# Patient Record
Sex: FEMALE | Race: WHITE | NOT HISPANIC OR LATINO
[De-identification: names, ages, dates, MRNs, and addresses within clinical notes are randomized per-mention and may not be internally consistent; named-entity substitution may affect disease eponyms.]

---

## 2017-01-03 ENCOUNTER — APPOINTMENT (OUTPATIENT)
Dept: OBGYN | Facility: CLINIC | Age: 27
End: 2017-01-03

## 2017-01-03 VITALS
SYSTOLIC BLOOD PRESSURE: 126 MMHG | WEIGHT: 124 LBS | HEIGHT: 61 IN | DIASTOLIC BLOOD PRESSURE: 81 MMHG | BODY MASS INDEX: 23.41 KG/M2

## 2017-01-04 LAB
HSV+VZV DNA SPEC QL NAA+PROBE: NOT DETECTED
SPECIMEN SOURCE: NORMAL

## 2017-01-11 ENCOUNTER — LABORATORY RESULT (OUTPATIENT)
Age: 27
End: 2017-01-11

## 2017-02-10 ENCOUNTER — EMERGENCY (EMERGENCY)
Facility: HOSPITAL | Age: 27
LOS: 1 days | Discharge: HOME | End: 2017-02-10
Attending: EMERGENCY MEDICINE
Payer: COMMERCIAL

## 2017-02-10 VITALS
HEART RATE: 92 BPM | RESPIRATION RATE: 19 BRPM | HEIGHT: 61 IN | WEIGHT: 126.1 LBS | TEMPERATURE: 98 F | DIASTOLIC BLOOD PRESSURE: 77 MMHG | SYSTOLIC BLOOD PRESSURE: 114 MMHG | OXYGEN SATURATION: 99 %

## 2017-02-10 DIAGNOSIS — N93.9 ABNORMAL UTERINE AND VAGINAL BLEEDING, UNSPECIFIED: ICD-10-CM

## 2017-02-10 DIAGNOSIS — O20.0 THREATENED ABORTION: ICD-10-CM

## 2017-02-10 DIAGNOSIS — Z98.89 OTHER SPECIFIED POSTPROCEDURAL STATES: Chronic | ICD-10-CM

## 2017-02-10 DIAGNOSIS — Z90.49 ACQUIRED ABSENCE OF OTHER SPECIFIED PARTS OF DIGESTIVE TRACT: ICD-10-CM

## 2017-02-10 DIAGNOSIS — Z90.89 ACQUIRED ABSENCE OF OTHER ORGANS: ICD-10-CM

## 2017-02-10 DIAGNOSIS — J45.909 UNSPECIFIED ASTHMA, UNCOMPLICATED: ICD-10-CM

## 2017-02-10 DIAGNOSIS — Z3A.01 LESS THAN 8 WEEKS GESTATION OF PREGNANCY: ICD-10-CM

## 2017-02-10 LAB
ALBUMIN SERPL ELPH-MCNC: 4.4 G/DL — SIGNIFICANT CHANGE UP (ref 3.3–5.2)
ALP SERPL-CCNC: 44 U/L — SIGNIFICANT CHANGE UP (ref 40–120)
ALT FLD-CCNC: 28 U/L — SIGNIFICANT CHANGE UP
ANION GAP SERPL CALC-SCNC: 13 MMOL/L — SIGNIFICANT CHANGE UP (ref 5–17)
APPEARANCE UR: CLEAR — SIGNIFICANT CHANGE UP
AST SERPL-CCNC: 48 U/L — HIGH
BACTERIA # UR AUTO: ABNORMAL
BASOPHILS # BLD AUTO: 0 K/UL — SIGNIFICANT CHANGE UP (ref 0–0.2)
BASOPHILS NFR BLD AUTO: 0.4 % — SIGNIFICANT CHANGE UP (ref 0–2)
BILIRUB SERPL-MCNC: 0.4 MG/DL — SIGNIFICANT CHANGE UP (ref 0.4–2)
BILIRUB UR-MCNC: NEGATIVE — SIGNIFICANT CHANGE UP
BUN SERPL-MCNC: 10 MG/DL — SIGNIFICANT CHANGE UP (ref 8–20)
CALCIUM SERPL-MCNC: 9.2 MG/DL — SIGNIFICANT CHANGE UP (ref 8.6–10.2)
CHLORIDE SERPL-SCNC: 101 MMOL/L — SIGNIFICANT CHANGE UP (ref 98–107)
CO2 SERPL-SCNC: 24 MMOL/L — SIGNIFICANT CHANGE UP (ref 22–29)
COLOR SPEC: YELLOW — SIGNIFICANT CHANGE UP
CREAT SERPL-MCNC: 0.56 MG/DL — SIGNIFICANT CHANGE UP (ref 0.5–1.3)
DIFF PNL FLD: ABNORMAL
EOSINOPHIL # BLD AUTO: 0.1 K/UL — SIGNIFICANT CHANGE UP (ref 0–0.5)
EOSINOPHIL NFR BLD AUTO: 1.5 % — SIGNIFICANT CHANGE UP (ref 0–6)
EPI CELLS # UR: SIGNIFICANT CHANGE UP
GLUCOSE SERPL-MCNC: 89 MG/DL — SIGNIFICANT CHANGE UP (ref 70–115)
GLUCOSE UR QL: NEGATIVE MG/DL — SIGNIFICANT CHANGE UP
HCG SERPL-ACNC: 46.54 MIU/ML — SIGNIFICANT CHANGE UP
HCG UR QL: POSITIVE
HCT VFR BLD CALC: 38.5 % — SIGNIFICANT CHANGE UP (ref 37–47)
HGB BLD-MCNC: 13.5 G/DL — SIGNIFICANT CHANGE UP (ref 12–16)
KETONES UR-MCNC: ABNORMAL
LEUKOCYTE ESTERASE UR-ACNC: ABNORMAL
LYMPHOCYTES # BLD AUTO: 1.7 K/UL — SIGNIFICANT CHANGE UP (ref 1–4.8)
LYMPHOCYTES # BLD AUTO: 21.9 % — SIGNIFICANT CHANGE UP (ref 20–55)
MCHC RBC-ENTMCNC: 31.9 PG — HIGH (ref 27–31)
MCHC RBC-ENTMCNC: 35.1 G/DL — SIGNIFICANT CHANGE UP (ref 32–36)
MCV RBC AUTO: 91 FL — SIGNIFICANT CHANGE UP (ref 81–99)
MONOCYTES # BLD AUTO: 0.7 K/UL — SIGNIFICANT CHANGE UP (ref 0–0.8)
MONOCYTES NFR BLD AUTO: 9.6 % — SIGNIFICANT CHANGE UP (ref 3–10)
NEUTROPHILS # BLD AUTO: 5 K/UL — SIGNIFICANT CHANGE UP (ref 1.8–8)
NEUTROPHILS NFR BLD AUTO: 66.3 % — SIGNIFICANT CHANGE UP (ref 37–73)
NITRITE UR-MCNC: NEGATIVE — SIGNIFICANT CHANGE UP
PH UR: 6 — SIGNIFICANT CHANGE UP (ref 4.8–8)
PLATELET # BLD AUTO: 186 K/UL — SIGNIFICANT CHANGE UP (ref 150–400)
POTASSIUM SERPL-MCNC: 5.6 MMOL/L — HIGH (ref 3.5–5.3)
POTASSIUM SERPL-SCNC: 5.6 MMOL/L — HIGH (ref 3.5–5.3)
PROT SERPL-MCNC: 7.7 G/DL — SIGNIFICANT CHANGE UP (ref 6.6–8.7)
PROT UR-MCNC: NEGATIVE MG/DL — SIGNIFICANT CHANGE UP
RBC # BLD: 4.23 M/UL — LOW (ref 4.4–5.2)
RBC # FLD: 12.7 % — SIGNIFICANT CHANGE UP (ref 11–15.6)
RBC CASTS # UR COMP ASSIST: ABNORMAL /HPF (ref 0–4)
SODIUM SERPL-SCNC: 138 MMOL/L — SIGNIFICANT CHANGE UP (ref 135–145)
SP GR SPEC: 1.01 — SIGNIFICANT CHANGE UP (ref 1.01–1.02)
UROBILINOGEN FLD QL: NEGATIVE MG/DL — SIGNIFICANT CHANGE UP
WBC # BLD: 7.58 K/UL — SIGNIFICANT CHANGE UP (ref 4.8–10.8)
WBC # FLD AUTO: 7.58 K/UL — SIGNIFICANT CHANGE UP (ref 4.8–10.8)
WBC UR QL: SIGNIFICANT CHANGE UP

## 2017-02-10 PROCEDURE — 81001 URINALYSIS AUTO W/SCOPE: CPT

## 2017-02-10 PROCEDURE — 76817 TRANSVAGINAL US OBSTETRIC: CPT

## 2017-02-10 PROCEDURE — 85027 COMPLETE CBC AUTOMATED: CPT

## 2017-02-10 PROCEDURE — 99284 EMERGENCY DEPT VISIT MOD MDM: CPT | Mod: 25

## 2017-02-10 PROCEDURE — 84702 CHORIONIC GONADOTROPIN TEST: CPT

## 2017-02-10 PROCEDURE — 80053 COMPREHEN METABOLIC PANEL: CPT

## 2017-02-10 PROCEDURE — 76801 OB US < 14 WKS SINGLE FETUS: CPT

## 2017-02-10 PROCEDURE — 76801 OB US < 14 WKS SINGLE FETUS: CPT | Mod: 26

## 2017-02-10 PROCEDURE — 99284 EMERGENCY DEPT VISIT MOD MDM: CPT

## 2017-02-10 PROCEDURE — 81025 URINE PREGNANCY TEST: CPT

## 2017-02-10 PROCEDURE — 76817 TRANSVAGINAL US OBSTETRIC: CPT | Mod: 26

## 2017-02-10 RX ORDER — ACETAMINOPHEN 500 MG
975 TABLET ORAL ONCE
Qty: 0 | Refills: 0 | Status: COMPLETED | OUTPATIENT
Start: 2017-02-10 | End: 2017-02-10

## 2017-02-10 RX ADMIN — Medication 975 MILLIGRAM(S): at 18:53

## 2017-02-10 NOTE — ED STATDOCS - MEDICAL DECISION MAKING DETAILS
Known pregnancy, about 6 weeks by date. no prenatal care to date. Describes suprapubic crampy pain and vaginal spotting. Line, labs with urine for UTI, HCG/US to evaluate for early/threatened vs. ectopic pregnancy. Will treat symptomatically, check results, and reassess.

## 2017-02-10 NOTE — ED ADULT TRIAGE NOTE - CHIEF COMPLAINT QUOTE
c/o abd. pain and vaginal bleeding , started 2 hrs ago,  bleeding with clots, . took hpt and was +, lmp 12/31/2016

## 2017-02-10 NOTE — ED STATDOCS - MUSCULOSKELETAL FINDINGS, MLM
neck supple/Spine is midline. no leg edema. No calf tenderness./normal range of motion/no muscle tenderness

## 2017-02-10 NOTE — ED STATDOCS - NS ED MD SCRIBE ATTENDING SCRIBE SECTIONS
INTAKE ASSESSMENT/SCREENINGS/DISPOSITION/REVIEW OF SYSTEMS/HIV/VITAL SIGNS( Pullset)/HISTORY OF PRESENT ILLNESS/PHYSICAL EXAM/PAST MEDICAL/SURGICAL/SOCIAL HISTORY

## 2017-02-10 NOTE — ED STATDOCS - PROGRESS NOTE DETAILS
PA NOTE: Pt seen by intake physician and orders/plan reviewed. PT is pregnant presenting to the ED for lower abdominal cramping and vaginal bleeding.  Pt states she found out on 17 that she was pregnant with at home test, states scheduled for OB visit on 17; LMP 16, .  Pt states lower abdominal cramping started this morning, developing vaginal bleeding with small clot around 2pm.  Pt denies  fever, chills, dysuria, vaginal discharge, diarrhea, constipation, nausea, vomiting, hx of STDs.  Pt   PE: GEN: Awake, alert,  NAD,  EYES: PERRL CARDIAC: Reg rate and rhythm, S1,S2, RRR  RESP: No distress noted. Lungs CTA bilaterally no wheeze, ronchi, rales. ABD: soft, supple, mid-suprapubic tenderness, no guarding. . NEURO: AOx3, no focal deficits   PLAN: labs- cbc to check for infection/anemia, bmp to check kidney function/electrolytes, beta hcg, type and screen, ua; pelvic US to evaluate for IUP vs ectopic vs miscarriage; tylenol for pain; monitor; re-evaluate Spoke with patients OB/GYN doctor, Dr. Flannery [phone], made aware of the pts results, no IUP with beta of 45.  Instructed pt to follow up monday.  Pt states blood type is b+.  Pt states she is ready to go home and will follow up on Monday as scheduled. Pt educated on signs and symptoms to return to ED for.

## 2017-02-10 NOTE — ED STATDOCS - LAB INTERPRETATION
hemolyze potassium  no white count  not anemic  urine = rbcs  very low hcg suggesting early pregnancy; IUP cannot likely be seen due to early--follow quant/US

## 2017-02-10 NOTE — ED STATDOCS - OBJECTIVE STATEMENT
27 year old female, with hx of asthma, presenting to the ED with vaginal bleeding and cramping suprapubic pain. Pt states that she is approximately 6 weeks pregnant and found out through a home-pregnancy test. She states that she has not had her first visit with her GYN yet. Pt states that her PSHx includes appendectomy and tonsillectomy. She states that she has NKDA. She states that she began to bleed vaginally 2 hours ago. Pt denies having any hx of transfusion or transplant. She denies having any nausea, vomiting, dysuria, fever, cough, phlegm, or urinary frequency. Pt states that she is able to tolerate PO. No further complaints at this time.  GYN: Dr. Lawson

## 2017-02-10 NOTE — ED STATDOCS - ATTENDING CONTRIBUTION TO CARE
I, Jairo Richardson, performed the initial face to face bedside interview with this patient regarding history of present illness, review of symptoms and relevant past medical, social and family history.  I completed an independent physical examination.  I was the initial provider who evaluated this patient. I have signed out the follow up of any pending tests (i.e. labs, radiological studies) to the ACP.  I have communicated the patient’s plan of care and disposition with the ACP.  The history, relevant review of systems, past medical and surgical history, medical decision making, and physical examination was documented by the scribe in my presence and I attest to the accuracy of the documentation.

## 2017-02-13 ENCOUNTER — APPOINTMENT (OUTPATIENT)
Dept: OBGYN | Facility: CLINIC | Age: 27
End: 2017-02-13

## 2017-02-13 VITALS
DIASTOLIC BLOOD PRESSURE: 70 MMHG | WEIGHT: 124 LBS | HEIGHT: 61 IN | SYSTOLIC BLOOD PRESSURE: 110 MMHG | BODY MASS INDEX: 23.41 KG/M2

## 2017-02-14 ENCOUNTER — LABORATORY RESULT (OUTPATIENT)
Age: 27
End: 2017-02-14

## 2017-02-20 LAB
HCG UR QL: NEGATIVE
QUALITY CONTROL: YES

## 2017-07-03 ENCOUNTER — APPOINTMENT (OUTPATIENT)
Dept: OBGYN | Facility: CLINIC | Age: 27
End: 2017-07-03

## 2017-07-03 VITALS
HEART RATE: 106 BPM | HEIGHT: 61 IN | SYSTOLIC BLOOD PRESSURE: 106 MMHG | BODY MASS INDEX: 24.17 KG/M2 | WEIGHT: 128 LBS | DIASTOLIC BLOOD PRESSURE: 72 MMHG

## 2017-07-06 ENCOUNTER — APPOINTMENT (OUTPATIENT)
Dept: HUMAN REPRODUCTION | Facility: CLINIC | Age: 27
End: 2017-07-06

## 2017-08-04 ENCOUNTER — APPOINTMENT (OUTPATIENT)
Dept: OBGYN | Facility: CLINIC | Age: 27
End: 2017-08-04

## 2017-08-28 ENCOUNTER — APPOINTMENT (OUTPATIENT)
Dept: OBGYN | Facility: CLINIC | Age: 27
End: 2017-08-28
Payer: COMMERCIAL

## 2017-08-28 VITALS
SYSTOLIC BLOOD PRESSURE: 105 MMHG | BODY MASS INDEX: 24.35 KG/M2 | WEIGHT: 129 LBS | DIASTOLIC BLOOD PRESSURE: 65 MMHG | HEIGHT: 61 IN

## 2017-08-28 LAB
HCG UR QL: POSITIVE
QUALITY CONTROL: YES

## 2017-08-28 PROCEDURE — 76817 TRANSVAGINAL US OBSTETRIC: CPT

## 2017-08-28 PROCEDURE — 99213 OFFICE O/P EST LOW 20 MIN: CPT | Mod: 25

## 2017-08-28 PROCEDURE — 81025 URINE PREGNANCY TEST: CPT

## 2017-09-01 ENCOUNTER — RX RENEWAL (OUTPATIENT)
Age: 27
End: 2017-09-01

## 2017-09-01 LAB — CYTOLOGY CVX/VAG DOC THIN PREP: NORMAL

## 2017-09-11 ENCOUNTER — APPOINTMENT (OUTPATIENT)
Dept: OBGYN | Facility: CLINIC | Age: 27
End: 2017-09-11
Payer: COMMERCIAL

## 2017-09-11 VITALS
HEIGHT: 61 IN | SYSTOLIC BLOOD PRESSURE: 110 MMHG | WEIGHT: 128 LBS | DIASTOLIC BLOOD PRESSURE: 74 MMHG | BODY MASS INDEX: 24.17 KG/M2

## 2017-09-11 DIAGNOSIS — R19.09 OTHER INTRA-ABDOMINAL AND PELVIC SWELLING, MASS AND LUMP: ICD-10-CM

## 2017-09-11 DIAGNOSIS — Z87.42 PERSONAL HISTORY OF OTHER DISEASES OF THE FEMALE GENITAL TRACT: ICD-10-CM

## 2017-09-11 DIAGNOSIS — N88.8 OTHER SPECIFIED NONINFLAMMATORY DISORDERS OF CERVIX UTERI: ICD-10-CM

## 2017-09-11 DIAGNOSIS — M25.559 PAIN IN UNSPECIFIED HIP: ICD-10-CM

## 2017-09-11 DIAGNOSIS — O03.9 COMPLETE OR UNSPECIFIED SPONTANEOUS ABORTION W/OUT COMPLICATION: ICD-10-CM

## 2017-09-11 DIAGNOSIS — Z87.09 PERSONAL HISTORY OF OTHER DISEASES OF THE RESPIRATORY SYSTEM: ICD-10-CM

## 2017-09-11 DIAGNOSIS — Z86.19 PERSONAL HISTORY OF OTHER INFECTIOUS AND PARASITIC DISEASES: ICD-10-CM

## 2017-09-11 PROCEDURE — 0501F PRENATAL FLOW SHEET: CPT

## 2017-09-11 PROCEDURE — 76817 TRANSVAGINAL US OBSTETRIC: CPT

## 2017-09-25 ENCOUNTER — APPOINTMENT (OUTPATIENT)
Dept: ANTEPARTUM | Facility: CLINIC | Age: 27
End: 2017-09-25
Payer: COMMERCIAL

## 2017-09-25 ENCOUNTER — ASOB RESULT (OUTPATIENT)
Age: 27
End: 2017-09-25

## 2017-09-25 PROCEDURE — 76801 OB US < 14 WKS SINGLE FETUS: CPT

## 2017-10-02 ENCOUNTER — APPOINTMENT (OUTPATIENT)
Dept: ANTEPARTUM | Facility: CLINIC | Age: 27
End: 2017-10-02
Payer: COMMERCIAL

## 2017-10-02 ENCOUNTER — ASOB RESULT (OUTPATIENT)
Age: 27
End: 2017-10-02

## 2017-10-02 PROCEDURE — 76801 OB US < 14 WKS SINGLE FETUS: CPT

## 2017-10-02 PROCEDURE — 76813 OB US NUCHAL MEAS 1 GEST: CPT

## 2017-10-02 PROCEDURE — 36416 COLLJ CAPILLARY BLOOD SPEC: CPT

## 2017-10-04 ENCOUNTER — ASOB RESULT (OUTPATIENT)
Age: 27
End: 2017-10-04

## 2017-10-04 ENCOUNTER — APPOINTMENT (OUTPATIENT)
Dept: MATERNAL FETAL MEDICINE | Facility: CLINIC | Age: 27
End: 2017-10-04

## 2017-10-05 LAB
1ST TRIMESTER DATA: NORMAL
ADDENDUM DOC: NORMAL
AFP PNL SERPL: NORMAL
AFP SERPL-ACNC: NORMAL
CLINICAL BIOCHEMIST REVIEW: NORMAL
FREE BETA HCG 1ST TRIMESTER: NORMAL
Lab: NORMAL
NOTES NTD: NORMAL
NT: NORMAL
PAPP-A SERPL-ACNC: NORMAL
TRISOMY 18/3: NORMAL

## 2017-10-09 ENCOUNTER — NON-APPOINTMENT (OUTPATIENT)
Age: 27
End: 2017-10-09

## 2017-10-09 ENCOUNTER — APPOINTMENT (OUTPATIENT)
Dept: OBGYN | Facility: CLINIC | Age: 27
End: 2017-10-09
Payer: COMMERCIAL

## 2017-10-09 VITALS
HEIGHT: 61 IN | WEIGHT: 125 LBS | SYSTOLIC BLOOD PRESSURE: 114 MMHG | DIASTOLIC BLOOD PRESSURE: 70 MMHG | BODY MASS INDEX: 23.6 KG/M2

## 2017-10-09 PROCEDURE — 90471 IMMUNIZATION ADMIN: CPT

## 2017-10-09 PROCEDURE — 90656 IIV3 VACC NO PRSV 0.5 ML IM: CPT

## 2017-10-09 PROCEDURE — 0502F SUBSEQUENT PRENATAL CARE: CPT

## 2017-10-14 ENCOUNTER — TRANSCRIPTION ENCOUNTER (OUTPATIENT)
Age: 27
End: 2017-10-14

## 2017-10-16 LAB — CYTOLOGY CVX/VAG DOC THIN PREP: NORMAL

## 2017-10-19 ENCOUNTER — APPOINTMENT (OUTPATIENT)
Dept: NEUROLOGY | Facility: CLINIC | Age: 27
End: 2017-10-19
Payer: COMMERCIAL

## 2017-10-19 VITALS
SYSTOLIC BLOOD PRESSURE: 112 MMHG | WEIGHT: 125 LBS | DIASTOLIC BLOOD PRESSURE: 80 MMHG | HEIGHT: 61 IN | BODY MASS INDEX: 23.6 KG/M2

## 2017-10-19 PROCEDURE — 99204 OFFICE O/P NEW MOD 45 MIN: CPT

## 2017-10-30 ENCOUNTER — APPOINTMENT (OUTPATIENT)
Dept: ANTEPARTUM | Facility: CLINIC | Age: 27
End: 2017-10-30
Payer: COMMERCIAL

## 2017-10-30 ENCOUNTER — ASOB RESULT (OUTPATIENT)
Age: 27
End: 2017-10-30

## 2017-10-30 PROCEDURE — 76805 OB US >/= 14 WKS SNGL FETUS: CPT

## 2017-11-02 LAB
1ST TRIMESTER DATA: NORMAL
2ND TRIMESTER DATA: NORMAL
AFP PNL SERPL: NORMAL
AFP SERPL-ACNC: NORMAL
AFP SERPL-ACNC: NORMAL
B-HCG FREE SERPL-MCNC: NORMAL
CLINICAL BIOCHEMIST REVIEW: NORMAL
FREE BETA HCG 1ST TRIMESTER: NORMAL
INHIBIN A SERPL-MCNC: NORMAL
NOTES NTD: NORMAL
NT: NORMAL
PAPP-A SERPL-ACNC: NORMAL
U ESTRIOL SERPL-SCNC: NORMAL

## 2017-11-06 ENCOUNTER — APPOINTMENT (OUTPATIENT)
Dept: OBGYN | Facility: CLINIC | Age: 27
End: 2017-11-06
Payer: COMMERCIAL

## 2017-11-06 VITALS
HEIGHT: 61 IN | SYSTOLIC BLOOD PRESSURE: 110 MMHG | BODY MASS INDEX: 24.17 KG/M2 | WEIGHT: 128 LBS | DIASTOLIC BLOOD PRESSURE: 68 MMHG

## 2017-11-06 PROCEDURE — 0502F SUBSEQUENT PRENATAL CARE: CPT

## 2017-11-17 ENCOUNTER — APPOINTMENT (OUTPATIENT)
Dept: OBGYN | Facility: CLINIC | Age: 27
End: 2017-11-17
Payer: COMMERCIAL

## 2017-11-17 VITALS — BODY MASS INDEX: 24.56 KG/M2 | WEIGHT: 130 LBS | SYSTOLIC BLOOD PRESSURE: 118 MMHG | DIASTOLIC BLOOD PRESSURE: 78 MMHG

## 2017-11-17 PROCEDURE — 0502F SUBSEQUENT PRENATAL CARE: CPT

## 2017-11-27 ENCOUNTER — APPOINTMENT (OUTPATIENT)
Dept: ANTEPARTUM | Facility: CLINIC | Age: 27
End: 2017-11-27
Payer: COMMERCIAL

## 2017-11-27 ENCOUNTER — ASOB RESULT (OUTPATIENT)
Age: 27
End: 2017-11-27

## 2017-11-27 PROCEDURE — 76817 TRANSVAGINAL US OBSTETRIC: CPT | Mod: 59

## 2017-11-27 PROCEDURE — 76811 OB US DETAILED SNGL FETUS: CPT

## 2017-12-04 ENCOUNTER — APPOINTMENT (OUTPATIENT)
Dept: OBGYN | Facility: CLINIC | Age: 27
End: 2017-12-04
Payer: COMMERCIAL

## 2017-12-04 VITALS
BODY MASS INDEX: 25.3 KG/M2 | WEIGHT: 134 LBS | SYSTOLIC BLOOD PRESSURE: 110 MMHG | HEIGHT: 61 IN | DIASTOLIC BLOOD PRESSURE: 74 MMHG

## 2017-12-04 PROCEDURE — 76818 FETAL BIOPHYS PROFILE W/NST: CPT

## 2017-12-04 PROCEDURE — 0502F SUBSEQUENT PRENATAL CARE: CPT

## 2017-12-18 ENCOUNTER — APPOINTMENT (OUTPATIENT)
Dept: OBGYN | Facility: CLINIC | Age: 27
End: 2017-12-18
Payer: COMMERCIAL

## 2017-12-18 VITALS
BODY MASS INDEX: 25.86 KG/M2 | WEIGHT: 137 LBS | DIASTOLIC BLOOD PRESSURE: 74 MMHG | SYSTOLIC BLOOD PRESSURE: 111 MMHG | HEIGHT: 61 IN

## 2017-12-18 PROCEDURE — 59025 FETAL NON-STRESS TEST: CPT

## 2017-12-18 PROCEDURE — 0502F SUBSEQUENT PRENATAL CARE: CPT

## 2017-12-18 PROCEDURE — 76817 TRANSVAGINAL US OBSTETRIC: CPT

## 2018-01-01 ENCOUNTER — LABORATORY RESULT (OUTPATIENT)
Age: 28
End: 2018-01-01

## 2018-01-02 ENCOUNTER — APPOINTMENT (OUTPATIENT)
Dept: OBGYN | Facility: CLINIC | Age: 28
End: 2018-01-02
Payer: COMMERCIAL

## 2018-01-02 VITALS
SYSTOLIC BLOOD PRESSURE: 125 MMHG | WEIGHT: 138 LBS | BODY MASS INDEX: 26.06 KG/M2 | DIASTOLIC BLOOD PRESSURE: 86 MMHG | HEIGHT: 61 IN

## 2018-01-02 PROCEDURE — 99213 OFFICE O/P EST LOW 20 MIN: CPT | Mod: 25

## 2018-01-02 PROCEDURE — 0502F SUBSEQUENT PRENATAL CARE: CPT

## 2018-01-17 ENCOUNTER — APPOINTMENT (OUTPATIENT)
Dept: OBGYN | Facility: CLINIC | Age: 28
End: 2018-01-17
Payer: COMMERCIAL

## 2018-01-17 VITALS
SYSTOLIC BLOOD PRESSURE: 121 MMHG | BODY MASS INDEX: 26.62 KG/M2 | WEIGHT: 141 LBS | DIASTOLIC BLOOD PRESSURE: 74 MMHG | HEIGHT: 61 IN

## 2018-01-17 PROCEDURE — 0502F SUBSEQUENT PRENATAL CARE: CPT

## 2018-01-22 ENCOUNTER — ASOB RESULT (OUTPATIENT)
Age: 28
End: 2018-01-22

## 2018-01-22 ENCOUNTER — APPOINTMENT (OUTPATIENT)
Dept: ANTEPARTUM | Facility: CLINIC | Age: 28
End: 2018-01-22
Payer: COMMERCIAL

## 2018-01-22 PROCEDURE — 76817 TRANSVAGINAL US OBSTETRIC: CPT

## 2018-01-22 PROCEDURE — 76816 OB US FOLLOW-UP PER FETUS: CPT

## 2018-01-23 ENCOUNTER — APPOINTMENT (OUTPATIENT)
Dept: ANTEPARTUM | Facility: CLINIC | Age: 28
End: 2018-01-23

## 2018-01-23 ENCOUNTER — APPOINTMENT (OUTPATIENT)
Dept: MATERNAL FETAL MEDICINE | Facility: CLINIC | Age: 28
End: 2018-01-23
Payer: COMMERCIAL

## 2018-01-23 VITALS
BODY MASS INDEX: 27.3 KG/M2 | RESPIRATION RATE: 16 BRPM | SYSTOLIC BLOOD PRESSURE: 102 MMHG | HEART RATE: 99 BPM | OXYGEN SATURATION: 98 % | DIASTOLIC BLOOD PRESSURE: 68 MMHG | WEIGHT: 144.5 LBS

## 2018-01-23 DIAGNOSIS — Z3A.28 28 WEEKS GESTATION OF PREGNANCY: ICD-10-CM

## 2018-01-23 PROCEDURE — 99242 OFF/OP CONSLTJ NEW/EST SF 20: CPT

## 2018-01-23 RX ORDER — OXICONAZOLE NITRATE 10 MG/G
1 CREAM TOPICAL
Qty: 90 | Refills: 0 | Status: DISCONTINUED | COMMUNITY
Start: 2017-06-12 | End: 2018-01-23

## 2018-01-23 RX ORDER — HYDROCORTISONE 25 MG/G
2.5 CREAM TOPICAL
Qty: 454 | Refills: 0 | Status: DISCONTINUED | COMMUNITY
Start: 2017-11-02

## 2018-01-23 RX ORDER — AZELAIC ACID 0.15 G/G
15 AEROSOL, FOAM TOPICAL
Qty: 50 | Refills: 0 | Status: DISCONTINUED | COMMUNITY
Start: 2017-06-12 | End: 2018-01-23

## 2018-01-23 RX ORDER — DOXYCYCLINE HYCLATE 100 MG/1
100 TABLET ORAL
Qty: 10 | Refills: 0 | Status: DISCONTINUED | COMMUNITY
Start: 2017-06-12 | End: 2018-01-23

## 2018-01-23 RX ORDER — VITAMIN A ACETATE, .BETA.-CAROTENE, ASCORBIC ACID, CHOLECALCIFEROL, .ALPHA.-TOCOPHEROL ACETATE, DL-, THIAMINE MONONITRATE, RIBOFLAVIN, NIACINAMIDE, PYRIDOXINE HYDROCHLORIDE, FOLIC ACID, CYANOCOBALAMIN, CALCIUM CARBONATE, FERROUS FUMARATE, ZINC OXIDE, AND CUPRIC OXIDE 2000; 2000; 120; 400; 22; 1.84; 3; 20; 10; 1; 12; 200; 27; 25; 2 [IU]/1; [IU]/1; MG/1; [IU]/1; MG/1; MG/1; MG/1; MG/1; MG/1; MG/1; UG/1; MG/1; MG/1; MG/1; MG/1
27-1 TABLET ORAL DAILY
Qty: 90 | Refills: 2 | Status: DISCONTINUED | COMMUNITY
Start: 2017-03-16 | End: 2018-01-23

## 2018-01-23 RX ORDER — VITAMIN C, CALCIUM, IRON, VITAMIN D3, VITAMIN E, VITAMIN B1, VITAMIN B2, VITAMIN B3, VITAMIN B6, FOLIC ACID, IODINE, ZINC, COPPER, DOCUSATE SODIUM, DOCOSAHEXAENOIC ACID (DHA) 27-1-50 MG
27-1 & 250 KIT ORAL
Qty: 90 | Refills: 2 | Status: DISCONTINUED | COMMUNITY
Start: 2017-02-20 | End: 2018-01-23

## 2018-01-30 ENCOUNTER — ASOB RESULT (OUTPATIENT)
Age: 28
End: 2018-01-30

## 2018-01-30 ENCOUNTER — APPOINTMENT (OUTPATIENT)
Dept: ANTEPARTUM | Facility: CLINIC | Age: 28
End: 2018-01-30
Payer: COMMERCIAL

## 2018-01-30 PROCEDURE — 76815 OB US LIMITED FETUS(S): CPT

## 2018-01-30 PROCEDURE — 76819 FETAL BIOPHYS PROFIL W/O NST: CPT

## 2018-01-31 LAB — FIBRONECTIN PLAS-MCNC: NEGATIVE

## 2018-02-05 ENCOUNTER — APPOINTMENT (OUTPATIENT)
Dept: OBGYN | Facility: CLINIC | Age: 28
End: 2018-02-05
Payer: COMMERCIAL

## 2018-02-05 ENCOUNTER — APPOINTMENT (OUTPATIENT)
Dept: ANTEPARTUM | Facility: CLINIC | Age: 28
End: 2018-02-05

## 2018-02-05 VITALS
WEIGHT: 146 LBS | HEIGHT: 61 IN | DIASTOLIC BLOOD PRESSURE: 72 MMHG | SYSTOLIC BLOOD PRESSURE: 113 MMHG | BODY MASS INDEX: 27.56 KG/M2

## 2018-02-05 PROCEDURE — 0502F SUBSEQUENT PRENATAL CARE: CPT

## 2018-02-13 ENCOUNTER — APPOINTMENT (OUTPATIENT)
Dept: ANTEPARTUM | Facility: CLINIC | Age: 28
End: 2018-02-13
Payer: COMMERCIAL

## 2018-02-13 ENCOUNTER — ASOB RESULT (OUTPATIENT)
Age: 28
End: 2018-02-13

## 2018-02-13 PROCEDURE — 76817 TRANSVAGINAL US OBSTETRIC: CPT

## 2018-02-13 PROCEDURE — 76816 OB US FOLLOW-UP PER FETUS: CPT

## 2018-02-13 PROCEDURE — 76819 FETAL BIOPHYS PROFIL W/O NST: CPT

## 2018-02-14 ENCOUNTER — LABORATORY RESULT (OUTPATIENT)
Age: 28
End: 2018-02-14

## 2018-02-16 ENCOUNTER — APPOINTMENT (OUTPATIENT)
Dept: OBGYN | Facility: CLINIC | Age: 28
End: 2018-02-16
Payer: COMMERCIAL

## 2018-02-16 VITALS
WEIGHT: 147 LBS | HEIGHT: 61 IN | BODY MASS INDEX: 27.75 KG/M2 | DIASTOLIC BLOOD PRESSURE: 70 MMHG | SYSTOLIC BLOOD PRESSURE: 114 MMHG

## 2018-02-16 PROCEDURE — 0502F SUBSEQUENT PRENATAL CARE: CPT

## 2018-02-16 PROCEDURE — 90715 TDAP VACCINE 7 YRS/> IM: CPT

## 2018-02-16 PROCEDURE — 90471 IMMUNIZATION ADMIN: CPT

## 2018-02-28 ENCOUNTER — ASOB RESULT (OUTPATIENT)
Age: 28
End: 2018-02-28

## 2018-02-28 ENCOUNTER — APPOINTMENT (OUTPATIENT)
Dept: ANTEPARTUM | Facility: CLINIC | Age: 28
End: 2018-02-28
Payer: COMMERCIAL

## 2018-02-28 ENCOUNTER — APPOINTMENT (OUTPATIENT)
Dept: OBGYN | Facility: CLINIC | Age: 28
End: 2018-02-28
Payer: COMMERCIAL

## 2018-02-28 VITALS
DIASTOLIC BLOOD PRESSURE: 74 MMHG | WEIGHT: 150 LBS | HEIGHT: 61 IN | BODY MASS INDEX: 28.32 KG/M2 | SYSTOLIC BLOOD PRESSURE: 126 MMHG

## 2018-02-28 PROCEDURE — 76816 OB US FOLLOW-UP PER FETUS: CPT

## 2018-02-28 PROCEDURE — 0502F SUBSEQUENT PRENATAL CARE: CPT

## 2018-02-28 PROCEDURE — 76817 TRANSVAGINAL US OBSTETRIC: CPT

## 2018-02-28 PROCEDURE — 76819 FETAL BIOPHYS PROFIL W/O NST: CPT

## 2018-03-13 ENCOUNTER — APPOINTMENT (OUTPATIENT)
Dept: ANTEPARTUM | Facility: CLINIC | Age: 28
End: 2018-03-13
Payer: COMMERCIAL

## 2018-03-13 ENCOUNTER — ASOB RESULT (OUTPATIENT)
Age: 28
End: 2018-03-13

## 2018-03-13 ENCOUNTER — LABORATORY RESULT (OUTPATIENT)
Age: 28
End: 2018-03-13

## 2018-03-13 PROCEDURE — 76816 OB US FOLLOW-UP PER FETUS: CPT

## 2018-03-13 PROCEDURE — 76819 FETAL BIOPHYS PROFIL W/O NST: CPT

## 2018-03-14 ENCOUNTER — LABORATORY RESULT (OUTPATIENT)
Age: 28
End: 2018-03-14

## 2018-03-14 ENCOUNTER — APPOINTMENT (OUTPATIENT)
Dept: OBGYN | Facility: CLINIC | Age: 28
End: 2018-03-14
Payer: COMMERCIAL

## 2018-03-14 VITALS
DIASTOLIC BLOOD PRESSURE: 78 MMHG | BODY MASS INDEX: 28.7 KG/M2 | HEIGHT: 61 IN | WEIGHT: 152 LBS | SYSTOLIC BLOOD PRESSURE: 110 MMHG

## 2018-03-14 PROCEDURE — 0502F SUBSEQUENT PRENATAL CARE: CPT

## 2018-03-21 ENCOUNTER — APPOINTMENT (OUTPATIENT)
Dept: OBGYN | Facility: CLINIC | Age: 28
End: 2018-03-21
Payer: COMMERCIAL

## 2018-03-21 ENCOUNTER — APPOINTMENT (OUTPATIENT)
Dept: ANTEPARTUM | Facility: CLINIC | Age: 28
End: 2018-03-21
Payer: COMMERCIAL

## 2018-03-21 ENCOUNTER — ASOB RESULT (OUTPATIENT)
Age: 28
End: 2018-03-21

## 2018-03-21 PROCEDURE — 76819 FETAL BIOPHYS PROFIL W/O NST: CPT

## 2018-03-21 PROCEDURE — 76816 OB US FOLLOW-UP PER FETUS: CPT

## 2018-03-21 PROCEDURE — 0502F SUBSEQUENT PRENATAL CARE: CPT

## 2018-03-29 ENCOUNTER — APPOINTMENT (OUTPATIENT)
Dept: OBGYN | Facility: CLINIC | Age: 28
End: 2018-03-29
Payer: COMMERCIAL

## 2018-03-29 VITALS
SYSTOLIC BLOOD PRESSURE: 110 MMHG | HEIGHT: 61 IN | BODY MASS INDEX: 29.83 KG/M2 | DIASTOLIC BLOOD PRESSURE: 76 MMHG | WEIGHT: 158 LBS

## 2018-03-29 PROCEDURE — 0502F SUBSEQUENT PRENATAL CARE: CPT

## 2018-03-29 PROCEDURE — 76818 FETAL BIOPHYS PROFILE W/NST: CPT

## 2018-04-04 ENCOUNTER — APPOINTMENT (OUTPATIENT)
Dept: OBGYN | Facility: CLINIC | Age: 28
End: 2018-04-04
Payer: COMMERCIAL

## 2018-04-04 VITALS
WEIGHT: 160 LBS | SYSTOLIC BLOOD PRESSURE: 130 MMHG | DIASTOLIC BLOOD PRESSURE: 80 MMHG | BODY MASS INDEX: 30.21 KG/M2 | HEIGHT: 61 IN

## 2018-04-04 PROCEDURE — 0502F SUBSEQUENT PRENATAL CARE: CPT

## 2018-04-10 ENCOUNTER — MEDICATION RENEWAL (OUTPATIENT)
Age: 28
End: 2018-04-10

## 2018-04-12 ENCOUNTER — APPOINTMENT (OUTPATIENT)
Dept: ANTEPARTUM | Facility: CLINIC | Age: 28
End: 2018-04-12
Payer: COMMERCIAL

## 2018-04-12 ENCOUNTER — APPOINTMENT (OUTPATIENT)
Dept: OBGYN | Facility: CLINIC | Age: 28
End: 2018-04-12
Payer: COMMERCIAL

## 2018-04-12 ENCOUNTER — ASOB RESULT (OUTPATIENT)
Age: 28
End: 2018-04-12

## 2018-04-12 VITALS
DIASTOLIC BLOOD PRESSURE: 80 MMHG | BODY MASS INDEX: 30.21 KG/M2 | SYSTOLIC BLOOD PRESSURE: 129 MMHG | WEIGHT: 160 LBS | HEIGHT: 61 IN

## 2018-04-12 PROCEDURE — 76820 UMBILICAL ARTERY ECHO: CPT

## 2018-04-12 PROCEDURE — 93975 VASCULAR STUDY: CPT

## 2018-04-12 PROCEDURE — 0502F SUBSEQUENT PRENATAL CARE: CPT

## 2018-04-12 PROCEDURE — 76819 FETAL BIOPHYS PROFIL W/O NST: CPT

## 2018-04-12 PROCEDURE — 76816 OB US FOLLOW-UP PER FETUS: CPT

## 2018-04-18 ENCOUNTER — APPOINTMENT (OUTPATIENT)
Dept: OBGYN | Facility: CLINIC | Age: 28
End: 2018-04-18
Payer: COMMERCIAL

## 2018-04-18 VITALS
WEIGHT: 161 LBS | BODY MASS INDEX: 30.4 KG/M2 | SYSTOLIC BLOOD PRESSURE: 120 MMHG | HEIGHT: 61 IN | DIASTOLIC BLOOD PRESSURE: 84 MMHG

## 2018-04-18 PROCEDURE — 76817 TRANSVAGINAL US OBSTETRIC: CPT

## 2018-04-18 PROCEDURE — 0502F SUBSEQUENT PRENATAL CARE: CPT

## 2018-04-23 ENCOUNTER — APPOINTMENT (OUTPATIENT)
Dept: OBGYN | Facility: CLINIC | Age: 28
End: 2018-04-23
Payer: COMMERCIAL

## 2018-04-23 PROCEDURE — 76818 FETAL BIOPHYS PROFILE W/NST: CPT

## 2018-04-23 PROCEDURE — 0502F SUBSEQUENT PRENATAL CARE: CPT

## 2018-04-25 ENCOUNTER — TRANSCRIPTION ENCOUNTER (OUTPATIENT)
Age: 28
End: 2018-04-25

## 2018-04-25 ENCOUNTER — INPATIENT (INPATIENT)
Facility: HOSPITAL | Age: 28
LOS: 1 days | Discharge: HOME | End: 2018-04-27
Payer: COMMERCIAL

## 2018-04-25 VITALS — WEIGHT: 158.73 LBS | HEIGHT: 61 IN

## 2018-04-25 DIAGNOSIS — Z98.89 OTHER SPECIFIED POSTPROCEDURAL STATES: Chronic | ICD-10-CM

## 2018-04-25 DIAGNOSIS — O26.893 OTHER SPECIFIED PREGNANCY RELATED CONDITIONS, THIRD TRIMESTER: ICD-10-CM

## 2018-04-25 DIAGNOSIS — O47.1 FALSE LABOR AT OR AFTER 37 COMPLETED WEEKS OF GESTATION: ICD-10-CM

## 2018-04-25 LAB
APPEARANCE UR: CLEAR — SIGNIFICANT CHANGE UP
BASE EXCESS BLDCOA CALC-SCNC: -9.1 MMOL/L — LOW (ref -2–2)
BASE EXCESS BLDCOV CALC-SCNC: -7.6 MMOL/L — LOW (ref -2–2)
BASOPHILS # BLD AUTO: 0 K/UL — SIGNIFICANT CHANGE UP (ref 0–0.2)
BILIRUB UR-MCNC: NEGATIVE — SIGNIFICANT CHANGE UP
BLD GP AB SCN SERPL QL: SIGNIFICANT CHANGE UP
COLOR SPEC: YELLOW — SIGNIFICANT CHANGE UP
DIFF PNL FLD: ABNORMAL
EOSINOPHIL # BLD AUTO: 0 K/UL — SIGNIFICANT CHANGE UP (ref 0–0.5)
EPI CELLS # UR: SIGNIFICANT CHANGE UP
GAS PNL BLDCOV: 7.19 — LOW (ref 7.25–7.45)
GLUCOSE UR QL: NEGATIVE — SIGNIFICANT CHANGE UP
HCO3 BLDCOA-SCNC: 16 MMOL/L — LOW (ref 21–29)
HCO3 BLDCOV-SCNC: 17 MMOL/L — LOW (ref 21–29)
HCT VFR BLD CALC: 32.9 % — LOW (ref 37–47)
HGB BLD-MCNC: 11.4 G/DL — LOW (ref 12–16)
KETONES UR-MCNC: NEGATIVE — SIGNIFICANT CHANGE UP
LEUKOCYTE ESTERASE UR-ACNC: ABNORMAL
LYMPHOCYTES # BLD AUTO: 1.3 K/UL — SIGNIFICANT CHANGE UP (ref 1–4.8)
LYMPHOCYTES # BLD AUTO: 10 % — LOW (ref 20–55)
MCHC RBC-ENTMCNC: 32.3 PG — HIGH (ref 27–31)
MCHC RBC-ENTMCNC: 34.7 G/DL — SIGNIFICANT CHANGE UP (ref 32–36)
MCV RBC AUTO: 93.2 FL — SIGNIFICANT CHANGE UP (ref 81–99)
MONOCYTES # BLD AUTO: 0.6 K/UL — SIGNIFICANT CHANGE UP (ref 0–0.8)
MONOCYTES NFR BLD AUTO: 5 % — SIGNIFICANT CHANGE UP (ref 3–10)
NEUTROPHILS # BLD AUTO: 11.1 K/UL — HIGH (ref 1.8–8)
NEUTROPHILS NFR BLD AUTO: 84 % — HIGH (ref 37–73)
NEUTS BAND # BLD: 1 % — SIGNIFICANT CHANGE UP (ref 0–8)
NITRITE UR-MCNC: NEGATIVE — SIGNIFICANT CHANGE UP
PCO2 BLDCOA: 64.8 MMHG — SIGNIFICANT CHANGE UP (ref 32–68)
PCO2 BLDCOV: 55.3 MMHG — HIGH (ref 29–53)
PH BLDCOA: 7.12 — LOW (ref 7.18–7.38)
PH UR: 6.5 — SIGNIFICANT CHANGE UP (ref 5–8)
PLAT MORPH BLD: NORMAL — SIGNIFICANT CHANGE UP
PLATELET # BLD AUTO: 167 K/UL — SIGNIFICANT CHANGE UP (ref 150–400)
PO2 BLDCOA: 17.3 MMHG — SIGNIFICANT CHANGE UP (ref 5.7–30.5)
PO2 BLDCOA: 21.6 MMHG — SIGNIFICANT CHANGE UP (ref 17–41)
PROT UR-MCNC: NEGATIVE — SIGNIFICANT CHANGE UP
RBC # BLD: 3.53 M/UL — LOW (ref 4.4–5.2)
RBC # FLD: 13.3 % — SIGNIFICANT CHANGE UP (ref 11–15.6)
RBC BLD AUTO: NORMAL — SIGNIFICANT CHANGE UP
SAO2 % BLDCOA: SIGNIFICANT CHANGE UP
SAO2 % BLDCOV: SIGNIFICANT CHANGE UP
SP GR SPEC: 1.01 — SIGNIFICANT CHANGE UP (ref 1.01–1.02)
T PALLIDUM AB TITR SER: NEGATIVE — SIGNIFICANT CHANGE UP
TYPE + AB SCN PNL BLD: SIGNIFICANT CHANGE UP
UROBILINOGEN FLD QL: NEGATIVE — SIGNIFICANT CHANGE UP
WBC # BLD: 13 K/UL — HIGH (ref 4.8–10.8)
WBC # FLD AUTO: 13 K/UL — HIGH (ref 4.8–10.8)
WBC UR QL: SIGNIFICANT CHANGE UP

## 2018-04-25 PROCEDURE — 59400 OBSTETRICAL CARE: CPT

## 2018-04-25 RX ORDER — GLYCERIN ADULT
1 SUPPOSITORY, RECTAL RECTAL AT BEDTIME
Qty: 0 | Refills: 0 | Status: DISCONTINUED | OUTPATIENT
Start: 2018-04-26 | End: 2018-04-27

## 2018-04-25 RX ORDER — SODIUM CHLORIDE 9 MG/ML
3 INJECTION INTRAMUSCULAR; INTRAVENOUS; SUBCUTANEOUS EVERY 8 HOURS
Qty: 0 | Refills: 0 | Status: DISCONTINUED | OUTPATIENT
Start: 2018-04-26 | End: 2018-04-27

## 2018-04-25 RX ORDER — DIBUCAINE 1 %
1 OINTMENT (GRAM) RECTAL EVERY 4 HOURS
Qty: 0 | Refills: 0 | Status: DISCONTINUED | OUTPATIENT
Start: 2018-04-26 | End: 2018-04-27

## 2018-04-25 RX ORDER — SODIUM CHLORIDE 9 MG/ML
500 INJECTION, SOLUTION INTRAVENOUS ONCE
Qty: 0 | Refills: 0 | Status: DISCONTINUED | OUTPATIENT
Start: 2018-04-25 | End: 2018-04-26

## 2018-04-25 RX ORDER — AER TRAVELER 0.5 G/1
1 SOLUTION RECTAL; TOPICAL EVERY 4 HOURS
Qty: 0 | Refills: 0 | Status: DISCONTINUED | OUTPATIENT
Start: 2018-04-26 | End: 2018-04-27

## 2018-04-25 RX ORDER — LANOLIN
1 OINTMENT (GRAM) TOPICAL EVERY 6 HOURS
Qty: 0 | Refills: 0 | Status: DISCONTINUED | OUTPATIENT
Start: 2018-04-26 | End: 2018-04-27

## 2018-04-25 RX ORDER — PRAMOXINE HYDROCHLORIDE 150 MG/15G
1 AEROSOL, FOAM RECTAL EVERY 4 HOURS
Qty: 0 | Refills: 0 | Status: DISCONTINUED | OUTPATIENT
Start: 2018-04-26 | End: 2018-04-27

## 2018-04-25 RX ORDER — SODIUM CHLORIDE 9 MG/ML
1000 INJECTION, SOLUTION INTRAVENOUS ONCE
Qty: 0 | Refills: 0 | Status: DISCONTINUED | OUTPATIENT
Start: 2018-04-25 | End: 2018-04-25

## 2018-04-25 RX ORDER — ACETAMINOPHEN 500 MG
650 TABLET ORAL EVERY 6 HOURS
Qty: 0 | Refills: 0 | Status: DISCONTINUED | OUTPATIENT
Start: 2018-04-26 | End: 2018-04-27

## 2018-04-25 RX ORDER — SODIUM CHLORIDE 9 MG/ML
1000 INJECTION, SOLUTION INTRAVENOUS
Qty: 0 | Refills: 0 | Status: DISCONTINUED | OUTPATIENT
Start: 2018-04-25 | End: 2018-04-25

## 2018-04-25 RX ORDER — HYDROCORTISONE 1 %
1 OINTMENT (GRAM) TOPICAL EVERY 4 HOURS
Qty: 0 | Refills: 0 | Status: DISCONTINUED | OUTPATIENT
Start: 2018-04-26 | End: 2018-04-27

## 2018-04-25 RX ORDER — IBUPROFEN 200 MG
600 TABLET ORAL EVERY 6 HOURS
Qty: 0 | Refills: 0 | Status: DISCONTINUED | OUTPATIENT
Start: 2018-04-26 | End: 2018-04-27

## 2018-04-25 RX ORDER — DOCUSATE SODIUM 100 MG
100 CAPSULE ORAL
Qty: 0 | Refills: 0 | Status: DISCONTINUED | OUTPATIENT
Start: 2018-04-26 | End: 2018-04-27

## 2018-04-25 RX ORDER — OXYTOCIN 10 UNIT/ML
333.33 VIAL (ML) INJECTION
Qty: 20 | Refills: 0 | Status: DISCONTINUED | OUTPATIENT
Start: 2018-04-25 | End: 2018-04-25

## 2018-04-25 RX ORDER — TETANUS TOXOID, REDUCED DIPHTHERIA TOXOID AND ACELLULAR PERTUSSIS VACCINE, ADSORBED 5; 2.5; 8; 8; 2.5 [IU]/.5ML; [IU]/.5ML; UG/.5ML; UG/.5ML; UG/.5ML
0.5 SUSPENSION INTRAMUSCULAR ONCE
Qty: 0 | Refills: 0 | Status: DISCONTINUED | OUTPATIENT
Start: 2018-04-26 | End: 2018-04-27

## 2018-04-25 RX ORDER — OXYTOCIN 10 UNIT/ML
333.33 VIAL (ML) INJECTION
Qty: 20 | Refills: 0 | Status: DISCONTINUED | OUTPATIENT
Start: 2018-04-25 | End: 2018-04-27

## 2018-04-25 RX ORDER — OXYTOCIN 10 UNIT/ML
41.67 VIAL (ML) INJECTION
Qty: 20 | Refills: 0 | Status: DISCONTINUED | OUTPATIENT
Start: 2018-04-26 | End: 2018-04-27

## 2018-04-25 RX ORDER — OXYTOCIN 10 UNIT/ML
2 VIAL (ML) INJECTION
Qty: 30 | Refills: 0 | Status: DISCONTINUED | OUTPATIENT
Start: 2018-04-25 | End: 2018-04-27

## 2018-04-25 RX ORDER — CITRIC ACID/SODIUM CITRATE 300-500 MG
30 SOLUTION, ORAL ORAL ONCE
Qty: 0 | Refills: 0 | Status: DISCONTINUED | OUTPATIENT
Start: 2018-04-25 | End: 2018-04-26

## 2018-04-25 RX ORDER — OXYTOCIN 10 UNIT/ML
333.33 VIAL (ML) INJECTION
Qty: 20 | Refills: 0 | Status: COMPLETED | OUTPATIENT
Start: 2018-04-25

## 2018-04-25 RX ORDER — OXYCODONE AND ACETAMINOPHEN 5; 325 MG/1; MG/1
2 TABLET ORAL EVERY 6 HOURS
Qty: 0 | Refills: 0 | Status: DISCONTINUED | OUTPATIENT
Start: 2018-04-26 | End: 2018-04-27

## 2018-04-25 RX ORDER — DIPHENHYDRAMINE HCL 50 MG
25 CAPSULE ORAL EVERY 6 HOURS
Qty: 0 | Refills: 0 | Status: DISCONTINUED | OUTPATIENT
Start: 2018-04-26 | End: 2018-04-27

## 2018-04-25 RX ORDER — SODIUM CHLORIDE 9 MG/ML
1000 INJECTION, SOLUTION INTRAVENOUS
Qty: 0 | Refills: 0 | Status: DISCONTINUED | OUTPATIENT
Start: 2018-04-25 | End: 2018-04-26

## 2018-04-25 RX ORDER — CITRIC ACID/SODIUM CITRATE 300-500 MG
15 SOLUTION, ORAL ORAL EVERY 4 HOURS
Qty: 0 | Refills: 0 | Status: DISCONTINUED | OUTPATIENT
Start: 2018-04-25 | End: 2018-04-26

## 2018-04-25 RX ORDER — SIMETHICONE 80 MG/1
80 TABLET, CHEWABLE ORAL EVERY 6 HOURS
Qty: 0 | Refills: 0 | Status: DISCONTINUED | OUTPATIENT
Start: 2018-04-26 | End: 2018-04-27

## 2018-04-25 RX ORDER — MAGNESIUM HYDROXIDE 400 MG/1
30 TABLET, CHEWABLE ORAL
Qty: 0 | Refills: 0 | Status: DISCONTINUED | OUTPATIENT
Start: 2018-04-26 | End: 2018-04-27

## 2018-04-25 RX ADMIN — Medication 2 MILLIUNIT(S)/MIN: at 11:43

## 2018-04-26 RX ORDER — IBUPROFEN 200 MG
1 TABLET ORAL
Qty: 30 | Refills: 0 | OUTPATIENT
Start: 2018-04-26

## 2018-04-26 RX ORDER — DOCUSATE SODIUM 100 MG
1 CAPSULE ORAL
Qty: 30 | Refills: 0 | OUTPATIENT
Start: 2018-04-26

## 2018-04-26 RX ADMIN — OXYCODONE AND ACETAMINOPHEN 2 TABLET(S): 5; 325 TABLET ORAL at 13:25

## 2018-04-26 RX ADMIN — OXYCODONE AND ACETAMINOPHEN 2 TABLET(S): 5; 325 TABLET ORAL at 19:51

## 2018-04-26 RX ADMIN — OXYCODONE AND ACETAMINOPHEN 2 TABLET(S): 5; 325 TABLET ORAL at 14:15

## 2018-04-26 RX ADMIN — OXYCODONE AND ACETAMINOPHEN 2 TABLET(S): 5; 325 TABLET ORAL at 06:38

## 2018-04-26 RX ADMIN — OXYCODONE AND ACETAMINOPHEN 2 TABLET(S): 5; 325 TABLET ORAL at 00:49

## 2018-04-26 RX ADMIN — OXYCODONE AND ACETAMINOPHEN 2 TABLET(S): 5; 325 TABLET ORAL at 20:50

## 2018-04-26 RX ADMIN — Medication 600 MILLIGRAM(S): at 11:30

## 2018-04-26 RX ADMIN — Medication 600 MILLIGRAM(S): at 10:39

## 2018-04-26 NOTE — DISCHARGE NOTE OB - CARE PROVIDER_API CALL
Salvatore Flannery), Obstetrics and Gynecology  [address] [place] [address]  Phone: [phone]  Fax: [phone]

## 2018-04-26 NOTE — DISCHARGE NOTE OB - PATIENT PORTAL LINK FT
You can access the [facility] Patient Portal, offered by [facility], by registering with the following website: [email or web address]

## 2018-04-26 NOTE — PROGRESS NOTE ADULT - SUBJECTIVE AND OBJECTIVE BOX
Patient is PPD#1 s/p spontaneous vaginal delivery     S:    The patient had some perineal pain overnight, otherwise no complaints.  She is ambulating and tolerating diet   + flatus/-BM     O:    T(C): 37.4 (04-26-18 @ 00:18), Max: 37.4 (04-26-18 @ 00:18)  HR: 67 (04-26-18 @ 00:18) (65 - 89)  BP: 129/79 (04-26-18 @ 00:18) (114/58 - 135/62)  RR: 18 (04-26-18 @ 00:18) (16 - 18)  SpO2: 98% (04-26-18 @ 00:18) (98% - 98%)  Abdomen:  soft, non-tender, non-distended, +bowel sounds.  Uterus:  Fundus firm below umbilicus  VE:  +lochia  Ext:  Non-tender.                          11.4   13.0  )-----------( 167      ( 25 Apr 2018 11:41 )             32.9

## 2018-04-26 NOTE — PROGRESS NOTE ADULT - SUBJECTIVE AND OBJECTIVE BOX
INTERVAL HPI/OVERNIGHT EVENTS:  28y Female s/p labor epidural on 4/25/18    Vital Signs Last 24 Hrs  T(C): 36.7 (26 Apr 2018 07:40), Max: 37.4 (26 Apr 2018 00:18)  T(F): 98 (26 Apr 2018 07:40), Max: 99.3 (26 Apr 2018 00:18)  HR: 83 (26 Apr 2018 07:40) (65 - 89)  BP: 119/80 (26 Apr 2018 07:40) (114/58 - 135/62)  BP(mean): --  RR: 18 (26 Apr 2018 07:40) (16 - 18)  SpO2: 98% (26 Apr 2018 07:40) (98% - 98%)    Patient seen, doing well, no headache, no residual numbness or weakness, no anesthetic complications or complaints noted or reported.

## 2018-04-26 NOTE — DISCHARGE NOTE OB - MEDICATION SUMMARY - MEDICATIONS TO TAKE
I will START or STAY ON the medications listed below when I get home from the hospital:     mg oral tablet  -- 1 tab(s) by mouth every 8 hours   -- Do not take this drug if you are pregnant.  It is very important that you take or use this exactly as directed.  Do not skip doses or discontinue unless directed by your doctor.  May cause drowsiness or dizziness.  Obtain medical advice before taking any non-prescription drugs as some may affect the action of this medication.  Take with food or milk.    -- Indication: For moderate pain    oxyCODONE-acetaminophen 5 mg-325 mg oral tablet  -- 1 tab(s) by mouth every 6 hours MDD:4  -- Caution federal law prohibits the transfer of this drug to any person other  than the person for whom it was prescribed.  May cause drowsiness.  Alcohol may intensify this effect.  Use care when operating dangerous machinery.  This prescription cannot be refilled.  This product contains acetaminophen.  Do not use  with any other product containing acetaminophen to prevent possible liver damage.  Using more of this medication than prescribed may cause serious breathing problems.    -- Indication: For severe pain    Prenatal Multivitamins with Folic Acid 1 mg oral tablet  -- 1 tab(s) by mouth once a day  -- Indication: For home med    Colace 100 mg oral capsule  -- 1 cap(s) by mouth once a day   -- Medication should be taken with plenty of water.    -- Indication: For constipation

## 2018-04-26 NOTE — DISCHARGE NOTE OB - NS DC ANGIO PCI YN
Continue taking the antibiotics prescribed for the ear infection. Return to the emergency room if symptoms worsen, or if you have any concerns. Make an appointment with your child's pediatrician.  
no

## 2018-04-26 NOTE — PROGRESS NOTE ADULT - ASSESSMENT
A/P: Patient is PPD#1 s/p spontaneous vaginal delivery   -Stable  -Voiding, tolerating PO, bowel function nml   -Advance care as tolerated   -Continue routine postpartum/postoperative care and education.  -Desires circumcision.

## 2018-04-27 VITALS
SYSTOLIC BLOOD PRESSURE: 126 MMHG | TEMPERATURE: 98 F | HEART RATE: 86 BPM | DIASTOLIC BLOOD PRESSURE: 85 MMHG | RESPIRATION RATE: 20 BRPM

## 2018-04-27 LAB
BASOPHILS # BLD AUTO: 0 K/UL — SIGNIFICANT CHANGE UP (ref 0–0.2)
BASOPHILS NFR BLD AUTO: 0.2 % — SIGNIFICANT CHANGE UP (ref 0–2)
EOSINOPHIL # BLD AUTO: 0.2 K/UL — SIGNIFICANT CHANGE UP (ref 0–0.5)
EOSINOPHIL NFR BLD AUTO: 2.1 % — SIGNIFICANT CHANGE UP (ref 0–6)
HCT VFR BLD CALC: 30.7 % — LOW (ref 37–47)
HGB BLD-MCNC: 10.2 G/DL — LOW (ref 12–16)
LYMPHOCYTES # BLD AUTO: 2.5 K/UL — SIGNIFICANT CHANGE UP (ref 1–4.8)
LYMPHOCYTES # BLD AUTO: 23.4 % — SIGNIFICANT CHANGE UP (ref 20–55)
MCHC RBC-ENTMCNC: 31.5 PG — HIGH (ref 27–31)
MCHC RBC-ENTMCNC: 33.2 G/DL — SIGNIFICANT CHANGE UP (ref 32–36)
MCV RBC AUTO: 94.8 FL — SIGNIFICANT CHANGE UP (ref 81–99)
MONOCYTES # BLD AUTO: 0.7 K/UL — SIGNIFICANT CHANGE UP (ref 0–0.8)
MONOCYTES NFR BLD AUTO: 6.2 % — SIGNIFICANT CHANGE UP (ref 3–10)
NEUTROPHILS # BLD AUTO: 7.2 K/UL — SIGNIFICANT CHANGE UP (ref 1.8–8)
NEUTROPHILS NFR BLD AUTO: 67.8 % — SIGNIFICANT CHANGE UP (ref 37–73)
PLATELET # BLD AUTO: 139 K/UL — LOW (ref 150–400)
RBC # BLD: 3.24 M/UL — LOW (ref 4.4–5.2)
RBC # FLD: 14 % — SIGNIFICANT CHANGE UP (ref 11–15.6)
WBC # BLD: 10.6 K/UL — SIGNIFICANT CHANGE UP (ref 4.8–10.8)
WBC # FLD AUTO: 10.6 K/UL — SIGNIFICANT CHANGE UP (ref 4.8–10.8)

## 2018-04-27 PROCEDURE — 36415 COLL VENOUS BLD VENIPUNCTURE: CPT

## 2018-04-27 PROCEDURE — 86900 BLOOD TYPING SEROLOGIC ABO: CPT

## 2018-04-27 PROCEDURE — 86780 TREPONEMA PALLIDUM: CPT

## 2018-04-27 PROCEDURE — 81001 URINALYSIS AUTO W/SCOPE: CPT

## 2018-04-27 PROCEDURE — 82803 BLOOD GASES ANY COMBINATION: CPT

## 2018-04-27 PROCEDURE — 85027 COMPLETE CBC AUTOMATED: CPT

## 2018-04-27 PROCEDURE — 86901 BLOOD TYPING SEROLOGIC RH(D): CPT

## 2018-04-27 PROCEDURE — 86850 RBC ANTIBODY SCREEN: CPT

## 2018-04-27 PROCEDURE — G0463: CPT

## 2018-04-27 PROCEDURE — 59025 FETAL NON-STRESS TEST: CPT

## 2018-04-27 RX ADMIN — OXYCODONE AND ACETAMINOPHEN 2 TABLET(S): 5; 325 TABLET ORAL at 10:31

## 2018-04-27 RX ADMIN — OXYCODONE AND ACETAMINOPHEN 2 TABLET(S): 5; 325 TABLET ORAL at 01:55

## 2018-04-27 RX ADMIN — Medication 100 MILLIGRAM(S): at 10:31

## 2018-04-27 RX ADMIN — OXYCODONE AND ACETAMINOPHEN 2 TABLET(S): 5; 325 TABLET ORAL at 11:30

## 2018-04-27 RX ADMIN — OXYCODONE AND ACETAMINOPHEN 2 TABLET(S): 5; 325 TABLET ORAL at 02:50

## 2018-04-27 NOTE — PROGRESS NOTE ADULT - ASSESSMENT
A/P: PPD#2 s/p spontaneous vaginal delivery viable male infant, completed circumcision   -Stable  -Voiding, tolerating PO, bowel function nml   -Advance care as tolerated   -Continue routine postpartum/postoperative care and education.

## 2018-04-27 NOTE — PROGRESS NOTE ADULT - SUBJECTIVE AND OBJECTIVE BOX
Post partum Day#2        Vital Signs Last 24 Hrs  T(C): 37 (2018 20:08), Max: 37 (2018 20:08)  T(F): 98.6 (2018 20:08), Max: 98.6 (2018 20:08)  HR: 88 (2018 20:08) (88 - 88)  BP: 113/76 (2018 20:08) (113/76 - 113/76)  BP(mean): --  RR: 20 (2018 20:08) (20 - 20)  SpO2: --    Out of bed  Breasts soft  nontender no engorgement  Lungs Clear  Abdomen soft, fundus firm, lochia wnl  No cva or calf tenderness                          10.2   10.6  )-----------( 139      ( 2018 07:30 )             30.7       Urinalysis Basic - ( 2018 11:41 )    Color: Yellow / Appearance: Clear / S.010 / pH: x  Gluc: x / Ketone: Negative  / Bili: Negative / Urobili: Negative   Blood: x / Protein: Negative / Nitrite: Negative   Leuk Esterase: Trace / RBC: x / WBC 3-5   Sq Epi: x / Non Sq Epi: Occasional / Bacteria: x            Patient doing well, discharge today to office in 6 weeks

## 2018-04-27 NOTE — PROGRESS NOTE ADULT - SUBJECTIVE AND OBJECTIVE BOX
PPD#2 s/p spontaneous vaginal delivery viable male infant, completed circumcision     S:    The patient has no complaints.  She is ambulating and tolerating diet   + flatus/-BM     O:    T(C): 37 (04-26-18 @ 20:08), Max: 37 (04-26-18 @ 20:08)  HR: 88 (04-26-18 @ 20:08) (83 - 88)  BP: 113/76 (04-26-18 @ 20:08) (113/76 - 119/80)  RR: 20 (04-26-18 @ 20:08) (18 - 20)  SpO2: 98% (04-26-18 @ 07:40) (98% - 98%)  Abdomen:  soft, non-tender, non-distended, +bowel sounds.  Uterus:  Fundus firm below umbilicus  VE:  +lochia  Ext:  Non-tender.                          11.4   13.0  )-----------( 167      ( 25 Apr 2018 11:41 )             32.9

## 2018-04-30 ENCOUNTER — APPOINTMENT (OUTPATIENT)
Dept: OBGYN | Facility: CLINIC | Age: 28
End: 2018-04-30
Payer: COMMERCIAL

## 2018-04-30 VITALS
SYSTOLIC BLOOD PRESSURE: 124 MMHG | HEIGHT: 61 IN | WEIGHT: 149 LBS | DIASTOLIC BLOOD PRESSURE: 76 MMHG | HEART RATE: 108 BPM | BODY MASS INDEX: 28.13 KG/M2

## 2018-04-30 PROCEDURE — 0503F POSTPARTUM CARE VISIT: CPT

## 2018-04-30 PROCEDURE — 99212 OFFICE O/P EST SF 10 MIN: CPT

## 2018-05-07 ENCOUNTER — APPOINTMENT (OUTPATIENT)
Dept: OBGYN | Facility: CLINIC | Age: 28
End: 2018-05-07
Payer: COMMERCIAL

## 2018-05-07 VITALS
BODY MASS INDEX: 28.13 KG/M2 | WEIGHT: 149 LBS | HEIGHT: 61 IN | DIASTOLIC BLOOD PRESSURE: 70 MMHG | SYSTOLIC BLOOD PRESSURE: 114 MMHG

## 2018-05-07 PROCEDURE — 99214 OFFICE O/P EST MOD 30 MIN: CPT

## 2018-05-14 ENCOUNTER — APPOINTMENT (OUTPATIENT)
Dept: OBGYN | Facility: CLINIC | Age: 28
End: 2018-05-14
Payer: COMMERCIAL

## 2018-05-14 VITALS
WEIGHT: 143 LBS | DIASTOLIC BLOOD PRESSURE: 74 MMHG | BODY MASS INDEX: 27 KG/M2 | SYSTOLIC BLOOD PRESSURE: 116 MMHG | HEIGHT: 61 IN

## 2018-05-14 PROCEDURE — 81003 URINALYSIS AUTO W/O SCOPE: CPT | Mod: QW

## 2018-05-14 PROCEDURE — 99214 OFFICE O/P EST MOD 30 MIN: CPT

## 2018-05-15 LAB — BACTERIA UR CULT: NORMAL

## 2018-05-25 ENCOUNTER — APPOINTMENT (OUTPATIENT)
Dept: OBGYN | Facility: CLINIC | Age: 28
End: 2018-05-25
Payer: COMMERCIAL

## 2018-05-25 VITALS
DIASTOLIC BLOOD PRESSURE: 76 MMHG | HEIGHT: 61 IN | WEIGHT: 140 LBS | SYSTOLIC BLOOD PRESSURE: 120 MMHG | HEART RATE: 91 BPM | BODY MASS INDEX: 26.43 KG/M2

## 2018-05-25 PROCEDURE — 99213 OFFICE O/P EST LOW 20 MIN: CPT

## 2018-05-30 ENCOUNTER — OUTPATIENT (OUTPATIENT)
Dept: OUTPATIENT SERVICES | Facility: HOSPITAL | Age: 28
LOS: 1 days | End: 2018-05-30
Payer: COMMERCIAL

## 2018-05-30 ENCOUNTER — APPOINTMENT (OUTPATIENT)
Dept: ULTRASOUND IMAGING | Facility: CLINIC | Age: 28
End: 2018-05-30

## 2018-05-30 DIAGNOSIS — Z98.89 OTHER SPECIFIED POSTPROCEDURAL STATES: Chronic | ICD-10-CM

## 2018-05-30 DIAGNOSIS — Z00.8 ENCOUNTER FOR OTHER GENERAL EXAMINATION: ICD-10-CM

## 2018-05-30 PROCEDURE — 76642 ULTRASOUND BREAST LIMITED: CPT

## 2018-05-30 PROCEDURE — 76642 ULTRASOUND BREAST LIMITED: CPT | Mod: 26,LT

## 2018-05-31 ENCOUNTER — APPOINTMENT (OUTPATIENT)
Dept: ULTRASOUND IMAGING | Facility: CLINIC | Age: 28
End: 2018-05-31
Payer: COMMERCIAL

## 2018-05-31 ENCOUNTER — RESULT REVIEW (OUTPATIENT)
Age: 28
End: 2018-05-31

## 2018-05-31 ENCOUNTER — OUTPATIENT (OUTPATIENT)
Dept: OUTPATIENT SERVICES | Facility: HOSPITAL | Age: 28
LOS: 1 days | End: 2018-05-31
Payer: COMMERCIAL

## 2018-05-31 DIAGNOSIS — Z98.89 OTHER SPECIFIED POSTPROCEDURAL STATES: Chronic | ICD-10-CM

## 2018-05-31 DIAGNOSIS — Z00.8 ENCOUNTER FOR OTHER GENERAL EXAMINATION: ICD-10-CM

## 2018-05-31 PROCEDURE — 19083 BX BREAST 1ST LESION US IMAG: CPT | Mod: LT

## 2018-05-31 PROCEDURE — 19083 BX BREAST 1ST LESION US IMAG: CPT

## 2018-06-01 ENCOUNTER — APPOINTMENT (OUTPATIENT)
Dept: SURGERY | Facility: CLINIC | Age: 28
End: 2018-06-01
Payer: COMMERCIAL

## 2018-06-01 VITALS
HEART RATE: 104 BPM | DIASTOLIC BLOOD PRESSURE: 85 MMHG | BODY MASS INDEX: 26.43 KG/M2 | OXYGEN SATURATION: 99 % | HEIGHT: 61 IN | TEMPERATURE: 98.2 F | WEIGHT: 140 LBS | SYSTOLIC BLOOD PRESSURE: 131 MMHG

## 2018-06-01 PROCEDURE — 99205 OFFICE O/P NEW HI 60 MIN: CPT

## 2018-06-01 PROCEDURE — 99204 OFFICE O/P NEW MOD 45 MIN: CPT

## 2018-06-01 RX ORDER — NYSTATIN 100000 [USP'U]/G
100000 CREAM TOPICAL TWICE DAILY
Qty: 45 | Refills: 0 | Status: DISCONTINUED | COMMUNITY
Start: 2018-05-14 | End: 2018-06-01

## 2018-06-01 RX ORDER — VITAMIN C, CALCIUM, IRON, VITAMIN D3, VITAMIN E, VITAMIN B1, VITAMIN B2, VITAMIN B3, VITAMIN B6, FOLIC ACID, IODINE, ZINC, COPPER, DOCUSATE SODIUM, DOCOSAHEXAENOIC ACID (DHA) 27-1-50 MG
27-1 & 250 KIT ORAL
Qty: 90 | Refills: 2 | Status: DISCONTINUED | COMMUNITY
Start: 2017-02-13 | End: 2018-06-01

## 2018-06-01 RX ORDER — VALACYCLOVIR 500 MG/1
500 TABLET, FILM COATED ORAL TWICE DAILY
Qty: 60 | Refills: 0 | Status: DISCONTINUED | COMMUNITY
Start: 2017-09-01 | End: 2018-06-01

## 2018-06-01 RX ORDER — CEPHALEXIN 500 MG/1
500 CAPSULE ORAL 3 TIMES DAILY
Qty: 21 | Refills: 0 | Status: DISCONTINUED | COMMUNITY
Start: 2018-04-30 | End: 2018-06-01

## 2018-06-01 RX ORDER — MUPIROCIN 20 MG/G
2 OINTMENT TOPICAL
Qty: 22 | Refills: 0 | Status: DISCONTINUED | COMMUNITY
Start: 2017-10-24

## 2018-06-01 RX ORDER — SODIUM PICOSULFATE, MAGNESIUM OXIDE, AND ANHYDROUS CITRIC ACID 10; 3.5; 12 MG/16.2G; G/16.2G; G/16.2G
10-3.5-12 POWDER, METERED ORAL
Qty: 2 | Refills: 0 | Status: DISCONTINUED | COMMUNITY
Start: 2017-06-27

## 2018-06-01 RX ORDER — CEPHALEXIN 500 MG/1
500 CAPSULE ORAL 3 TIMES DAILY
Qty: 21 | Refills: 0 | Status: DISCONTINUED | COMMUNITY
Start: 2018-05-17 | End: 2018-06-01

## 2018-06-01 RX ORDER — AMPICILLIN 500 MG/1
500 CAPSULE ORAL 3 TIMES DAILY
Qty: 21 | Refills: 0 | Status: DISCONTINUED | COMMUNITY
Start: 2018-05-14 | End: 2018-06-01

## 2018-06-01 RX ORDER — VITAMIN C, CALCIUM, IRON, VITAMIN D3, VITAMIN E, VITAMIN B1, VITAMIN B2, VITAMIN B3, VITAMIN B6, FOLIC ACID, IODINE, ZINC, COPPER, DOCUSATE SODIUM, DOCOSAHEXAENOIC ACID (DHA) 27-1-50 MG
27-1 & 250 KIT ORAL
Qty: 90 | Refills: 2 | Status: DISCONTINUED | COMMUNITY
Start: 2018-04-10 | End: 2018-06-01

## 2018-06-01 RX ORDER — VALACYCLOVIR 1 G/1
1 TABLET, FILM COATED ORAL
Qty: 10 | Refills: 0 | Status: DISCONTINUED | COMMUNITY
Start: 2017-01-03 | End: 2018-06-01

## 2018-06-06 ENCOUNTER — APPOINTMENT (OUTPATIENT)
Dept: SURGERY | Facility: CLINIC | Age: 28
End: 2018-06-06
Payer: COMMERCIAL

## 2018-06-06 DIAGNOSIS — Z87.09 PERSONAL HISTORY OF OTHER DISEASES OF THE RESPIRATORY SYSTEM: ICD-10-CM

## 2018-06-06 PROCEDURE — 99354: CPT

## 2018-06-06 PROCEDURE — 99215 OFFICE O/P EST HI 40 MIN: CPT

## 2018-06-07 ENCOUNTER — OUTPATIENT (OUTPATIENT)
Dept: OUTPATIENT SERVICES | Facility: HOSPITAL | Age: 28
LOS: 1 days | End: 2018-06-07
Payer: COMMERCIAL

## 2018-06-07 ENCOUNTER — APPOINTMENT (OUTPATIENT)
Dept: MRI IMAGING | Facility: CLINIC | Age: 28
End: 2018-06-07
Payer: COMMERCIAL

## 2018-06-07 ENCOUNTER — APPOINTMENT (OUTPATIENT)
Dept: CT IMAGING | Facility: CLINIC | Age: 28
End: 2018-06-07

## 2018-06-07 ENCOUNTER — APPOINTMENT (OUTPATIENT)
Dept: ULTRASOUND IMAGING | Facility: CLINIC | Age: 28
End: 2018-06-07
Payer: COMMERCIAL

## 2018-06-07 ENCOUNTER — APPOINTMENT (OUTPATIENT)
Dept: NUCLEAR MEDICINE | Facility: CLINIC | Age: 28
End: 2018-06-07

## 2018-06-07 ENCOUNTER — APPOINTMENT (OUTPATIENT)
Dept: MAMMOGRAPHY | Facility: CLINIC | Age: 28
End: 2018-06-07
Payer: COMMERCIAL

## 2018-06-07 DIAGNOSIS — Z98.89 OTHER SPECIFIED POSTPROCEDURAL STATES: Chronic | ICD-10-CM

## 2018-06-07 DIAGNOSIS — Z00.8 ENCOUNTER FOR OTHER GENERAL EXAMINATION: ICD-10-CM

## 2018-06-07 PROBLEM — Z87.09 HISTORY OF ASTHMA: Status: RESOLVED | Noted: 2018-06-01 | Resolved: 2018-06-07

## 2018-06-07 PROCEDURE — 77066 DX MAMMO INCL CAD BI: CPT

## 2018-06-07 PROCEDURE — 76641 ULTRASOUND BREAST COMPLETE: CPT | Mod: 26,50

## 2018-06-07 PROCEDURE — 74178 CT ABD&PLV WO CNTR FLWD CNTR: CPT

## 2018-06-07 PROCEDURE — 77066 DX MAMMO INCL CAD BI: CPT | Mod: 26

## 2018-06-07 PROCEDURE — 74178 CT ABD&PLV WO CNTR FLWD CNTR: CPT | Mod: 26

## 2018-06-07 PROCEDURE — 0159T: CPT | Mod: 26

## 2018-06-07 PROCEDURE — 76641 ULTRASOUND BREAST COMPLETE: CPT

## 2018-06-07 PROCEDURE — C8908: CPT

## 2018-06-07 PROCEDURE — C8937: CPT

## 2018-06-07 PROCEDURE — 71260 CT THORAX DX C+: CPT

## 2018-06-07 PROCEDURE — G0279: CPT

## 2018-06-07 PROCEDURE — 77059 MRI BREAST BILATERAL: CPT | Mod: 26

## 2018-06-07 PROCEDURE — G0279: CPT | Mod: 26

## 2018-06-07 PROCEDURE — 71260 CT THORAX DX C+: CPT | Mod: 26

## 2018-06-08 ENCOUNTER — APPOINTMENT (OUTPATIENT)
Dept: NUCLEAR MEDICINE | Facility: CLINIC | Age: 28
End: 2018-06-08
Payer: COMMERCIAL

## 2018-06-08 ENCOUNTER — OUTPATIENT (OUTPATIENT)
Dept: OUTPATIENT SERVICES | Facility: HOSPITAL | Age: 28
LOS: 1 days | End: 2018-06-08
Payer: COMMERCIAL

## 2018-06-08 ENCOUNTER — RESULT REVIEW (OUTPATIENT)
Age: 28
End: 2018-06-08

## 2018-06-08 ENCOUNTER — OUTPATIENT (OUTPATIENT)
Dept: OUTPATIENT SERVICES | Facility: HOSPITAL | Age: 28
LOS: 1 days | End: 2018-06-08

## 2018-06-08 ENCOUNTER — APPOINTMENT (OUTPATIENT)
Dept: ULTRASOUND IMAGING | Facility: CLINIC | Age: 28
End: 2018-06-08
Payer: COMMERCIAL

## 2018-06-08 DIAGNOSIS — Z98.89 OTHER SPECIFIED POSTPROCEDURAL STATES: Chronic | ICD-10-CM

## 2018-06-08 DIAGNOSIS — Z00.8 ENCOUNTER FOR OTHER GENERAL EXAMINATION: ICD-10-CM

## 2018-06-08 DIAGNOSIS — C50.919 MALIGNANT NEOPLASM OF UNSPECIFIED SITE OF UNSPECIFIED FEMALE BREAST: ICD-10-CM

## 2018-06-08 DIAGNOSIS — C50.412 MALIGNANT NEOPLASM OF UPPER-OUTER QUADRANT OF LEFT FEMALE BREAST: ICD-10-CM

## 2018-06-08 PROCEDURE — 19083 BX BREAST 1ST LESION US IMAG: CPT | Mod: LT

## 2018-06-08 PROCEDURE — 78306 BONE IMAGING WHOLE BODY: CPT | Mod: 26

## 2018-06-08 PROCEDURE — 19083 BX BREAST 1ST LESION US IMAG: CPT

## 2018-06-08 PROCEDURE — C1739: CPT

## 2018-06-08 PROCEDURE — 19285 PERQ DEV BREAST 1ST US IMAG: CPT | Mod: 59,LT

## 2018-06-08 PROCEDURE — 77065 DX MAMMO INCL CAD UNI: CPT

## 2018-06-08 PROCEDURE — 19285 PERQ DEV BREAST 1ST US IMAG: CPT

## 2018-06-08 PROCEDURE — 77065 DX MAMMO INCL CAD UNI: CPT | Mod: 26,LT

## 2018-06-11 ENCOUNTER — APPOINTMENT (OUTPATIENT)
Dept: OBGYN | Facility: CLINIC | Age: 28
End: 2018-06-11

## 2018-06-11 ENCOUNTER — RESULT REVIEW (OUTPATIENT)
Age: 28
End: 2018-06-11

## 2018-06-12 ENCOUNTER — OUTPATIENT (OUTPATIENT)
Dept: OUTPATIENT SERVICES | Facility: HOSPITAL | Age: 28
LOS: 1 days | Discharge: HOME | End: 2018-06-12
Payer: COMMERCIAL

## 2018-06-12 ENCOUNTER — APPOINTMENT (OUTPATIENT)
Dept: HUMAN REPRODUCTION | Facility: CLINIC | Age: 28
End: 2018-06-12
Payer: COMMERCIAL

## 2018-06-12 DIAGNOSIS — Z98.89 OTHER SPECIFIED POSTPROCEDURAL STATES: Chronic | ICD-10-CM

## 2018-06-12 DIAGNOSIS — D05.12 INTRADUCTAL CARCINOMA IN SITU OF LEFT BREAST: ICD-10-CM

## 2018-06-12 PROCEDURE — 99244 OFF/OP CNSLTJ NEW/EST MOD 40: CPT

## 2018-06-13 ENCOUNTER — APPOINTMENT (OUTPATIENT)
Dept: MAMMOGRAPHY | Facility: CLINIC | Age: 28
End: 2018-06-13

## 2018-06-13 ENCOUNTER — RESULT REVIEW (OUTPATIENT)
Age: 28
End: 2018-06-13

## 2018-06-13 ENCOUNTER — APPOINTMENT (OUTPATIENT)
Dept: ULTRASOUND IMAGING | Facility: CLINIC | Age: 28
End: 2018-06-13

## 2018-06-13 ENCOUNTER — APPOINTMENT (OUTPATIENT)
Dept: HEMATOLOGY ONCOLOGY | Facility: CLINIC | Age: 28
End: 2018-06-13
Payer: COMMERCIAL

## 2018-06-13 ENCOUNTER — APPOINTMENT (OUTPATIENT)
Dept: HUMAN REPRODUCTION | Facility: CLINIC | Age: 28
End: 2018-06-13
Payer: COMMERCIAL

## 2018-06-13 VITALS
WEIGHT: 140.54 LBS | HEIGHT: 61 IN | OXYGEN SATURATION: 100 % | SYSTOLIC BLOOD PRESSURE: 136 MMHG | TEMPERATURE: 97.7 F | HEART RATE: 78 BPM | BODY MASS INDEX: 26.53 KG/M2 | DIASTOLIC BLOOD PRESSURE: 87 MMHG

## 2018-06-13 LAB
BASOPHILS # BLD AUTO: 0.1 K/UL — SIGNIFICANT CHANGE UP (ref 0–0.2)
BASOPHILS NFR BLD AUTO: 1 % — SIGNIFICANT CHANGE UP (ref 0–2)
EOSINOPHIL # BLD AUTO: 0.1 K/UL — SIGNIFICANT CHANGE UP (ref 0–0.5)
EOSINOPHIL NFR BLD AUTO: 1.7 % — SIGNIFICANT CHANGE UP (ref 0–6)
HCT VFR BLD CALC: 42.7 % — SIGNIFICANT CHANGE UP (ref 34.5–45)
HGB BLD-MCNC: 14.6 G/DL — SIGNIFICANT CHANGE UP (ref 11.5–15.5)
LYMPHOCYTES # BLD AUTO: 1.8 K/UL — SIGNIFICANT CHANGE UP (ref 1–3.3)
LYMPHOCYTES # BLD AUTO: 35.6 % — SIGNIFICANT CHANGE UP (ref 13–44)
MCHC RBC-ENTMCNC: 32.6 PG — SIGNIFICANT CHANGE UP (ref 27–34)
MCHC RBC-ENTMCNC: 34.1 GM/DL — SIGNIFICANT CHANGE UP (ref 32–36)
MCV RBC AUTO: 95.4 FL — SIGNIFICANT CHANGE UP (ref 80–100)
MONOCYTES # BLD AUTO: 0.4 K/UL — SIGNIFICANT CHANGE UP (ref 0–0.9)
MONOCYTES NFR BLD AUTO: 7.7 % — SIGNIFICANT CHANGE UP (ref 2–14)
NEUTROPHILS # BLD AUTO: 2.7 K/UL — SIGNIFICANT CHANGE UP (ref 1.8–7.4)
NEUTROPHILS NFR BLD AUTO: 54 % — SIGNIFICANT CHANGE UP (ref 43–77)
PLATELET # BLD AUTO: 215 K/UL — SIGNIFICANT CHANGE UP (ref 150–400)
RBC # BLD: 4.47 M/UL — SIGNIFICANT CHANGE UP (ref 3.8–5.2)
RBC # FLD: 11.3 % — SIGNIFICANT CHANGE UP (ref 10.3–14.5)
WBC # BLD: 5 K/UL — SIGNIFICANT CHANGE UP (ref 3.8–10.5)
WBC # FLD AUTO: 5 K/UL — SIGNIFICANT CHANGE UP (ref 3.8–10.5)

## 2018-06-13 PROCEDURE — 93010 ELECTROCARDIOGRAM REPORT: CPT

## 2018-06-13 PROCEDURE — 99214 OFFICE O/P EST MOD 30 MIN: CPT | Mod: 25

## 2018-06-13 PROCEDURE — 99205 OFFICE O/P NEW HI 60 MIN: CPT

## 2018-06-13 PROCEDURE — 76830 TRANSVAGINAL US NON-OB: CPT

## 2018-06-13 PROCEDURE — 36415 COLL VENOUS BLD VENIPUNCTURE: CPT

## 2018-06-13 RX ORDER — FLUCONAZOLE 100 MG/1
100 TABLET ORAL
Qty: 18 | Refills: 1 | Status: DISCONTINUED | COMMUNITY
Start: 2018-05-07 | End: 2018-06-13

## 2018-06-14 ENCOUNTER — APPOINTMENT (OUTPATIENT)
Dept: PLASTIC SURGERY | Facility: CLINIC | Age: 28
End: 2018-06-14
Payer: COMMERCIAL

## 2018-06-14 VITALS
RESPIRATION RATE: 16 BRPM | SYSTOLIC BLOOD PRESSURE: 132 MMHG | DIASTOLIC BLOOD PRESSURE: 84 MMHG | HEART RATE: 82 BPM | TEMPERATURE: 98.1 F

## 2018-06-14 DIAGNOSIS — Z82.3 FAMILY HISTORY OF STROKE: ICD-10-CM

## 2018-06-14 DIAGNOSIS — Z80.0 FAMILY HISTORY OF MALIGNANT NEOPLASM OF DIGESTIVE ORGANS: ICD-10-CM

## 2018-06-14 DIAGNOSIS — Z80.8 FAMILY HISTORY OF MALIGNANT NEOPLASM OF OTHER ORGANS OR SYSTEMS: ICD-10-CM

## 2018-06-14 DIAGNOSIS — Z82.49 FAMILY HISTORY OF ISCHEMIC HEART DISEASE AND OTHER DISEASES OF THE CIRCULATORY SYSTEM: ICD-10-CM

## 2018-06-14 PROCEDURE — 99245 OFF/OP CONSLTJ NEW/EST HI 55: CPT

## 2018-06-16 ENCOUNTER — APPOINTMENT (OUTPATIENT)
Dept: HUMAN REPRODUCTION | Facility: CLINIC | Age: 28
End: 2018-06-16
Payer: COMMERCIAL

## 2018-06-16 PROCEDURE — 36415 COLL VENOUS BLD VENIPUNCTURE: CPT

## 2018-06-16 PROCEDURE — 99213 OFFICE O/P EST LOW 20 MIN: CPT

## 2018-06-18 ENCOUNTER — APPOINTMENT (OUTPATIENT)
Dept: HUMAN REPRODUCTION | Facility: CLINIC | Age: 28
End: 2018-06-18
Payer: COMMERCIAL

## 2018-06-18 PROCEDURE — 99213 OFFICE O/P EST LOW 20 MIN: CPT | Mod: 25

## 2018-06-18 PROCEDURE — 36415 COLL VENOUS BLD VENIPUNCTURE: CPT

## 2018-06-18 PROCEDURE — 76830 TRANSVAGINAL US NON-OB: CPT

## 2018-06-19 ENCOUNTER — APPOINTMENT (OUTPATIENT)
Dept: CARDIOLOGY | Facility: CLINIC | Age: 28
End: 2018-06-19
Payer: COMMERCIAL

## 2018-06-19 PROCEDURE — 93306 TTE W/DOPPLER COMPLETE: CPT

## 2018-06-19 PROCEDURE — 0399T: CPT

## 2018-06-19 PROCEDURE — 76376 3D RENDER W/INTRP POSTPROCES: CPT

## 2018-06-21 ENCOUNTER — APPOINTMENT (OUTPATIENT)
Dept: SURGERY | Facility: CLINIC | Age: 28
End: 2018-06-21
Payer: COMMERCIAL

## 2018-06-21 ENCOUNTER — APPOINTMENT (OUTPATIENT)
Dept: HUMAN REPRODUCTION | Facility: CLINIC | Age: 28
End: 2018-06-21
Payer: COMMERCIAL

## 2018-06-21 VITALS
WEIGHT: 139 LBS | DIASTOLIC BLOOD PRESSURE: 88 MMHG | OXYGEN SATURATION: 99 % | SYSTOLIC BLOOD PRESSURE: 127 MMHG | HEIGHT: 61 IN | TEMPERATURE: 97.8 F | HEART RATE: 87 BPM | BODY MASS INDEX: 26.24 KG/M2

## 2018-06-21 PROCEDURE — 36415 COLL VENOUS BLD VENIPUNCTURE: CPT

## 2018-06-21 PROCEDURE — 76830 TRANSVAGINAL US NON-OB: CPT

## 2018-06-21 PROCEDURE — 99212 OFFICE O/P EST SF 10 MIN: CPT | Mod: 25

## 2018-06-21 PROCEDURE — 99214 OFFICE O/P EST MOD 30 MIN: CPT

## 2018-06-21 PROCEDURE — 99213 OFFICE O/P EST LOW 20 MIN: CPT | Mod: 25

## 2018-06-21 RX ORDER — DOXYCYCLINE HYCLATE 100 MG/1
100 CAPSULE ORAL TWICE DAILY
Qty: 12 | Refills: 0 | Status: DISCONTINUED | COMMUNITY
Start: 2018-06-14 | End: 2018-06-21

## 2018-06-22 ENCOUNTER — OUTPATIENT (OUTPATIENT)
Dept: OUTPATIENT SERVICES | Facility: HOSPITAL | Age: 28
LOS: 1 days | Discharge: HOME | End: 2018-06-22

## 2018-06-22 VITALS
DIASTOLIC BLOOD PRESSURE: 75 MMHG | OXYGEN SATURATION: 100 % | RESPIRATION RATE: 14 BRPM | HEART RATE: 73 BPM | HEIGHT: 63 IN | WEIGHT: 136.91 LBS | TEMPERATURE: 98 F | SYSTOLIC BLOOD PRESSURE: 119 MMHG

## 2018-06-22 DIAGNOSIS — Z98.89 OTHER SPECIFIED POSTPROCEDURAL STATES: Chronic | ICD-10-CM

## 2018-06-22 DIAGNOSIS — Z98.890 OTHER SPECIFIED POSTPROCEDURAL STATES: Chronic | ICD-10-CM

## 2018-06-22 LAB
ANION GAP SERPL CALC-SCNC: 6 MMOL/L — SIGNIFICANT CHANGE UP (ref 5–17)
APPEARANCE UR: CLEAR — SIGNIFICANT CHANGE UP
APTT BLD: 39.4 SEC — HIGH (ref 27.5–37.4)
BASOPHILS # BLD AUTO: 0.03 K/UL — SIGNIFICANT CHANGE UP (ref 0–0.2)
BASOPHILS NFR BLD AUTO: 0.7 % — SIGNIFICANT CHANGE UP (ref 0–2)
BILIRUB UR-MCNC: NEGATIVE — SIGNIFICANT CHANGE UP
BUN SERPL-MCNC: 13 MG/DL — SIGNIFICANT CHANGE UP (ref 7–23)
CALCIUM SERPL-MCNC: 8.9 MG/DL — SIGNIFICANT CHANGE UP (ref 8.5–10.1)
CHLORIDE SERPL-SCNC: 109 MMOL/L — HIGH (ref 96–108)
CO2 SERPL-SCNC: 26 MMOL/L — SIGNIFICANT CHANGE UP (ref 22–31)
COLOR SPEC: YELLOW — SIGNIFICANT CHANGE UP
CREAT SERPL-MCNC: 0.66 MG/DL — SIGNIFICANT CHANGE UP (ref 0.5–1.3)
DIFF PNL FLD: NEGATIVE — SIGNIFICANT CHANGE UP
EOSINOPHIL # BLD AUTO: 0.11 K/UL — SIGNIFICANT CHANGE UP (ref 0–0.5)
EOSINOPHIL NFR BLD AUTO: 2.4 % — SIGNIFICANT CHANGE UP (ref 0–6)
GLUCOSE SERPL-MCNC: 89 MG/DL — SIGNIFICANT CHANGE UP (ref 70–99)
GLUCOSE UR QL: NEGATIVE MG/DL — SIGNIFICANT CHANGE UP
HCT VFR BLD CALC: 38 % — SIGNIFICANT CHANGE UP (ref 34.5–45)
HGB BLD-MCNC: 12.9 G/DL — SIGNIFICANT CHANGE UP (ref 11.5–15.5)
IMM GRANULOCYTES NFR BLD AUTO: 0.2 % — SIGNIFICANT CHANGE UP (ref 0–1.5)
INR BLD: 1.02 RATIO — SIGNIFICANT CHANGE UP (ref 0.88–1.16)
KETONES UR-MCNC: NEGATIVE — SIGNIFICANT CHANGE UP
LEUKOCYTE ESTERASE UR-ACNC: NEGATIVE — SIGNIFICANT CHANGE UP
LYMPHOCYTES # BLD AUTO: 1.36 K/UL — SIGNIFICANT CHANGE UP (ref 1–3.3)
LYMPHOCYTES # BLD AUTO: 30.1 % — SIGNIFICANT CHANGE UP (ref 13–44)
MCHC RBC-ENTMCNC: 31.5 PG — SIGNIFICANT CHANGE UP (ref 27–34)
MCHC RBC-ENTMCNC: 33.9 GM/DL — SIGNIFICANT CHANGE UP (ref 32–36)
MCV RBC AUTO: 92.9 FL — SIGNIFICANT CHANGE UP (ref 80–100)
MONOCYTES # BLD AUTO: 0.38 K/UL — SIGNIFICANT CHANGE UP (ref 0–0.9)
MONOCYTES NFR BLD AUTO: 8.4 % — SIGNIFICANT CHANGE UP (ref 2–14)
NEUTROPHILS # BLD AUTO: 2.63 K/UL — SIGNIFICANT CHANGE UP (ref 1.8–7.4)
NEUTROPHILS NFR BLD AUTO: 58.2 % — SIGNIFICANT CHANGE UP (ref 43–77)
NITRITE UR-MCNC: NEGATIVE — SIGNIFICANT CHANGE UP
PH UR: 5 — SIGNIFICANT CHANGE UP (ref 5–8)
PLATELET # BLD AUTO: 211 K/UL — SIGNIFICANT CHANGE UP (ref 150–400)
POTASSIUM SERPL-MCNC: 3.8 MMOL/L — SIGNIFICANT CHANGE UP (ref 3.5–5.3)
POTASSIUM SERPL-SCNC: 3.8 MMOL/L — SIGNIFICANT CHANGE UP (ref 3.5–5.3)
PROT UR-MCNC: NEGATIVE MG/DL — SIGNIFICANT CHANGE UP
PROTHROM AB SERPL-ACNC: 11 SEC — SIGNIFICANT CHANGE UP (ref 9.8–12.7)
RBC # BLD: 4.09 M/UL — SIGNIFICANT CHANGE UP (ref 3.8–5.2)
RBC # FLD: 11.6 % — SIGNIFICANT CHANGE UP (ref 10.3–14.5)
SODIUM SERPL-SCNC: 141 MMOL/L — SIGNIFICANT CHANGE UP (ref 135–145)
SP GR SPEC: 1.01 — SIGNIFICANT CHANGE UP (ref 1.01–1.02)
UROBILINOGEN FLD QL: NEGATIVE MG/DL — SIGNIFICANT CHANGE UP
WBC # BLD: 4.52 K/UL — SIGNIFICANT CHANGE UP (ref 3.8–10.5)
WBC # FLD AUTO: 4.52 K/UL — SIGNIFICANT CHANGE UP (ref 3.8–10.5)

## 2018-06-22 NOTE — H&P PST ADULT - ANESTHESIA, PREVIOUS REACTION, PROFILE
(H35.371) Puckering of macula, right eye - Assesment : Examination revealed ERM. OD: ERM WITH TRACE MACULAR CYST OS: TRACE ERM - Plan : Monitor. Advised patient to call our office with decreased vision or increased symptoms. none

## 2018-06-22 NOTE — H&P PST ADULT - ASSESSMENT
yo scheduled for   with Dr. carroll     1. Labs as per surgeon  2. EKG  3. Medical clearance with  4. discussed EZ sponges & day of procedure instructions  CAPRINI SCORE    AGE RELATED RISK FACTORS                                                       MOBILITY RELATED FACTORS  [ ] Age 41-60 years                                            (1 Point)                  [ ] Bed rest                                                        (1 Point)  [ ] Age: 61-74 years                                           (2 Points)                [ ] Plaster cast                                                   (2 Points)  [ ] Age= 75 years                                              (3 Points)                 [ ] Bed bound for more than 72 hours                   (2 Points)    DISEASE RELATED RISK FACTORS                                               GENDER SPECIFIC FACTORS  [ ] Edema in the lower extremities                       (1 Point)                  [ ] Pregnancy                                                     (1 Point)  [ ] Varicose veins                                               (1 Point)                  [ ] Post-partum < 6 weeks                                   (1 Point)             [ ] BMI > 25 Kg/m2                                            (1 Point)                  [ ] Hormonal therapy  or oral contraception            (1 Point)                 [ ] Sepsis (in the previous month)                        (1 Point)                  [ ] History of pregnancy complications  [ ] Pneumonia or serious lung disease                                               [ ] Unexplained or recurrent                       (1 Point)           (in the previous month)                               (1 Point)  [ ] Abnormal pulmonary function test                     (1 Point)                 SURGERY RELATED RISK FACTORS  [ ] Acute myocardial infarction                              (1 Point)                 [ ]  Section                                            (1 Point)  [ ] Congestive heart failure (in the previous month)  (1 Point)                 [ ] Minor surgery                                                 (1 Point)   [ ] Inflammatory bowel disease                             (1 Point)                 [ ] Arthroscopic surgery                                        (2 Points)  [ ] Central venous access                                    (2 Points)                [ ] General surgery lasting more than 45 minutes   (2 Points)       [ ] Stroke (in the previous month)                          (5 Points)               [ ] Elective arthroplasty                                        (5 Points)                                                                                                                                               HEMATOLOGY RELATED FACTORS                                                 TRAUMA RELATED RISK FACTORS  [ ] Prior episodes of VTE                                     (3 Points)                 [ ] Fracture of the hip, pelvis, or leg                       (5 Points)  [ ] Positive family history for VTE                         (3 Points)                 [ ] Acute spinal cord injury (in the previous month)  (5 Points)  [ ] Prothrombin 65376 A                                      (3 Points)                 [ ] Paralysis  (less than 1 month)                          (5 Points)  [ ] Factor V Leiden                                             (3 Points)                 [ ] Multiple Trauma within 1 month                         (5 Points)  [ ] Lupus anticoagulants                                     (3 Points)                                                           [ ] Anticardiolipin antibodies                                (3 Points)                                                       [ ] High homocysteine in the blood                      (3 Points)                                             [ ] Other congenital or acquired thrombophilia       (3 Points)                                                [ ] Heparin induced thrombocytopenia                  (3 Points)                                          Total Score [          ] 29 yo female scheduled for b/l mastectomy with breast reconstruction & tissue expanders on 18 with Dr. Farr & Dr Falcon    1. Labs as per surgeon  2. EKG on chart  3. Medical clearance with PCP Dr Storey  4. discussed EZ sponges, mupirocin & day of procedure instructions  5. copy of chest CT on chart  6. Stat urine pregnancy on admit    CAPRINI SCORE    AGE RELATED RISK FACTORS                                                       MOBILITY RELATED FACTORS  [ ] Age 41-60 years                                            (1 Point)                  [ ] Bed rest                                                        (1 Point)  [ ] Age: 61-74 years                                           (2 Points)                [ ] Plaster cast                                                   (2 Points)  [ ] Age= 75 years                                              (3 Points)                 [ ] Bed bound for more than 72 hours                   (2 Points)    DISEASE RELATED RISK FACTORS                                               GENDER SPECIFIC FACTORS  [ ] Edema in the lower extremities                       (1 Point)                  [ ] Pregnancy                                                     (1 Point)  [ ] Varicose veins                                               (1 Point)                  [ ] Post-partum < 6 weeks                                   (1 Point)             [ ] BMI > 25 Kg/m2                                            (1 Point)                  [x ] Hormonal therapy  or oral contraception            (1 Point)                 [ ] Sepsis (in the previous month)                        (1 Point)                  [ ] History of pregnancy complications  [ ] Pneumonia or serious lung disease                                               [ ] Unexplained or recurrent                       (1 Point)           (in the previous month)                               (1 Point)  [ ] Abnormal pulmonary function test                     (1 Point)                 SURGERY RELATED RISK FACTORS  [ ] Acute myocardial infarction                              (1 Point)                 [ ]  Section                                            (1 Point)  [ ] Congestive heart failure (in the previous month)  (1 Point)                 [ ] Minor surgery                                                 (1 Point)   [ ] Inflammatory bowel disease                             (1 Point)                 [ ] Arthroscopic surgery                                        (2 Points)  [ ] Central venous access                                    (2 Points)                [x ] General surgery lasting more than 45 minutes   (2 Points)       [ ] Stroke (in the previous month)                          (5 Points)               [ ] Elective arthroplasty                                        (5 Points)                                                                                                                                               HEMATOLOGY RELATED FACTORS                                                 TRAUMA RELATED RISK FACTORS  [ ] Prior episodes of VTE                                     (3 Points)                 [ ] Fracture of the hip, pelvis, or leg                       (5 Points)  [ ] Positive family history for VTE                         (3 Points)                 [ ] Acute spinal cord injury (in the previous month)  (5 Points)  [ ] Prothrombin 86077 A                                      (3 Points)                 [ ] Paralysis  (less than 1 month)                          (5 Points)  [ ] Factor V Leiden                                             (3 Points)                 [ ] Multiple Trauma within 1 month                         (5 Points)  [ ] Lupus anticoagulants                                     (3 Points)                                                           [ ] Anticardiolipin antibodies                                (3 Points)                                                       [ ] High homocysteine in the blood                      (3 Points)                                             [ ] Other congenital or acquired thrombophilia       (3 Points)                                                [ ] Heparin induced thrombocytopenia                  (3 Points)                                          Total Score [      3    ]

## 2018-06-22 NOTE — H&P PST ADULT - SKIN/BREAST COMMENTS
Reports new diagnosis of stage 2 left breast cancer Reports new diagnosis of stage 2 left breast cancer.

## 2018-06-22 NOTE — H&P PST ADULT - PMH
Anxiety    Asthma    Back pain    Breast cancer in female  left breast  Breast pain, left    Hemorrhoids    IBS (irritable bowel syndrome)    Lumbar disc herniation    Migraine    Sciatic nerve disease, right

## 2018-06-22 NOTE — H&P PST ADULT - PSH
H/O left breast biopsy  & lymph node biopsy 2018  History of tonsillectomy  2008  S/P appendectomy  2004

## 2018-06-23 ENCOUNTER — APPOINTMENT (OUTPATIENT)
Dept: HUMAN REPRODUCTION | Facility: CLINIC | Age: 28
End: 2018-06-23
Payer: COMMERCIAL

## 2018-06-23 PROCEDURE — 76830 TRANSVAGINAL US NON-OB: CPT

## 2018-06-23 PROCEDURE — 36415 COLL VENOUS BLD VENIPUNCTURE: CPT

## 2018-06-23 PROCEDURE — 99213 OFFICE O/P EST LOW 20 MIN: CPT | Mod: 25

## 2018-06-25 ENCOUNTER — OTHER (OUTPATIENT)
Age: 28
End: 2018-06-25

## 2018-06-25 ENCOUNTER — APPOINTMENT (OUTPATIENT)
Dept: FAMILY MEDICINE | Facility: CLINIC | Age: 28
End: 2018-06-25
Payer: COMMERCIAL

## 2018-06-25 ENCOUNTER — APPOINTMENT (OUTPATIENT)
Dept: MRI IMAGING | Facility: CLINIC | Age: 28
End: 2018-06-25
Payer: COMMERCIAL

## 2018-06-25 ENCOUNTER — APPOINTMENT (OUTPATIENT)
Dept: HUMAN REPRODUCTION | Facility: CLINIC | Age: 28
End: 2018-06-25
Payer: COMMERCIAL

## 2018-06-25 ENCOUNTER — OUTPATIENT (OUTPATIENT)
Dept: OUTPATIENT SERVICES | Facility: HOSPITAL | Age: 28
LOS: 1 days | End: 2018-06-25
Payer: COMMERCIAL

## 2018-06-25 VITALS
OXYGEN SATURATION: 99 % | SYSTOLIC BLOOD PRESSURE: 112 MMHG | HEART RATE: 90 BPM | HEIGHT: 61 IN | DIASTOLIC BLOOD PRESSURE: 80 MMHG | WEIGHT: 141 LBS | BODY MASS INDEX: 26.62 KG/M2

## 2018-06-25 DIAGNOSIS — Z98.89 OTHER SPECIFIED POSTPROCEDURAL STATES: Chronic | ICD-10-CM

## 2018-06-25 DIAGNOSIS — Z82.49 FAMILY HISTORY OF ISCHEMIC HEART DISEASE AND OTHER DISEASES OF THE CIRCULATORY SYSTEM: ICD-10-CM

## 2018-06-25 DIAGNOSIS — Z98.890 OTHER SPECIFIED POSTPROCEDURAL STATES: Chronic | ICD-10-CM

## 2018-06-25 DIAGNOSIS — Z00.8 ENCOUNTER FOR OTHER GENERAL EXAMINATION: ICD-10-CM

## 2018-06-25 DIAGNOSIS — R51 HEADACHE: ICD-10-CM

## 2018-06-25 PROCEDURE — 36415 COLL VENOUS BLD VENIPUNCTURE: CPT

## 2018-06-25 PROCEDURE — A9585: CPT

## 2018-06-25 PROCEDURE — 76830 TRANSVAGINAL US NON-OB: CPT

## 2018-06-25 PROCEDURE — 70553 MRI BRAIN STEM W/O & W/DYE: CPT

## 2018-06-25 PROCEDURE — 99244 OFF/OP CNSLTJ NEW/EST MOD 40: CPT | Mod: 25

## 2018-06-25 PROCEDURE — 99213 OFFICE O/P EST LOW 20 MIN: CPT | Mod: 25

## 2018-06-25 PROCEDURE — 70553 MRI BRAIN STEM W/O & W/DYE: CPT | Mod: 26

## 2018-06-25 RX ORDER — NEEDLES, DISPOSABLE 25GX5/8"
27G X 1/2" NEEDLE, DISPOSABLE MISCELLANEOUS
Qty: 25 | Refills: 0 | Status: DISCONTINUED | COMMUNITY
Start: 2018-06-13 | End: 2018-06-25

## 2018-06-25 RX ORDER — LETROZOLE TABLETS 2.5 MG/1
2.5 TABLET, FILM COATED ORAL
Qty: 30 | Refills: 1 | Status: DISCONTINUED | COMMUNITY
Start: 2018-06-12 | End: 2018-06-25

## 2018-06-25 RX ORDER — CHORIOGONADOTROPIN ALFA 10000 UNIT
10000 KIT INTRAMUSCULAR
Qty: 1 | Refills: 3 | Status: DISCONTINUED | COMMUNITY
Start: 2018-06-13 | End: 2018-06-25

## 2018-06-25 RX ORDER — AZITHROMYCIN 250 MG/1
250 TABLET, FILM COATED ORAL
Qty: 6 | Refills: 0 | Status: DISCONTINUED | COMMUNITY
Start: 2018-01-22

## 2018-06-25 RX ORDER — MUPIROCIN 20 MG/G
1 OINTMENT TOPICAL
Qty: 0 | Refills: 0 | DISCHARGE
Start: 2018-06-25 | End: 2018-06-29

## 2018-06-25 RX ORDER — SYRINGE WITH NEEDLE, 1 ML 25GX5/8"
22G X 1-1/2" SYRINGE, EMPTY DISPOSABLE MISCELLANEOUS
Qty: 25 | Refills: 0 | Status: DISCONTINUED | COMMUNITY
Start: 2018-06-13 | End: 2018-06-25

## 2018-06-25 RX ORDER — ISOPROPYL ALCOHOL 0.7 ML/ML
SWAB TOPICAL
Qty: 25 | Refills: 0 | Status: DISCONTINUED | COMMUNITY
Start: 2018-06-13 | End: 2018-06-25

## 2018-06-25 RX ORDER — CONTAINER,EMPTY
EACH MISCELLANEOUS
Qty: 1 | Refills: 0 | Status: DISCONTINUED | COMMUNITY
Start: 2018-06-13 | End: 2018-06-25

## 2018-06-25 RX ORDER — CETRORELIX ACETATE 0.25 MG
0.25 KIT SUBCUTANEOUS
Qty: 8 | Refills: 1 | Status: DISCONTINUED | COMMUNITY
Start: 2018-06-13 | End: 2018-06-25

## 2018-06-25 RX ORDER — MENOTROPINS 75 UNIT
75 KIT SUBCUTANEOUS
Qty: 25 | Refills: 1 | Status: DISCONTINUED | COMMUNITY
Start: 2018-06-13 | End: 2018-06-25

## 2018-06-25 RX ORDER — FOLLITROPIN ALFA 1050 UNIT
1050 KIT SUBCUTANEOUS
Qty: 4 | Refills: 1 | Status: DISCONTINUED | COMMUNITY
Start: 2018-06-13 | End: 2018-06-25

## 2018-06-26 ENCOUNTER — APPOINTMENT (OUTPATIENT)
Dept: HUMAN REPRODUCTION | Facility: CLINIC | Age: 28
End: 2018-06-26
Payer: COMMERCIAL

## 2018-06-26 ENCOUNTER — LABORATORY RESULT (OUTPATIENT)
Age: 28
End: 2018-06-26

## 2018-06-26 ENCOUNTER — OTHER (OUTPATIENT)
Age: 28
End: 2018-06-26

## 2018-06-26 ENCOUNTER — APPOINTMENT (OUTPATIENT)
Dept: CARDIOLOGY | Facility: CLINIC | Age: 28
End: 2018-06-26
Payer: COMMERCIAL

## 2018-06-26 ENCOUNTER — RESULT REVIEW (OUTPATIENT)
Age: 28
End: 2018-06-26

## 2018-06-26 ENCOUNTER — APPOINTMENT (OUTPATIENT)
Dept: HEMATOLOGY ONCOLOGY | Facility: CLINIC | Age: 28
End: 2018-06-26

## 2018-06-26 VITALS
SYSTOLIC BLOOD PRESSURE: 115 MMHG | DIASTOLIC BLOOD PRESSURE: 81 MMHG | HEART RATE: 93 BPM | WEIGHT: 141 LBS | OXYGEN SATURATION: 100 % | HEIGHT: 61 IN | BODY MASS INDEX: 26.62 KG/M2

## 2018-06-26 LAB
APTT BLD: 33 SEC
BASOPHILS # BLD AUTO: 0.1 K/UL — SIGNIFICANT CHANGE UP (ref 0–0.2)
BASOPHILS NFR BLD AUTO: 0.9 % — SIGNIFICANT CHANGE UP (ref 0–2)
EOSINOPHIL # BLD AUTO: 0.1 K/UL — SIGNIFICANT CHANGE UP (ref 0–0.5)
EOSINOPHIL NFR BLD AUTO: 1.5 % — SIGNIFICANT CHANGE UP (ref 0–6)
HCT VFR BLD CALC: 41.6 % — SIGNIFICANT CHANGE UP (ref 34.5–45)
HGB BLD-MCNC: 14 G/DL — SIGNIFICANT CHANGE UP (ref 11.5–15.5)
INR PPP: 0.96 RATIO
LYMPHOCYTES # BLD AUTO: 1.6 K/UL — SIGNIFICANT CHANGE UP (ref 1–3.3)
LYMPHOCYTES # BLD AUTO: 27.2 % — SIGNIFICANT CHANGE UP (ref 13–44)
MCHC RBC-ENTMCNC: 32.4 PG — SIGNIFICANT CHANGE UP (ref 27–34)
MCHC RBC-ENTMCNC: 33.7 GM/DL — SIGNIFICANT CHANGE UP (ref 32–36)
MCV RBC AUTO: 96.2 FL — SIGNIFICANT CHANGE UP (ref 80–100)
MONOCYTES # BLD AUTO: 0.5 K/UL — SIGNIFICANT CHANGE UP (ref 0–0.9)
MONOCYTES NFR BLD AUTO: 7.7 % — SIGNIFICANT CHANGE UP (ref 2–14)
NEUTROPHILS # BLD AUTO: 3.8 K/UL — SIGNIFICANT CHANGE UP (ref 1.8–7.4)
NEUTROPHILS NFR BLD AUTO: 62.7 % — SIGNIFICANT CHANGE UP (ref 43–77)
PLATELET # BLD AUTO: 231 K/UL — SIGNIFICANT CHANGE UP (ref 150–400)
PT BLD: 10.8 SEC
RBC # BLD: 4.33 M/UL — SIGNIFICANT CHANGE UP (ref 3.8–5.2)
RBC # FLD: 11 % — SIGNIFICANT CHANGE UP (ref 10.3–14.5)
WBC # BLD: 6 K/UL — SIGNIFICANT CHANGE UP (ref 3.8–10.5)
WBC # FLD AUTO: 6 K/UL — SIGNIFICANT CHANGE UP (ref 3.8–10.5)

## 2018-06-26 PROCEDURE — 36415 COLL VENOUS BLD VENIPUNCTURE: CPT

## 2018-06-26 PROCEDURE — 99213 OFFICE O/P EST LOW 20 MIN: CPT | Mod: 25

## 2018-06-26 PROCEDURE — 99244 OFF/OP CNSLTJ NEW/EST MOD 40: CPT

## 2018-06-26 PROCEDURE — 76830 TRANSVAGINAL US NON-OB: CPT

## 2018-06-27 ENCOUNTER — APPOINTMENT (OUTPATIENT)
Dept: HUMAN REPRODUCTION | Facility: CLINIC | Age: 28
End: 2018-06-27
Payer: COMMERCIAL

## 2018-06-27 ENCOUNTER — LABORATORY RESULT (OUTPATIENT)
Age: 28
End: 2018-06-27

## 2018-06-27 ENCOUNTER — RESULT REVIEW (OUTPATIENT)
Age: 28
End: 2018-06-27

## 2018-06-27 ENCOUNTER — APPOINTMENT (OUTPATIENT)
Dept: HEMATOLOGY ONCOLOGY | Facility: CLINIC | Age: 28
End: 2018-06-27

## 2018-06-27 LAB
BASOPHILS # BLD AUTO: 0 K/UL — SIGNIFICANT CHANGE UP (ref 0–0.2)
BASOPHILS NFR BLD AUTO: 0.4 % — SIGNIFICANT CHANGE UP (ref 0–2)
CONFIRM: 24.6 SEC
DRVVT IMM 1:2 NP PPP: NORMAL
DRVVT SCREEN TO CONFIRM RATIO: 0.91 RATIO
EOSINOPHIL # BLD AUTO: 0 K/UL — SIGNIFICANT CHANGE UP (ref 0–0.5)
EOSINOPHIL NFR BLD AUTO: 0.2 % — SIGNIFICANT CHANGE UP (ref 0–6)
HCT VFR BLD CALC: 41 % — SIGNIFICANT CHANGE UP (ref 34.5–45)
HGB BLD-MCNC: 13.5 G/DL — SIGNIFICANT CHANGE UP (ref 11.5–15.5)
LYMPHOCYTES # BLD AUTO: 0.5 K/UL — LOW (ref 1–3.3)
LYMPHOCYTES # BLD AUTO: 6.1 % — LOW (ref 13–44)
MCHC RBC-ENTMCNC: 31.9 PG — SIGNIFICANT CHANGE UP (ref 27–34)
MCHC RBC-ENTMCNC: 33 GM/DL — SIGNIFICANT CHANGE UP (ref 32–36)
MCV RBC AUTO: 96.7 FL — SIGNIFICANT CHANGE UP (ref 80–100)
MONOCYTES # BLD AUTO: 0.1 K/UL — SIGNIFICANT CHANGE UP (ref 0–0.9)
MONOCYTES NFR BLD AUTO: 0.7 % — LOW (ref 2–14)
NEUTROPHILS # BLD AUTO: 7.3 K/UL — SIGNIFICANT CHANGE UP (ref 1.8–7.4)
NEUTROPHILS NFR BLD AUTO: 92.7 % — HIGH (ref 43–77)
PLATELET # BLD AUTO: 196 K/UL — SIGNIFICANT CHANGE UP (ref 150–400)
RBC # BLD: 4.24 M/UL — SIGNIFICANT CHANGE UP (ref 3.8–5.2)
RBC # FLD: 11.1 % — SIGNIFICANT CHANGE UP (ref 10.3–14.5)
SCREEN DRVVT: 27.7 SEC
WBC # BLD: 7.8 K/UL — SIGNIFICANT CHANGE UP (ref 3.8–10.5)
WBC # FLD AUTO: 7.8 K/UL — SIGNIFICANT CHANGE UP (ref 3.8–10.5)

## 2018-06-27 PROCEDURE — 58970 RETRIEVAL OF OOCYTE: CPT

## 2018-06-27 PROCEDURE — 89250 CULTR OOCYTE/EMBRYO <4 DAYS: CPT

## 2018-06-27 PROCEDURE — 89280 ASSIST OOCYTE FERTILIZATION: CPT

## 2018-06-27 PROCEDURE — 89254 OOCYTE IDENTIFICATION: CPT

## 2018-06-27 PROCEDURE — 76948 ECHO GUIDE OVA ASPIRATION: CPT

## 2018-06-28 ENCOUNTER — RESULT REVIEW (OUTPATIENT)
Age: 28
End: 2018-06-28

## 2018-06-28 LAB
ALBUMIN SERPL ELPH-MCNC: 4.9 G/DL
ALP BLD-CCNC: 72 U/L
ALT SERPL-CCNC: 15 U/L
ANION GAP SERPL CALC-SCNC: 14 MMOL/L
APTT 2H P 1:4 NP PPP: 34.6 SEC
APTT 2H P INC PPP: 35.1 SEC
APTT BLD: 41.2 SEC
APTT HEX PL PPP: NEGATIVE
APTT IMM NP/PRE NP PPP: 33.7 SEC
APTT INV RATIO PPP: 39.4 SEC
AST SERPL-CCNC: 17 U/L
B2 GLYCOPROT1 AB SER QL: NEGATIVE
BILIRUB SERPL-MCNC: 0.3 MG/DL
BUN SERPL-MCNC: 11 MG/DL
CALCIUM SERPL-MCNC: 9.7 MG/DL
CARDIOLIPIN AB SER IA-ACNC: NEGATIVE
CHLORIDE SERPL-SCNC: 105 MMOL/L
CO2 SERPL-SCNC: 24 MMOL/L
CREAT SERPL-MCNC: 0.81 MG/DL
FACT IX ACT/NOR PPP: 101 %
FACT VIII ACT/NOR PPP: 102 %
FACT XI ACT/NOR PPP: 91 %
GLUCOSE SERPL-MCNC: 87 MG/DL
HBV CORE IGG+IGM SER QL: NONREACTIVE
HBV CORE IGM SER QL: NONREACTIVE
HBV SURFACE AB SER QL: REACTIVE
HBV SURFACE AG SER QL: NONREACTIVE
HCV AB SER QL: NONREACTIVE
HCV S/CO RATIO: 0.1 S/CO
HEX-1: 49.4 SECS
HEX-2: 47.8 SECS
INR PPP: 0.9 RATIO
MAGNESIUM SERPL-MCNC: 2 MG/DL
NPP NORMAL POOLED PLASMA: 32.8 SEC
POTASSIUM SERPL-SCNC: 4.4 MMOL/L
PROT SERPL-MCNC: 7.8 G/DL
PT BLD: 10.1 SEC
SILICA CLOTTING TIME INTERPRETATION: NORMAL
SILICA CLOTTING TIME S/C: 0.98 RATIO
SODIUM SERPL-SCNC: 143 MMOL/L
VWF AG PPP IA-ACNC: 104 %
VWF:RCO ACT/NOR PPP PL AGG: 97 %

## 2018-06-28 RX ORDER — HYDROMORPHONE HYDROCHLORIDE 2 MG/ML
1 INJECTION INTRAMUSCULAR; INTRAVENOUS; SUBCUTANEOUS
Qty: 0 | Refills: 0 | Status: DISCONTINUED | OUTPATIENT
Start: 2018-06-29 | End: 2018-06-29

## 2018-06-28 RX ORDER — OXYCODONE HYDROCHLORIDE 5 MG/1
10 TABLET ORAL ONCE
Qty: 0 | Refills: 0 | Status: DISCONTINUED | OUTPATIENT
Start: 2018-06-29 | End: 2018-06-29

## 2018-06-28 RX ORDER — ACETAMINOPHEN 500 MG
975 TABLET ORAL ONCE
Qty: 0 | Refills: 0 | Status: COMPLETED | OUTPATIENT
Start: 2018-06-29 | End: 2018-06-29

## 2018-06-28 RX ORDER — ONDANSETRON 8 MG/1
4 TABLET, FILM COATED ORAL ONCE
Qty: 0 | Refills: 0 | Status: COMPLETED | OUTPATIENT
Start: 2018-06-29 | End: 2018-06-29

## 2018-06-29 ENCOUNTER — INPATIENT (INPATIENT)
Facility: HOSPITAL | Age: 28
LOS: 0 days | Discharge: HOME HEALTH | End: 2018-06-30
Attending: PLASTIC SURGERY | Admitting: PLASTIC SURGERY
Payer: COMMERCIAL

## 2018-06-29 ENCOUNTER — APPOINTMENT (OUTPATIENT)
Dept: SURGERY | Facility: HOSPITAL | Age: 28
End: 2018-06-29

## 2018-06-29 ENCOUNTER — RESULT REVIEW (OUTPATIENT)
Age: 28
End: 2018-06-29

## 2018-06-29 VITALS
WEIGHT: 138.89 LBS | TEMPERATURE: 98 F | RESPIRATION RATE: 14 BRPM | SYSTOLIC BLOOD PRESSURE: 123 MMHG | OXYGEN SATURATION: 100 % | DIASTOLIC BLOOD PRESSURE: 72 MMHG | HEIGHT: 61 IN | HEART RATE: 68 BPM

## 2018-06-29 DIAGNOSIS — Z98.89 OTHER SPECIFIED POSTPROCEDURAL STATES: Chronic | ICD-10-CM

## 2018-06-29 DIAGNOSIS — Z98.890 OTHER SPECIFIED POSTPROCEDURAL STATES: Chronic | ICD-10-CM

## 2018-06-29 LAB — HCG UR QL: NEGATIVE — SIGNIFICANT CHANGE UP

## 2018-06-29 PROCEDURE — 88305 TISSUE EXAM BY PATHOLOGIST: CPT | Mod: 26

## 2018-06-29 PROCEDURE — 88307 TISSUE EXAM BY PATHOLOGIST: CPT | Mod: 26

## 2018-06-29 PROCEDURE — 88333 PATH CONSLTJ SURG CYTO XM 1: CPT | Mod: 26

## 2018-06-29 PROCEDURE — 19303 MAST SIMPLE COMPLETE: CPT | Mod: 50

## 2018-06-29 PROCEDURE — 38792 RA TRACER ID OF SENTINL NODE: CPT | Mod: 50

## 2018-06-29 PROCEDURE — 38500 BIOPSY/REMOVAL LYMPH NODES: CPT | Mod: 50

## 2018-06-29 PROCEDURE — 78806: CPT | Mod: 26

## 2018-06-29 PROCEDURE — 88360 TUMOR IMMUNOHISTOCHEM/MANUAL: CPT | Mod: 26

## 2018-06-29 PROCEDURE — 88304 TISSUE EXAM BY PATHOLOGIST: CPT | Mod: 26

## 2018-06-29 RX ORDER — SODIUM CHLORIDE 9 MG/ML
1000 INJECTION, SOLUTION INTRAVENOUS
Qty: 0 | Refills: 0 | Status: DISCONTINUED | OUTPATIENT
Start: 2018-06-29 | End: 2018-06-29

## 2018-06-29 RX ORDER — OXYCODONE AND ACETAMINOPHEN 5; 325 MG/1; MG/1
2 TABLET ORAL EVERY 4 HOURS
Qty: 0 | Refills: 0 | Status: DISCONTINUED | OUTPATIENT
Start: 2018-06-29 | End: 2018-06-30

## 2018-06-29 RX ORDER — MEPERIDINE HYDROCHLORIDE 50 MG/ML
12.5 INJECTION INTRAMUSCULAR; INTRAVENOUS; SUBCUTANEOUS
Qty: 0 | Refills: 0 | Status: DISCONTINUED | OUTPATIENT
Start: 2018-06-29 | End: 2018-06-29

## 2018-06-29 RX ORDER — OXYCODONE AND ACETAMINOPHEN 5; 325 MG/1; MG/1
1 TABLET ORAL EVERY 4 HOURS
Qty: 0 | Refills: 0 | Status: DISCONTINUED | OUTPATIENT
Start: 2018-06-29 | End: 2018-06-30

## 2018-06-29 RX ORDER — CYCLOBENZAPRINE HYDROCHLORIDE 10 MG/1
1 TABLET, FILM COATED ORAL
Qty: 0 | Refills: 0 | DISCHARGE
Start: 2018-06-29

## 2018-06-29 RX ORDER — HEPARIN SODIUM 5000 [USP'U]/ML
5000 INJECTION INTRAVENOUS; SUBCUTANEOUS EVERY 8 HOURS
Qty: 0 | Refills: 0 | Status: DISCONTINUED | OUTPATIENT
Start: 2018-06-29 | End: 2018-06-30

## 2018-06-29 RX ORDER — HYDROMORPHONE HYDROCHLORIDE 2 MG/ML
0.5 INJECTION INTRAMUSCULAR; INTRAVENOUS; SUBCUTANEOUS
Qty: 0 | Refills: 0 | Status: DISCONTINUED | OUTPATIENT
Start: 2018-06-29 | End: 2018-06-30

## 2018-06-29 RX ORDER — KETOROLAC TROMETHAMINE 30 MG/ML
15 SYRINGE (ML) INJECTION EVERY 6 HOURS
Qty: 0 | Refills: 0 | Status: DISCONTINUED | OUTPATIENT
Start: 2018-06-30 | End: 2018-06-30

## 2018-06-29 RX ORDER — ACETAMINOPHEN 500 MG
1000 TABLET ORAL ONCE
Qty: 0 | Refills: 0 | Status: COMPLETED | OUTPATIENT
Start: 2018-06-30 | End: 2018-06-30

## 2018-06-29 RX ORDER — FENTANYL CITRATE 50 UG/ML
50 INJECTION INTRAVENOUS
Qty: 0 | Refills: 0 | Status: DISCONTINUED | OUTPATIENT
Start: 2018-06-29 | End: 2018-06-29

## 2018-06-29 RX ORDER — CYCLOBENZAPRINE HYDROCHLORIDE 10 MG/1
10 TABLET, FILM COATED ORAL THREE TIMES A DAY
Qty: 0 | Refills: 0 | Status: DISCONTINUED | OUTPATIENT
Start: 2018-06-29 | End: 2018-06-30

## 2018-06-29 RX ORDER — ACETAMINOPHEN 500 MG
650 TABLET ORAL EVERY 6 HOURS
Qty: 0 | Refills: 0 | Status: DISCONTINUED | OUTPATIENT
Start: 2018-06-29 | End: 2018-06-30

## 2018-06-29 RX ORDER — FAMOTIDINE 10 MG/ML
20 INJECTION INTRAVENOUS ONCE
Qty: 0 | Refills: 0 | Status: COMPLETED | OUTPATIENT
Start: 2018-06-29 | End: 2018-06-29

## 2018-06-29 RX ORDER — SODIUM CHLORIDE 9 MG/ML
3 INJECTION INTRAMUSCULAR; INTRAVENOUS; SUBCUTANEOUS EVERY 8 HOURS
Qty: 0 | Refills: 0 | Status: DISCONTINUED | OUTPATIENT
Start: 2018-06-29 | End: 2018-06-29

## 2018-06-29 RX ORDER — CEFAZOLIN SODIUM 1 G
1000 VIAL (EA) INJECTION EVERY 8 HOURS
Qty: 0 | Refills: 0 | Status: DISCONTINUED | OUTPATIENT
Start: 2018-06-29 | End: 2018-06-30

## 2018-06-29 RX ADMIN — Medication 975 MILLIGRAM(S): at 09:14

## 2018-06-29 RX ADMIN — OXYCODONE AND ACETAMINOPHEN 2 TABLET(S): 5; 325 TABLET ORAL at 22:46

## 2018-06-29 RX ADMIN — OXYCODONE HYDROCHLORIDE 10 MILLIGRAM(S): 5 TABLET ORAL at 09:13

## 2018-06-29 RX ADMIN — HYDROMORPHONE HYDROCHLORIDE 1 MILLIGRAM(S): 2 INJECTION INTRAMUSCULAR; INTRAVENOUS; SUBCUTANEOUS at 19:27

## 2018-06-29 RX ADMIN — HYDROMORPHONE HYDROCHLORIDE 1 MILLIGRAM(S): 2 INJECTION INTRAMUSCULAR; INTRAVENOUS; SUBCUTANEOUS at 18:57

## 2018-06-29 RX ADMIN — HEPARIN SODIUM 5000 UNIT(S): 5000 INJECTION INTRAVENOUS; SUBCUTANEOUS at 22:46

## 2018-06-29 RX ADMIN — HYDROMORPHONE HYDROCHLORIDE 1 MILLIGRAM(S): 2 INJECTION INTRAMUSCULAR; INTRAVENOUS; SUBCUTANEOUS at 19:19

## 2018-06-29 RX ADMIN — Medication 975 MILLIGRAM(S): at 09:13

## 2018-06-29 RX ADMIN — ONDANSETRON 4 MILLIGRAM(S): 8 TABLET, FILM COATED ORAL at 22:46

## 2018-06-29 RX ADMIN — CYCLOBENZAPRINE HYDROCHLORIDE 10 MILLIGRAM(S): 10 TABLET, FILM COATED ORAL at 22:45

## 2018-06-29 RX ADMIN — OXYCODONE AND ACETAMINOPHEN 2 TABLET(S): 5; 325 TABLET ORAL at 23:30

## 2018-06-29 RX ADMIN — HYDROMORPHONE HYDROCHLORIDE 1 MILLIGRAM(S): 2 INJECTION INTRAMUSCULAR; INTRAVENOUS; SUBCUTANEOUS at 20:19

## 2018-06-29 RX ADMIN — OXYCODONE HYDROCHLORIDE 10 MILLIGRAM(S): 5 TABLET ORAL at 09:14

## 2018-06-29 NOTE — BRIEF OPERATIVE NOTE - PROCEDURE
<<-----Click on this checkbox to enter Procedure Breast reconstruction with tissue expander  06/29/2018  breast reconstruction with tissue expanders and alloderm following double mastectomy  Active  [email or web address]

## 2018-06-29 NOTE — ASU DISCHARGE PLAN (ADULT/PEDIATRIC). - SPECIAL INSTRUCTIONS
You should follow-up in the office in 3-4 days, please call for follow-up appointment if you have not already made one. [phone].   Prescription pain medications have been prescribed for you, take as needed for pain only. Do not drive a vehicle or operate machinery while taking narcotic pain medication.   You have drains, record output of drains daily bring recording with you to post operative visit.   Take all medications as they have been prescribed for you.  Ambulating is very important post operatively, make sure you ambulate several times daily.   If you experience bleeding that does not stop, swelling, hardness of surgical site, chest pain, shortness of breath, leg pain, dizziness or any signs or symptoms of infection such as fever, redness, pus, foul smell of surgical site please contact the office immediately, [phone].

## 2018-06-29 NOTE — ASU DISCHARGE PLAN (ADULT/PEDIATRIC). - MEDICATION SUMMARY - MEDICATIONS TO TAKE
I will START or STAY ON the medications listed below when I get home from the hospital:    oxyCODONE-acetaminophen 5 mg-325 mg oral tablet  -- 1 tab(s) by mouth every 4 hours, As needed, Moderate Pain  -- Indication: For Moderate to severe pain    Valium 5 mg oral tablet  -- 1 tab(s) by mouth 3 times a day, As Needed  -- Indication: For As per PMD    doxycycline monohydrate 100 mg oral tablet  -- 1 tab(s) by mouth 2 times a day  -- Indication: For Infection prophylaxis     Ambien 5 mg oral tablet  -- 1 tab(s) by mouth once a day (at bedtime), As Needed  -- Indication: For As per PMD    cyclobenzaprine 10 mg oral tablet  -- 1 tab(s) by mouth 3 times a day  -- Indication: For Muscle spasms

## 2018-06-30 VITALS
OXYGEN SATURATION: 98 % | DIASTOLIC BLOOD PRESSURE: 43 MMHG | TEMPERATURE: 98 F | RESPIRATION RATE: 16 BRPM | HEART RATE: 78 BPM | SYSTOLIC BLOOD PRESSURE: 97 MMHG

## 2018-06-30 LAB
ANION GAP SERPL CALC-SCNC: 6 MMOL/L — SIGNIFICANT CHANGE UP (ref 5–17)
BASOPHILS # BLD AUTO: 0.02 K/UL — SIGNIFICANT CHANGE UP (ref 0–0.2)
BASOPHILS NFR BLD AUTO: 0.2 % — SIGNIFICANT CHANGE UP (ref 0–2)
BUN SERPL-MCNC: 11 MG/DL — SIGNIFICANT CHANGE UP (ref 7–23)
CALCIUM SERPL-MCNC: 8 MG/DL — LOW (ref 8.5–10.1)
CHLORIDE SERPL-SCNC: 106 MMOL/L — SIGNIFICANT CHANGE UP (ref 96–108)
CO2 SERPL-SCNC: 28 MMOL/L — SIGNIFICANT CHANGE UP (ref 22–31)
CREAT SERPL-MCNC: 0.66 MG/DL — SIGNIFICANT CHANGE UP (ref 0.5–1.3)
EOSINOPHIL # BLD AUTO: 0 K/UL — SIGNIFICANT CHANGE UP (ref 0–0.5)
EOSINOPHIL NFR BLD AUTO: 0 % — SIGNIFICANT CHANGE UP (ref 0–6)
GLUCOSE SERPL-MCNC: 96 MG/DL — SIGNIFICANT CHANGE UP (ref 70–99)
HCT VFR BLD CALC: 30.2 % — LOW (ref 34.5–45)
HGB BLD-MCNC: 10.2 G/DL — LOW (ref 11.5–15.5)
IMM GRANULOCYTES NFR BLD AUTO: 0.2 % — SIGNIFICANT CHANGE UP (ref 0–1.5)
LYMPHOCYTES # BLD AUTO: 1.46 K/UL — SIGNIFICANT CHANGE UP (ref 1–3.3)
LYMPHOCYTES # BLD AUTO: 16.3 % — SIGNIFICANT CHANGE UP (ref 13–44)
MCHC RBC-ENTMCNC: 32.4 PG — SIGNIFICANT CHANGE UP (ref 27–34)
MCHC RBC-ENTMCNC: 33.8 GM/DL — SIGNIFICANT CHANGE UP (ref 32–36)
MCV RBC AUTO: 95.9 FL — SIGNIFICANT CHANGE UP (ref 80–100)
MONOCYTES # BLD AUTO: 0.89 K/UL — SIGNIFICANT CHANGE UP (ref 0–0.9)
MONOCYTES NFR BLD AUTO: 9.9 % — SIGNIFICANT CHANGE UP (ref 2–14)
NEUTROPHILS # BLD AUTO: 6.59 K/UL — SIGNIFICANT CHANGE UP (ref 1.8–7.4)
NEUTROPHILS NFR BLD AUTO: 73.4 % — SIGNIFICANT CHANGE UP (ref 43–77)
NRBC # BLD: 0 /100 WBCS — SIGNIFICANT CHANGE UP (ref 0–0)
PLATELET # BLD AUTO: 178 K/UL — SIGNIFICANT CHANGE UP (ref 150–400)
POTASSIUM SERPL-MCNC: 4 MMOL/L — SIGNIFICANT CHANGE UP (ref 3.5–5.3)
POTASSIUM SERPL-SCNC: 4 MMOL/L — SIGNIFICANT CHANGE UP (ref 3.5–5.3)
RBC # BLD: 3.15 M/UL — LOW (ref 3.8–5.2)
RBC # FLD: 11.9 % — SIGNIFICANT CHANGE UP (ref 10.3–14.5)
SODIUM SERPL-SCNC: 140 MMOL/L — SIGNIFICANT CHANGE UP (ref 135–145)
WBC # BLD: 8.98 K/UL — SIGNIFICANT CHANGE UP (ref 3.8–10.5)
WBC # FLD AUTO: 8.98 K/UL — SIGNIFICANT CHANGE UP (ref 3.8–10.5)

## 2018-06-30 RX ORDER — GABAPENTIN 400 MG/1
100 CAPSULE ORAL THREE TIMES A DAY
Qty: 0 | Refills: 0 | Status: DISCONTINUED | OUTPATIENT
Start: 2018-06-30 | End: 2018-06-30

## 2018-06-30 RX ORDER — GABAPENTIN 400 MG/1
300 CAPSULE ORAL THREE TIMES A DAY
Qty: 0 | Refills: 0 | Status: DISCONTINUED | OUTPATIENT
Start: 2018-06-30 | End: 2018-06-30

## 2018-06-30 RX ADMIN — GABAPENTIN 100 MILLIGRAM(S): 400 CAPSULE ORAL at 11:07

## 2018-06-30 RX ADMIN — Medication 100 MILLIGRAM(S): at 11:30

## 2018-06-30 RX ADMIN — Medication 100 MILLIGRAM(S): at 16:18

## 2018-06-30 RX ADMIN — HEPARIN SODIUM 5000 UNIT(S): 5000 INJECTION INTRAVENOUS; SUBCUTANEOUS at 13:08

## 2018-06-30 RX ADMIN — OXYCODONE AND ACETAMINOPHEN 2 TABLET(S): 5; 325 TABLET ORAL at 15:29

## 2018-06-30 RX ADMIN — OXYCODONE AND ACETAMINOPHEN 2 TABLET(S): 5; 325 TABLET ORAL at 09:00

## 2018-06-30 RX ADMIN — OXYCODONE AND ACETAMINOPHEN 2 TABLET(S): 5; 325 TABLET ORAL at 04:55

## 2018-06-30 RX ADMIN — Medication 100 MILLIGRAM(S): at 00:30

## 2018-06-30 RX ADMIN — OXYCODONE AND ACETAMINOPHEN 2 TABLET(S): 5; 325 TABLET ORAL at 06:13

## 2018-06-30 RX ADMIN — CYCLOBENZAPRINE HYDROCHLORIDE 10 MILLIGRAM(S): 10 TABLET, FILM COATED ORAL at 13:08

## 2018-06-30 RX ADMIN — Medication 1000 MILLIGRAM(S): at 06:30

## 2018-06-30 RX ADMIN — Medication 400 MILLIGRAM(S): at 05:57

## 2018-06-30 RX ADMIN — Medication 15 MILLIGRAM(S): at 18:18

## 2018-06-30 RX ADMIN — OXYCODONE AND ACETAMINOPHEN 2 TABLET(S): 5; 325 TABLET ORAL at 16:19

## 2018-06-30 RX ADMIN — HEPARIN SODIUM 5000 UNIT(S): 5000 INJECTION INTRAVENOUS; SUBCUTANEOUS at 06:12

## 2018-06-30 RX ADMIN — Medication 15 MILLIGRAM(S): at 11:45

## 2018-06-30 RX ADMIN — Medication 15 MILLIGRAM(S): at 00:30

## 2018-06-30 RX ADMIN — Medication 15 MILLIGRAM(S): at 11:30

## 2018-06-30 RX ADMIN — Medication 15 MILLIGRAM(S): at 17:18

## 2018-06-30 RX ADMIN — Medication 15 MILLIGRAM(S): at 06:12

## 2018-06-30 RX ADMIN — CYCLOBENZAPRINE HYDROCHLORIDE 10 MILLIGRAM(S): 10 TABLET, FILM COATED ORAL at 09:00

## 2018-06-30 RX ADMIN — OXYCODONE AND ACETAMINOPHEN 2 TABLET(S): 5; 325 TABLET ORAL at 10:00

## 2018-06-30 NOTE — PROGRESS NOTE ADULT - SUBJECTIVE AND OBJECTIVE BOX
C/o bilateral chest soreness, suboptimal pain control.  Ambulated in room, voiding, tolerated liquids and breakfast this morning.        98.5, 99/52, HR 71, 100% RA  Void 50cc  Drains: 30, 37, 40, 65 serosanguinous  Bilateral breasts soft, no fluid collections; incisions c/d/i with dermabond  Right breast lateral skin flap inferior portion slightly ecchymotic, but with 2 second capillary refill; otherwise all skin pink and appears well perfused.

## 2018-06-30 NOTE — PROGRESS NOTE ADULT - ASSESSMENT
POD#1 s/p bilateral breast reconstruction with tissue expanders and alloderm following bilateral mastectomy, doing well, but poor pain control.   -optimize pain control  -ambulate   -SHIREEN drain teaching  -okay for discharge home later this evening after improved pain control.

## 2018-07-01 PROCEDURE — 89272 EXTENDED CULTURE OF OOCYTES: CPT

## 2018-07-02 ENCOUNTER — APPOINTMENT (OUTPATIENT)
Dept: SURGERY | Facility: CLINIC | Age: 28
End: 2018-07-02

## 2018-07-02 PROCEDURE — 89258 CRYOPRESERVATION EMBRYO(S): CPT

## 2018-07-02 PROCEDURE — 89342 STORAGE/YEAR EMBRYO(S): CPT

## 2018-07-03 PROCEDURE — 89258 CRYOPRESERVATION EMBRYO(S): CPT

## 2018-07-09 LAB — SURGICAL PATHOLOGY FINAL REPORT - CH: SIGNIFICANT CHANGE UP

## 2018-07-11 ENCOUNTER — MESSAGE (OUTPATIENT)
Age: 28
End: 2018-07-11

## 2018-07-11 ENCOUNTER — APPOINTMENT (OUTPATIENT)
Dept: SURGERY | Facility: CLINIC | Age: 28
End: 2018-07-11
Payer: COMMERCIAL

## 2018-07-11 VITALS
OXYGEN SATURATION: 100 % | SYSTOLIC BLOOD PRESSURE: 133 MMHG | TEMPERATURE: 98.3 F | RESPIRATION RATE: 19 BRPM | DIASTOLIC BLOOD PRESSURE: 85 MMHG | HEART RATE: 112 BPM

## 2018-07-11 DIAGNOSIS — J45.909 UNSPECIFIED ASTHMA, UNCOMPLICATED: ICD-10-CM

## 2018-07-11 DIAGNOSIS — C50.912 MALIGNANT NEOPLASM OF UNSPECIFIED SITE OF LEFT FEMALE BREAST: ICD-10-CM

## 2018-07-11 DIAGNOSIS — N64.81 PTOSIS OF BREAST: ICD-10-CM

## 2018-07-11 PROCEDURE — 99024 POSTOP FOLLOW-UP VISIT: CPT

## 2018-07-11 RX ORDER — GABAPENTIN 100 MG/1
100 CAPSULE ORAL
Qty: 21 | Refills: 0 | Status: DISCONTINUED | COMMUNITY
Start: 2018-06-30

## 2018-07-11 RX ORDER — OXYCODONE AND ACETAMINOPHEN 5; 325 MG/1; MG/1
5-325 TABLET ORAL
Qty: 30 | Refills: 0 | Status: DISCONTINUED | COMMUNITY
Start: 2018-06-22

## 2018-07-11 RX ORDER — AMOXICILLIN 500 MG/1
500 TABLET, FILM COATED ORAL
Qty: 90 | Refills: 0 | Status: DISCONTINUED | COMMUNITY
Start: 2018-03-29

## 2018-07-11 RX ORDER — STANDARDIZED SENNA CONCENTRATE 8.6 MG/1
8.6 TABLET ORAL
Qty: 28 | Refills: 0 | Status: DISCONTINUED | COMMUNITY
Start: 2018-07-05 | End: 2018-07-11

## 2018-07-11 RX ORDER — ASCORBIC ACID, CHOLECALCIFEROL, .ALPHA.-TOCOPHEROL ACETATE, DL-, PYRIDOXINE, FOLIC ACID, CYANOCOBALAMIN, CALCIUM, FERROUS FUMARATE, MAGNESIUM, DOCONEXENT 85; 200; 10; 25; 1; 12; 140; 27; 45; 300 [IU]/1; [IU]/1; [IU]/1; [IU]/1; MG/1; UG/1; MG/1; MG/1; MG/1; MG/1
27-0.6-0.4-3 CAPSULE, GELATIN COATED ORAL
Qty: 90 | Refills: 0 | Status: DISCONTINUED | COMMUNITY
Start: 2018-04-10

## 2018-07-11 RX ORDER — ALPRAZOLAM 0.5 MG/1
0.5 TABLET ORAL
Qty: 60 | Refills: 0 | Status: DISCONTINUED | COMMUNITY
Start: 2018-06-12

## 2018-07-11 RX ORDER — KETOROLAC TROMETHAMINE 10 MG/1
10 TABLET, FILM COATED ORAL
Qty: 6 | Refills: 0 | Status: DISCONTINUED | COMMUNITY
Start: 2018-06-30

## 2018-07-11 RX ORDER — IBUPROFEN 600 MG/1
600 TABLET, FILM COATED ORAL
Qty: 30 | Refills: 0 | Status: DISCONTINUED | COMMUNITY
Start: 2018-04-26

## 2018-07-12 ENCOUNTER — APPOINTMENT (OUTPATIENT)
Dept: CT IMAGING | Facility: CLINIC | Age: 28
End: 2018-07-12

## 2018-07-12 ENCOUNTER — OUTPATIENT (OUTPATIENT)
Dept: OUTPATIENT SERVICES | Facility: HOSPITAL | Age: 28
LOS: 1 days | Discharge: HOME | End: 2018-07-12

## 2018-07-12 DIAGNOSIS — D05.12 INTRADUCTAL CARCINOMA IN SITU OF LEFT BREAST: ICD-10-CM

## 2018-07-12 DIAGNOSIS — Z98.89 OTHER SPECIFIED POSTPROCEDURAL STATES: Chronic | ICD-10-CM

## 2018-07-12 DIAGNOSIS — Z98.890 OTHER SPECIFIED POSTPROCEDURAL STATES: Chronic | ICD-10-CM

## 2018-07-12 LAB — VWF MULTIMERS PPP IA-ACNC: NORMAL

## 2018-07-13 ENCOUNTER — APPOINTMENT (OUTPATIENT)
Dept: HEMATOLOGY ONCOLOGY | Facility: CLINIC | Age: 28
End: 2018-07-13
Payer: COMMERCIAL

## 2018-07-13 ENCOUNTER — OUTPATIENT (OUTPATIENT)
Dept: OUTPATIENT SERVICES | Facility: HOSPITAL | Age: 28
LOS: 1 days | Discharge: HOME | End: 2018-07-13

## 2018-07-13 ENCOUNTER — RESULT REVIEW (OUTPATIENT)
Age: 28
End: 2018-07-13

## 2018-07-13 ENCOUNTER — OTHER (OUTPATIENT)
Age: 28
End: 2018-07-13

## 2018-07-13 ENCOUNTER — APPOINTMENT (OUTPATIENT)
Dept: CT IMAGING | Facility: CLINIC | Age: 28
End: 2018-07-13

## 2018-07-13 VITALS
HEART RATE: 113 BPM | WEIGHT: 135.78 LBS | DIASTOLIC BLOOD PRESSURE: 77 MMHG | SYSTOLIC BLOOD PRESSURE: 116 MMHG | BODY MASS INDEX: 25.66 KG/M2

## 2018-07-13 DIAGNOSIS — Z98.89 OTHER SPECIFIED POSTPROCEDURAL STATES: Chronic | ICD-10-CM

## 2018-07-13 DIAGNOSIS — Z98.890 OTHER SPECIFIED POSTPROCEDURAL STATES: Chronic | ICD-10-CM

## 2018-07-13 LAB
BASOPHILS # BLD AUTO: 0.1 K/UL — SIGNIFICANT CHANGE UP (ref 0–0.2)
BASOPHILS NFR BLD AUTO: 1.4 % — SIGNIFICANT CHANGE UP (ref 0–2)
EOSINOPHIL # BLD AUTO: 0.3 K/UL — SIGNIFICANT CHANGE UP (ref 0–0.5)
EOSINOPHIL NFR BLD AUTO: 5.8 % — SIGNIFICANT CHANGE UP (ref 0–6)
HCT VFR BLD CALC: 36.8 % — SIGNIFICANT CHANGE UP (ref 34.5–45)
HGB BLD-MCNC: 12.2 G/DL — SIGNIFICANT CHANGE UP (ref 11.5–15.5)
LYMPHOCYTES # BLD AUTO: 1.5 K/UL — SIGNIFICANT CHANGE UP (ref 1–3.3)
LYMPHOCYTES # BLD AUTO: 32.8 % — SIGNIFICANT CHANGE UP (ref 13–44)
MCHC RBC-ENTMCNC: 31.5 PG — SIGNIFICANT CHANGE UP (ref 27–34)
MCHC RBC-ENTMCNC: 33.2 GM/DL — SIGNIFICANT CHANGE UP (ref 32–36)
MCV RBC AUTO: 94.6 FL — SIGNIFICANT CHANGE UP (ref 80–100)
MONOCYTES # BLD AUTO: 0.4 K/UL — SIGNIFICANT CHANGE UP (ref 0–0.9)
MONOCYTES NFR BLD AUTO: 8.3 % — SIGNIFICANT CHANGE UP (ref 2–14)
NEUTROPHILS # BLD AUTO: 2.4 K/UL — SIGNIFICANT CHANGE UP (ref 1.8–7.4)
NEUTROPHILS NFR BLD AUTO: 51.8 % — SIGNIFICANT CHANGE UP (ref 43–77)
PLATELET # BLD AUTO: 216 K/UL — SIGNIFICANT CHANGE UP (ref 150–400)
RBC # BLD: 3.89 M/UL — SIGNIFICANT CHANGE UP (ref 3.8–5.2)
RBC # FLD: 10.3 % — SIGNIFICANT CHANGE UP (ref 10.3–14.5)
WBC # BLD: 4.6 K/UL — SIGNIFICANT CHANGE UP (ref 3.8–10.5)
WBC # FLD AUTO: 4.6 K/UL — SIGNIFICANT CHANGE UP (ref 3.8–10.5)

## 2018-07-13 PROCEDURE — 99214 OFFICE O/P EST MOD 30 MIN: CPT

## 2018-07-16 ENCOUNTER — OUTPATIENT (OUTPATIENT)
Dept: OUTPATIENT SERVICES | Facility: HOSPITAL | Age: 28
LOS: 1 days | Discharge: HOME | End: 2018-07-16

## 2018-07-16 ENCOUNTER — APPOINTMENT (OUTPATIENT)
Dept: SURGERY | Facility: CLINIC | Age: 28
End: 2018-07-16

## 2018-07-16 VITALS
OXYGEN SATURATION: 98 % | HEIGHT: 63 IN | TEMPERATURE: 98 F | WEIGHT: 136.91 LBS | DIASTOLIC BLOOD PRESSURE: 80 MMHG | RESPIRATION RATE: 16 BRPM | HEART RATE: 92 BPM | SYSTOLIC BLOOD PRESSURE: 131 MMHG

## 2018-07-16 VITALS
HEART RATE: 68 BPM | DIASTOLIC BLOOD PRESSURE: 82 MMHG | SYSTOLIC BLOOD PRESSURE: 125 MMHG | OXYGEN SATURATION: 97 % | RESPIRATION RATE: 12 BRPM

## 2018-07-16 DIAGNOSIS — Z98.89 OTHER SPECIFIED POSTPROCEDURAL STATES: Chronic | ICD-10-CM

## 2018-07-16 DIAGNOSIS — Z98.890 OTHER SPECIFIED POSTPROCEDURAL STATES: Chronic | ICD-10-CM

## 2018-07-16 LAB — HCG UR QL: NEGATIVE — SIGNIFICANT CHANGE UP

## 2018-07-16 RX ORDER — FENTANYL CITRATE 50 UG/ML
50 INJECTION INTRAVENOUS
Qty: 0 | Refills: 0 | Status: DISCONTINUED | OUTPATIENT
Start: 2018-07-16 | End: 2018-07-16

## 2018-07-16 RX ORDER — FAMOTIDINE 10 MG/ML
20 INJECTION INTRAVENOUS ONCE
Qty: 0 | Refills: 0 | Status: COMPLETED | OUTPATIENT
Start: 2018-07-16 | End: 2018-07-16

## 2018-07-16 RX ORDER — OXYCODONE HYDROCHLORIDE 5 MG/1
10 TABLET ORAL EVERY 6 HOURS
Qty: 0 | Refills: 0 | Status: DISCONTINUED | OUTPATIENT
Start: 2018-07-16 | End: 2018-07-16

## 2018-07-16 RX ORDER — ONDANSETRON 8 MG/1
4 TABLET, FILM COATED ORAL ONCE
Qty: 0 | Refills: 0 | Status: DISCONTINUED | OUTPATIENT
Start: 2018-07-16 | End: 2018-07-16

## 2018-07-16 RX ORDER — ACETAMINOPHEN 500 MG
1000 TABLET ORAL ONCE
Qty: 0 | Refills: 0 | Status: COMPLETED | OUTPATIENT
Start: 2018-07-16 | End: 2018-07-16

## 2018-07-16 RX ORDER — NALOXONE HYDROCHLORIDE 4 MG/.1ML
0.6 SPRAY NASAL ONCE
Qty: 0 | Refills: 0 | Status: COMPLETED | OUTPATIENT
Start: 2018-07-16 | End: 2018-07-16

## 2018-07-16 RX ORDER — FENTANYL CITRATE 50 UG/ML
25 INJECTION INTRAVENOUS ONCE
Qty: 0 | Refills: 0 | Status: DISCONTINUED | OUTPATIENT
Start: 2018-07-16 | End: 2018-07-16

## 2018-07-16 RX ORDER — NALOXONE HYDROCHLORIDE 4 MG/.1ML
0.06 SPRAY NASAL ONCE
Qty: 0 | Refills: 0 | Status: COMPLETED | OUTPATIENT
Start: 2018-07-16 | End: 2018-07-16

## 2018-07-16 RX ORDER — SODIUM CHLORIDE 9 MG/ML
1000 INJECTION, SOLUTION INTRAVENOUS
Qty: 0 | Refills: 0 | Status: DISCONTINUED | OUTPATIENT
Start: 2018-07-16 | End: 2018-07-16

## 2018-07-16 RX ADMIN — FENTANYL CITRATE 50 MICROGRAM(S): 50 INJECTION INTRAVENOUS at 19:04

## 2018-07-16 RX ADMIN — Medication 1000 MILLIGRAM(S): at 19:04

## 2018-07-16 RX ADMIN — OXYCODONE HYDROCHLORIDE 10 MILLIGRAM(S): 5 TABLET ORAL at 19:37

## 2018-07-16 RX ADMIN — FAMOTIDINE 20 MILLIGRAM(S): 10 INJECTION INTRAVENOUS at 13:19

## 2018-07-16 RX ADMIN — OXYCODONE HYDROCHLORIDE 5 MILLIGRAM(S): 5 TABLET ORAL at 19:37

## 2018-07-16 RX ADMIN — FENTANYL CITRATE 50 MICROGRAM(S): 50 INJECTION INTRAVENOUS at 17:00

## 2018-07-16 RX ADMIN — FENTANYL CITRATE 25 MICROGRAM(S): 50 INJECTION INTRAVENOUS at 19:05

## 2018-07-16 RX ADMIN — Medication 400 MILLIGRAM(S): at 17:58

## 2018-07-16 RX ADMIN — NALOXONE HYDROCHLORIDE 0.06 MILLIGRAM(S): 4 SPRAY NASAL at 17:10

## 2018-07-16 RX ADMIN — SODIUM CHLORIDE 75 MILLILITER(S): 9 INJECTION, SOLUTION INTRAVENOUS at 17:21

## 2018-07-16 RX ADMIN — OXYCODONE HYDROCHLORIDE 10 MILLIGRAM(S): 5 TABLET ORAL at 20:30

## 2018-07-16 RX ADMIN — FENTANYL CITRATE 25 MICROGRAM(S): 50 INJECTION INTRAVENOUS at 18:47

## 2018-07-16 RX ADMIN — OXYCODONE HYDROCHLORIDE 5 MILLIGRAM(S): 5 TABLET ORAL at 19:04

## 2018-07-16 NOTE — ASU DISCHARGE PLAN (ADULT/PEDIATRIC). - NOTIFY
Fever greater than 101/Persistent Nausea and Vomiting/Swelling that continues/Unable to Urinate/Numbness, color, or temperature change to extremity

## 2018-07-16 NOTE — H&P ADULT - ASSESSMENT
Exploration , possible Incision and drainage of right breast fluid collection as scheduled.  Rsisk/benefits explained, consent signed.

## 2018-07-16 NOTE — H&P ADULT - HISTORY OF PRESENT ILLNESS
17 days s/p bilateral mastetcomy with reconstruction with Lidia and alloderm.   Right breast enlarged a few days after removing drain, associated with pain.   Potential seroma/hematoma, not expanding. Scheduled for incision and drainage fo right breast.

## 2018-07-16 NOTE — BRIEF OPERATIVE NOTE - OPERATION/FINDINGS
Incision and drainage of right breast fluid collection ~40cc of serosanguinous fluid in upper outer quadrant

## 2018-07-16 NOTE — H&P ADULT - NSHPPHYSICALEXAM_GEN_ALL_CORE
Bilateral breasts soft, all skin viable. Incisions c/d/i.  Right breast larger than left, possible subpec. fluid collection.

## 2018-07-16 NOTE — BRIEF OPERATIVE NOTE - PROCEDURE
<<-----Click on this checkbox to enter Procedure Incision and drainage  07/16/2018    Active  [email or web address]

## 2018-07-17 ENCOUNTER — APPOINTMENT (OUTPATIENT)
Dept: HUMAN REPRODUCTION | Facility: CLINIC | Age: 28
End: 2018-07-17

## 2018-07-17 ENCOUNTER — APPOINTMENT (OUTPATIENT)
Dept: CARDIOLOGY | Facility: CLINIC | Age: 28
End: 2018-07-17

## 2018-07-18 PROBLEM — C50.919 MALIGNANT NEOPLASM OF UNSPECIFIED SITE OF UNSPECIFIED FEMALE BREAST: Chronic | Status: ACTIVE | Noted: 2018-06-22

## 2018-07-18 PROBLEM — K58.9 IRRITABLE BOWEL SYNDROME, UNSPECIFIED: Chronic | Status: ACTIVE | Noted: 2018-06-22

## 2018-07-18 PROBLEM — M54.9 DORSALGIA, UNSPECIFIED: Chronic | Status: ACTIVE | Noted: 2018-06-22

## 2018-07-18 PROBLEM — N64.4 MASTODYNIA: Chronic | Status: ACTIVE | Noted: 2018-06-22

## 2018-07-18 PROBLEM — K58.9 IRRITABLE BOWEL SYNDROME WITHOUT DIARRHEA: Chronic | Status: ACTIVE | Noted: 2018-06-22

## 2018-07-18 PROBLEM — K64.9 UNSPECIFIED HEMORRHOIDS: Chronic | Status: ACTIVE | Noted: 2018-06-22

## 2018-07-18 PROBLEM — M51.26 OTHER INTERVERTEBRAL DISC DISPLACEMENT, LUMBAR REGION: Chronic | Status: ACTIVE | Noted: 2018-06-22

## 2018-07-18 PROBLEM — G43.909 MIGRAINE, UNSPECIFIED, NOT INTRACTABLE, WITHOUT STATUS MIGRAINOSUS: Chronic | Status: ACTIVE | Noted: 2018-06-22

## 2018-07-18 PROBLEM — F41.9 ANXIETY DISORDER, UNSPECIFIED: Chronic | Status: ACTIVE | Noted: 2018-06-22

## 2018-07-18 PROBLEM — G57.01 LESION OF SCIATIC NERVE, RIGHT LOWER LIMB: Chronic | Status: ACTIVE | Noted: 2018-06-22

## 2018-07-19 ENCOUNTER — APPOINTMENT (OUTPATIENT)
Dept: RADIATION ONCOLOGY | Facility: CLINIC | Age: 28
End: 2018-07-19
Payer: COMMERCIAL

## 2018-07-19 VITALS
BODY MASS INDEX: 24.92 KG/M2 | DIASTOLIC BLOOD PRESSURE: 77 MMHG | SYSTOLIC BLOOD PRESSURE: 116 MMHG | HEART RATE: 118 BPM | HEIGHT: 61 IN | OXYGEN SATURATION: 99 % | RESPIRATION RATE: 16 BRPM | WEIGHT: 132 LBS

## 2018-07-19 DIAGNOSIS — L76.33 POSTPROCEDURAL SEROMA OF SKIN AND SUBCUTANEOUS TISSUE FOLLOWING A DERMATOLOGIC PROCEDURE: ICD-10-CM
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
## 2018-07-19 DIAGNOSIS — F41.9 ANXIETY DISORDER, UNSPECIFIED: ICD-10-CM

## 2018-07-19 DIAGNOSIS — Z91.010 ALLERGY TO PEANUTS: ICD-10-CM

## 2018-07-19 DIAGNOSIS — J45.909 UNSPECIFIED ASTHMA, UNCOMPLICATED: ICD-10-CM

## 2018-07-19 DIAGNOSIS — Z91.013 ALLERGY TO SEAFOOD: ICD-10-CM

## 2018-07-19 PROCEDURE — 99244 OFF/OP CNSLTJ NEW/EST MOD 40: CPT | Mod: 25

## 2018-07-23 ENCOUNTER — OTHER (OUTPATIENT)
Age: 28
End: 2018-07-23

## 2018-07-23 ENCOUNTER — APPOINTMENT (OUTPATIENT)
Dept: HEMATOLOGY ONCOLOGY | Facility: CLINIC | Age: 28
End: 2018-07-23

## 2018-07-26 ENCOUNTER — RX RENEWAL (OUTPATIENT)
Age: 28
End: 2018-07-26

## 2018-07-27 ENCOUNTER — APPOINTMENT (OUTPATIENT)
Dept: FAMILY MEDICINE | Facility: CLINIC | Age: 28
End: 2018-07-27

## 2018-07-30 ENCOUNTER — APPOINTMENT (OUTPATIENT)
Dept: HUMAN REPRODUCTION | Facility: CLINIC | Age: 28
End: 2018-07-30
Payer: COMMERCIAL

## 2018-07-30 PROCEDURE — 99213 OFFICE O/P EST LOW 20 MIN: CPT | Mod: 25

## 2018-07-30 PROCEDURE — 76830 TRANSVAGINAL US NON-OB: CPT

## 2018-07-31 ENCOUNTER — FORM ENCOUNTER (OUTPATIENT)
Age: 28
End: 2018-07-31

## 2018-08-01 ENCOUNTER — APPOINTMENT (OUTPATIENT)
Dept: INTERVENTIONAL RADIOLOGY/VASCULAR | Facility: CLINIC | Age: 28
End: 2018-08-01
Payer: COMMERCIAL

## 2018-08-01 ENCOUNTER — OUTPATIENT (OUTPATIENT)
Dept: OUTPATIENT SERVICES | Facility: HOSPITAL | Age: 28
LOS: 1 days | End: 2018-08-01

## 2018-08-01 DIAGNOSIS — C50.412 MALIGNANT NEOPLASM OF UPPER-OUTER QUADRANT OF LEFT FEMALE BREAST: ICD-10-CM

## 2018-08-01 DIAGNOSIS — Z98.89 OTHER SPECIFIED POSTPROCEDURAL STATES: Chronic | ICD-10-CM

## 2018-08-01 DIAGNOSIS — Z98.890 OTHER SPECIFIED POSTPROCEDURAL STATES: Chronic | ICD-10-CM

## 2018-08-01 PROCEDURE — 36561 INSERT TUNNELED CV CATH: CPT

## 2018-08-01 PROCEDURE — 77001 FLUOROGUIDE FOR VEIN DEVICE: CPT | Mod: 26

## 2018-08-01 PROCEDURE — 76937 US GUIDE VASCULAR ACCESS: CPT | Mod: 26

## 2018-08-02 ENCOUNTER — APPOINTMENT (OUTPATIENT)
Dept: HEMATOLOGY ONCOLOGY | Facility: CLINIC | Age: 28
End: 2018-08-02
Payer: COMMERCIAL

## 2018-08-02 VITALS
WEIGHT: 133.49 LBS | BODY MASS INDEX: 25.2 KG/M2 | SYSTOLIC BLOOD PRESSURE: 125 MMHG | DIASTOLIC BLOOD PRESSURE: 87 MMHG | TEMPERATURE: 97.7 F | HEIGHT: 61 IN | HEART RATE: 92 BPM | OXYGEN SATURATION: 98 %

## 2018-08-02 PROCEDURE — 99214 OFFICE O/P EST MOD 30 MIN: CPT

## 2018-08-03 ENCOUNTER — APPOINTMENT (OUTPATIENT)
Dept: INFUSION THERAPY | Facility: CLINIC | Age: 28
End: 2018-08-03

## 2018-08-03 ENCOUNTER — LABORATORY RESULT (OUTPATIENT)
Age: 28
End: 2018-08-03

## 2018-08-03 ENCOUNTER — RESULT REVIEW (OUTPATIENT)
Age: 28
End: 2018-08-03

## 2018-08-03 LAB
BASOPHILS # BLD AUTO: 0 K/UL — SIGNIFICANT CHANGE UP (ref 0–0.2)
BASOPHILS NFR BLD AUTO: 1 % — SIGNIFICANT CHANGE UP (ref 0–2)
EOSINOPHIL # BLD AUTO: 0.3 K/UL — SIGNIFICANT CHANGE UP (ref 0–0.5)
EOSINOPHIL NFR BLD AUTO: 6.5 % — HIGH (ref 0–6)
HCT VFR BLD CALC: 39.5 % — SIGNIFICANT CHANGE UP (ref 34.5–45)
HGB BLD-MCNC: 13.1 G/DL — SIGNIFICANT CHANGE UP (ref 11.5–15.5)
LYMPHOCYTES # BLD AUTO: 1.3 K/UL — SIGNIFICANT CHANGE UP (ref 1–3.3)
LYMPHOCYTES # BLD AUTO: 31.3 % — SIGNIFICANT CHANGE UP (ref 13–44)
MCHC RBC-ENTMCNC: 30.6 PG — SIGNIFICANT CHANGE UP (ref 27–34)
MCHC RBC-ENTMCNC: 33.3 GM/DL — SIGNIFICANT CHANGE UP (ref 32–36)
MCV RBC AUTO: 92.1 FL — SIGNIFICANT CHANGE UP (ref 80–100)
MONOCYTES # BLD AUTO: 0.4 K/UL — SIGNIFICANT CHANGE UP (ref 0–0.9)
MONOCYTES NFR BLD AUTO: 8.9 % — SIGNIFICANT CHANGE UP (ref 2–14)
NEUTROPHILS # BLD AUTO: 2.2 K/UL — SIGNIFICANT CHANGE UP (ref 1.8–7.4)
NEUTROPHILS NFR BLD AUTO: 52.3 % — SIGNIFICANT CHANGE UP (ref 43–77)
PLATELET # BLD AUTO: 168 K/UL — SIGNIFICANT CHANGE UP (ref 150–400)
RBC # BLD: 4.29 M/UL — SIGNIFICANT CHANGE UP (ref 3.8–5.2)
RBC # FLD: 10.6 % — SIGNIFICANT CHANGE UP (ref 10.3–14.5)
WBC # BLD: 4.2 K/UL — SIGNIFICANT CHANGE UP (ref 3.8–10.5)
WBC # FLD AUTO: 4.2 K/UL — SIGNIFICANT CHANGE UP (ref 3.8–10.5)

## 2018-08-06 DIAGNOSIS — R11.2 NAUSEA WITH VOMITING, UNSPECIFIED: ICD-10-CM

## 2018-08-06 DIAGNOSIS — Z51.11 ENCOUNTER FOR ANTINEOPLASTIC CHEMOTHERAPY: ICD-10-CM

## 2018-08-06 DIAGNOSIS — Z51.89 ENCOUNTER FOR OTHER SPECIFIED AFTERCARE: ICD-10-CM

## 2018-08-06 DIAGNOSIS — C50.412 MALIGNANT NEOPLASM OF UPPER-OUTER QUADRANT OF LEFT FEMALE BREAST: ICD-10-CM

## 2018-08-08 ENCOUNTER — OUTPATIENT (OUTPATIENT)
Dept: OUTPATIENT SERVICES | Facility: HOSPITAL | Age: 28
LOS: 1 days | Discharge: HOME | End: 2018-08-08

## 2018-08-08 DIAGNOSIS — Z98.89 OTHER SPECIFIED POSTPROCEDURAL STATES: Chronic | ICD-10-CM

## 2018-08-08 DIAGNOSIS — C50.412 MALIGNANT NEOPLASM OF UPPER-OUTER QUADRANT OF LEFT FEMALE BREAST: ICD-10-CM

## 2018-08-08 DIAGNOSIS — Z98.890 OTHER SPECIFIED POSTPROCEDURAL STATES: Chronic | ICD-10-CM

## 2018-08-17 ENCOUNTER — LABORATORY RESULT (OUTPATIENT)
Age: 28
End: 2018-08-17

## 2018-08-17 ENCOUNTER — APPOINTMENT (OUTPATIENT)
Dept: HEMATOLOGY ONCOLOGY | Facility: CLINIC | Age: 28
End: 2018-08-17

## 2018-08-17 ENCOUNTER — RESULT REVIEW (OUTPATIENT)
Age: 28
End: 2018-08-17

## 2018-08-17 ENCOUNTER — APPOINTMENT (OUTPATIENT)
Dept: HEMATOLOGY ONCOLOGY | Facility: CLINIC | Age: 28
End: 2018-08-17
Payer: COMMERCIAL

## 2018-08-17 ENCOUNTER — APPOINTMENT (OUTPATIENT)
Dept: INFUSION THERAPY | Facility: CLINIC | Age: 28
End: 2018-08-17

## 2018-08-17 DIAGNOSIS — Z87.898 PERSONAL HISTORY OF OTHER SPECIFIED CONDITIONS: ICD-10-CM

## 2018-08-17 DIAGNOSIS — N61.0 MASTITIS WITHOUT ABSCESS: ICD-10-CM

## 2018-08-17 DIAGNOSIS — R39.9 UNSPECIFIED SYMPTOMS AND SIGNS INVOLVING THE GENITOURINARY SYSTEM: ICD-10-CM

## 2018-08-17 DIAGNOSIS — N64.59 OTHER SIGNS AND SYMPTOMS IN BREAST: ICD-10-CM

## 2018-08-17 DIAGNOSIS — R92.8 OTHER ABNORMAL AND INCONCLUSIVE FINDINGS ON DIAGNOSTIC IMAGING OF BREAST: ICD-10-CM

## 2018-08-17 LAB
BASOPHILS # BLD AUTO: 0.1 K/UL — SIGNIFICANT CHANGE UP (ref 0–0.2)
BASOPHILS NFR BLD AUTO: 2.3 % — HIGH (ref 0–2)
EOSINOPHIL # BLD AUTO: 0.1 K/UL — SIGNIFICANT CHANGE UP (ref 0–0.5)
EOSINOPHIL NFR BLD AUTO: 1.7 % — SIGNIFICANT CHANGE UP (ref 0–6)
HCT VFR BLD CALC: 40.6 % — SIGNIFICANT CHANGE UP (ref 34.5–45)
HGB BLD-MCNC: 13.8 G/DL — SIGNIFICANT CHANGE UP (ref 11.5–15.5)
LYMPHOCYTES # BLD AUTO: 1.7 K/UL — SIGNIFICANT CHANGE UP (ref 1–3.3)
LYMPHOCYTES # BLD AUTO: 34.6 % — SIGNIFICANT CHANGE UP (ref 13–44)
MCHC RBC-ENTMCNC: 30.6 PG — SIGNIFICANT CHANGE UP (ref 27–34)
MCHC RBC-ENTMCNC: 34 GM/DL — SIGNIFICANT CHANGE UP (ref 32–36)
MCV RBC AUTO: 89.9 FL — SIGNIFICANT CHANGE UP (ref 80–100)
MONOCYTES # BLD AUTO: 0.6 K/UL — SIGNIFICANT CHANGE UP (ref 0–0.9)
MONOCYTES NFR BLD AUTO: 13 % — SIGNIFICANT CHANGE UP (ref 2–14)
NEUTROPHILS # BLD AUTO: 2.4 K/UL — SIGNIFICANT CHANGE UP (ref 1.8–7.4)
NEUTROPHILS NFR BLD AUTO: 48.4 % — SIGNIFICANT CHANGE UP (ref 43–77)
PLATELET # BLD AUTO: 217 K/UL — SIGNIFICANT CHANGE UP (ref 150–400)
RBC # BLD: 4.51 M/UL — SIGNIFICANT CHANGE UP (ref 3.8–5.2)
RBC # FLD: 10.5 % — SIGNIFICANT CHANGE UP (ref 10.3–14.5)
WBC # BLD: 4.9 K/UL — SIGNIFICANT CHANGE UP (ref 3.8–10.5)
WBC # FLD AUTO: 4.9 K/UL — SIGNIFICANT CHANGE UP (ref 3.8–10.5)

## 2018-08-17 PROCEDURE — 99214 OFFICE O/P EST MOD 30 MIN: CPT

## 2018-08-20 DIAGNOSIS — Z51.11 ENCOUNTER FOR ANTINEOPLASTIC CHEMOTHERAPY: ICD-10-CM

## 2018-08-20 DIAGNOSIS — R11.2 NAUSEA WITH VOMITING, UNSPECIFIED: ICD-10-CM

## 2018-08-20 DIAGNOSIS — Z51.89 ENCOUNTER FOR OTHER SPECIFIED AFTERCARE: ICD-10-CM

## 2018-08-22 ENCOUNTER — OTHER (OUTPATIENT)
Age: 28
End: 2018-08-22

## 2018-08-31 ENCOUNTER — RESULT REVIEW (OUTPATIENT)
Age: 28
End: 2018-08-31

## 2018-08-31 ENCOUNTER — LABORATORY RESULT (OUTPATIENT)
Age: 28
End: 2018-08-31

## 2018-08-31 ENCOUNTER — APPOINTMENT (OUTPATIENT)
Dept: INFUSION THERAPY | Facility: CLINIC | Age: 28
End: 2018-08-31

## 2018-08-31 PROBLEM — R39.9 UTI SYMPTOMS: Status: RESOLVED | Noted: 2018-05-14 | Resolved: 2018-08-31

## 2018-08-31 PROBLEM — N61.0 ACUTE MASTITIS OF LEFT BREAST: Noted: 2018-04-30

## 2018-08-31 PROBLEM — R92.8 ABNORMAL FINDING ON BREAST IMAGING: Noted: 2018-06-12

## 2018-08-31 PROBLEM — N64.59 ABNORMAL BREAST EXAM: Noted: 2018-06-12

## 2018-08-31 PROBLEM — Z87.898 HISTORY OF PAINFUL URINATION: Status: RESOLVED | Noted: 2017-11-17 | Resolved: 2018-08-31

## 2018-08-31 LAB
BASOPHILS # BLD AUTO: 0.1 K/UL — SIGNIFICANT CHANGE UP (ref 0–0.2)
BASOPHILS NFR BLD AUTO: 1.3 % — SIGNIFICANT CHANGE UP (ref 0–2)
EOSINOPHIL # BLD AUTO: 0 K/UL — SIGNIFICANT CHANGE UP (ref 0–0.5)
EOSINOPHIL NFR BLD AUTO: 0.7 % — SIGNIFICANT CHANGE UP (ref 0–6)
HCT VFR BLD CALC: 37.2 % — SIGNIFICANT CHANGE UP (ref 34.5–45)
HGB BLD-MCNC: 12.4 G/DL — SIGNIFICANT CHANGE UP (ref 11.5–15.5)
LYMPHOCYTES # BLD AUTO: 1.3 K/UL — SIGNIFICANT CHANGE UP (ref 1–3.3)
LYMPHOCYTES # BLD AUTO: 23.6 % — SIGNIFICANT CHANGE UP (ref 13–44)
MCHC RBC-ENTMCNC: 29.8 PG — SIGNIFICANT CHANGE UP (ref 27–34)
MCHC RBC-ENTMCNC: 33.2 GM/DL — SIGNIFICANT CHANGE UP (ref 32–36)
MCV RBC AUTO: 89.6 FL — SIGNIFICANT CHANGE UP (ref 80–100)
MONOCYTES # BLD AUTO: 0.7 K/UL — SIGNIFICANT CHANGE UP (ref 0–0.9)
MONOCYTES NFR BLD AUTO: 13.6 % — SIGNIFICANT CHANGE UP (ref 2–14)
NEUTROPHILS # BLD AUTO: 3.3 K/UL — SIGNIFICANT CHANGE UP (ref 1.8–7.4)
NEUTROPHILS NFR BLD AUTO: 60.8 % — SIGNIFICANT CHANGE UP (ref 43–77)
PLATELET # BLD AUTO: 186 K/UL — SIGNIFICANT CHANGE UP (ref 150–400)
RBC # BLD: 4.15 M/UL — SIGNIFICANT CHANGE UP (ref 3.8–5.2)
RBC # FLD: 11.2 % — SIGNIFICANT CHANGE UP (ref 10.3–14.5)
WBC # BLD: 5.5 K/UL — SIGNIFICANT CHANGE UP (ref 3.8–10.5)
WBC # FLD AUTO: 5.5 K/UL — SIGNIFICANT CHANGE UP (ref 3.8–10.5)

## 2018-09-06 ENCOUNTER — FORM ENCOUNTER (OUTPATIENT)
Age: 28
End: 2018-09-06

## 2018-09-07 ENCOUNTER — OUTPATIENT (OUTPATIENT)
Dept: OUTPATIENT SERVICES | Facility: HOSPITAL | Age: 28
LOS: 1 days | End: 2018-09-07
Payer: COMMERCIAL

## 2018-09-07 ENCOUNTER — APPOINTMENT (OUTPATIENT)
Dept: ULTRASOUND IMAGING | Facility: CLINIC | Age: 28
End: 2018-09-07
Payer: COMMERCIAL

## 2018-09-07 DIAGNOSIS — Z98.89 OTHER SPECIFIED POSTPROCEDURAL STATES: Chronic | ICD-10-CM

## 2018-09-07 DIAGNOSIS — Z00.8 ENCOUNTER FOR OTHER GENERAL EXAMINATION: ICD-10-CM

## 2018-09-07 DIAGNOSIS — Z98.890 OTHER SPECIFIED POSTPROCEDURAL STATES: Chronic | ICD-10-CM

## 2018-09-07 PROCEDURE — 76882 US LMTD JT/FCL EVL NVASC XTR: CPT | Mod: 26,RT

## 2018-09-07 PROCEDURE — 76882 US LMTD JT/FCL EVL NVASC XTR: CPT

## 2018-09-10 ENCOUNTER — MOBILE ON CALL (OUTPATIENT)
Age: 28
End: 2018-09-10

## 2018-09-11 ENCOUNTER — OUTPATIENT (OUTPATIENT)
Dept: OUTPATIENT SERVICES | Facility: HOSPITAL | Age: 28
LOS: 1 days | Discharge: HOME | End: 2018-09-11

## 2018-09-11 DIAGNOSIS — Z98.89 OTHER SPECIFIED POSTPROCEDURAL STATES: Chronic | ICD-10-CM

## 2018-09-11 DIAGNOSIS — Z98.890 OTHER SPECIFIED POSTPROCEDURAL STATES: Chronic | ICD-10-CM

## 2018-09-11 DIAGNOSIS — C50.412 MALIGNANT NEOPLASM OF UPPER-OUTER QUADRANT OF LEFT FEMALE BREAST: ICD-10-CM

## 2018-09-14 ENCOUNTER — LABORATORY RESULT (OUTPATIENT)
Age: 28
End: 2018-09-14

## 2018-09-14 ENCOUNTER — RESULT REVIEW (OUTPATIENT)
Age: 28
End: 2018-09-14

## 2018-09-14 ENCOUNTER — APPOINTMENT (OUTPATIENT)
Dept: INFUSION THERAPY | Facility: CLINIC | Age: 28
End: 2018-09-14

## 2018-09-14 LAB
BASOPHILS # BLD AUTO: 0.1 K/UL — SIGNIFICANT CHANGE UP (ref 0–0.2)
BASOPHILS NFR BLD AUTO: 2.2 % — HIGH (ref 0–2)
EOSINOPHIL # BLD AUTO: 0.1 K/UL — SIGNIFICANT CHANGE UP (ref 0–0.5)
EOSINOPHIL NFR BLD AUTO: 1.8 % — SIGNIFICANT CHANGE UP (ref 0–6)
HCT VFR BLD CALC: 36 % — SIGNIFICANT CHANGE UP (ref 34.5–45)
HGB BLD-MCNC: 11.9 G/DL — SIGNIFICANT CHANGE UP (ref 11.5–15.5)
LYMPHOCYTES # BLD AUTO: 1.1 K/UL — SIGNIFICANT CHANGE UP (ref 1–3.3)
LYMPHOCYTES # BLD AUTO: 26.8 % — SIGNIFICANT CHANGE UP (ref 13–44)
MCHC RBC-ENTMCNC: 29.9 PG — SIGNIFICANT CHANGE UP (ref 27–34)
MCHC RBC-ENTMCNC: 33 GM/DL — SIGNIFICANT CHANGE UP (ref 32–36)
MCV RBC AUTO: 90.6 FL — SIGNIFICANT CHANGE UP (ref 80–100)
MONOCYTES # BLD AUTO: 0.6 K/UL — SIGNIFICANT CHANGE UP (ref 0–0.9)
MONOCYTES NFR BLD AUTO: 14.6 % — HIGH (ref 2–14)
NEUTROPHILS # BLD AUTO: 2.3 K/UL — SIGNIFICANT CHANGE UP (ref 1.8–7.4)
NEUTROPHILS NFR BLD AUTO: 54.5 % — SIGNIFICANT CHANGE UP (ref 43–77)
PLATELET # BLD AUTO: 197 K/UL — SIGNIFICANT CHANGE UP (ref 150–400)
RBC # BLD: 3.97 M/UL — SIGNIFICANT CHANGE UP (ref 3.8–5.2)
RBC # FLD: 13.8 % — SIGNIFICANT CHANGE UP (ref 10.3–14.5)
WBC # BLD: 4.1 K/UL — SIGNIFICANT CHANGE UP (ref 3.8–10.5)
WBC # FLD AUTO: 4.1 K/UL — SIGNIFICANT CHANGE UP (ref 3.8–10.5)

## 2018-09-17 ENCOUNTER — APPOINTMENT (OUTPATIENT)
Dept: SURGERY | Facility: CLINIC | Age: 28
End: 2018-09-17
Payer: COMMERCIAL

## 2018-09-17 VITALS
TEMPERATURE: 98 F | BODY MASS INDEX: 25.68 KG/M2 | DIASTOLIC BLOOD PRESSURE: 74 MMHG | SYSTOLIC BLOOD PRESSURE: 115 MMHG | WEIGHT: 136 LBS | HEART RATE: 98 BPM | HEIGHT: 61 IN | OXYGEN SATURATION: 98 %

## 2018-09-17 DIAGNOSIS — Z51.11 ENCOUNTER FOR ANTINEOPLASTIC CHEMOTHERAPY: ICD-10-CM

## 2018-09-17 DIAGNOSIS — Z51.89 ENCOUNTER FOR OTHER SPECIFIED AFTERCARE: ICD-10-CM

## 2018-09-17 DIAGNOSIS — R11.2 NAUSEA WITH VOMITING, UNSPECIFIED: ICD-10-CM

## 2018-09-17 PROCEDURE — 99214 OFFICE O/P EST MOD 30 MIN: CPT | Mod: 24

## 2018-09-21 ENCOUNTER — RESULT REVIEW (OUTPATIENT)
Age: 28
End: 2018-09-21

## 2018-09-21 ENCOUNTER — APPOINTMENT (OUTPATIENT)
Dept: HEMATOLOGY ONCOLOGY | Facility: CLINIC | Age: 28
End: 2018-09-21
Payer: COMMERCIAL

## 2018-09-21 VITALS
DIASTOLIC BLOOD PRESSURE: 75 MMHG | OXYGEN SATURATION: 100 % | WEIGHT: 135.25 LBS | TEMPERATURE: 98.2 F | HEIGHT: 61 IN | BODY MASS INDEX: 25.54 KG/M2 | HEART RATE: 81 BPM | SYSTOLIC BLOOD PRESSURE: 111 MMHG

## 2018-09-21 LAB
BASOPHILS # BLD AUTO: 0 K/UL — SIGNIFICANT CHANGE UP (ref 0–0.2)
BASOPHILS NFR BLD AUTO: 2.1 % — HIGH (ref 0–2)
EOSINOPHIL # BLD AUTO: 0.1 K/UL — SIGNIFICANT CHANGE UP (ref 0–0.5)
EOSINOPHIL NFR BLD AUTO: 4.8 % — SIGNIFICANT CHANGE UP (ref 0–6)
HCT VFR BLD CALC: 32.2 % — LOW (ref 34.5–45)
HGB BLD-MCNC: 10.6 G/DL — LOW (ref 11.5–15.5)
LYMPHOCYTES # BLD AUTO: 0.5 K/UL — LOW (ref 1–3.3)
LYMPHOCYTES # BLD AUTO: 30.9 % — SIGNIFICANT CHANGE UP (ref 13–44)
MCHC RBC-ENTMCNC: 29.5 PG — SIGNIFICANT CHANGE UP (ref 27–34)
MCHC RBC-ENTMCNC: 32.9 GM/DL — SIGNIFICANT CHANGE UP (ref 32–36)
MCV RBC AUTO: 89.6 FL — SIGNIFICANT CHANGE UP (ref 80–100)
MONOCYTES # BLD AUTO: 0.1 K/UL — SIGNIFICANT CHANGE UP (ref 0–0.9)
MONOCYTES NFR BLD AUTO: 6.6 % — SIGNIFICANT CHANGE UP (ref 2–14)
NEUTROPHILS # BLD AUTO: 1 K/UL — LOW (ref 1.8–7.4)
NEUTROPHILS NFR BLD AUTO: 55.6 % — SIGNIFICANT CHANGE UP (ref 43–77)
PLATELET # BLD AUTO: 168 K/UL — SIGNIFICANT CHANGE UP (ref 150–400)
RBC # BLD: 3.6 M/UL — LOW (ref 3.8–5.2)
RBC # FLD: 13.6 % — SIGNIFICANT CHANGE UP (ref 10.3–14.5)
WBC # BLD: 1.8 K/UL — LOW (ref 3.8–10.5)
WBC # FLD AUTO: 1.8 K/UL — LOW (ref 3.8–10.5)

## 2018-09-21 PROCEDURE — 99214 OFFICE O/P EST MOD 30 MIN: CPT

## 2018-09-21 RX ORDER — NAPROXEN 500 MG/1
500 TABLET ORAL
Qty: 60 | Refills: 0 | Status: DISCONTINUED | COMMUNITY
Start: 2018-07-11 | End: 2018-09-21

## 2018-09-21 RX ORDER — GABAPENTIN 300 MG/1
300 CAPSULE ORAL
Qty: 21 | Refills: 0 | Status: DISCONTINUED | COMMUNITY
Start: 2018-07-06 | End: 2018-09-21

## 2018-09-21 RX ORDER — CYCLOBENZAPRINE HYDROCHLORIDE 10 MG/1
10 TABLET, FILM COATED ORAL
Qty: 42 | Refills: 0 | Status: DISCONTINUED | COMMUNITY
Start: 2018-06-22 | End: 2018-09-21

## 2018-09-21 RX ORDER — CELECOXIB 200 MG/1
200 CAPSULE ORAL
Qty: 20 | Refills: 0 | Status: DISCONTINUED | COMMUNITY
Start: 2018-07-03 | End: 2018-09-21

## 2018-09-21 RX ORDER — DOXYCYCLINE HYCLATE 100 MG/1
100 CAPSULE ORAL TWICE DAILY
Qty: 12 | Refills: 0 | Status: DISCONTINUED | COMMUNITY
Start: 2018-06-13 | End: 2018-09-21

## 2018-09-23 ENCOUNTER — FORM ENCOUNTER (OUTPATIENT)
Age: 28
End: 2018-09-23

## 2018-09-24 ENCOUNTER — OUTPATIENT (OUTPATIENT)
Dept: OUTPATIENT SERVICES | Facility: HOSPITAL | Age: 28
LOS: 1 days | End: 2018-09-24
Payer: COMMERCIAL

## 2018-09-24 ENCOUNTER — APPOINTMENT (OUTPATIENT)
Dept: MAMMOGRAPHY | Facility: CLINIC | Age: 28
End: 2018-09-24
Payer: COMMERCIAL

## 2018-09-24 ENCOUNTER — APPOINTMENT (OUTPATIENT)
Dept: RADIOLOGY | Facility: CLINIC | Age: 28
End: 2018-09-24
Payer: COMMERCIAL

## 2018-09-24 ENCOUNTER — APPOINTMENT (OUTPATIENT)
Dept: ULTRASOUND IMAGING | Facility: CLINIC | Age: 28
End: 2018-09-24
Payer: COMMERCIAL

## 2018-09-24 DIAGNOSIS — Z98.890 OTHER SPECIFIED POSTPROCEDURAL STATES: Chronic | ICD-10-CM

## 2018-09-24 DIAGNOSIS — Z00.8 ENCOUNTER FOR OTHER GENERAL EXAMINATION: ICD-10-CM

## 2018-09-24 DIAGNOSIS — Z98.89 OTHER SPECIFIED POSTPROCEDURAL STATES: Chronic | ICD-10-CM

## 2018-09-24 PROCEDURE — 71046 X-RAY EXAM CHEST 2 VIEWS: CPT

## 2018-09-24 PROCEDURE — 76642 ULTRASOUND BREAST LIMITED: CPT | Mod: 26,LT

## 2018-09-24 PROCEDURE — 76642 ULTRASOUND BREAST LIMITED: CPT

## 2018-09-24 PROCEDURE — 71046 X-RAY EXAM CHEST 2 VIEWS: CPT | Mod: 26

## 2018-09-26 ENCOUNTER — RX RENEWAL (OUTPATIENT)
Age: 28
End: 2018-09-26

## 2018-09-28 ENCOUNTER — APPOINTMENT (OUTPATIENT)
Dept: INFUSION THERAPY | Facility: CLINIC | Age: 28
End: 2018-09-28

## 2018-10-01 ENCOUNTER — RESULT REVIEW (OUTPATIENT)
Age: 28
End: 2018-10-01

## 2018-10-01 ENCOUNTER — APPOINTMENT (OUTPATIENT)
Dept: HEMATOLOGY ONCOLOGY | Facility: CLINIC | Age: 28
End: 2018-10-01
Payer: COMMERCIAL

## 2018-10-01 VITALS
HEIGHT: 61 IN | SYSTOLIC BLOOD PRESSURE: 118 MMHG | WEIGHT: 136.35 LBS | TEMPERATURE: 98.2 F | OXYGEN SATURATION: 98 % | BODY MASS INDEX: 25.74 KG/M2 | DIASTOLIC BLOOD PRESSURE: 85 MMHG | HEART RATE: 79 BPM

## 2018-10-01 LAB
BASOPHILS # BLD AUTO: 0.1 K/UL — SIGNIFICANT CHANGE UP (ref 0–0.2)
BASOPHILS NFR BLD AUTO: 1 % — SIGNIFICANT CHANGE UP (ref 0–2)
EOSINOPHIL # BLD AUTO: 0.1 K/UL — SIGNIFICANT CHANGE UP (ref 0–0.5)
EOSINOPHIL NFR BLD AUTO: 3 % — SIGNIFICANT CHANGE UP (ref 0–6)
HCT VFR BLD CALC: 33.6 % — LOW (ref 34.5–45)
HGB BLD-MCNC: 11.4 G/DL — LOW (ref 11.5–15.5)
LYMPHOCYTES # BLD AUTO: 1 K/UL — SIGNIFICANT CHANGE UP (ref 1–3.3)
LYMPHOCYTES # BLD AUTO: 36 % — SIGNIFICANT CHANGE UP (ref 13–44)
MCHC RBC-ENTMCNC: 30.5 PG — SIGNIFICANT CHANGE UP (ref 27–34)
MCHC RBC-ENTMCNC: 33.8 GM/DL — SIGNIFICANT CHANGE UP (ref 32–36)
MCV RBC AUTO: 90 FL — SIGNIFICANT CHANGE UP (ref 80–100)
MONOCYTES # BLD AUTO: 0.7 K/UL — SIGNIFICANT CHANGE UP (ref 0–0.9)
MONOCYTES NFR BLD AUTO: 20 % — HIGH (ref 2–14)
NEUTROPHILS # BLD AUTO: 1.5 K/UL — LOW (ref 1.8–7.4)
NEUTROPHILS NFR BLD AUTO: 38 % — LOW (ref 43–77)
NEUTS BAND # BLD: 2 % — SIGNIFICANT CHANGE UP (ref 0–8)
PLAT MORPH BLD: NORMAL — SIGNIFICANT CHANGE UP
PLATELET # BLD AUTO: 268 K/UL — SIGNIFICANT CHANGE UP (ref 150–400)
RBC # BLD: 3.74 M/UL — LOW (ref 3.8–5.2)
RBC # FLD: 16.1 % — HIGH (ref 10.3–14.5)
RBC BLD AUTO: NORMAL — SIGNIFICANT CHANGE UP
WBC # BLD: 3.3 K/UL — LOW (ref 3.8–10.5)
WBC # FLD AUTO: 3.3 K/UL — LOW (ref 3.8–10.5)

## 2018-10-01 PROCEDURE — 99214 OFFICE O/P EST MOD 30 MIN: CPT

## 2018-10-05 ENCOUNTER — LABORATORY RESULT (OUTPATIENT)
Age: 28
End: 2018-10-05

## 2018-10-05 ENCOUNTER — APPOINTMENT (OUTPATIENT)
Dept: INFUSION THERAPY | Facility: CLINIC | Age: 28
End: 2018-10-05

## 2018-10-05 ENCOUNTER — RESULT REVIEW (OUTPATIENT)
Age: 28
End: 2018-10-05

## 2018-10-05 LAB
BASOPHILS # BLD AUTO: 0.1 K/UL — SIGNIFICANT CHANGE UP (ref 0–0.2)
BASOPHILS NFR BLD AUTO: 2.5 % — HIGH (ref 0–2)
EOSINOPHIL # BLD AUTO: 0.1 K/UL — SIGNIFICANT CHANGE UP (ref 0–0.5)
EOSINOPHIL NFR BLD AUTO: 1.6 % — SIGNIFICANT CHANGE UP (ref 0–6)
HCT VFR BLD CALC: 35.4 % — SIGNIFICANT CHANGE UP (ref 34.5–45)
HGB BLD-MCNC: 11.7 G/DL — SIGNIFICANT CHANGE UP (ref 11.5–15.5)
LYMPHOCYTES # BLD AUTO: 1 K/UL — SIGNIFICANT CHANGE UP (ref 1–3.3)
LYMPHOCYTES # BLD AUTO: 29.5 % — SIGNIFICANT CHANGE UP (ref 13–44)
MCHC RBC-ENTMCNC: 30.4 PG — SIGNIFICANT CHANGE UP (ref 27–34)
MCHC RBC-ENTMCNC: 33.1 GM/DL — SIGNIFICANT CHANGE UP (ref 32–36)
MCV RBC AUTO: 92 FL — SIGNIFICANT CHANGE UP (ref 80–100)
MONOCYTES # BLD AUTO: 0.6 K/UL — SIGNIFICANT CHANGE UP (ref 0–0.9)
MONOCYTES NFR BLD AUTO: 16.5 % — HIGH (ref 2–14)
NEUTROPHILS # BLD AUTO: 1.8 K/UL — SIGNIFICANT CHANGE UP (ref 1.8–7.4)
NEUTROPHILS NFR BLD AUTO: 49.9 % — SIGNIFICANT CHANGE UP (ref 43–77)
PLATELET # BLD AUTO: 333 K/UL — SIGNIFICANT CHANGE UP (ref 150–400)
RBC # BLD: 3.84 M/UL — SIGNIFICANT CHANGE UP (ref 3.8–5.2)
RBC # FLD: 16.1 % — HIGH (ref 10.3–14.5)
WBC # BLD: 3.5 K/UL — LOW (ref 3.8–10.5)
WBC # FLD AUTO: 3.5 K/UL — LOW (ref 3.8–10.5)

## 2018-10-09 ENCOUNTER — OUTPATIENT (OUTPATIENT)
Dept: OUTPATIENT SERVICES | Facility: HOSPITAL | Age: 28
LOS: 1 days | Discharge: HOME | End: 2018-10-09

## 2018-10-09 DIAGNOSIS — Z98.890 OTHER SPECIFIED POSTPROCEDURAL STATES: Chronic | ICD-10-CM

## 2018-10-09 DIAGNOSIS — C50.412 MALIGNANT NEOPLASM OF UPPER-OUTER QUADRANT OF LEFT FEMALE BREAST: ICD-10-CM

## 2018-10-09 DIAGNOSIS — Z98.89 OTHER SPECIFIED POSTPROCEDURAL STATES: Chronic | ICD-10-CM

## 2018-10-10 ENCOUNTER — RX RENEWAL (OUTPATIENT)
Age: 28
End: 2018-10-10

## 2018-10-12 ENCOUNTER — RESULT REVIEW (OUTPATIENT)
Age: 28
End: 2018-10-12

## 2018-10-12 ENCOUNTER — LABORATORY RESULT (OUTPATIENT)
Age: 28
End: 2018-10-12

## 2018-10-12 ENCOUNTER — APPOINTMENT (OUTPATIENT)
Dept: INFUSION THERAPY | Facility: CLINIC | Age: 28
End: 2018-10-12

## 2018-10-12 LAB
BASOPHILS # BLD AUTO: 0.1 K/UL — SIGNIFICANT CHANGE UP (ref 0–0.2)
BASOPHILS NFR BLD AUTO: 2.6 % — HIGH (ref 0–2)
EOSINOPHIL # BLD AUTO: 0.1 K/UL — SIGNIFICANT CHANGE UP (ref 0–0.5)
EOSINOPHIL NFR BLD AUTO: 5.4 % — SIGNIFICANT CHANGE UP (ref 0–6)
HCT VFR BLD CALC: 35.8 % — SIGNIFICANT CHANGE UP (ref 34.5–45)
HGB BLD-MCNC: 11.8 G/DL — SIGNIFICANT CHANGE UP (ref 11.5–15.5)
LYMPHOCYTES # BLD AUTO: 0.7 K/UL — LOW (ref 1–3.3)
LYMPHOCYTES # BLD AUTO: 27.1 % — SIGNIFICANT CHANGE UP (ref 13–44)
MCHC RBC-ENTMCNC: 29.9 PG — SIGNIFICANT CHANGE UP (ref 27–34)
MCHC RBC-ENTMCNC: 32.9 GM/DL — SIGNIFICANT CHANGE UP (ref 32–36)
MCV RBC AUTO: 91 FL — SIGNIFICANT CHANGE UP (ref 80–100)
MONOCYTES # BLD AUTO: 0.3 K/UL — SIGNIFICANT CHANGE UP (ref 0–0.9)
MONOCYTES NFR BLD AUTO: 10.9 % — SIGNIFICANT CHANGE UP (ref 2–14)
NEUTROPHILS # BLD AUTO: 1.4 K/UL — LOW (ref 1.8–7.4)
NEUTROPHILS NFR BLD AUTO: 54.1 % — SIGNIFICANT CHANGE UP (ref 43–77)
PLATELET # BLD AUTO: 288 K/UL — SIGNIFICANT CHANGE UP (ref 150–400)
RBC # BLD: 3.93 M/UL — SIGNIFICANT CHANGE UP (ref 3.8–5.2)
RBC # FLD: 15.3 % — HIGH (ref 10.3–14.5)
WBC # BLD: 2.7 K/UL — LOW (ref 3.8–10.5)
WBC # FLD AUTO: 2.7 K/UL — LOW (ref 3.8–10.5)

## 2018-10-15 ENCOUNTER — RX RENEWAL (OUTPATIENT)
Age: 28
End: 2018-10-15

## 2018-10-15 DIAGNOSIS — Z51.11 ENCOUNTER FOR ANTINEOPLASTIC CHEMOTHERAPY: ICD-10-CM

## 2018-10-15 DIAGNOSIS — R11.2 NAUSEA WITH VOMITING, UNSPECIFIED: ICD-10-CM

## 2018-10-19 ENCOUNTER — RESULT REVIEW (OUTPATIENT)
Age: 28
End: 2018-10-19

## 2018-10-19 ENCOUNTER — LABORATORY RESULT (OUTPATIENT)
Age: 28
End: 2018-10-19

## 2018-10-19 ENCOUNTER — APPOINTMENT (OUTPATIENT)
Dept: HEMATOLOGY ONCOLOGY | Facility: CLINIC | Age: 28
End: 2018-10-19
Payer: COMMERCIAL

## 2018-10-19 ENCOUNTER — APPOINTMENT (OUTPATIENT)
Dept: INFUSION THERAPY | Facility: CLINIC | Age: 28
End: 2018-10-19
Payer: COMMERCIAL

## 2018-10-19 VITALS
DIASTOLIC BLOOD PRESSURE: 76 MMHG | OXYGEN SATURATION: 100 % | BODY MASS INDEX: 25.85 KG/M2 | TEMPERATURE: 98 F | HEIGHT: 61 IN | SYSTOLIC BLOOD PRESSURE: 125 MMHG | WEIGHT: 136.9 LBS | HEART RATE: 94 BPM

## 2018-10-19 LAB
BASOPHILS # BLD AUTO: 0 K/UL — SIGNIFICANT CHANGE UP (ref 0–0.2)
BASOPHILS NFR BLD AUTO: 1.6 % — SIGNIFICANT CHANGE UP (ref 0–2)
EOSINOPHIL # BLD AUTO: 0.3 K/UL — SIGNIFICANT CHANGE UP (ref 0–0.5)
EOSINOPHIL NFR BLD AUTO: 10.2 % — HIGH (ref 0–6)
HCT VFR BLD CALC: 35.3 % — SIGNIFICANT CHANGE UP (ref 34.5–45)
HGB BLD-MCNC: 11.6 G/DL — SIGNIFICANT CHANGE UP (ref 11.5–15.5)
LYMPHOCYTES # BLD AUTO: 0.9 K/UL — LOW (ref 1–3.3)
LYMPHOCYTES # BLD AUTO: 31.2 % — SIGNIFICANT CHANGE UP (ref 13–44)
MCHC RBC-ENTMCNC: 30.2 PG — SIGNIFICANT CHANGE UP (ref 27–34)
MCHC RBC-ENTMCNC: 32.8 GM/DL — SIGNIFICANT CHANGE UP (ref 32–36)
MCV RBC AUTO: 91.9 FL — SIGNIFICANT CHANGE UP (ref 80–100)
MONOCYTES # BLD AUTO: 0.4 K/UL — SIGNIFICANT CHANGE UP (ref 0–0.9)
MONOCYTES NFR BLD AUTO: 12.8 % — SIGNIFICANT CHANGE UP (ref 2–14)
NEUTROPHILS # BLD AUTO: 1.3 K/UL — LOW (ref 1.8–7.4)
NEUTROPHILS NFR BLD AUTO: 44.2 % — SIGNIFICANT CHANGE UP (ref 43–77)
PLATELET # BLD AUTO: 220 K/UL — SIGNIFICANT CHANGE UP (ref 150–400)
RBC # BLD: 3.84 M/UL — SIGNIFICANT CHANGE UP (ref 3.8–5.2)
RBC # FLD: 15.6 % — HIGH (ref 10.3–14.5)
WBC # BLD: 3 K/UL — LOW (ref 3.8–10.5)
WBC # FLD AUTO: 3 K/UL — LOW (ref 3.8–10.5)

## 2018-10-19 PROCEDURE — 99214 OFFICE O/P EST MOD 30 MIN: CPT

## 2018-10-19 PROCEDURE — G0008: CPT

## 2018-10-26 ENCOUNTER — RESULT REVIEW (OUTPATIENT)
Age: 28
End: 2018-10-26

## 2018-10-26 ENCOUNTER — OTHER (OUTPATIENT)
Age: 28
End: 2018-10-26

## 2018-10-26 ENCOUNTER — LABORATORY RESULT (OUTPATIENT)
Age: 28
End: 2018-10-26

## 2018-10-26 ENCOUNTER — APPOINTMENT (OUTPATIENT)
Age: 28
End: 2018-10-26

## 2018-10-26 ENCOUNTER — APPOINTMENT (OUTPATIENT)
Dept: BREAST CENTER | Facility: CLINIC | Age: 28
End: 2018-10-26

## 2018-10-26 LAB
BASOPHILS # BLD AUTO: 0 K/UL — SIGNIFICANT CHANGE UP (ref 0–0.2)
BASOPHILS NFR BLD AUTO: 1.7 % — SIGNIFICANT CHANGE UP (ref 0–2)
EOSINOPHIL # BLD AUTO: 0.1 K/UL — SIGNIFICANT CHANGE UP (ref 0–0.5)
EOSINOPHIL NFR BLD AUTO: 5 % — SIGNIFICANT CHANGE UP (ref 0–6)
HCT VFR BLD CALC: 35.1 % — SIGNIFICANT CHANGE UP (ref 34.5–45)
HGB BLD-MCNC: 11.6 G/DL — SIGNIFICANT CHANGE UP (ref 11.5–15.5)
LYMPHOCYTES # BLD AUTO: 0.8 K/UL — LOW (ref 1–3.3)
LYMPHOCYTES # BLD AUTO: 29.2 % — SIGNIFICANT CHANGE UP (ref 13–44)
MCHC RBC-ENTMCNC: 30.3 PG — SIGNIFICANT CHANGE UP (ref 27–34)
MCHC RBC-ENTMCNC: 33 GM/DL — SIGNIFICANT CHANGE UP (ref 32–36)
MCV RBC AUTO: 92 FL — SIGNIFICANT CHANGE UP (ref 80–100)
MONOCYTES # BLD AUTO: 0.2 K/UL — SIGNIFICANT CHANGE UP (ref 0–0.9)
MONOCYTES NFR BLD AUTO: 7.6 % — SIGNIFICANT CHANGE UP (ref 2–14)
NEUTROPHILS # BLD AUTO: 1.6 K/UL — LOW (ref 1.8–7.4)
NEUTROPHILS NFR BLD AUTO: 56.4 % — SIGNIFICANT CHANGE UP (ref 43–77)
PLATELET # BLD AUTO: 226 K/UL — SIGNIFICANT CHANGE UP (ref 150–400)
RBC # BLD: 3.82 M/UL — SIGNIFICANT CHANGE UP (ref 3.8–5.2)
RBC # FLD: 14.8 % — HIGH (ref 10.3–14.5)
WBC # BLD: 2.8 K/UL — LOW (ref 3.8–10.5)
WBC # FLD AUTO: 2.8 K/UL — LOW (ref 3.8–10.5)

## 2018-11-02 ENCOUNTER — RESULT REVIEW (OUTPATIENT)
Age: 28
End: 2018-11-02

## 2018-11-02 ENCOUNTER — LABORATORY RESULT (OUTPATIENT)
Age: 28
End: 2018-11-02

## 2018-11-02 ENCOUNTER — APPOINTMENT (OUTPATIENT)
Dept: HEMATOLOGY ONCOLOGY | Facility: CLINIC | Age: 28
End: 2018-11-02
Payer: COMMERCIAL

## 2018-11-02 ENCOUNTER — APPOINTMENT (OUTPATIENT)
Age: 28
End: 2018-11-02

## 2018-11-02 LAB
BASOPHILS # BLD AUTO: 0 K/UL — SIGNIFICANT CHANGE UP (ref 0–0.2)
BASOPHILS NFR BLD AUTO: 1 % — SIGNIFICANT CHANGE UP (ref 0–2)
EOSINOPHIL # BLD AUTO: 0.1 K/UL — SIGNIFICANT CHANGE UP (ref 0–0.5)
EOSINOPHIL NFR BLD AUTO: 4 % — SIGNIFICANT CHANGE UP (ref 0–6)
HCT VFR BLD CALC: 34.7 % — SIGNIFICANT CHANGE UP (ref 34.5–45)
HGB BLD-MCNC: 11.5 G/DL — SIGNIFICANT CHANGE UP (ref 11.5–15.5)
LYMPHOCYTES # BLD AUTO: 0.7 K/UL — LOW (ref 1–3.3)
LYMPHOCYTES # BLD AUTO: 21.6 % — SIGNIFICANT CHANGE UP (ref 13–44)
MCHC RBC-ENTMCNC: 31.2 PG — SIGNIFICANT CHANGE UP (ref 27–34)
MCHC RBC-ENTMCNC: 33.2 GM/DL — SIGNIFICANT CHANGE UP (ref 32–36)
MCV RBC AUTO: 94 FL — SIGNIFICANT CHANGE UP (ref 80–100)
MONOCYTES # BLD AUTO: 0.3 K/UL — SIGNIFICANT CHANGE UP (ref 0–0.9)
MONOCYTES NFR BLD AUTO: 9.1 % — SIGNIFICANT CHANGE UP (ref 2–14)
NEUTROPHILS # BLD AUTO: 2.2 K/UL — SIGNIFICANT CHANGE UP (ref 1.8–7.4)
NEUTROPHILS NFR BLD AUTO: 64.2 % — SIGNIFICANT CHANGE UP (ref 43–77)
PLATELET # BLD AUTO: 235 K/UL — SIGNIFICANT CHANGE UP (ref 150–400)
RBC # BLD: 3.69 M/UL — LOW (ref 3.8–5.2)
RBC # FLD: 14.6 % — HIGH (ref 10.3–14.5)
WBC # BLD: 3.4 K/UL — LOW (ref 3.8–10.5)
WBC # FLD AUTO: 3.4 K/UL — LOW (ref 3.8–10.5)

## 2018-11-02 PROCEDURE — 99214 OFFICE O/P EST MOD 30 MIN: CPT

## 2018-11-02 RX ORDER — ZOLPIDEM TARTRATE 10 MG/1
10 TABLET ORAL
Qty: 30 | Refills: 0 | Status: DISCONTINUED | COMMUNITY
Start: 2018-06-06 | End: 2018-11-02

## 2018-11-05 ENCOUNTER — APPOINTMENT (OUTPATIENT)
Dept: SURGERY | Facility: CLINIC | Age: 28
End: 2018-11-05
Payer: COMMERCIAL

## 2018-11-05 ENCOUNTER — OUTPATIENT (OUTPATIENT)
Dept: OUTPATIENT SERVICES | Facility: HOSPITAL | Age: 28
LOS: 1 days | Discharge: HOME | End: 2018-11-05

## 2018-11-05 DIAGNOSIS — Z98.89 OTHER SPECIFIED POSTPROCEDURAL STATES: Chronic | ICD-10-CM

## 2018-11-05 DIAGNOSIS — Z98.890 OTHER SPECIFIED POSTPROCEDURAL STATES: Chronic | ICD-10-CM

## 2018-11-05 DIAGNOSIS — C50.412 MALIGNANT NEOPLASM OF UPPER-OUTER QUADRANT OF LEFT FEMALE BREAST: ICD-10-CM

## 2018-11-05 PROCEDURE — 99214 OFFICE O/P EST MOD 30 MIN: CPT

## 2018-11-05 NOTE — HISTORY OF PRESENT ILLNESS
[Disease: _____________________] : Disease: [unfilled] [T: ___] : T[unfilled] [N: ___] : N[unfilled] [AJCC Stage: ____] : AJCC Stage: [unfilled] [de-identified] : Ms. Kennedy was diagnosed with left triple negative breast cancer at age 28. \par \par She delivered her first child on 4/25/18 following which she self palpated a left breast mass.  It was  thought to be a blocked milk duct / mastitis and was treated with antibiotics and then antifungals.  The mass did not resolve and she sought a 2nd opinion.  \par \par She then had a breast ultrasound on 5/30/18 which showed a 2.3 cm left breast mass at 1-2:00, 9 cm from the nipple, and a single axillary lymph node which does not contain a prominent benign appearing fatty hilum.  \par \par 5/31/18 left breast core biopsy - invasive poorly differentiated ductal carcinoma, [place] score 9/9, measures at least 17 cm w/ extensive necrosis. ER 0%, RI 0%, HER 2 (0/1+) -negative. \par \par On 6/7/18 she had a diagnostic mammogram + ultrasound - 2.1 cm rounded mass of left breast with overall coarsening of parenchymal pattern and increased parenchymal density in upper outer left breast. US - 2.4 cm left breast mass at 1-2:00 and left axilla 0.8 cm rounded hypoechoic mass consistent with abnormal left axillary node.  \par \par On 6/7/18 she also had a breast MRI - 2.9 x 3.2 x 3.1 cm mass in left upper inner breast at 1:00.  Suspicious left axillary nodes with ultrasound correlate for which US guided biopsy is recommended.   \par \par On 6/8/17 she had biopsy of the left axillary node - pathology : reactive lymph node. \par \par 6/7/18 - CT chest/abd/pelvis -  Known left upper outer breast malignancy. 2 small, round left axillary lymph nodes for which patient will undergo percutaneous \par sampling, as per report of breast MRI from 6/7/2018. A 3 mm subpleural nodular density in the right middle lobe probably represents a focus of atelectasis. No evidence of metastatic disease in the chest, abdomen, and pelvis.\par \par 6/8/18 bone scan - Normal bone scan. No radionuclide evidence of osseous metastasis.\par \par  6/29/18 -s/p bilateral mastectomy \par Pathology: left breast IDC 3.5 cm, joo score 9/9, margins negative, 2 SNL nodes negative.  pT2 pN0\par Right mastectomy - negative for malignancy. \par \par Family History: 2 maternal great aunts with breast cancer.\par paternal cousin with breast cancer\par paternal GM - pancreatic cancer\par paternal GF - pancreatic cancer\par \par [facility]  [employer] panel - no clinically significant mutations\par \par PMH: asthma, DPD deficiency (dihydropyrimidine dehydrogenase deficiency).\par \par Sx hx: tonsillectomy\par \par Social: non-smoker, social ETOH  [de-identified] : poorly differentiated invasive ductal carcinoma [de-identified] : ER 0% IA 0% HER2 negative [de-identified] : Having pain in L breast and pain has increased, and she has f/u w/ Dr. Farr on 11/27/18. Has c/o H/As almost daily bilaterally-- was diagnosed w/ sinus infection, rx'd b Dr. Edge - Augmentin 875 mg BID and has completed 4 days. She states her  and child were sick and her PCP wanted to get a . She has clear nasal drainage, and feels improved since starting on antibx. She has palpated a small LN in l neck. Denies fevers, no SOB, no CP (except L reconstructed breast) some nausea, relieved w/ antiemetic. No constipation, some diarrhea from antibiotic, which has subsided " a lot", no pain/burning w/ urination, no vaginal d/c post last tx. Feels fatigued, but energy level is very good. Mentions that she is getting up every AM at 3 AM, and gets up at 6:30 w/ baby, wants to know if we can prescribed Zolpidem, the longer acting. Remainder of ROS is negative.

## 2018-11-05 NOTE — ASSESSMENT
[FreeTextEntry1] : 28 year old female with stage IIA left breast TNBC (T2N0) s/p bilateral mastectomy on 6/29/18. She has DPD deficiency.  6/19/18 ECHO - EF 72%. \par \par S/p 4 cycles of AC. Taxol delayed on 9/28 for low ANC and antibx. On antibx - sounds like it ss empiric choice for sinus pressure (clear drainage), afebrile. WBC 3.4, anc 2.2\par \par Plan: \par - proceed w/ week 5 Taxol today. \par - no c/o numbness\par - finger tips tender- no numbness, no paronychia-> recommended 20 min soak to fingertips daily in 1/2 apple cider vinegar and water.\par - indiscreet small L neck LN- may be reactive. \par - insomnia: wakes in middle of night. Rx zolpidem er 12.5 mg at HS prn to break the poor sleep cycle.\par - follow up as scheduled. \par - RTO in 2 weeks or sooner for questions/concerns.

## 2018-11-05 NOTE — PHYSICAL EXAM
[Ambulatory and capable of all self care but unable to carry out any work activities] : Status 2- Ambulatory and capable of all self care but unable to carry out any work activities. Up and about more than 50% of waking hours [Normal] : pharynx is unremarkable, moist mucus membrane, no oral lesions [de-identified] : Interactive, well groomed, in NAD. [de-identified] : Brianna ceron asked me to feel L neck small indiscreet nodule palpated. [de-identified] : bilateral reconstructed breast, non nodularity [de-identified] : BS+, soft, nontender on palpation. [de-identified] : without spinal or cva tenderness. [de-identified] : dryness of fingertips, no erythema or peronychia, nail beds firm.

## 2018-11-09 ENCOUNTER — LABORATORY RESULT (OUTPATIENT)
Age: 28
End: 2018-11-09

## 2018-11-09 ENCOUNTER — RESULT REVIEW (OUTPATIENT)
Age: 28
End: 2018-11-09

## 2018-11-09 ENCOUNTER — APPOINTMENT (OUTPATIENT)
Age: 28
End: 2018-11-09

## 2018-11-09 LAB
BASOPHILS # BLD AUTO: 0 K/UL — SIGNIFICANT CHANGE UP (ref 0–0.2)
BASOPHILS NFR BLD AUTO: 1.2 % — SIGNIFICANT CHANGE UP (ref 0–2)
EOSINOPHIL # BLD AUTO: 0.2 K/UL — SIGNIFICANT CHANGE UP (ref 0–0.5)
EOSINOPHIL NFR BLD AUTO: 4.3 % — SIGNIFICANT CHANGE UP (ref 0–6)
HCT VFR BLD CALC: 36 % — SIGNIFICANT CHANGE UP (ref 34.5–45)
HGB BLD-MCNC: 12.1 G/DL — SIGNIFICANT CHANGE UP (ref 11.5–15.5)
LYMPHOCYTES # BLD AUTO: 1 K/UL — SIGNIFICANT CHANGE UP (ref 1–3.3)
LYMPHOCYTES # BLD AUTO: 29.6 % — SIGNIFICANT CHANGE UP (ref 13–44)
MCHC RBC-ENTMCNC: 31 PG — SIGNIFICANT CHANGE UP (ref 27–34)
MCHC RBC-ENTMCNC: 33.5 GM/DL — SIGNIFICANT CHANGE UP (ref 32–36)
MCV RBC AUTO: 92.5 FL — SIGNIFICANT CHANGE UP (ref 80–100)
MONOCYTES # BLD AUTO: 0.3 K/UL — SIGNIFICANT CHANGE UP (ref 0–0.9)
MONOCYTES NFR BLD AUTO: 8.5 % — SIGNIFICANT CHANGE UP (ref 2–14)
NEUTROPHILS # BLD AUTO: 1.9 K/UL — SIGNIFICANT CHANGE UP (ref 1.8–7.4)
NEUTROPHILS NFR BLD AUTO: 56.3 % — SIGNIFICANT CHANGE UP (ref 43–77)
PLATELET # BLD AUTO: 264 K/UL — SIGNIFICANT CHANGE UP (ref 150–400)
RBC # BLD: 3.89 M/UL — SIGNIFICANT CHANGE UP (ref 3.8–5.2)
RBC # FLD: 13.7 % — SIGNIFICANT CHANGE UP (ref 10.3–14.5)
WBC # BLD: 3.5 K/UL — LOW (ref 3.8–10.5)
WBC # FLD AUTO: 3.5 K/UL — LOW (ref 3.8–10.5)

## 2018-11-09 RX ORDER — ZOLPIDEM TARTRATE 12.5 MG/1
12.5 TABLET, EXTENDED RELEASE ORAL
Qty: 30 | Refills: 2 | Status: DISCONTINUED | COMMUNITY
Start: 2018-11-02 | End: 2018-11-09

## 2018-11-09 RX ORDER — AMOXICILLIN 875 MG/1
875 TABLET, FILM COATED ORAL
Qty: 20 | Refills: 0 | Status: DISCONTINUED | COMMUNITY
Start: 2018-09-26 | End: 2018-11-09

## 2018-11-09 RX ORDER — DIPHENHYDRAMINE HYDROCHLORIDE AND LIDOCAINE HYDROCHLORIDE AND ALUMINUM HYDROXIDE AND MAGNESIUM HYDRO
KIT
Qty: 1 | Refills: 1 | Status: DISCONTINUED | COMMUNITY
Start: 2018-09-26 | End: 2018-11-09

## 2018-11-09 RX ORDER — DIAZEPAM 5 MG/1
5 TABLET ORAL
Qty: 60 | Refills: 0 | Status: DISCONTINUED | COMMUNITY
Start: 2018-06-06 | End: 2018-11-09

## 2018-11-09 RX ORDER — LIDOCAINE HYDROCHLORIDE 20 MG/ML
2 SOLUTION OROPHARYNGEAL
Qty: 1 | Refills: 2 | Status: DISCONTINUED | COMMUNITY
Start: 2018-11-09 | End: 2018-11-09

## 2018-11-12 ENCOUNTER — OUTPATIENT (OUTPATIENT)
Dept: OUTPATIENT SERVICES | Facility: HOSPITAL | Age: 28
LOS: 1 days | End: 2018-11-12
Payer: COMMERCIAL

## 2018-11-12 DIAGNOSIS — Z51.11 ENCOUNTER FOR ANTINEOPLASTIC CHEMOTHERAPY: ICD-10-CM

## 2018-11-12 DIAGNOSIS — Z98.890 OTHER SPECIFIED POSTPROCEDURAL STATES: Chronic | ICD-10-CM

## 2018-11-12 DIAGNOSIS — Z98.89 OTHER SPECIFIED POSTPROCEDURAL STATES: Chronic | ICD-10-CM

## 2018-11-12 DIAGNOSIS — R11.2 NAUSEA WITH VOMITING, UNSPECIFIED: ICD-10-CM

## 2018-11-12 DIAGNOSIS — I89.0 LYMPHEDEMA, NOT ELSEWHERE CLASSIFIED: ICD-10-CM

## 2018-11-15 ENCOUNTER — OUTPATIENT (OUTPATIENT)
Dept: OUTPATIENT SERVICES | Facility: HOSPITAL | Age: 28
LOS: 1 days | End: 2018-11-15
Payer: COMMERCIAL

## 2018-11-15 ENCOUNTER — APPOINTMENT (OUTPATIENT)
Dept: CT IMAGING | Facility: CLINIC | Age: 28
End: 2018-11-15
Payer: COMMERCIAL

## 2018-11-15 DIAGNOSIS — Z98.89 OTHER SPECIFIED POSTPROCEDURAL STATES: Chronic | ICD-10-CM

## 2018-11-15 DIAGNOSIS — C50.412 MALIGNANT NEOPLASM OF UPPER-OUTER QUADRANT OF LEFT FEMALE BREAST: ICD-10-CM

## 2018-11-15 DIAGNOSIS — Z98.890 OTHER SPECIFIED POSTPROCEDURAL STATES: Chronic | ICD-10-CM

## 2018-11-15 PROCEDURE — 71260 CT THORAX DX C+: CPT | Mod: 26

## 2018-11-15 PROCEDURE — 71260 CT THORAX DX C+: CPT

## 2018-11-16 ENCOUNTER — LABORATORY RESULT (OUTPATIENT)
Age: 28
End: 2018-11-16

## 2018-11-16 ENCOUNTER — APPOINTMENT (OUTPATIENT)
Age: 28
End: 2018-11-16

## 2018-11-16 ENCOUNTER — RESULT REVIEW (OUTPATIENT)
Age: 28
End: 2018-11-16

## 2018-11-16 ENCOUNTER — APPOINTMENT (OUTPATIENT)
Dept: HEMATOLOGY ONCOLOGY | Facility: CLINIC | Age: 28
End: 2018-11-16
Payer: COMMERCIAL

## 2018-11-16 DIAGNOSIS — N63.20 UNSPECIFIED LUMP IN THE LEFT BREAST, UNSPECIFIED QUADRANT: ICD-10-CM

## 2018-11-16 LAB
BASOPHILS # BLD AUTO: 0 K/UL — SIGNIFICANT CHANGE UP (ref 0–0.2)
BASOPHILS NFR BLD AUTO: 0.9 % — SIGNIFICANT CHANGE UP (ref 0–2)
EOSINOPHIL # BLD AUTO: 0.1 K/UL — SIGNIFICANT CHANGE UP (ref 0–0.5)
EOSINOPHIL NFR BLD AUTO: 3 % — SIGNIFICANT CHANGE UP (ref 0–6)
HCT VFR BLD CALC: 36.8 % — SIGNIFICANT CHANGE UP (ref 34.5–45)
HGB BLD-MCNC: 12.1 G/DL — SIGNIFICANT CHANGE UP (ref 11.5–15.5)
LYMPHOCYTES # BLD AUTO: 1 K/UL — SIGNIFICANT CHANGE UP (ref 1–3.3)
LYMPHOCYTES # BLD AUTO: 26.8 % — SIGNIFICANT CHANGE UP (ref 13–44)
MCHC RBC-ENTMCNC: 30.9 PG — SIGNIFICANT CHANGE UP (ref 27–34)
MCHC RBC-ENTMCNC: 33 GM/DL — SIGNIFICANT CHANGE UP (ref 32–36)
MCV RBC AUTO: 93.7 FL — SIGNIFICANT CHANGE UP (ref 80–100)
MONOCYTES # BLD AUTO: 0.2 K/UL — SIGNIFICANT CHANGE UP (ref 0–0.9)
MONOCYTES NFR BLD AUTO: 6.5 % — SIGNIFICANT CHANGE UP (ref 2–14)
NEUTROPHILS # BLD AUTO: 2.3 K/UL — SIGNIFICANT CHANGE UP (ref 1.8–7.4)
NEUTROPHILS NFR BLD AUTO: 62.8 % — SIGNIFICANT CHANGE UP (ref 43–77)
PLATELET # BLD AUTO: 243 K/UL — SIGNIFICANT CHANGE UP (ref 150–400)
RBC # BLD: 3.92 M/UL — SIGNIFICANT CHANGE UP (ref 3.8–5.2)
RBC # FLD: 13 % — SIGNIFICANT CHANGE UP (ref 10.3–14.5)
WBC # BLD: 3.6 K/UL — LOW (ref 3.8–10.5)
WBC # FLD AUTO: 3.6 K/UL — LOW (ref 3.8–10.5)

## 2018-11-16 PROCEDURE — 99214 OFFICE O/P EST MOD 30 MIN: CPT

## 2018-11-17 RX ORDER — SULFAMETHOXAZOLE AND TRIMETHOPRIM 800; 160 MG/1; MG/1
800-160 TABLET ORAL
Qty: 14 | Refills: 0 | Status: DISCONTINUED | COMMUNITY
Start: 2018-10-20

## 2018-11-17 RX ORDER — AMOXICILLIN AND CLAVULANATE POTASSIUM 875; 125 MG/1; MG/1
875-125 TABLET, COATED ORAL
Qty: 20 | Refills: 0 | Status: DISCONTINUED | COMMUNITY
Start: 2018-10-30

## 2018-11-21 NOTE — ASSESSMENT
[FreeTextEntry1] : 28 year old female with stage IIA left breast TNBC (T2N0) s/p bilateral mastectomy on 6/29/18. She has DPD deficiency.  6/19/18 ECHO - EF 72%. \par \par S/p 4 cycles of AC. Taxol delayed on 9/28 for low ANC and antibx. On antibx - sounds like it ss empiric choice for sinus pressure (clear drainage), afebrile. WBC 3.4, anc 2.2\par \par Plan: \par - Peripheral neuropathy- reports dropping things- no outright abnormality determined on exam. Discussed 25% dose reduction in Taxol--> Brianna does not want a dose reduction at this time.\par - Proceed w/ week 7 Taxol today. \par - L chest pain at reconstructed site--> reviewed results of chest CT--> wnl's. Advised to contact plastic surgeon to evaluate increased pain. No LUE edema--> did not palpate hand swelling as patient subjectively notes. Discussed PT evaluation for strategic maneuvers to decrease pain no lymphedema palpated maybe subacute.\par - insomnia: zolpidem was ineffective recent change to Lorazepam 0.5 mg at HS-- still awakening at 3 AM- Advised trial of 1 mg at bedtime.\par - Vit D level is low --- despite Vit D 50,000 IU weekly for 8 weeks. Called her on 11/19 and re-ordered- may need endocrinologist to evaluate.\par - RTO in 2 weeks or sooner for questions/concerns.

## 2018-11-21 NOTE — PHYSICAL EXAM
[Ambulatory and capable of all self care but unable to carry out any work activities] : Status 2- Ambulatory and capable of all self care but unable to carry out any work activities. Up and about more than 50% of waking hours [Normal] : normal appearance, no rash, nodules, vesicles, ulcers, erythema [de-identified] : IAppears fatigued, well groomed, in NAD. [de-identified] : supple [de-identified] : bilateral reconstructed breast, no nodularity. Pain on palpation 3 CM above implant and at implant ridge. No axillary adenopathy. [de-identified] : BS+, soft, nontender on palpation. [de-identified] : without spinal or cva tenderness. [de-identified] : Handwriting clear and legible, no difficulty holding pencil, w/ eyes closed determined coin in R hand- correctly determined a miles, L hand correctly determined small paper clip w/in a moment of placing in hand. [de-identified] : Quiet

## 2018-11-21 NOTE — RESULTS/DATA
[FreeTextEntry1] : EXAM: CT CHEST IC \par \par PROCEDURE DATE: 11/15/2018 \par \par INTERPRETATION: EXAMINATION: CT CHEST IC \par \par CLINICAL INDICATION: Left breast cancer. \par \par TECHNIQUE: CT of the chest was obtained after the administration of 50 ml of \par [id number]. \par \par COMPARISON: None. \par \par FINDINGS: \par \par AIRWAYS AND LUNGS: The central tracheobronchial tree is patent. The lungs \par are clear. \par \par MEDIASTINUM AND PLEURA: There are no enlarged mediastinal, hilar or axillary \par lymph nodes. The visualized portion of the thyroid gland is unremarkable. \par There is no pleural effusion. There is no pneumothorax. \par \par HEART AND VESSELS: The heart is normal in size. There are no \par atherosclerotic calcifications of the aorta. There is no pericardial \par effusion. \par \par UPPER ABDOMEN: Images of the upper abdomen demonstrate no abnormality. \par \par BONES AND SOFT TISSUES: The bones are unremarkable. Bilateral mastectomy. \par \par TUBES/LINES: None. \par \par IMPRESSION: \par Clear lungs. \par \par \par \par

## 2018-11-21 NOTE — HISTORY OF PRESENT ILLNESS
[Disease: _____________________] : Disease: [unfilled] [T: ___] : T[unfilled] [N: ___] : N[unfilled] [AJCC Stage: ____] : AJCC Stage: [unfilled] [de-identified] : Ms. Kennedy was diagnosed with left triple negative breast cancer at age 28. \par \par She delivered her first child on 4/25/18 following which she self palpated a left breast mass.  It was  thought to be a blocked milk duct / mastitis and was treated with antibiotics and then antifungals.  The mass did not resolve and she sought a 2nd opinion.  \par \par She then had a breast ultrasound on 5/30/18 which showed a 2.3 cm left breast mass at 1-2:00, 9 cm from the nipple, and a single axillary lymph node which does not contain a prominent benign appearing fatty hilum.  \par \par 5/31/18 left breast core biopsy - invasive poorly differentiated ductal carcinoma, [place] score 9/9, measures at least 17 cm w/ extensive necrosis. ER 0%, ID 0%, HER 2 (0/1+) -negative. \par \par On 6/7/18 she had a diagnostic mammogram + ultrasound - 2.1 cm rounded mass of left breast with overall coarsening of parenchymal pattern and increased parenchymal density in upper outer left breast. US - 2.4 cm left breast mass at 1-2:00 and left axilla 0.8 cm rounded hypoechoic mass consistent with abnormal left axillary node.  \par \par On 6/7/18 she also had a breast MRI - 2.9 x 3.2 x 3.1 cm mass in left upper inner breast at 1:00.  Suspicious left axillary nodes with ultrasound correlate for which US guided biopsy is recommended.   \par \par On 6/8/17 she had biopsy of the left axillary node - pathology : reactive lymph node. \par \par 6/7/18 - CT chest/abd/pelvis -  Known left upper outer breast malignancy. 2 small, round left axillary lymph nodes for which patient will undergo percutaneous \par sampling, as per report of breast MRI from 6/7/2018. A 3 mm subpleural nodular density in the right middle lobe probably represents a focus of atelectasis. No evidence of metastatic disease in the chest, abdomen, and pelvis.\par \par 6/8/18 bone scan - Normal bone scan. No radionuclide evidence of osseous metastasis.\par \par  6/29/18 -s/p bilateral mastectomy \par Pathology: left breast IDC 3.5 cm, joo score 9/9, margins negative, 2 SNL nodes negative.  pT2 pN0\par Right mastectomy - negative for malignancy. \par \par Family History: 2 maternal great aunts with breast cancer.\par paternal cousin with breast cancer\par paternal GM - pancreatic cancer\par paternal GF - pancreatic cancer\par \par [facility]  [employer] panel - no clinically significant mutations\par \par PMH: asthma, DPD deficiency (dihydropyrimidine dehydrogenase deficiency).\par \par Sx hx: tonsillectomy\par \par Social: non-smoker, social ETOH  [de-identified] : poorly differentiated invasive ductal carcinoma [de-identified] : ER 0% HI 0% HER2 negative [de-identified] : Denies fevers, reports vision changes, seen by optometrist per Brianna no identified concerns, no SOB, ++ increased pain to L reconstructed breast and L axilla- CT chest ordered by Dr. Farr to evaluate- requests results. Denies injury but w/ holding baby is uncomfortable. Denies injury. Appetite is fair, intermittent nausea, mouth sore improved, no constipation or diarrhea, no pain/burning w/ urinations. Feels neuropathy is increased as she has dropped things. Feels hands are swollen, no LE edema. Sleep remains problematic- tried lorazepam at HS 0.5 mg awakens at 3 AM. Did not increase to 2 tabs. Psychologically does not feel that pain to L reconstructed breast is r/t to fear of cancer recurrence, states "I'm doing everything I can possibly do to prevent this cancer from returning+. The remainder of her ROS is negative.

## 2018-11-23 ENCOUNTER — APPOINTMENT (OUTPATIENT)
Age: 28
End: 2018-11-23

## 2018-11-23 ENCOUNTER — RESULT REVIEW (OUTPATIENT)
Age: 28
End: 2018-11-23

## 2018-11-23 ENCOUNTER — LABORATORY RESULT (OUTPATIENT)
Age: 28
End: 2018-11-23

## 2018-11-23 LAB
BASOPHILS # BLD AUTO: 0.1 K/UL — SIGNIFICANT CHANGE UP (ref 0–0.2)
BASOPHILS NFR BLD AUTO: 1.6 % — SIGNIFICANT CHANGE UP (ref 0–2)
EOSINOPHIL # BLD AUTO: 0.2 K/UL — SIGNIFICANT CHANGE UP (ref 0–0.5)
EOSINOPHIL NFR BLD AUTO: 4.4 % — SIGNIFICANT CHANGE UP (ref 0–6)
HCT VFR BLD CALC: 37.7 % — SIGNIFICANT CHANGE UP (ref 34.5–45)
HGB BLD-MCNC: 12.2 G/DL — SIGNIFICANT CHANGE UP (ref 11.5–15.5)
LYMPHOCYTES # BLD AUTO: 1 K/UL — SIGNIFICANT CHANGE UP (ref 1–3.3)
LYMPHOCYTES # BLD AUTO: 29.6 % — SIGNIFICANT CHANGE UP (ref 13–44)
MCHC RBC-ENTMCNC: 30.9 PG — SIGNIFICANT CHANGE UP (ref 27–34)
MCHC RBC-ENTMCNC: 32.2 GM/DL — SIGNIFICANT CHANGE UP (ref 32–36)
MCV RBC AUTO: 95.9 FL — SIGNIFICANT CHANGE UP (ref 80–100)
MONOCYTES # BLD AUTO: 0.2 K/UL — SIGNIFICANT CHANGE UP (ref 0–0.9)
MONOCYTES NFR BLD AUTO: 6.4 % — SIGNIFICANT CHANGE UP (ref 2–14)
NEUTROPHILS # BLD AUTO: 2.1 K/UL — SIGNIFICANT CHANGE UP (ref 1.8–7.4)
NEUTROPHILS NFR BLD AUTO: 58 % — SIGNIFICANT CHANGE UP (ref 43–77)
PLATELET # BLD AUTO: 251 K/UL — SIGNIFICANT CHANGE UP (ref 150–400)
RBC # BLD: 3.93 M/UL — SIGNIFICANT CHANGE UP (ref 3.8–5.2)
RBC # FLD: 12.2 % — SIGNIFICANT CHANGE UP (ref 10.3–14.5)
WBC # BLD: 3.5 K/UL — LOW (ref 3.8–10.5)
WBC # FLD AUTO: 3.5 K/UL — LOW (ref 3.8–10.5)

## 2018-11-26 NOTE — ASU DISCHARGE PLAN (ADULT/PEDIATRIC). - MEDICATION SUMMARY - MEDICATIONS TO STOP TAKING
Continue statin   I will STOP taking the medications listed below when I get home from the hospital:    Bactroban 2% nasal ointment  -- 1 application into nose 2 times a day

## 2018-11-30 ENCOUNTER — APPOINTMENT (OUTPATIENT)
Age: 28
End: 2018-11-30

## 2018-11-30 ENCOUNTER — LABORATORY RESULT (OUTPATIENT)
Age: 28
End: 2018-11-30

## 2018-11-30 ENCOUNTER — APPOINTMENT (OUTPATIENT)
Dept: HEMATOLOGY ONCOLOGY | Facility: CLINIC | Age: 28
End: 2018-11-30
Payer: COMMERCIAL

## 2018-11-30 ENCOUNTER — RESULT REVIEW (OUTPATIENT)
Age: 28
End: 2018-11-30

## 2018-11-30 VITALS
WEIGHT: 144.29 LBS | SYSTOLIC BLOOD PRESSURE: 123 MMHG | HEIGHT: 61 IN | DIASTOLIC BLOOD PRESSURE: 89 MMHG | HEART RATE: 75 BPM | BODY MASS INDEX: 27.24 KG/M2 | TEMPERATURE: 97.9 F | OXYGEN SATURATION: 99 %

## 2018-11-30 LAB
BASOPHILS # BLD AUTO: 0 K/UL — SIGNIFICANT CHANGE UP (ref 0–0.2)
BASOPHILS NFR BLD AUTO: 1.3 % — SIGNIFICANT CHANGE UP (ref 0–2)
EOSINOPHIL # BLD AUTO: 0.1 K/UL — SIGNIFICANT CHANGE UP (ref 0–0.5)
EOSINOPHIL NFR BLD AUTO: 2.2 % — SIGNIFICANT CHANGE UP (ref 0–6)
HCT VFR BLD CALC: 37.3 % — SIGNIFICANT CHANGE UP (ref 34.5–45)
HGB BLD-MCNC: 12 G/DL — SIGNIFICANT CHANGE UP (ref 11.5–15.5)
LYMPHOCYTES # BLD AUTO: 0.9 K/UL — LOW (ref 1–3.3)
LYMPHOCYTES # BLD AUTO: 25.3 % — SIGNIFICANT CHANGE UP (ref 13–44)
MCHC RBC-ENTMCNC: 30.7 PG — SIGNIFICANT CHANGE UP (ref 27–34)
MCHC RBC-ENTMCNC: 32 GM/DL — SIGNIFICANT CHANGE UP (ref 32–36)
MCV RBC AUTO: 95.9 FL — SIGNIFICANT CHANGE UP (ref 80–100)
MONOCYTES # BLD AUTO: 0.3 K/UL — SIGNIFICANT CHANGE UP (ref 0–0.9)
MONOCYTES NFR BLD AUTO: 9.6 % — SIGNIFICANT CHANGE UP (ref 2–14)
NEUTROPHILS # BLD AUTO: 2.1 K/UL — SIGNIFICANT CHANGE UP (ref 1.8–7.4)
NEUTROPHILS NFR BLD AUTO: 61.5 % — SIGNIFICANT CHANGE UP (ref 43–77)
PLATELET # BLD AUTO: 234 K/UL — SIGNIFICANT CHANGE UP (ref 150–400)
RBC # BLD: 3.89 M/UL — SIGNIFICANT CHANGE UP (ref 3.8–5.2)
RBC # FLD: 11.8 % — SIGNIFICANT CHANGE UP (ref 10.3–14.5)
WBC # BLD: 3.5 K/UL — LOW (ref 3.8–10.5)
WBC # FLD AUTO: 3.5 K/UL — LOW (ref 3.8–10.5)

## 2018-11-30 PROCEDURE — 99214 OFFICE O/P EST MOD 30 MIN: CPT

## 2018-11-30 RX ORDER — BENZONATATE 100 MG/1
100 CAPSULE ORAL
Qty: 21 | Refills: 1 | Status: DISCONTINUED | COMMUNITY
Start: 2018-09-20 | End: 2018-11-30

## 2018-11-30 RX ORDER — LORAZEPAM 0.5 MG/1
0.5 TABLET ORAL
Qty: 45 | Refills: 0 | Status: DISCONTINUED | COMMUNITY
Start: 2018-11-09 | End: 2018-11-30

## 2018-11-30 NOTE — RESULTS/DATA
[FreeTextEntry1] : EXAM: CT CHEST IC \par \par PROCEDURE DATE: 11/15/2018 \par \par INTERPRETATION: EXAMINATION: CT CHEST IC \par \par CLINICAL INDICATION: Left breast cancer. \par \par TECHNIQUE: CT of the chest was obtained after the administration of 50 ml of \par [id number]. \par \par COMPARISON: None. \par \par FINDINGS: \par \par AIRWAYS AND LUNGS: The central tracheobronchial tree is patent. The lungs \par are clear. \par \par MEDIASTINUM AND PLEURA: There are no enlarged mediastinal, hilar or axillary \par lymph nodes. The visualized portion of the thyroid gland is unremarkable. \par There is no pleural effusion. There is no pneumothorax. \par \par HEART AND VESSELS: The heart is normal in size. There are no \par atherosclerotic calcifications of the aorta. There is no pericardial \par effusion. \par \par UPPER ABDOMEN: Images of the upper abdomen demonstrate no abnormality. \par \par BONES AND SOFT TISSUES: The bones are unremarkable. Bilateral mastectomy. \par \par TUBES/LINES: None. \par \par IMPRESSION: \par Clear lungs. \par \par \par \par

## 2018-11-30 NOTE — PHYSICAL EXAM
[Ambulatory and capable of all self care but unable to carry out any work activities] : Status 2- Ambulatory and capable of all self care but unable to carry out any work activities. Up and about more than 50% of waking hours [Normal] : well developed, well nourished, in no acute distress [de-identified] : supple [de-identified] : bilateral reconstructed breast, no nodularity.  [de-identified] : BS+, soft, nontender on palpation. [de-identified] : without spinal or cva tenderness. [de-identified] : alopecia [de-identified] : Quiet

## 2018-11-30 NOTE — ASSESSMENT
[FreeTextEntry1] : 28 year old female with stage IIA left breast TNBC (T2N0) S/P bilateral mastectomy on 6/29/18. She has DPD deficiency.  6/19/18 ECHO - EF 72%. S/p 4 cycles of dose dense AC, now on weekly Taxol.\par \par Grade 1 intermittent neuropathy, and has weight gain likely related to steroids for chemotherapy.\par \par Plan: \par -proceed week 9 Taxol\par -left lateral chest wall pain - no radiographic correlate, continue PT\par -Insomnia: Ativan and Ambien was ineffective, switch to Trazodone 50 mg q HS\par -Vitamin D level low --> re-treat with Vitamin D 50,000 weekly x 8 \par -Follow up 2 weeks\par

## 2018-11-30 NOTE — HISTORY OF PRESENT ILLNESS
[Disease: _____________________] : Disease: [unfilled] [T: ___] : T[unfilled] [N: ___] : N[unfilled] [AJCC Stage: ____] : AJCC Stage: [unfilled] [de-identified] : Ms. Kennedy was diagnosed with left triple negative breast cancer at age 28. \par \par She delivered her first child on 4/25/18 following which she self palpated a left breast mass.  It was  thought to be a blocked milk duct / mastitis and was treated with antibiotics and then antifungals.  The mass did not resolve and she sought a 2nd opinion.  \par \par She then had a breast ultrasound on 5/30/18 which showed a 2.3 cm left breast mass at 1-2:00, 9 cm from the nipple, and a single axillary lymph node which does not contain a prominent benign appearing fatty hilum.  \par \par 5/31/18 left breast core biopsy - invasive poorly differentiated ductal carcinoma, [place] score 9/9, measures at least 17 cm w/ extensive necrosis. ER 0%, VT 0%, HER 2 (0/1+) -negative. \par \par On 6/7/18 she had a diagnostic mammogram + ultrasound - 2.1 cm rounded mass of left breast with overall coarsening of parenchymal pattern and increased parenchymal density in upper outer left breast. US - 2.4 cm left breast mass at 1-2:00 and left axilla 0.8 cm rounded hypoechoic mass consistent with abnormal left axillary node.  \par \par On 6/7/18 she also had a breast MRI - 2.9 x 3.2 x 3.1 cm mass in left upper inner breast at 1:00.  Suspicious left axillary nodes with ultrasound correlate for which US guided biopsy is recommended.   \par \par On 6/8/17 she had biopsy of the left axillary node - pathology : reactive lymph node. \par \par 6/7/18 - CT chest/abd/pelvis -  Known left upper outer breast malignancy. 2 small, round left axillary lymph nodes for which patient will undergo percutaneous \par sampling, as per report of breast MRI from 6/7/2018. A 3 mm subpleural nodular density in the right middle lobe probably represents a focus of atelectasis. No evidence of metastatic disease in the chest, abdomen, and pelvis.\par \par 6/8/18 bone scan - Normal bone scan. No radionuclide evidence of osseous metastasis.\par \par  6/29/18 -s/p bilateral mastectomy \par Pathology: left breast IDC 3.5 cm, joo score 9/9, margins negative, 2 SNL nodes negative.  pT2 pN0\par Right mastectomy - negative for malignancy. \par \par Family History: 2 maternal great aunts with breast cancer.\par paternal cousin with breast cancer\par paternal GM - pancreatic cancer\par paternal GF - pancreatic cancer\par \par [facility]  [employer] panel - no clinically significant mutations\par \par PMH: asthma, DPD deficiency (dihydropyrimidine dehydrogenase deficiency).\par \par Sx hx: tonsillectomy\par \par Social: non-smoker, social ETOH  [de-identified] : poorly differentiated invasive ductal carcinoma [de-identified] : ER 0% MO 0% HER2 negative [de-identified] : Returns for follow up, c/o ++ increased pain to L reconstructed breast and L axilla, s/p US and CT scan with no findings. Now getting PT.  \par C/o weight gain and has increased appetite. No fever.  Intermittent numbness / tingling of hands/feet.  Reports hot flashes since chemotherapy. Continues to struggle sleeping with no relief from Ativan or Ambien. On vitamin D for chronically low Vit D levels. \par \par Chest CT 11/15/2018: No acute pathology

## 2018-12-06 ENCOUNTER — APPOINTMENT (OUTPATIENT)
Dept: FAMILY MEDICINE | Facility: CLINIC | Age: 28
End: 2018-12-06
Payer: COMMERCIAL

## 2018-12-06 ENCOUNTER — APPOINTMENT (OUTPATIENT)
Dept: HEMATOLOGY ONCOLOGY | Facility: CLINIC | Age: 28
End: 2018-12-06

## 2018-12-06 ENCOUNTER — OUTPATIENT (OUTPATIENT)
Dept: OUTPATIENT SERVICES | Facility: HOSPITAL | Age: 28
LOS: 1 days | Discharge: HOME | End: 2018-12-06

## 2018-12-06 VITALS
OXYGEN SATURATION: 97 % | WEIGHT: 142 LBS | BODY MASS INDEX: 26.81 KG/M2 | HEIGHT: 61 IN | TEMPERATURE: 98.1 F | HEART RATE: 96 BPM | SYSTOLIC BLOOD PRESSURE: 122 MMHG | DIASTOLIC BLOOD PRESSURE: 74 MMHG

## 2018-12-06 DIAGNOSIS — Z98.89 OTHER SPECIFIED POSTPROCEDURAL STATES: Chronic | ICD-10-CM

## 2018-12-06 DIAGNOSIS — Z98.890 OTHER SPECIFIED POSTPROCEDURAL STATES: Chronic | ICD-10-CM

## 2018-12-06 DIAGNOSIS — C50.412 MALIGNANT NEOPLASM OF UPPER-OUTER QUADRANT OF LEFT FEMALE BREAST: ICD-10-CM

## 2018-12-06 PROCEDURE — 99214 OFFICE O/P EST MOD 30 MIN: CPT

## 2018-12-06 RX ORDER — FLUTICASONE PROPIONATE 50 UG/1
50 SPRAY, METERED NASAL DAILY
Qty: 1 | Refills: 0 | Status: COMPLETED | COMMUNITY
Start: 2018-09-21 | End: 2018-12-06

## 2018-12-06 NOTE — ASSESSMENT
[FreeTextEntry1] : REST!!!!!!!!!\par \par aggressive hydration!!!!!!!!\par \par see med orders\par \par Oncology f/u c Dr. Mcclain  strongly advised

## 2018-12-06 NOTE — PHYSICAL EXAM
[No Acute Distress] : no acute distress [Well Nourished] : well nourished [Well Developed] : well developed [Well-Appearing] : well-appearing [EOMI] : extraocular movements intact [No Respiratory Distress] : no respiratory distress  [Clear to Auscultation] : lungs were clear to auscultation bilaterally [No Accessory Muscle Use] : no accessory muscle use [Normal Rate] : normal rate  [Regular Rhythm] : with a regular rhythm [Normal S1, S2] : normal S1 and S2 [No Edema] : there was no peripheral edema [Non-distended] : non-distended [No CVA Tenderness] : no CVA  tenderness [Grossly Normal Strength/Tone] : grossly normal strength/tone [No Rash] : no rash [Normal Gait] : normal gait [Coordination Grossly Intact] : coordination grossly intact [No Focal Deficits] : no focal deficits [Normal Affect] : the affect was normal [Normal Mood] : the mood was normal [Normal Insight/Judgement] : insight and judgment were intact [de-identified] : +PND post pharynx, dull TMs B [de-identified] : +nodes  +L maxillary tenderness to palpation

## 2018-12-06 NOTE — HISTORY OF PRESENT ILLNESS
[FreeTextEntry8] : 27 yo female c hx of L breast Ca, currently undergoing weekly Chemo Tx infusions c Taxol under  the care of Dr. Mcclain and Dr. Farr \par presents to office c  acute onset sneezing, +loose cough, no fever,  +chronic stable chills/sweats, +chest tightness \par +facial pressure  +B/L ear popping  +nasal congestion c clear blood speckled rhinorrhea (STABLE) \par \par 7M  old son  Dx'ed c OM and placed on Amoxil. \par \par \par Dx'ed c Breast Ca 6/6/18

## 2018-12-07 ENCOUNTER — RESULT REVIEW (OUTPATIENT)
Age: 28
End: 2018-12-07

## 2018-12-07 ENCOUNTER — APPOINTMENT (OUTPATIENT)
Age: 28
End: 2018-12-07

## 2018-12-07 ENCOUNTER — LABORATORY RESULT (OUTPATIENT)
Age: 28
End: 2018-12-07

## 2018-12-07 LAB
BASOPHILS # BLD AUTO: 0 K/UL — SIGNIFICANT CHANGE UP (ref 0–0.2)
BASOPHILS NFR BLD AUTO: 1 % — SIGNIFICANT CHANGE UP (ref 0–2)
EOSINOPHIL # BLD AUTO: 0.1 K/UL — SIGNIFICANT CHANGE UP (ref 0–0.5)
EOSINOPHIL NFR BLD AUTO: 4.1 % — SIGNIFICANT CHANGE UP (ref 0–6)
HCT VFR BLD CALC: 36.8 % — SIGNIFICANT CHANGE UP (ref 34.5–45)
HGB BLD-MCNC: 12.1 G/DL — SIGNIFICANT CHANGE UP (ref 11.5–15.5)
LYMPHOCYTES # BLD AUTO: 1 K/UL — SIGNIFICANT CHANGE UP (ref 1–3.3)
LYMPHOCYTES # BLD AUTO: 32.4 % — SIGNIFICANT CHANGE UP (ref 13–44)
MCHC RBC-ENTMCNC: 31.4 PG — SIGNIFICANT CHANGE UP (ref 27–34)
MCHC RBC-ENTMCNC: 32.8 GM/DL — SIGNIFICANT CHANGE UP (ref 32–36)
MCV RBC AUTO: 95.5 FL — SIGNIFICANT CHANGE UP (ref 80–100)
MONOCYTES # BLD AUTO: 0.2 K/UL — SIGNIFICANT CHANGE UP (ref 0–0.9)
MONOCYTES NFR BLD AUTO: 7.9 % — SIGNIFICANT CHANGE UP (ref 2–14)
NEUTROPHILS # BLD AUTO: 1.7 K/UL — LOW (ref 1.8–7.4)
NEUTROPHILS NFR BLD AUTO: 54.5 % — SIGNIFICANT CHANGE UP (ref 43–77)
PLATELET # BLD AUTO: 248 K/UL — SIGNIFICANT CHANGE UP (ref 150–400)
RBC # BLD: 3.85 M/UL — SIGNIFICANT CHANGE UP (ref 3.8–5.2)
RBC # FLD: 11.1 % — SIGNIFICANT CHANGE UP (ref 10.3–14.5)
WBC # BLD: 3.1 K/UL — LOW (ref 3.8–10.5)
WBC # FLD AUTO: 3.1 K/UL — LOW (ref 3.8–10.5)

## 2018-12-10 ENCOUNTER — APPOINTMENT (OUTPATIENT)
Dept: FAMILY MEDICINE | Facility: CLINIC | Age: 28
End: 2018-12-10
Payer: COMMERCIAL

## 2018-12-10 VITALS
SYSTOLIC BLOOD PRESSURE: 126 MMHG | BODY MASS INDEX: 27.56 KG/M2 | DIASTOLIC BLOOD PRESSURE: 78 MMHG | TEMPERATURE: 98.3 F | HEIGHT: 61 IN | WEIGHT: 146 LBS | OXYGEN SATURATION: 97 % | HEART RATE: 109 BPM

## 2018-12-10 DIAGNOSIS — Z51.11 ENCOUNTER FOR ANTINEOPLASTIC CHEMOTHERAPY: ICD-10-CM

## 2018-12-10 DIAGNOSIS — R11.2 NAUSEA WITH VOMITING, UNSPECIFIED: ICD-10-CM

## 2018-12-10 PROCEDURE — 99214 OFFICE O/P EST MOD 30 MIN: CPT

## 2018-12-10 NOTE — PHYSICAL EXAM
[No Acute Distress] : no acute distress [PERRL] : pupils equal round and reactive to light [EOMI] : extraocular movements intact [No JVD] : no jugular venous distention [Supple] : supple [No Lymphadenopathy] : no lymphadenopathy [No Respiratory Distress] : no respiratory distress  [Clear to Auscultation] : lungs were clear to auscultation bilaterally [No Accessory Muscle Use] : no accessory muscle use [Normal Rate] : normal rate  [Regular Rhythm] : with a regular rhythm [Normal S1, S2] : normal S1 and S2 [No Edema] : there was no peripheral edema [No Extremity Clubbing/Cyanosis] : no extremity clubbing/cyanosis [Non-distended] : non-distended [No CVA Tenderness] : no CVA  tenderness [No Rash] : no rash [Normal Gait] : normal gait [Coordination Grossly Intact] : coordination grossly intact [No Focal Deficits] : no focal deficits [Normal Affect] : the affect was normal [Normal Mood] : the mood was normal [Normal Insight/Judgement] : insight and judgment were intact [de-identified] : +PND c minimal erythema post pharynx, minimally dull TMs B/L  [de-identified] : no w/r/r B/L at this time

## 2018-12-10 NOTE — REVIEW OF SYSTEMS
[Negative] : Psychiatric [FreeTextEntry2] : see HPI  [FreeTextEntry4] : see HPI  [FreeTextEntry6] : see HPI  [de-identified] : see HPI

## 2018-12-10 NOTE — HISTORY OF PRESENT ILLNESS
[FreeTextEntry8] : Patient here for worsening sinusitis . Patient c/o ear pain \par \par 27 yo female presents to office for f/u on sinusitis.  pt reports NO relief.  States loose cough now productive\par c yellow phlegm,  worsened B/L ear discomfort, cont'd facial pressure, +chest tightness\par \par +nausea and diarrhea \par \par ** pt states still underwent chemo treatment on 12/7/18 despite being sick on 12/6/18**

## 2018-12-10 NOTE — ASSESSMENT
[FreeTextEntry1] : cont abx course for 7 days total (2 more days) \par \par see med orders \par \par close monitoring \par \par pt instructed to f/u c Oncology Dr. Mcclain \par

## 2018-12-12 ENCOUNTER — RESULT REVIEW (OUTPATIENT)
Age: 28
End: 2018-12-12

## 2018-12-12 ENCOUNTER — APPOINTMENT (OUTPATIENT)
Age: 28
End: 2018-12-12

## 2018-12-12 LAB
BASOPHILS # BLD AUTO: 0 K/UL — SIGNIFICANT CHANGE UP (ref 0–0.2)
BASOPHILS NFR BLD AUTO: 1.1 % — SIGNIFICANT CHANGE UP (ref 0–2)
BUN SERPL-MCNC: 9 MG/DL — SIGNIFICANT CHANGE UP (ref 7–23)
CA-I BLDA-SCNC: 1.21 MMOL/L — SIGNIFICANT CHANGE UP (ref 1.12–1.3)
CHLORIDE SERPL-SCNC: 101 MMOL/L — SIGNIFICANT CHANGE UP (ref 96–108)
CO2 SERPL-SCNC: 28 MMOL/L — SIGNIFICANT CHANGE UP (ref 22–31)
CREAT SERPL-MCNC: 0.7 MG/DL — SIGNIFICANT CHANGE UP (ref 0.5–1.3)
EOSINOPHIL # BLD AUTO: 0.1 K/UL — SIGNIFICANT CHANGE UP (ref 0–0.5)
EOSINOPHIL NFR BLD AUTO: 3.4 % — SIGNIFICANT CHANGE UP (ref 0–6)
GLUCOSE SERPL-MCNC: 93 MG/DL — SIGNIFICANT CHANGE UP (ref 70–99)
HCT VFR BLD CALC: 38.5 % — SIGNIFICANT CHANGE UP (ref 34.5–45)
HGB BLD-MCNC: 12.3 G/DL — SIGNIFICANT CHANGE UP (ref 11.5–15.5)
LYMPHOCYTES # BLD AUTO: 1 K/UL — SIGNIFICANT CHANGE UP (ref 1–3.3)
LYMPHOCYTES # BLD AUTO: 32.7 % — SIGNIFICANT CHANGE UP (ref 13–44)
MCHC RBC-ENTMCNC: 30.7 PG — SIGNIFICANT CHANGE UP (ref 27–34)
MCHC RBC-ENTMCNC: 32 GM/DL — SIGNIFICANT CHANGE UP (ref 32–36)
MCV RBC AUTO: 96 FL — SIGNIFICANT CHANGE UP (ref 80–100)
MONOCYTES # BLD AUTO: 0.3 K/UL — SIGNIFICANT CHANGE UP (ref 0–0.9)
MONOCYTES NFR BLD AUTO: 10.7 % — SIGNIFICANT CHANGE UP (ref 2–14)
NEUTROPHILS # BLD AUTO: 1.5 K/UL — LOW (ref 1.8–7.4)
NEUTROPHILS NFR BLD AUTO: 52.2 % — SIGNIFICANT CHANGE UP (ref 43–77)
PLATELET # BLD AUTO: 239 K/UL — SIGNIFICANT CHANGE UP (ref 150–400)
POTASSIUM SERPL-MCNC: 3.7 MMOL/L — SIGNIFICANT CHANGE UP (ref 3.5–5.3)
POTASSIUM SERPL-SCNC: 3.7 MMOL/L — SIGNIFICANT CHANGE UP (ref 3.5–5.3)
RBC # BLD: 4.01 M/UL — SIGNIFICANT CHANGE UP (ref 3.8–5.2)
RBC # FLD: 11 % — SIGNIFICANT CHANGE UP (ref 10.3–14.5)
SODIUM SERPL-SCNC: 142 MMOL/L — SIGNIFICANT CHANGE UP (ref 135–145)
WBC # BLD: 3 K/UL — LOW (ref 3.8–10.5)
WBC # FLD AUTO: 3 K/UL — LOW (ref 3.8–10.5)

## 2018-12-14 ENCOUNTER — RESULT REVIEW (OUTPATIENT)
Age: 28
End: 2018-12-14

## 2018-12-14 ENCOUNTER — LABORATORY RESULT (OUTPATIENT)
Age: 28
End: 2018-12-14

## 2018-12-14 ENCOUNTER — APPOINTMENT (OUTPATIENT)
Age: 28
End: 2018-12-14

## 2018-12-14 ENCOUNTER — APPOINTMENT (OUTPATIENT)
Dept: HEMATOLOGY ONCOLOGY | Facility: CLINIC | Age: 28
End: 2018-12-14

## 2018-12-14 LAB
BASOPHILS # BLD AUTO: 0 K/UL — SIGNIFICANT CHANGE UP (ref 0–0.2)
EOSINOPHIL # BLD AUTO: 0.1 K/UL — SIGNIFICANT CHANGE UP (ref 0–0.5)
EOSINOPHIL NFR BLD AUTO: 1 % — SIGNIFICANT CHANGE UP (ref 0–6)
HCT VFR BLD CALC: 36.8 % — SIGNIFICANT CHANGE UP (ref 34.5–45)
HGB BLD-MCNC: 12.5 G/DL — SIGNIFICANT CHANGE UP (ref 11.5–15.5)
LYMPHOCYTES # BLD AUTO: 1.5 K/UL — SIGNIFICANT CHANGE UP (ref 1–3.3)
LYMPHOCYTES # BLD AUTO: 54 % — HIGH (ref 13–44)
MCHC RBC-ENTMCNC: 31.9 PG — SIGNIFICANT CHANGE UP (ref 27–34)
MCHC RBC-ENTMCNC: 34 GM/DL — SIGNIFICANT CHANGE UP (ref 32–36)
MCV RBC AUTO: 93.8 FL — SIGNIFICANT CHANGE UP (ref 80–100)
MONOCYTES # BLD AUTO: 0.2 K/UL — SIGNIFICANT CHANGE UP (ref 0–0.9)
MONOCYTES NFR BLD AUTO: 4 % — SIGNIFICANT CHANGE UP (ref 2–14)
NEUTROPHILS # BLD AUTO: 1.5 K/UL — LOW (ref 1.8–7.4)
NEUTROPHILS NFR BLD AUTO: 41 % — LOW (ref 43–77)
PLAT MORPH BLD: NORMAL — SIGNIFICANT CHANGE UP
PLATELET # BLD AUTO: 228 K/UL — SIGNIFICANT CHANGE UP (ref 150–400)
RBC # BLD: 3.92 M/UL — SIGNIFICANT CHANGE UP (ref 3.8–5.2)
RBC # FLD: 10.8 % — SIGNIFICANT CHANGE UP (ref 10.3–14.5)
RBC BLD AUTO: NORMAL — SIGNIFICANT CHANGE UP
WBC # BLD: 3.4 K/UL — LOW (ref 3.8–10.5)
WBC # FLD AUTO: 3.4 K/UL — LOW (ref 3.8–10.5)

## 2018-12-17 ENCOUNTER — RESULT REVIEW (OUTPATIENT)
Age: 28
End: 2018-12-17

## 2018-12-17 ENCOUNTER — APPOINTMENT (OUTPATIENT)
Age: 28
End: 2018-12-17

## 2018-12-17 ENCOUNTER — EMERGENCY (EMERGENCY)
Facility: HOSPITAL | Age: 28
LOS: 1 days | Discharge: HOME | End: 2018-12-17
Attending: EMERGENCY MEDICINE
Payer: COMMERCIAL

## 2018-12-17 VITALS — HEIGHT: 61 IN | WEIGHT: 145.06 LBS

## 2018-12-17 DIAGNOSIS — Z98.89 OTHER SPECIFIED POSTPROCEDURAL STATES: Chronic | ICD-10-CM

## 2018-12-17 DIAGNOSIS — Z98.890 OTHER SPECIFIED POSTPROCEDURAL STATES: Chronic | ICD-10-CM

## 2018-12-17 LAB
ALBUMIN SERPL ELPH-MCNC: 4.3 G/DL — SIGNIFICANT CHANGE UP (ref 3.3–5.2)
ALP SERPL-CCNC: 54 U/L — SIGNIFICANT CHANGE UP (ref 40–120)
ALT FLD-CCNC: 12 U/L — SIGNIFICANT CHANGE UP
ANION GAP SERPL CALC-SCNC: 11 MMOL/L — SIGNIFICANT CHANGE UP (ref 5–17)
APPEARANCE UR: CLEAR — SIGNIFICANT CHANGE UP
APTT BLD: 43.4 SEC — HIGH (ref 27.5–36.3)
AST SERPL-CCNC: 16 U/L — SIGNIFICANT CHANGE UP
BILIRUB SERPL-MCNC: 0.3 MG/DL — LOW (ref 0.4–2)
BILIRUB UR-MCNC: NEGATIVE — SIGNIFICANT CHANGE UP
BUN SERPL-MCNC: 10 MG/DL — SIGNIFICANT CHANGE UP (ref 7–23)
BUN SERPL-MCNC: 8 MG/DL — SIGNIFICANT CHANGE UP (ref 8–20)
C DIFF BY PCR RESULT: SIGNIFICANT CHANGE UP
C DIFF TOX GENS STL QL NAA+PROBE: SIGNIFICANT CHANGE UP
CA-I BLDA-SCNC: 1.21 MMOL/L — SIGNIFICANT CHANGE UP (ref 1.12–1.3)
CALCIUM SERPL-MCNC: 8.5 MG/DL — LOW (ref 8.6–10.2)
CHLORIDE SERPL-SCNC: 103 MMOL/L — SIGNIFICANT CHANGE UP (ref 96–108)
CHLORIDE SERPL-SCNC: 107 MMOL/L — SIGNIFICANT CHANGE UP (ref 98–107)
CO2 SERPL-SCNC: 22 MMOL/L — SIGNIFICANT CHANGE UP (ref 22–29)
CO2 SERPL-SCNC: 26 MMOL/L — SIGNIFICANT CHANGE UP (ref 22–31)
COLOR SPEC: YELLOW — SIGNIFICANT CHANGE UP
CREAT SERPL-MCNC: 0.38 MG/DL — LOW (ref 0.5–1.3)
CREAT SERPL-MCNC: 0.5 MG/DL — SIGNIFICANT CHANGE UP (ref 0.5–1.3)
DIFF PNL FLD: NEGATIVE — SIGNIFICANT CHANGE UP
GLUCOSE SERPL-MCNC: 95 MG/DL — SIGNIFICANT CHANGE UP (ref 70–115)
GLUCOSE SERPL-MCNC: 98 MG/DL — SIGNIFICANT CHANGE UP (ref 70–99)
GLUCOSE UR QL: NEGATIVE MG/DL — SIGNIFICANT CHANGE UP
HCG UR QL: NEGATIVE — SIGNIFICANT CHANGE UP
HCT VFR BLD CALC: 34.9 % — LOW (ref 37–47)
HGB BLD-MCNC: 11.7 G/DL — LOW (ref 12–16)
INR BLD: 1 RATIO — SIGNIFICANT CHANGE UP (ref 0.88–1.16)
KETONES UR-MCNC: NEGATIVE — SIGNIFICANT CHANGE UP
LEUKOCYTE ESTERASE UR-ACNC: NEGATIVE — SIGNIFICANT CHANGE UP
MCHC RBC-ENTMCNC: 31.3 PG — HIGH (ref 27–31)
MCHC RBC-ENTMCNC: 33.5 G/DL — SIGNIFICANT CHANGE UP (ref 32–36)
MCV RBC AUTO: 93.3 FL — SIGNIFICANT CHANGE UP (ref 81–99)
NITRITE UR-MCNC: NEGATIVE — SIGNIFICANT CHANGE UP
PH UR: 6.5 — SIGNIFICANT CHANGE UP (ref 5–8)
PLATELET # BLD AUTO: 285 K/UL — SIGNIFICANT CHANGE UP (ref 150–400)
POTASSIUM SERPL-MCNC: 3.7 MMOL/L — SIGNIFICANT CHANGE UP (ref 3.5–5.3)
POTASSIUM SERPL-MCNC: 3.9 MMOL/L — SIGNIFICANT CHANGE UP (ref 3.5–5.3)
POTASSIUM SERPL-SCNC: 3.7 MMOL/L — SIGNIFICANT CHANGE UP (ref 3.5–5.3)
POTASSIUM SERPL-SCNC: 3.9 MMOL/L — SIGNIFICANT CHANGE UP (ref 3.5–5.3)
PROT SERPL-MCNC: 6.7 G/DL — SIGNIFICANT CHANGE UP (ref 6.6–8.7)
PROT UR-MCNC: NEGATIVE MG/DL — SIGNIFICANT CHANGE UP
PROTHROM AB SERPL-ACNC: 11.5 SEC — SIGNIFICANT CHANGE UP (ref 10–12.9)
RBC # BLD: 3.74 M/UL — LOW (ref 4.4–5.2)
RBC # FLD: 11.4 % — SIGNIFICANT CHANGE UP (ref 11–15.6)
SODIUM SERPL-SCNC: 140 MMOL/L — SIGNIFICANT CHANGE UP (ref 135–145)
SODIUM SERPL-SCNC: 142 MMOL/L — SIGNIFICANT CHANGE UP (ref 135–145)
SP GR SPEC: 1.01 — SIGNIFICANT CHANGE UP (ref 1.01–1.02)
UROBILINOGEN FLD QL: NEGATIVE MG/DL — SIGNIFICANT CHANGE UP
WBC # BLD: 2.7 K/UL — LOW (ref 4.8–10.8)
WBC # FLD AUTO: 2.7 K/UL — LOW (ref 4.8–10.8)

## 2018-12-17 PROCEDURE — 85730 THROMBOPLASTIN TIME PARTIAL: CPT

## 2018-12-17 PROCEDURE — 84702 CHORIONIC GONADOTROPIN TEST: CPT

## 2018-12-17 PROCEDURE — 80053 COMPREHEN METABOLIC PANEL: CPT

## 2018-12-17 PROCEDURE — 36415 COLL VENOUS BLD VENIPUNCTURE: CPT

## 2018-12-17 PROCEDURE — 74177 CT ABD & PELVIS W/CONTRAST: CPT

## 2018-12-17 PROCEDURE — 87493 C DIFF AMPLIFIED PROBE: CPT

## 2018-12-17 PROCEDURE — 74177 CT ABD & PELVIS W/CONTRAST: CPT | Mod: 26

## 2018-12-17 PROCEDURE — 71045 X-RAY EXAM CHEST 1 VIEW: CPT

## 2018-12-17 PROCEDURE — 87045 FECES CULTURE AEROBIC BACT: CPT

## 2018-12-17 PROCEDURE — 99284 EMERGENCY DEPT VISIT MOD MDM: CPT

## 2018-12-17 PROCEDURE — 85610 PROTHROMBIN TIME: CPT

## 2018-12-17 PROCEDURE — 71045 X-RAY EXAM CHEST 1 VIEW: CPT | Mod: 26

## 2018-12-17 PROCEDURE — 99284 EMERGENCY DEPT VISIT MOD MDM: CPT | Mod: 25

## 2018-12-17 PROCEDURE — 81025 URINE PREGNANCY TEST: CPT

## 2018-12-17 PROCEDURE — 85027 COMPLETE CBC AUTOMATED: CPT

## 2018-12-17 PROCEDURE — 81003 URINALYSIS AUTO W/O SCOPE: CPT

## 2018-12-17 PROCEDURE — 87046 STOOL CULTR AEROBIC BACT EA: CPT

## 2018-12-17 RX ORDER — SODIUM CHLORIDE 9 MG/ML
1000 INJECTION INTRAMUSCULAR; INTRAVENOUS; SUBCUTANEOUS ONCE
Qty: 0 | Refills: 0 | Status: COMPLETED | OUTPATIENT
Start: 2018-12-17 | End: 2018-12-17

## 2018-12-17 RX ADMIN — SODIUM CHLORIDE 1000 MILLILITER(S): 9 INJECTION INTRAMUSCULAR; INTRAVENOUS; SUBCUTANEOUS at 18:40

## 2018-12-17 RX ADMIN — Medication 20 MILLIGRAM(S): at 22:44

## 2018-12-17 NOTE — ED ADULT NURSE NOTE - OBJECTIVE STATEMENT
Pt presents to ED with bloody stools that began this morning at 6 am. Pt reported she just finished chemotherapy for breast cancer. Pt had a double breasted mastectomy.. Pt c/o  bright red mucus- like stools, diarrhea, chills, and bone pain in her knees (bilateral).

## 2018-12-17 NOTE — ED ADULT NURSE REASSESSMENT NOTE - NS ED NURSE REASSESS COMMENT FT1
Pt resting comfortably and calm. Plan of care explained and warm blanket provided. Pt awaiting CT scan.  Patient  A/Ox3, VSS, denies chest pain or sob.  Respirations are even and unlabored, lungs cta, +bowel x4 quads, abdomen soft, nontender/nondistended, skin w/d/i. Will continue to monitor.

## 2018-12-17 NOTE — ED ADULT NURSE NOTE - NSIMPLEMENTINTERV_GEN_ALL_ED
Implemented All Universal Safety Interventions:  [place] to call system. Call bell, personal items and telephone within reach. Instruct patient to call for assistance. Room bathroom lighting operational. Non-slip footwear when patient is off stretcher. Physically safe environment: no spills, clutter or unnecessary equipment. Stretcher in lowest position, wheels locked, appropriate side rails in place.

## 2018-12-17 NOTE — ED ADULT TRIAGE NOTE - CHIEF COMPLAINT QUOTE
has breast cancer and on chemo. Has bloody diarrhea. abd pain also. no vomiting. started last night. rt chest wall port not accessed. Last chem Friday. Due again this Friday.

## 2018-12-17 NOTE — ED ADULT TRIAGE NOTE - BSA (M2)
Chief Complaint   Patient presents with    Gyn Exam     Visit Vitals  /71 (BP 1 Location: Right arm, BP Patient Position: Sitting)   Pulse 77   Temp 97.8 °F (36.6 °C) (Oral)   Ht 5' 1\" (1.549 m)   Wt 163 lb 6.4 oz (74.1 kg)   BMI 30.87 kg/m²     1. Have you been to the ER, urgent care clinic since your last visit? Hospitalized since your last visit? No    2. Have you seen or consulted any other health care providers outside of the [address] [place] [address] since your last visit? Include any pap smears or colon screening.  No    Reviewed record in preparation for visit and have necessary documentation  Pt did not bring medication to office visit for review  opportunity was given for questions  Goals that were addressed and/or need to be completed during or after this appointment include   Health Maintenance Due   Topic Date Due    HPV Age 9Y-34Y (1 - Female 3-dose series) 01/07/2007    DTaP/Tdap/Td series (1 - Tdap) 01/07/2017    PAP AKA CERVICAL CYTOLOGY  01/07/2017 1.65

## 2018-12-18 ENCOUNTER — APPOINTMENT (OUTPATIENT)
Dept: CT IMAGING | Facility: CLINIC | Age: 28
End: 2018-12-18

## 2018-12-18 VITALS
SYSTOLIC BLOOD PRESSURE: 115 MMHG | DIASTOLIC BLOOD PRESSURE: 74 MMHG | HEART RATE: 86 BPM | TEMPERATURE: 98 F | RESPIRATION RATE: 16 BRPM | OXYGEN SATURATION: 98 %

## 2018-12-18 DIAGNOSIS — E86.0 DEHYDRATION: ICD-10-CM

## 2018-12-19 LAB
CULTURE RESULTS: SIGNIFICANT CHANGE UP
SPECIMEN SOURCE: SIGNIFICANT CHANGE UP

## 2018-12-20 ENCOUNTER — APPOINTMENT (OUTPATIENT)
Dept: HEMATOLOGY ONCOLOGY | Facility: CLINIC | Age: 28
End: 2018-12-20
Payer: COMMERCIAL

## 2018-12-20 ENCOUNTER — APPOINTMENT (OUTPATIENT)
Dept: CARDIOLOGY | Facility: CLINIC | Age: 28
End: 2018-12-20
Payer: COMMERCIAL

## 2018-12-20 ENCOUNTER — NON-APPOINTMENT (OUTPATIENT)
Age: 28
End: 2018-12-20

## 2018-12-20 VITALS
HEART RATE: 99 BPM | DIASTOLIC BLOOD PRESSURE: 78 MMHG | WEIGHT: 139 LBS | BODY MASS INDEX: 26.24 KG/M2 | OXYGEN SATURATION: 98 % | HEIGHT: 61 IN | SYSTOLIC BLOOD PRESSURE: 108 MMHG

## 2018-12-20 VITALS
HEART RATE: 102 BPM | SYSTOLIC BLOOD PRESSURE: 125 MMHG | HEIGHT: 61 IN | DIASTOLIC BLOOD PRESSURE: 88 MMHG | WEIGHT: 140.05 LBS | BODY MASS INDEX: 26.44 KG/M2 | OXYGEN SATURATION: 97 %

## 2018-12-20 PROCEDURE — 99214 OFFICE O/P EST MOD 30 MIN: CPT

## 2018-12-20 PROCEDURE — 93000 ELECTROCARDIOGRAM COMPLETE: CPT

## 2018-12-20 PROCEDURE — 99214 OFFICE O/P EST MOD 30 MIN: CPT | Mod: 25

## 2018-12-20 NOTE — PHYSICAL EXAM
[Ambulatory and capable of all self care but unable to carry out any work activities] : Status 2- Ambulatory and capable of all self care but unable to carry out any work activities. Up and about more than 50% of waking hours [Normal] : grossly intact [de-identified] : supple [de-identified] : BS+, soft, nontender on palpation. [de-identified] : without spinal or cva tenderness. [de-identified] : alopecia [de-identified] : Quiet

## 2018-12-20 NOTE — DISCUSSION/SUMMARY
[FreeTextEntry1] : Pt is a 27 y/o F diagnosed with L breast cancer 05/2018 s/p b/l mastectomy 6/29/2018, currently receiving chemo - no plans for RXT.  ECG shows NSR with short NE without signs of pre-excitation, no ectopy - this is unchanged.  She is currently asymptomatic\par TTE 06/2018 shows EF = 70% without significant valvular disease\par She remains active without any cardiac symptoms\par There are no signs or symptoms of HF, active ischemia, arrhythmias\par VSS\par I will have her f/u in 6 mnths or sooner PRN\par The described plan was discussed with the pt.  All questions and concerns were addressed to the best of my knowledge. \par

## 2018-12-20 NOTE — ASSESSMENT
[FreeTextEntry1] : 28 year old female with stage IIA left breast TNBC (T2N0) S/P bilateral mastectomy on 6/29/18. She has DPD deficiency.  6/19/18 ECHO - EF 72%. S/p 4 cycles of dose dense AC, now on weekly Taxol.\par \par She has Grade 1 intermittent neuropathy.\par Currently on antibiotics for focal colitis and is improving clinically.\par She has 1 week of taxol remaining which she emphatically wants to complete against her husbands wishes who feels that she complains of being tired all the time at home.  Brianna was clear that she wants last week of treatment.\par \par \par Plan: \par -delay week 12 taxol by 1 week until she completed abx for colitis\par -colitis - continue cipro/flagyl\par -PORT to remain in place for at least 1 year.\par -follow up in 3 weeks\par

## 2018-12-20 NOTE — HISTORY OF PRESENT ILLNESS
[Disease: _____________________] : Disease: [unfilled] [T: ___] : T[unfilled] [N: ___] : N[unfilled] [AJCC Stage: ____] : AJCC Stage: [unfilled] [de-identified] : Ms. Kennedy was diagnosed with left triple negative breast cancer at age 28. \par \par She delivered her first child on 4/25/18 following which she self palpated a left breast mass.  It was  thought to be a blocked milk duct / mastitis and was treated with antibiotics and then antifungals.  The mass did not resolve and she sought a 2nd opinion.  \par \par She then had a breast ultrasound on 5/30/18 which showed a 2.3 cm left breast mass at 1-2:00, 9 cm from the nipple, and a single axillary lymph node which does not contain a prominent benign appearing fatty hilum.  \par \par 5/31/18 left breast core biopsy - invasive poorly differentiated ductal carcinoma, [place] score 9/9, measures at least 17 cm w/ extensive necrosis. ER 0%, MD 0%, HER 2 (0/1+) -negative. \par \par On 6/7/18 she had a diagnostic mammogram + ultrasound - 2.1 cm rounded mass of left breast with overall coarsening of parenchymal pattern and increased parenchymal density in upper outer left breast. US - 2.4 cm left breast mass at 1-2:00 and left axilla 0.8 cm rounded hypoechoic mass consistent with abnormal left axillary node.  \par \par On 6/7/18 she also had a breast MRI - 2.9 x 3.2 x 3.1 cm mass in left upper inner breast at 1:00.  Suspicious left axillary nodes with ultrasound correlate for which US guided biopsy is recommended.   \par \par On 6/8/17 she had biopsy of the left axillary node - pathology : reactive lymph node. \par \par 6/7/18 - CT chest/abd/pelvis -  Known left upper outer breast malignancy. 2 small, round left axillary lymph nodes for which patient will undergo percutaneous \par sampling, as per report of breast MRI from 6/7/2018. A 3 mm subpleural nodular density in the right middle lobe probably represents a focus of atelectasis. No evidence of metastatic disease in the chest, abdomen, and pelvis.\par \par 6/8/18 bone scan - Normal bone scan. No radionuclide evidence of osseous metastasis.\par \par  6/29/18 -s/p bilateral mastectomy \par Pathology: left breast IDC 3.5 cm, joo score 9/9, margins negative, 2 SNL nodes negative.  pT2 pN0\par Right mastectomy - negative for malignancy. \par \par Family History: 2 maternal great aunts with breast cancer.\par paternal cousin with breast cancer\par paternal GM - pancreatic cancer\par paternal GF - pancreatic cancer\par \par [facility]  [employer] panel - no clinically significant mutations\par \par PMH: asthma, DPD deficiency (dihydropyrimidine dehydrogenase deficiency).\par \par Sx hx: tonsillectomy\par \par Social: non-smoker, social ETOH  [de-identified] : poorly differentiated invasive ductal carcinoma [de-identified] : ER 0% ID 0% HER2 negative [de-identified] : Returns for follow up.\par S/p 11 weeks of taxol.\ky Developed bloody diarrhea earlier this week. Came to clinic for hydration on 12/19 and was sent to ED.  CT abd done in ED showed focal colitis. C.diff was negative.\ky Started on cipro/flagyl on 12/19 and today reports that her diarrhea is significant improved.  Thus far today she has had 1 bowel movement which was non bloody and slightly more formed than prior. \par No fever. No abdominal pain.  She has intermittent neuropathy.  + fatigue. \ky Continues to have poor sleep.

## 2018-12-20 NOTE — PHYSICAL EXAM
[General Appearance - Well Developed] : well developed [Normal Appearance] : normal appearance [Well Groomed] : well groomed [General Appearance - Well Nourished] : well nourished [No Deformities] : no deformities [General Appearance - In No Acute Distress] : no acute distress [Normal Conjunctiva] : the conjunctiva exhibited no abnormalities [Eyelids - No Xanthelasma] : the eyelids demonstrated no xanthelasmas [Normal Oral Mucosa] : normal oral mucosa [No Oral Pallor] : no oral pallor [No Oral Cyanosis] : no oral cyanosis [FreeTextEntry1] : no JVD or bruits  [Respiration, Rhythm And Depth] : normal respiratory rhythm and effort [Exaggerated Use Of Accessory Muscles For Inspiration] : no accessory muscle use [Auscultation Breath Sounds / Voice Sounds] : lungs were clear to auscultation bilaterally [Heart Rate And Rhythm] : heart rate and rhythm were normal [Heart Sounds] : normal S1 and S2 [Murmurs] : no murmurs present [Arterial Pulses Normal] : the arterial pulses were normal [Edema] : no peripheral edema present [Abdomen Soft] : soft [Abdomen Tenderness] : non-tender [Abdomen Mass (___ Cm)] : no abdominal mass palpated [Abnormal Walk] : normal gait [Gait - Sufficient For Exercise Testing] : the gait was sufficient for exercise testing [Nail Clubbing] : no clubbing of the fingernails [Cyanosis, Localized] : no localized cyanosis [Petechial Hemorrhages (___cm)] : no petechial hemorrhages [] : no ischemic changes [Oriented To Time, Place, And Person] : oriented to person, place, and time [Impaired Insight] : insight and judgment were intact [Affect] : the affect was normal [Mood] : the mood was normal [No Anxiety] : not feeling anxious

## 2018-12-20 NOTE — HISTORY OF PRESENT ILLNESS
[FreeTextEntry1] : Pt is a 29 y/o F who presents today for f/u.  She was diagnosed with L breast cancer 05/2018 and is now s/p b/l mastectomy 6/29/2018, started chemo treatments 08/03/2018 - may get last treatment this week.  She states that she is feeling well and denies cardiac complaints.  She denies CP, SOB, diaphoresis, palpitations, dizziness, syncope, LE edema, PND. \par \par PMH: breast cancer, asthma\par Smoking status: never\par Current exercise: walks a couple of miles daily\par Daily water intake: 2-3 cups\par Daily caffeine intake: 1 cup coffee\par OTC medications: none\par Family hx: pt denies any immediate fam hx cardiac disease\par Previous cardiac testing: TTE recently\par Previous hospitalizations: appy 2004 - no problems with anesthesia

## 2018-12-20 NOTE — RESULTS/DATA
[FreeTextEntry1] : EXAM: CT CHEST IC \par \par PROCEDURE DATE: 11/15/2018 \par \par INTERPRETATION: EXAMINATION: CT CHEST IC \par \par CLINICAL INDICATION: Left breast cancer. \par \par TECHNIQUE: CT of the chest was obtained after the administration of 50 ml of \par [id number]. \par \par COMPARISON: None. \par \par FINDINGS: \par \par AIRWAYS AND LUNGS: The central tracheobronchial tree is patent. The lungs \par are clear. \par \par MEDIASTINUM AND PLEURA: There are no enlarged mediastinal, hilar or axillary \par lymph nodes. The visualized portion of the thyroid gland is unremarkable. \par There is no pleural effusion. There is no pneumothorax. \par \par HEART AND VESSELS: The heart is normal in size. There are no \par atherosclerotic calcifications of the aorta. There is no pericardial \par effusion. \par \par UPPER ABDOMEN: Images of the upper abdomen demonstrate no abnormality. \par \par BONES AND SOFT TISSUES: The bones are unremarkable. Bilateral mastectomy. \par \par TUBES/LINES: None. \par \par IMPRESSION: \par Clear lungs. \par \par \par \par

## 2018-12-24 NOTE — ED PROVIDER NOTE - MEDICAL DECISION MAKING DETAILS
diarrhea with history of breast ca on chemo, recent abx use; eval diverticulitis, c diff, obstruction;

## 2018-12-24 NOTE — ED PROVIDER NOTE - OBJECTIVE STATEMENT
29 yo female pmh breast ca on chemotherapy comes to ed with diarrhea and abdominal pain x 1 -2 days; pt seen at Dr Mcclain ,and given iv fluids;pt sent to ed for further evaluation; pt recently txed on augmentin

## 2018-12-26 ENCOUNTER — APPOINTMENT (OUTPATIENT)
Age: 28
End: 2018-12-26

## 2018-12-26 ENCOUNTER — LABORATORY RESULT (OUTPATIENT)
Age: 28
End: 2018-12-26

## 2018-12-26 ENCOUNTER — RESULT REVIEW (OUTPATIENT)
Age: 28
End: 2018-12-26

## 2018-12-26 LAB
BASOPHILS # BLD AUTO: 0.1 K/UL — SIGNIFICANT CHANGE UP (ref 0–0.2)
EOSINOPHIL # BLD AUTO: 0.2 K/UL — SIGNIFICANT CHANGE UP (ref 0–0.5)
EOSINOPHIL NFR BLD AUTO: 1 % — SIGNIFICANT CHANGE UP (ref 0–6)
HCT VFR BLD CALC: 38.3 % — SIGNIFICANT CHANGE UP (ref 34.5–45)
HGB BLD-MCNC: 13.1 G/DL — SIGNIFICANT CHANGE UP (ref 11.5–15.5)
LYMPHOCYTES # BLD AUTO: 1.1 K/UL — SIGNIFICANT CHANGE UP (ref 1–3.3)
LYMPHOCYTES # BLD AUTO: 37 % — SIGNIFICANT CHANGE UP (ref 13–44)
MCHC RBC-ENTMCNC: 31.7 PG — SIGNIFICANT CHANGE UP (ref 27–34)
MCHC RBC-ENTMCNC: 34.1 GM/DL — SIGNIFICANT CHANGE UP (ref 32–36)
MCV RBC AUTO: 92.8 FL — SIGNIFICANT CHANGE UP (ref 80–100)
MONOCYTES # BLD AUTO: 0.6 K/UL — SIGNIFICANT CHANGE UP (ref 0–0.9)
MONOCYTES NFR BLD AUTO: 15 % — HIGH (ref 2–14)
NEUTROPHILS # BLD AUTO: 1.5 K/UL — LOW (ref 1.8–7.4)
NEUTROPHILS NFR BLD AUTO: 47 % — SIGNIFICANT CHANGE UP (ref 43–77)
PLAT MORPH BLD: NORMAL — SIGNIFICANT CHANGE UP
PLATELET # BLD AUTO: 276 K/UL — SIGNIFICANT CHANGE UP (ref 150–400)
RBC # BLD: 4.13 M/UL — SIGNIFICANT CHANGE UP (ref 3.8–5.2)
RBC # FLD: 11.2 % — SIGNIFICANT CHANGE UP (ref 10.3–14.5)
RBC BLD AUTO: NORMAL — SIGNIFICANT CHANGE UP
WBC # BLD: 3.3 K/UL — LOW (ref 3.8–10.5)
WBC # FLD AUTO: 3.3 K/UL — LOW (ref 3.8–10.5)

## 2018-12-28 ENCOUNTER — APPOINTMENT (OUTPATIENT)
Age: 28
End: 2018-12-28

## 2018-12-28 ENCOUNTER — APPOINTMENT (OUTPATIENT)
Dept: HEMATOLOGY ONCOLOGY | Facility: CLINIC | Age: 28
End: 2018-12-28

## 2019-01-04 ENCOUNTER — APPOINTMENT (OUTPATIENT)
Age: 29
End: 2019-01-04

## 2019-01-11 ENCOUNTER — APPOINTMENT (OUTPATIENT)
Age: 29
End: 2019-01-11

## 2019-01-14 ENCOUNTER — RX RENEWAL (OUTPATIENT)
Age: 29
End: 2019-01-14

## 2019-01-14 ENCOUNTER — OUTPATIENT (OUTPATIENT)
Dept: OUTPATIENT SERVICES | Facility: HOSPITAL | Age: 29
LOS: 1 days | Discharge: HOME | End: 2019-01-14

## 2019-01-14 DIAGNOSIS — Z98.890 OTHER SPECIFIED POSTPROCEDURAL STATES: Chronic | ICD-10-CM

## 2019-01-14 DIAGNOSIS — Z98.89 OTHER SPECIFIED POSTPROCEDURAL STATES: Chronic | ICD-10-CM

## 2019-01-14 DIAGNOSIS — C50.412 MALIGNANT NEOPLASM OF UPPER-OUTER QUADRANT OF LEFT FEMALE BREAST: ICD-10-CM

## 2019-01-17 ENCOUNTER — APPOINTMENT (OUTPATIENT)
Dept: HEMATOLOGY ONCOLOGY | Facility: CLINIC | Age: 29
End: 2019-01-17
Payer: COMMERCIAL

## 2019-01-17 VITALS
BODY MASS INDEX: 27 KG/M2 | WEIGHT: 143 LBS | TEMPERATURE: 98.3 F | DIASTOLIC BLOOD PRESSURE: 85 MMHG | SYSTOLIC BLOOD PRESSURE: 136 MMHG | HEART RATE: 85 BPM | HEIGHT: 61 IN

## 2019-01-17 PROCEDURE — 99214 OFFICE O/P EST MOD 30 MIN: CPT

## 2019-01-17 RX ORDER — METRONIDAZOLE 500 MG/1
500 TABLET ORAL
Qty: 30 | Refills: 0 | Status: DISCONTINUED | COMMUNITY
Start: 2018-12-18 | End: 2019-01-17

## 2019-01-17 RX ORDER — NAPROXEN 500 MG/1
500 TABLET ORAL
Qty: 30 | Refills: 0 | Status: DISCONTINUED | COMMUNITY
Start: 2018-11-05 | End: 2019-01-17

## 2019-01-17 RX ORDER — MONTELUKAST 10 MG/1
10 TABLET, FILM COATED ORAL
Qty: 30 | Refills: 2 | Status: DISCONTINUED | COMMUNITY
Start: 2018-12-06 | End: 2019-01-17

## 2019-01-17 RX ORDER — METHYLPREDNISOLONE 4 MG/1
4 TABLET ORAL
Qty: 1 | Refills: 0 | Status: DISCONTINUED | COMMUNITY
Start: 2018-12-10 | End: 2019-01-17

## 2019-01-17 RX ORDER — ONDANSETRON 8 MG/1
8 TABLET ORAL
Qty: 90 | Refills: 1 | Status: DISCONTINUED | COMMUNITY
Start: 2018-08-02 | End: 2019-01-17

## 2019-01-17 RX ORDER — CIPROFLOXACIN HYDROCHLORIDE 500 MG/1
500 TABLET, FILM COATED ORAL
Qty: 20 | Refills: 0 | Status: DISCONTINUED | COMMUNITY
Start: 2018-12-18 | End: 2019-01-17

## 2019-01-17 RX ORDER — LIDOCAINE HYDROCHLORIDE 20 MG/ML
2 SOLUTION OROPHARYNGEAL
Qty: 1 | Refills: 2 | Status: DISCONTINUED | COMMUNITY
Start: 2018-11-09 | End: 2019-01-17

## 2019-01-17 RX ORDER — PROCHLORPERAZINE MALEATE 10 MG/1
10 TABLET ORAL EVERY 6 HOURS
Qty: 120 | Refills: 1 | Status: DISCONTINUED | COMMUNITY
Start: 2018-08-02 | End: 2019-01-17

## 2019-01-17 RX ORDER — PROMETHAZINE HYDROCHLORIDE 6.25 MG/5ML
6.25 SOLUTION ORAL
Qty: 1 | Refills: 0 | Status: DISCONTINUED | COMMUNITY
Start: 2018-12-10 | End: 2019-01-17

## 2019-01-17 RX ORDER — TRAZODONE HYDROCHLORIDE 50 MG/1
50 TABLET ORAL
Qty: 30 | Refills: 1 | Status: DISCONTINUED | COMMUNITY
Start: 2018-11-30 | End: 2019-01-17

## 2019-01-17 RX ORDER — AMOXICILLIN 875 MG/1
875 TABLET, FILM COATED ORAL
Qty: 20 | Refills: 0 | Status: DISCONTINUED | COMMUNITY
Start: 2018-12-06 | End: 2019-01-17

## 2019-01-17 NOTE — RESULTS/DATA
[FreeTextEntry1] : EXAM: CT CHEST IC \par \par PROCEDURE DATE: 11/15/2018 \par \par INTERPRETATION: EXAMINATION: CT CHEST IC \par \par CLINICAL INDICATION: Left breast cancer. \par \par TECHNIQUE: CT of the chest was obtained after the administration of 50 ml of \par [id number]. \par \par COMPARISON: None. \par \par FINDINGS: \par \par AIRWAYS AND LUNGS: The central tracheobronchial tree is patent. The lungs \par are clear. \par \par MEDIASTINUM AND PLEURA: There are no enlarged mediastinal, hilar or axillary \par lymph nodes. The visualized portion of the thyroid gland is unremarkable. \par There is no pleural effusion. There is no pneumothorax. \par \par HEART AND VESSELS: The heart is normal in size. There are no \par atherosclerotic calcifications of the aorta. There is no pericardial \par effusion. \par \par UPPER ABDOMEN: Images of the upper abdomen demonstrate no abnormality. \par \par BONES AND SOFT TISSUES: The bones are unremarkable. Bilateral mastectomy. \par \par TUBES/LINES: None. \par \par IMPRESSION: \par Clear lungs. \par \par \par \par

## 2019-01-17 NOTE — HISTORY OF PRESENT ILLNESS
[Disease: _____________________] : Disease: [unfilled] [T: ___] : T[unfilled] [N: ___] : N[unfilled] [AJCC Stage: ____] : AJCC Stage: [unfilled] [de-identified] : Ms. Kennedy was diagnosed with left triple negative breast cancer at age 28. \par \par She delivered her first child on 4/25/18 following which she self palpated a left breast mass.  It was  thought to be a blocked milk duct / mastitis and was treated with antibiotics and then antifungals.  The mass did not resolve and she sought a 2nd opinion.  \par \par She then had a breast ultrasound on 5/30/18 which showed a 2.3 cm left breast mass at 1-2:00, 9 cm from the nipple, and a single axillary lymph node which does not contain a prominent benign appearing fatty hilum.  \par \par 5/31/18 left breast core biopsy - invasive poorly differentiated ductal carcinoma, [place] score 9/9, measures at least 17 cm w/ extensive necrosis. ER 0%, DC 0%, HER 2 (0/1+) -negative. \par \par On 6/7/18 she had a diagnostic mammogram + ultrasound - 2.1 cm rounded mass of left breast with overall coarsening of parenchymal pattern and increased parenchymal density in upper outer left breast. US - 2.4 cm left breast mass at 1-2:00 and left axilla 0.8 cm rounded hypoechoic mass consistent with abnormal left axillary node.  \par \par On 6/7/18 she also had a breast MRI - 2.9 x 3.2 x 3.1 cm mass in left upper inner breast at 1:00.  Suspicious left axillary nodes with ultrasound correlate for which US guided biopsy is recommended.   \par \par On 6/8/17 she had biopsy of the left axillary node - pathology : reactive lymph node. \par \par 6/7/18 - CT chest/abd/pelvis -  Known left upper outer breast malignancy. 2 small, round left axillary lymph nodes for which patient will undergo percutaneous \par sampling, as per report of breast MRI from 6/7/2018. A 3 mm subpleural nodular density in the right middle lobe probably represents a focus of atelectasis. No evidence of metastatic disease in the chest, abdomen, and pelvis.\par \par 6/8/18 bone scan - Normal bone scan. No radionuclide evidence of osseous metastasis.\par \par  6/29/18 -s/p bilateral mastectomy \par Pathology: left breast IDC 3.5 cm, joo score 9/9, margins negative, 2 SNL nodes negative.  pT2 pN0\par Right mastectomy - negative for malignancy. \par \par Family History: 2 maternal great aunts with breast cancer.\par paternal cousin with breast cancer\par paternal GM - pancreatic cancer\par paternal GF - pancreatic cancer\par \par [facility]  [employer]SK panel - no clinically significant mutations\par \par PMH: asthma, DPD deficiency (dihydropyrimidine dehydrogenase deficiency).\par \par Sx hx: tonsillectomy\par \par Social: non-smoker, social ETOH  [de-identified] : poorly differentiated invasive ductal carcinoma [de-identified] : ER 0% [place] 0% HER2 negative [de-identified] : Returns for follow up.\par S/p 12 weeks of taxol on 12/26/18.\par She reports fatigue. She reports pain and tenderness in left breast/ Dr. Farr prescribed gabapentin. She has been going to PT 3x a week. Continues to have radiating pain down her left arm and chronic lower back pain. \par She notes swelling and numbness in her left hand at night, but it resolves in the morning. \par She has about 5 bowel movements a day, with slightly formed stool and notes mucus in stool. She has mild abdominal pain. Denies any melena or blood in stool. \par No fever. No dysuria. She has intermittent neuropathy.\par

## 2019-01-17 NOTE — ADDENDUM
[FreeTextEntry1] : I, Macrina Blakely, acted solely as a scribe for Dr. Eloy Mcclain on this date 1/17/19.

## 2019-01-17 NOTE — ASSESSMENT
[FreeTextEntry1] : 28 year old female with stage IIA left breast TNBC (T2N0) S/P bilateral mastectomy on 6/29/18. She has DPD deficiency.  6/19/18 ECHO - EF 72%. S/p 4 cycles of dose dense AC, followed by weekly taxol completed 12/26/18. \par \par Multiple complaints post completion of chemotherapy including fatigue, left axillary/breast pain which has been on going with negative imaging studies.  Also developed diarrhea in 12/2018 with CT abd/pelvis showing focal colitis and treated with cipro/fagyl but continues to have 5 bowel movements daily with some formed, others with mucus. \par \par \par Plan: \par -diarrhea - referral to GI for further evaluation\par -encouraged establishing care with PCP\par -encouraged pt to exercise and stay active\par -We discussed survivorship at length and that it is normal to feel anxious after completion of treatment.  We discussed that there is no role for routine breast cancer surveillance imaging such as CT scans as studies have not shown improvement in survival by doing so.  Imaging is symptom driven.  \par -PORT to remain in place for at least 1 year, continue to get port flushed every 4-6 weeks.\par -continue follow up with Dr. Farr\par -RTO 3 months\par

## 2019-01-17 NOTE — PHYSICAL EXAM
[Ambulatory and capable of all self care but unable to carry out any work activities] : Status 2- Ambulatory and capable of all self care but unable to carry out any work activities. Up and about more than 50% of waking hours [Normal] : normal appearance, no rash, nodules, vesicles, ulcers, erythema [de-identified] : supple [de-identified] : no palpable masses around reconstructed left breast, no axillary adenopathy [de-identified] : BS+, soft, nontender on palpation. [de-identified] : without spinal or cva tenderness. [de-identified] : Quiet

## 2019-01-18 ENCOUNTER — APPOINTMENT (OUTPATIENT)
Age: 29
End: 2019-01-18

## 2019-01-25 ENCOUNTER — APPOINTMENT (OUTPATIENT)
Age: 29
End: 2019-01-25

## 2019-01-31 PROCEDURE — 97163 PT EVAL HIGH COMPLEX 45 MIN: CPT

## 2019-01-31 PROCEDURE — 97140 MANUAL THERAPY 1/> REGIONS: CPT

## 2019-01-31 PROCEDURE — 97110 THERAPEUTIC EXERCISES: CPT

## 2019-02-01 ENCOUNTER — OUTPATIENT (OUTPATIENT)
Dept: OUTPATIENT SERVICES | Facility: HOSPITAL | Age: 29
LOS: 1 days | End: 2019-02-01
Payer: COMMERCIAL

## 2019-02-01 ENCOUNTER — APPOINTMENT (OUTPATIENT)
Age: 29
End: 2019-02-01

## 2019-02-01 DIAGNOSIS — Z98.89 OTHER SPECIFIED POSTPROCEDURAL STATES: Chronic | ICD-10-CM

## 2019-02-01 DIAGNOSIS — Z98.890 OTHER SPECIFIED POSTPROCEDURAL STATES: Chronic | ICD-10-CM

## 2019-02-08 ENCOUNTER — APPOINTMENT (OUTPATIENT)
Age: 29
End: 2019-02-08

## 2019-02-11 ENCOUNTER — OUTPATIENT (OUTPATIENT)
Dept: OUTPATIENT SERVICES | Facility: HOSPITAL | Age: 29
LOS: 1 days | Discharge: HOME | End: 2019-02-11

## 2019-02-11 DIAGNOSIS — Z98.89 OTHER SPECIFIED POSTPROCEDURAL STATES: Chronic | ICD-10-CM

## 2019-02-11 DIAGNOSIS — Z98.890 OTHER SPECIFIED POSTPROCEDURAL STATES: Chronic | ICD-10-CM

## 2019-02-11 DIAGNOSIS — C50.412 MALIGNANT NEOPLASM OF UPPER-OUTER QUADRANT OF LEFT FEMALE BREAST: ICD-10-CM

## 2019-02-12 DIAGNOSIS — I89.0 LYMPHEDEMA, NOT ELSEWHERE CLASSIFIED: ICD-10-CM

## 2019-02-15 ENCOUNTER — APPOINTMENT (OUTPATIENT)
Age: 29
End: 2019-02-15

## 2019-02-22 ENCOUNTER — APPOINTMENT (OUTPATIENT)
Age: 29
End: 2019-02-22

## 2019-02-26 ENCOUNTER — APPOINTMENT (OUTPATIENT)
Dept: SURGERY | Facility: CLINIC | Age: 29
End: 2019-02-26
Payer: COMMERCIAL

## 2019-02-26 VITALS
SYSTOLIC BLOOD PRESSURE: 117 MMHG | DIASTOLIC BLOOD PRESSURE: 83 MMHG | HEART RATE: 101 BPM | WEIGHT: 145.56 LBS | HEIGHT: 61 IN | BODY MASS INDEX: 27.48 KG/M2

## 2019-02-26 PROCEDURE — 99214 OFFICE O/P EST MOD 30 MIN: CPT

## 2019-02-27 PROCEDURE — 97140 MANUAL THERAPY 1/> REGIONS: CPT

## 2019-02-27 PROCEDURE — 97110 THERAPEUTIC EXERCISES: CPT

## 2019-03-06 ENCOUNTER — APPOINTMENT (OUTPATIENT)
Age: 29
End: 2019-03-06

## 2019-03-08 NOTE — PHYSICAL EXAM
[Normocephalic] : normocephalic [Atraumatic] : atraumatic [PERRL] : pupils equal, round and reactive to light [Sclera nonicteric] : sclera nonicteric [Supple] : supple [No Supraclavicular Adenopathy] : no supraclavicular adenopathy [Examined in the supine and seated position] : examined in the supine and seated position [No dominant masses] : no dominant masses in right breast  [No dominant masses] : no dominant masses left breast [No Axillary Lymphadenopathy] : no left axillary lymphadenopathy [No Edema] : no edema [No Rashes] : no rashes [No Ulceration] : no ulceration [de-identified] : mastectomy flap well healed.\par  [de-identified] : mastectomy flap well healed.\par \par

## 2019-03-08 NOTE — HISTORY OF PRESENT ILLNESS
[FreeTextEntry1] : I had the pleasure of seeing JEANE GUTIERREZ  in the office for a followup visit secondary to left breast pain.  \par \par Jeane is a sg 27 yo female who originally presented after feeling a left breast mass after delivery of her son on 2018.  She states she had first felt the lump after delivery of her child.  She sought medical attention and was told it was a blocked milk duct which caused the mastitis and put her on antibiotics.  When the symptoms did not resolve she saw another physician for a second opinion.  She was then sent for ultrasound and ultrasound demonstrated a 2.3 cm lobulated hypoechoic nodule at in the left breast 2:00 and an ultrasound guided core biopsy was recommended.  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  Chemotherapy began on 8/3/2018.  Radiotherapy was discussed with Dr. Gage and there is no significant benefit to adjuvant radiotherapy.\par \par She denies skin changes, nipple dimpling or nipple discharge. \par \par  with 1st delivery at 28.  Menses began at age 8.\par She denies personal history of malignancy.\par Family history is significant for two maunts diagnosed with breast cancer, Pcousin diagnosed with breast cancer, PGM and MGM both diagnosed with pancreatic cancer at unknown age, MGM diagnose with skin cancer.\par \par 2018 [employer] LRN:  Genetic results:  Negative- No clinically significant mutation identified\par \par Imaging:  [facility] [place]\par 2018 MRI BREAST:  1.  2.9X 3.2X 3.1 CM MASS  in the LEFT BREAST UPPER INNER BREAST AT 1:00, middle to posterior depth, consistent with biopsy proven invasive ductal carcinoma.  2.  Suspicious left axillary lymph node with ultrasound correlate, for which ultrasound guided core biopsy has been recommended.  As per addendum to a breast ultrasound guided core biopsy report dated 2018, the patient will return for left axillary lymph node biopsy after surgical consultation.  3.  No MRI evidence of malignancy in the right breast.  BIRADS 6 Biopsy-Proven Malignancy\par \par 2018 CT CHEST/ABD/PELVIS:  Known left upper outer breast malignancy.  2 small round left axillary lymph nodes for which patient will undergo percutaneous sampling, as per report of breast MRI from 2018.  A 3 mm subpleural nodular density in the right middle lobe probably represents a focus of atelectasis.  No evidence of metastatic disease in the chest, abdomen, and pelvis.\par \par 2018  US NONVASC EXT LTD RT:  Benign appearing right groin lymph nodes the smaller of which correlates with area of patients tenderness.  Findings likely represent benign reactive lymph nodes.  Recommend continued clinical follow up.\par \par 2018  US breast limited left\par Impression:  No sonographic suspicious findings left axilla and upper outer quadrant left breast at area of concern.  Note is made of two small benign appearing axillary lymph nodes and a small amount of fluid adjacent to the axillary segment of the axillary segment of the left breast expander.  BIRADS 2 benign findings.\par \par SURGICAL PATHOLOGY:\par 1.  Breast, right simple mastectomy:  Negative for malignancy.\par 2.  [place] lymph node, left axilla, excision:  One lymph node, negative for metastatic carcinoma.\par 3.  [place] lymph node, left axilla, excision:  One lymph node, negative for metastatic carcinoma.\par 4.  Breast, left, simple mastectomy: Infiltrating ductal carcinoma, measuring 3.5 cm, with necrosis.  Infiltrating carcinoma extends to the anterior margin of the mastectomy specimen, please see final margin status in the synoptic summary below and part 5.  Negative nipple.  Negative skin.  Focal secretory change.  \par 5.  Left superior flap margin, over tumor:  negative margin.\par 6.  Skin and adipose tissue, right and left breast, excision:  Negative for malignancy.\par \par We reviewed and discussed clinical breast exam.  She understands the left breast pain and lump felt at the 8:00 position of the left breast is scar tissue building on the TE.  This can occur and when she gets her TE exchanged out the lump and pain should go away.  There are no dominant masses on exam today.  Recommendation to increase gabapentin to TID and she plans to TE exchanged in  when her  is off from work.  Will continue to follow  and all questions answered.\par \par All questions answered.

## 2019-03-08 NOTE — PAST MEDICAL HISTORY
[Menstruating] : The patient is menstruating [Total Preg ___] : G[unfilled] [Live Births ___] : P[unfilled]  [Age At Live Birth ___] : Age at live birth: [unfilled] [FreeTextEntry8] : attempted but then stopped

## 2019-03-12 ENCOUNTER — APPOINTMENT (OUTPATIENT)
Dept: SURGERY | Facility: CLINIC | Age: 29
End: 2019-03-12

## 2019-03-13 ENCOUNTER — TRANSCRIPTION ENCOUNTER (OUTPATIENT)
Age: 29
End: 2019-03-13

## 2019-03-17 ENCOUNTER — FORM ENCOUNTER (OUTPATIENT)
Age: 29
End: 2019-03-17

## 2019-03-18 ENCOUNTER — LABORATORY RESULT (OUTPATIENT)
Age: 29
End: 2019-03-18

## 2019-03-18 ENCOUNTER — APPOINTMENT (OUTPATIENT)
Dept: FAMILY MEDICINE | Facility: CLINIC | Age: 29
End: 2019-03-18
Payer: COMMERCIAL

## 2019-03-18 ENCOUNTER — OUTPATIENT (OUTPATIENT)
Dept: OUTPATIENT SERVICES | Facility: HOSPITAL | Age: 29
LOS: 1 days | End: 2019-03-18
Payer: COMMERCIAL

## 2019-03-18 ENCOUNTER — APPOINTMENT (OUTPATIENT)
Dept: ULTRASOUND IMAGING | Facility: CLINIC | Age: 29
End: 2019-03-18
Payer: COMMERCIAL

## 2019-03-18 VITALS
WEIGHT: 146 LBS | OXYGEN SATURATION: 97 % | TEMPERATURE: 97.5 F | HEIGHT: 61 IN | DIASTOLIC BLOOD PRESSURE: 84 MMHG | SYSTOLIC BLOOD PRESSURE: 122 MMHG | BODY MASS INDEX: 27.56 KG/M2 | HEART RATE: 83 BPM

## 2019-03-18 DIAGNOSIS — Z98.89 OTHER SPECIFIED POSTPROCEDURAL STATES: Chronic | ICD-10-CM

## 2019-03-18 DIAGNOSIS — R10.9 UNSPECIFIED ABDOMINAL PAIN: ICD-10-CM

## 2019-03-18 DIAGNOSIS — M54.9 DORSALGIA, UNSPECIFIED: ICD-10-CM

## 2019-03-18 DIAGNOSIS — Z98.890 OTHER SPECIFIED POSTPROCEDURAL STATES: Chronic | ICD-10-CM

## 2019-03-18 DIAGNOSIS — Z00.00 ENCOUNTER FOR GENERAL ADULT MEDICAL EXAMINATION WITHOUT ABNORMAL FINDINGS: ICD-10-CM

## 2019-03-18 PROCEDURE — 72110 X-RAY EXAM L-2 SPINE 4/>VWS: CPT | Mod: 26

## 2019-03-18 PROCEDURE — 36415 COLL VENOUS BLD VENIPUNCTURE: CPT

## 2019-03-18 PROCEDURE — 99214 OFFICE O/P EST MOD 30 MIN: CPT | Mod: 25

## 2019-03-18 PROCEDURE — 72070 X-RAY EXAM THORAC SPINE 2VWS: CPT

## 2019-03-18 PROCEDURE — 72070 X-RAY EXAM THORAC SPINE 2VWS: CPT | Mod: 26

## 2019-03-18 PROCEDURE — 76700 US EXAM ABDOM COMPLETE: CPT | Mod: 26

## 2019-03-18 PROCEDURE — 76700 US EXAM ABDOM COMPLETE: CPT

## 2019-03-18 PROCEDURE — 72110 X-RAY EXAM L-2 SPINE 4/>VWS: CPT

## 2019-03-18 RX ORDER — GABAPENTIN 300 MG/1
300 CAPSULE ORAL 3 TIMES DAILY
Qty: 90 | Refills: 4 | Status: DISCONTINUED | COMMUNITY
Start: 2019-02-07 | End: 2019-03-18

## 2019-03-18 RX ORDER — ERGOCALCIFEROL 1.25 MG/1
1.25 MG CAPSULE, LIQUID FILLED ORAL
Qty: 8 | Refills: 0 | Status: DISCONTINUED | COMMUNITY
Start: 2018-09-20 | End: 2019-03-18

## 2019-03-18 RX ORDER — GABAPENTIN 300 MG/1
300 CAPSULE ORAL
Qty: 5 | Refills: 0 | Status: DISCONTINUED | COMMUNITY
Start: 2019-01-11 | End: 2019-03-18

## 2019-03-18 RX ORDER — ESCITALOPRAM OXALATE 10 MG/1
10 TABLET ORAL
Qty: 30 | Refills: 2 | Status: DISCONTINUED | COMMUNITY
Start: 2018-07-13 | End: 2019-03-18

## 2019-03-19 LAB
25(OH)D3 SERPL-MCNC: 23.1 NG/ML
ALBUMIN SERPL ELPH-MCNC: 4 G/DL
ALP BLD-CCNC: 61 U/L
ALT SERPL-CCNC: 28 U/L
ANION GAP SERPL CALC-SCNC: 14 MMOL/L
APPEARANCE: ABNORMAL
AST SERPL-CCNC: 41 U/L
BACTERIA: NEGATIVE
BASOPHILS # BLD AUTO: 0.02 K/UL
BASOPHILS NFR BLD AUTO: 0.8 %
BILIRUB SERPL-MCNC: 0.2 MG/DL
BILIRUBIN URINE: NEGATIVE
BLOOD URINE: NEGATIVE
BUN SERPL-MCNC: 8 MG/DL
CALCIUM SERPL-MCNC: 9.3 MG/DL
CHLORIDE SERPL-SCNC: 105 MMOL/L
CHOLEST SERPL-MCNC: 121 MG/DL
CHOLEST/HDLC SERPL: 2.3 RATIO
CO2 SERPL-SCNC: 22 MMOL/L
COLOR: YELLOW
CREAT SERPL-MCNC: 0.56 MG/DL
EOSINOPHIL # BLD AUTO: 0.08 K/UL
EOSINOPHIL NFR BLD AUTO: 3.3 %
GLUCOSE QUALITATIVE U: NEGATIVE
GLUCOSE SERPL-MCNC: 94 MG/DL
HBA1C MFR BLD HPLC: 4.8 %
HCT VFR BLD CALC: 39.2 %
HDLC SERPL-MCNC: 53 MG/DL
HGB BLD-MCNC: 12.8 G/DL
HYALINE CASTS: 1 /LPF
IMM GRANULOCYTES NFR BLD AUTO: 0 %
KETONES URINE: NORMAL
LDLC SERPL CALC-MCNC: 52 MG/DL
LEUKOCYTE ESTERASE URINE: NEGATIVE
LPL SERPL-CCNC: 19 U/L
LYMPHOCYTES # BLD AUTO: 0.86 K/UL
LYMPHOCYTES NFR BLD AUTO: 35.2 %
MAN DIFF?: NORMAL
MCHC RBC-ENTMCNC: 30.1 PG
MCHC RBC-ENTMCNC: 32.7 GM/DL
MCV RBC AUTO: 92.2 FL
MICROSCOPIC-UA: NORMAL
MONOCYTES # BLD AUTO: 0.5 K/UL
MONOCYTES NFR BLD AUTO: 20.5 %
NEUTROPHILS # BLD AUTO: 0.98 K/UL
NEUTROPHILS NFR BLD AUTO: 40.2 %
NITRITE URINE: NEGATIVE
PH URINE: 6
PLATELET # BLD AUTO: 172 K/UL
POTASSIUM SERPL-SCNC: 3.8 MMOL/L
PROT SERPL-MCNC: 6.9 G/DL
PROTEIN URINE: ABNORMAL
RBC # BLD: 4.25 M/UL
RBC # FLD: 12.5 %
RED BLOOD CELLS URINE: 4 /HPF
SODIUM SERPL-SCNC: 141 MMOL/L
SPECIFIC GRAVITY URINE: 1.03
SQUAMOUS EPITHELIAL CELLS: 15 /HPF
TRIGL SERPL-MCNC: 82 MG/DL
TSH SERPL-ACNC: 0.03 UIU/ML
UROBILINOGEN URINE: NORMAL
WBC # FLD AUTO: 2.44 K/UL
WHITE BLOOD CELLS URINE: 4 /HPF

## 2019-03-20 ENCOUNTER — OTHER (OUTPATIENT)
Age: 29
End: 2019-03-20

## 2019-03-21 ENCOUNTER — FORM ENCOUNTER (OUTPATIENT)
Age: 29
End: 2019-03-21

## 2019-03-22 ENCOUNTER — APPOINTMENT (OUTPATIENT)
Dept: ULTRASOUND IMAGING | Facility: CLINIC | Age: 29
End: 2019-03-22
Payer: COMMERCIAL

## 2019-03-22 ENCOUNTER — OUTPATIENT (OUTPATIENT)
Dept: OUTPATIENT SERVICES | Facility: HOSPITAL | Age: 29
LOS: 1 days | End: 2019-03-22
Payer: COMMERCIAL

## 2019-03-22 DIAGNOSIS — Z98.890 OTHER SPECIFIED POSTPROCEDURAL STATES: Chronic | ICD-10-CM

## 2019-03-22 DIAGNOSIS — Z98.89 OTHER SPECIFIED POSTPROCEDURAL STATES: Chronic | ICD-10-CM

## 2019-03-22 DIAGNOSIS — R79.89 OTHER SPECIFIED ABNORMAL FINDINGS OF BLOOD CHEMISTRY: ICD-10-CM

## 2019-03-22 PROCEDURE — 76536 US EXAM OF HEAD AND NECK: CPT

## 2019-03-22 PROCEDURE — 76536 US EXAM OF HEAD AND NECK: CPT | Mod: 26

## 2019-03-27 ENCOUNTER — OUTPATIENT (OUTPATIENT)
Dept: OUTPATIENT SERVICES | Facility: HOSPITAL | Age: 29
LOS: 1 days | Discharge: HOME | End: 2019-03-27

## 2019-03-27 DIAGNOSIS — Z98.89 OTHER SPECIFIED POSTPROCEDURAL STATES: Chronic | ICD-10-CM

## 2019-03-27 DIAGNOSIS — Z98.890 OTHER SPECIFIED POSTPROCEDURAL STATES: Chronic | ICD-10-CM

## 2019-03-27 DIAGNOSIS — C50.412 MALIGNANT NEOPLASM OF UPPER-OUTER QUADRANT OF LEFT FEMALE BREAST: ICD-10-CM

## 2019-04-01 ENCOUNTER — LABORATORY RESULT (OUTPATIENT)
Age: 29
End: 2019-04-01

## 2019-04-01 ENCOUNTER — OUTPATIENT (OUTPATIENT)
Dept: OUTPATIENT SERVICES | Facility: HOSPITAL | Age: 29
LOS: 1 days | End: 2019-04-01
Payer: COMMERCIAL

## 2019-04-01 ENCOUNTER — APPOINTMENT (OUTPATIENT)
Dept: ULTRASOUND IMAGING | Facility: CLINIC | Age: 29
End: 2019-04-01
Payer: COMMERCIAL

## 2019-04-01 DIAGNOSIS — Z98.89 OTHER SPECIFIED POSTPROCEDURAL STATES: Chronic | ICD-10-CM

## 2019-04-01 DIAGNOSIS — Z00.00 ENCOUNTER FOR GENERAL ADULT MEDICAL EXAMINATION WITHOUT ABNORMAL FINDINGS: ICD-10-CM

## 2019-04-01 DIAGNOSIS — Z98.890 OTHER SPECIFIED POSTPROCEDURAL STATES: Chronic | ICD-10-CM

## 2019-04-01 PROCEDURE — 76604 US EXAM CHEST: CPT

## 2019-04-01 PROCEDURE — 76604 US EXAM CHEST: CPT | Mod: 26

## 2019-04-02 ENCOUNTER — APPOINTMENT (OUTPATIENT)
Dept: FAMILY MEDICINE | Facility: CLINIC | Age: 29
End: 2019-04-02
Payer: COMMERCIAL

## 2019-04-02 VITALS
WEIGHT: 145 LBS | DIASTOLIC BLOOD PRESSURE: 72 MMHG | OXYGEN SATURATION: 100 % | HEART RATE: 94 BPM | SYSTOLIC BLOOD PRESSURE: 116 MMHG | HEIGHT: 61 IN | BODY MASS INDEX: 27.38 KG/M2

## 2019-04-02 PROCEDURE — 36415 COLL VENOUS BLD VENIPUNCTURE: CPT

## 2019-04-02 PROCEDURE — 99214 OFFICE O/P EST MOD 30 MIN: CPT | Mod: 25

## 2019-04-03 LAB
ALBUMIN SERPL ELPH-MCNC: 4.2 G/DL
ALP BLD-CCNC: 55 U/L
ALT SERPL-CCNC: 15 U/L
ANION GAP SERPL CALC-SCNC: 12 MMOL/L
APPEARANCE: CLEAR
AST SERPL-CCNC: 18 U/L
BACTERIA: NEGATIVE
BASOPHILS # BLD AUTO: 0.03 K/UL
BASOPHILS NFR BLD AUTO: 0.8 %
BILIRUB SERPL-MCNC: 0.4 MG/DL
BILIRUBIN URINE: NEGATIVE
BLOOD URINE: NEGATIVE
BUN SERPL-MCNC: 10 MG/DL
CALCIUM SERPL-MCNC: 9.4 MG/DL
CHLORIDE SERPL-SCNC: 106 MMOL/L
CO2 SERPL-SCNC: 25 MMOL/L
COLOR: NORMAL
CREAT SERPL-MCNC: 0.61 MG/DL
EOSINOPHIL # BLD AUTO: 0.06 K/UL
EOSINOPHIL NFR BLD AUTO: 1.5 %
GLUCOSE QUALITATIVE U: NEGATIVE
GLUCOSE SERPL-MCNC: 119 MG/DL
HAV IGM SER QL: NONREACTIVE
HBV CORE IGM SER QL: NONREACTIVE
HBV SURFACE AG SER QL: NONREACTIVE
HCT VFR BLD CALC: 41.4 %
HCV AB SER QL: NONREACTIVE
HCV S/CO RATIO: 0.09 S/CO
HGB BLD-MCNC: 13.2 G/DL
HYALINE CASTS: 0 /LPF
IMM GRANULOCYTES NFR BLD AUTO: 0 %
KETONES URINE: NEGATIVE
LEUKOCYTE ESTERASE URINE: NEGATIVE
LYMPHOCYTES # BLD AUTO: 1.25 K/UL
LYMPHOCYTES NFR BLD AUTO: 31.6 %
MAN DIFF?: NORMAL
MCHC RBC-ENTMCNC: 30 PG
MCHC RBC-ENTMCNC: 31.9 GM/DL
MCV RBC AUTO: 94.1 FL
MICROSCOPIC-UA: NORMAL
MONOCYTES # BLD AUTO: 0.38 K/UL
MONOCYTES NFR BLD AUTO: 9.6 %
NEUTROPHILS # BLD AUTO: 2.23 K/UL
NEUTROPHILS NFR BLD AUTO: 56.5 %
NITRITE URINE: NEGATIVE
PH URINE: 6
PLATELET # BLD AUTO: 214 K/UL
POTASSIUM SERPL-SCNC: 4 MMOL/L
PROT SERPL-MCNC: 6.9 G/DL
PROTEIN URINE: NEGATIVE
RBC # BLD: 4.4 M/UL
RBC # FLD: 12.8 %
RED BLOOD CELLS URINE: 0 /HPF
SODIUM SERPL-SCNC: 143 MMOL/L
SPECIFIC GRAVITY URINE: 1.01
SQUAMOUS EPITHELIAL CELLS: 1 /HPF
T3 SERPL-MCNC: 110 NG/DL
T3FREE SERPL-MCNC: 3.38 PG/ML
T3RU NFR SERPL: 0.9 TBI
T4 FREE SERPL-MCNC: 1.3 NG/DL
T4 SERPL-MCNC: 7.1 UG/DL
THYROGLOB AB SERPL-ACNC: 383 IU/ML
THYROPEROXIDASE AB SERPL IA-ACNC: 199 IU/ML
TSH SERPL-ACNC: 0.05 UIU/ML
UROBILINOGEN URINE: NORMAL
WBC # FLD AUTO: 3.95 K/UL
WHITE BLOOD CELLS URINE: 0 /HPF

## 2019-04-04 ENCOUNTER — APPOINTMENT (OUTPATIENT)
Dept: HEMATOLOGY ONCOLOGY | Facility: CLINIC | Age: 29
End: 2019-04-04
Payer: COMMERCIAL

## 2019-04-04 ENCOUNTER — APPOINTMENT (OUTPATIENT)
Age: 29
End: 2019-04-04

## 2019-04-04 VITALS
WEIGHT: 145 LBS | HEIGHT: 61 IN | OXYGEN SATURATION: 98 % | BODY MASS INDEX: 27.38 KG/M2 | SYSTOLIC BLOOD PRESSURE: 111 MMHG | DIASTOLIC BLOOD PRESSURE: 73 MMHG | HEART RATE: 80 BPM

## 2019-04-04 PROCEDURE — 99214 OFFICE O/P EST MOD 30 MIN: CPT

## 2019-04-04 NOTE — HISTORY OF PRESENT ILLNESS
[Disease: _____________________] : Disease: [unfilled] [T: ___] : T[unfilled] [N: ___] : N[unfilled] [AJCC Stage: ____] : AJCC Stage: [unfilled] [de-identified] : Ms. Kennedy was diagnosed with left triple negative breast cancer at age 28. \par \par She delivered her first child on 4/25/18 following which she self palpated a left breast mass.  It was  thought to be a blocked milk duct / mastitis and was treated with antibiotics and then antifungals.  The mass did not resolve and she sought a 2nd opinion.  \par \par She then had a breast ultrasound on 5/30/18 which showed a 2.3 cm left breast mass at 1-2:00, 9 cm from the nipple, and a single axillary lymph node which does not contain a prominent benign appearing fatty hilum.  \par \par 5/31/18 left breast core biopsy - invasive poorly differentiated ductal carcinoma, [place] score 9/9, measures at least 17 cm w/ extensive necrosis. ER 0%, TX 0%, HER 2 (0/1+) -negative. \par \par On 6/7/18 she had a diagnostic mammogram + ultrasound - 2.1 cm rounded mass of left breast with overall coarsening of parenchymal pattern and increased parenchymal density in upper outer left breast. US - 2.4 cm left breast mass at 1-2:00 and left axilla 0.8 cm rounded hypoechoic mass consistent with abnormal left axillary node.  \par \par On 6/7/18 she also had a breast MRI - 2.9 x 3.2 x 3.1 cm mass in left upper inner breast at 1:00.  Suspicious left axillary nodes with ultrasound correlate for which US guided biopsy is recommended.   \par \par On 6/8/17 she had biopsy of the left axillary node - pathology : reactive lymph node. \par \par 6/7/18 - CT chest/abd/pelvis -  Known left upper outer breast malignancy. 2 small, round left axillary lymph nodes for which patient will undergo percutaneous \par sampling, as per report of breast MRI from 6/7/2018. A 3 mm subpleural nodular density in the right middle lobe probably represents a focus of atelectasis. No evidence of metastatic disease in the chest, abdomen, and pelvis.\par \par 6/8/18 bone scan - Normal bone scan. No radionuclide evidence of osseous metastasis.\par \par  6/29/18 -s/p bilateral mastectomy \par Pathology: left breast IDC 3.5 cm, joo score 9/9, margins negative, 2 SNL nodes negative.  pT2 pN0\par Right mastectomy - negative for malignancy. \par \par Family History: 2 maternal great aunts with breast cancer.\par paternal cousin with breast cancer\par paternal GM - pancreatic cancer\par paternal GF - pancreatic cancer\par \par [facility]  [employer] panel - no clinically significant mutations\par \par PMH: asthma, DPD deficiency (dihydropyrimidine dehydrogenase deficiency).\par \par Sx hx: tonsillectomy\par \par Social: non-smoker, social ETOH  [de-identified] : poorly differentiated invasive ductal carcinoma [de-identified] : ER 0% [place] 0% HER2 negative [de-identified] : Returns for follow up.\par Continues to have 5-6 bowel movements per day. \par She is s/p colonosocpy by Dr. Perez which was reportedly negative.\par She felt a small lump in right breast --> s/p US which showed it was a lipoma. \par She will have implant exchange surgery in 6/2019.\par She is not back at work yet. \par \par

## 2019-04-04 NOTE — ASSESSMENT
[FreeTextEntry1] : 28 year old female with stage IIA left breast TNBC (T2N0) S/P bilateral mastectomy on 6/29/18. She has DPD deficiency.  6/19/18 ECHO - EF 72%. S/p 4 cycles of dose dense AC, followed by weekly taxol completed 12/26/18. \par \par Doing well clinically.  S/p US chest on 4/1/19 which showed benign appearing lipoma in right chest wall.\par \par \par Plan: \par -diarrhea - follow up GI\par -breast revision surgery in 6/2019\par -continue follow up with Dr. Farr\par -RTO 3 months\par

## 2019-04-04 NOTE — RESULTS/DATA
[FreeTextEntry1] : EXAM: CT CHEST IC \par \par PROCEDURE DATE: 11/15/2018 \par \par INTERPRETATION: EXAMINATION: CT CHEST IC \par \par CLINICAL INDICATION: Left breast cancer. \par \par TECHNIQUE: CT of the chest was obtained after the administration of 50 ml of \par [id number]. \par \par COMPARISON: None. \par \par FINDINGS: \par \par AIRWAYS AND LUNGS: The central tracheobronchial tree is patent. The lungs \par are clear. \par \par MEDIASTINUM AND PLEURA: There are no enlarged mediastinal, hilar or axillary \par lymph nodes. The visualized portion of the thyroid gland is unremarkable. \par There is no pleural effusion. There is no pneumothorax. \par \par HEART AND VESSELS: The heart is normal in size. There are no \par atherosclerotic calcifications of the aorta. There is no pericardial \par effusion. \par \par UPPER ABDOMEN: Images of the upper abdomen demonstrate no abnormality. \par \par BONES AND SOFT TISSUES: The bones are unremarkable. Bilateral mastectomy. \par \par TUBES/LINES: None. \par \par IMPRESSION: \par Clear lungs. \par \par \par \par

## 2019-04-04 NOTE — PHYSICAL EXAM
[Ambulatory and capable of all self care but unable to carry out any work activities] : Status 2- Ambulatory and capable of all self care but unable to carry out any work activities. Up and about more than 50% of waking hours [Normal] : affect appropriate [de-identified] : supple [de-identified] : no palpable masses around reconstructed left breast, no axillary adenopathy [de-identified] : BS+, soft, nontender on palpation. [de-identified] : without spinal or cva tenderness.

## 2019-04-05 LAB — TSI ACT/NOR SER: <0.1 IU/L

## 2019-04-11 ENCOUNTER — NON-APPOINTMENT (OUTPATIENT)
Age: 29
End: 2019-04-11

## 2019-04-11 ENCOUNTER — APPOINTMENT (OUTPATIENT)
Dept: FAMILY MEDICINE | Facility: CLINIC | Age: 29
End: 2019-04-11
Payer: COMMERCIAL

## 2019-04-11 VITALS
HEIGHT: 61 IN | DIASTOLIC BLOOD PRESSURE: 72 MMHG | BODY MASS INDEX: 27.19 KG/M2 | SYSTOLIC BLOOD PRESSURE: 106 MMHG | OXYGEN SATURATION: 99 % | WEIGHT: 144 LBS | HEART RATE: 79 BPM

## 2019-04-11 DIAGNOSIS — Z83.3 FAMILY HISTORY OF DIABETES MELLITUS: ICD-10-CM

## 2019-04-11 PROCEDURE — 99214 OFFICE O/P EST MOD 30 MIN: CPT | Mod: 25

## 2019-04-11 PROCEDURE — 93000 ELECTROCARDIOGRAM COMPLETE: CPT

## 2019-04-11 PROCEDURE — 99385 PREV VISIT NEW AGE 18-39: CPT | Mod: 25

## 2019-04-11 PROCEDURE — 99395 PREV VISIT EST AGE 18-39: CPT

## 2019-04-17 ENCOUNTER — APPOINTMENT (OUTPATIENT)
Dept: ENDOCRINOLOGY | Facility: CLINIC | Age: 29
End: 2019-04-17
Payer: COMMERCIAL

## 2019-04-17 VITALS
HEART RATE: 82 BPM | SYSTOLIC BLOOD PRESSURE: 116 MMHG | BODY MASS INDEX: 27 KG/M2 | WEIGHT: 143 LBS | DIASTOLIC BLOOD PRESSURE: 74 MMHG | HEIGHT: 61 IN

## 2019-04-17 PROCEDURE — 99203 OFFICE O/P NEW LOW 30 MIN: CPT

## 2019-04-23 ENCOUNTER — APPOINTMENT (OUTPATIENT)
Dept: FAMILY MEDICINE | Facility: CLINIC | Age: 29
End: 2019-04-23

## 2019-04-23 ENCOUNTER — APPOINTMENT (OUTPATIENT)
Dept: ORTHOPEDIC SURGERY | Facility: CLINIC | Age: 29
End: 2019-04-23
Payer: COMMERCIAL

## 2019-04-23 PROCEDURE — 99204 OFFICE O/P NEW MOD 45 MIN: CPT

## 2019-04-23 NOTE — CONSULT LETTER
[Dear  ___] : Dear  [unfilled], [Please see my note below.] : Please see my note below. [Consult Letter:] : I had the pleasure of evaluating your patient, [unfilled]. [Consult Closing:] : Thank you very much for allowing me to participate in the care of this patient.  If you have any questions, please do not hesitate to contact me. [Sincerely,] : Sincerely, [FreeTextEntry3] : Dr. Juanita Mcgregor\par Orthopaedic Surgery\par Hand & Upper Extremity.\par

## 2019-04-23 NOTE — PHYSICAL EXAM
[Normal RUE] : Right Upper Extremity: No scars, rashes, lesions, ulcers, skin intact [Normal Finger/nose] : finger to nose coordination [Normal] : No swelling, no edema, normal pedal pulses and normal temperature [Tinel's Sign Median Nerve At The Wrist (Carpal Tunnel)] : negative Tinel's [de-identified] : right wrist:\par Skin intact no swelling no erythema no ecchymosis\par 1 x 2 cm soft swelling or mass at the SL interval, moderate tenderness to palpation\par Wrist range of motion intact moderate pain with full flexion and extension\par Range of motion of the digits intact without pain\par Sensation intact distally, capillary refill less than 2 seconds [Wrist Neurological Dysfunction Phalen's Maneuver Bilateral] : negative Phalen's

## 2019-04-23 NOTE — ASSESSMENT
[FreeTextEntry1] : rigoberto is a 29-year-old female with a month's post a painful dorsal right wrist mass consistent with ganglion cyst. Risks and benefits of observation versus aspiration versus surgical excision were discussed at length the patient she wishes to proceed with surgical excision.She is having surgery with Dr. Falcon on June 7 and would like to schedule the wrist mass excision at the same time. She will be prepped for the procedure, she may take meloxicam as needed until a time.

## 2019-04-23 NOTE — HISTORY OF PRESENT ILLNESS
[FreeTextEntry1] : the patient is a 29-year-old female who presents for evaluation of a right dorsal wrist mass. Her symptoms have been present since approximately August of note she was on chemotherapy at the time for breast cancer. Since she first noticed the mass it has become significantly painful, her pain is worse with activity and improved with rest. She is a dull pain at the site of the mass but did not significantly radiate. She denies paresthesias.

## 2019-04-28 NOTE — PHYSICAL EXAM
[Alert] : alert [No Acute Distress] : no acute distress [Well Nourished] : well nourished [Well Developed] : well developed [Normal Sclera/Conjunctiva] : normal sclera/conjunctiva [EOMI] : extra ocular movement intact [No Proptosis] : no proptosis [Normal Oropharynx] : the oropharynx was normal [Thyroid Not Enlarged] : the thyroid was not enlarged [No Thyroid Nodules] : there were no palpable thyroid nodules [No Respiratory Distress] : no respiratory distress [No Accessory Muscle Use] : no accessory muscle use [Clear to Auscultation] : lungs were clear to auscultation bilaterally [Normal Rate] : heart rate was normal  [Normal S1, S2] : normal S1 and S2 [Regular Rhythm] : with a regular rhythm [Pedal Pulses Normal] : the pedal pulses are present [No Edema] : there was no peripheral edema [Post Cervical Nodes] : posterior cervical nodes [Anterior Cervical Nodes] : anterior cervical nodes [Normal] : normal and non tender [No Stigmata of Cushings Syndrome] : no stigmata of cushings syndrome [Normal Gait] : normal gait [Normal Strength/Tone] : muscle strength and tone were normal [No Rash] : no rash [Acanthosis Nigricans] : no acanthosis nigricans [Normal Reflexes] : deep tendon reflexes were 2+ and symmetric [No Tremors] : no tremors [Oriented x3] : oriented to person, place, and time [de-identified] : fine vlllus hairs on  sideburn area

## 2019-04-28 NOTE — ASSESSMENT
[FreeTextEntry1] : Abnormal TFT - ? due toHT as evidenced by thyroid osnogram - no nodules only thryoidisits\par will have to trend full TFT \par  pt upset bc of weight gain- could be mulitfactorial- explained that tyipcally when TSH is low - usuall means a patietn is hyperthyroid and is losing weight\par  Possible secondary hypothyroidims\par  - will check full pituitary panel  in a few weeks \par \par Hirsutism- fine vellus heairs on face- checkhormonal panel \par Abd pain - for follwoup CT scan with PMD \par

## 2019-04-28 NOTE — HISTORY OF PRESENT ILLNESS
[FreeTextEntry1] : Pt is a 29 year old femal who is here for inability to lose weight\par  pt has been trying ot lose weight with weight watchers and exercsie and has been unsuccessful\par \par Pt with PMH\par  brest Cancer- inmenopause now due to chemo \par dx 2018  Triple negative \par lump in breast found while breast feeding \par Satge 2 breast Ca 0 chemo no xrt \par has residual nerve damage on left side  -takes gabapentin \par \par Pt has stored her eggs \par  Other PMH  IBS \par GERD\par Asthma\par  No preDm \par \par \par PSH  bilateral mastectomy  \par AP 2004 \par tonsillecotmy 2008 \par \par FH GM DM\par  Mom and Dad overweight \par Pat and Mat GF  pancreatic CA \par chidl healthy \par \par Soc HX Bor and rasie dinLI \par \par workedas a  \par no excess expsoure to chem or XRT \par \par non smoker- no etoh \par

## 2019-04-28 NOTE — REVIEW OF SYSTEMS
[Joint Pain] : joint pain [Muscle Weakness] : muscle weakness [Muscle Cramps] : muscle cramps [Myalgia] : myalgia  [Negative] : Heme/Lymph [FreeTextEntry7] : rt sided abdpain -to get CT scan done   HAs IBS - loose BM 5x in AM  [de-identified] : + hair growth since LMP Aug 2 2018  grew a beard of fine hair  No change in hand or shoe size no easy bruising

## 2019-05-06 ENCOUNTER — OTHER (OUTPATIENT)
Age: 29
End: 2019-05-06

## 2019-05-07 ENCOUNTER — APPOINTMENT (OUTPATIENT)
Dept: SURGERY | Facility: CLINIC | Age: 29
End: 2019-05-07
Payer: COMMERCIAL

## 2019-05-09 ENCOUNTER — APPOINTMENT (OUTPATIENT)
Dept: HEMATOLOGY ONCOLOGY | Facility: CLINIC | Age: 29
End: 2019-05-09

## 2019-05-14 ENCOUNTER — OUTPATIENT (OUTPATIENT)
Dept: OUTPATIENT SERVICES | Facility: HOSPITAL | Age: 29
LOS: 1 days | Discharge: HOME | End: 2019-05-14

## 2019-05-14 ENCOUNTER — FORM ENCOUNTER (OUTPATIENT)
Age: 29
End: 2019-05-14

## 2019-05-14 DIAGNOSIS — Z98.89 OTHER SPECIFIED POSTPROCEDURAL STATES: Chronic | ICD-10-CM

## 2019-05-14 DIAGNOSIS — C50.412 MALIGNANT NEOPLASM OF UPPER-OUTER QUADRANT OF LEFT FEMALE BREAST: ICD-10-CM

## 2019-05-14 DIAGNOSIS — Z98.890 OTHER SPECIFIED POSTPROCEDURAL STATES: Chronic | ICD-10-CM

## 2019-05-15 ENCOUNTER — NON-APPOINTMENT (OUTPATIENT)
Age: 29
End: 2019-05-15

## 2019-05-15 ENCOUNTER — LABORATORY RESULT (OUTPATIENT)
Age: 29
End: 2019-05-15

## 2019-05-15 ENCOUNTER — OUTPATIENT (OUTPATIENT)
Dept: OUTPATIENT SERVICES | Facility: HOSPITAL | Age: 29
LOS: 1 days | End: 2019-05-15
Payer: COMMERCIAL

## 2019-05-15 ENCOUNTER — APPOINTMENT (OUTPATIENT)
Dept: RADIOLOGY | Facility: CLINIC | Age: 29
End: 2019-05-15
Payer: COMMERCIAL

## 2019-05-15 ENCOUNTER — APPOINTMENT (OUTPATIENT)
Dept: FAMILY MEDICINE | Facility: CLINIC | Age: 29
End: 2019-05-15
Payer: COMMERCIAL

## 2019-05-15 VITALS
OXYGEN SATURATION: 98 % | DIASTOLIC BLOOD PRESSURE: 70 MMHG | HEIGHT: 61 IN | HEART RATE: 94 BPM | BODY MASS INDEX: 27.38 KG/M2 | SYSTOLIC BLOOD PRESSURE: 110 MMHG | WEIGHT: 145 LBS

## 2019-05-15 DIAGNOSIS — Z98.89 OTHER SPECIFIED POSTPROCEDURAL STATES: Chronic | ICD-10-CM

## 2019-05-15 DIAGNOSIS — Z98.890 OTHER SPECIFIED POSTPROCEDURAL STATES: Chronic | ICD-10-CM

## 2019-05-15 DIAGNOSIS — E06.3 AUTOIMMUNE THYROIDITIS: ICD-10-CM

## 2019-05-15 DIAGNOSIS — Z00.8 ENCOUNTER FOR OTHER GENERAL EXAMINATION: ICD-10-CM

## 2019-05-15 LAB
25(OH)D3 SERPL-MCNC: 19.9 NG/ML
ACTH SER-ACNC: 14.4 PG/ML
ALBUMIN SERPL ELPH-MCNC: 4.6 G/DL
ALP BLD-CCNC: 76 U/L
ALT SERPL-CCNC: 10 U/L
ANION GAP SERPL CALC-SCNC: 11 MMOL/L
APPEARANCE: CLEAR
AST SERPL-CCNC: 14 U/L
BASOPHILS # BLD AUTO: 0.02 K/UL
BASOPHILS NFR BLD AUTO: 0.4 %
BILIRUB SERPL-MCNC: 0.3 MG/DL
BILIRUBIN URINE: NEGATIVE
BLOOD URINE: NEGATIVE
BUN SERPL-MCNC: 14 MG/DL
CALCIUM SERPL-MCNC: 9.6 MG/DL
CHLORIDE SERPL-SCNC: 102 MMOL/L
CHOLEST SERPL-MCNC: 198 MG/DL
CHOLEST/HDLC SERPL: 2.3 RATIO
CO2 SERPL-SCNC: 25 MMOL/L
COLOR: NORMAL
CORTIS SERPL-MCNC: 18.5 UG/DL
CREAT SERPL-MCNC: 0.67 MG/DL
CREAT SPEC-SCNC: 151 MG/DL
EOSINOPHIL # BLD AUTO: 0.18 K/UL
EOSINOPHIL NFR BLD AUTO: 3.8 %
ESTIMATED AVERAGE GLUCOSE: 94 MG/DL
ESTRADIOL SERPL-MCNC: 151 PG/ML
FSH SERPL-MCNC: 9.7 IU/L
GH SERPL-MCNC: 0.57 NG/ML
GLUCOSE QUALITATIVE U: NEGATIVE
GLUCOSE SERPL-MCNC: 93 MG/DL
HBA1C MFR BLD HPLC: 4.9 %
HCT VFR BLD CALC: 43.1 %
HDLC SERPL-MCNC: 85 MG/DL
HGB BLD-MCNC: 13.9 G/DL
IGF-1 INTERP: NORMAL
IGF-I BLD-MCNC: 209 NG/ML
IMM GRANULOCYTES NFR BLD AUTO: 0.2 %
KETONES URINE: NEGATIVE
LDLC SERPL CALC-MCNC: 93 MG/DL
LEUKOCYTE ESTERASE URINE: NEGATIVE
LH SERPL-ACNC: 5.7 IU/L
LYMPHOCYTES # BLD AUTO: 1.37 K/UL
LYMPHOCYTES NFR BLD AUTO: 28.5 %
MAN DIFF?: NORMAL
MCHC RBC-ENTMCNC: 29.4 PG
MCHC RBC-ENTMCNC: 32.3 GM/DL
MCV RBC AUTO: 91.3 FL
MICROALBUMIN 24H UR DL<=1MG/L-MCNC: <1.2 MG/DL
MICROALBUMIN/CREAT 24H UR-RTO: NORMAL MG/G
MONOCYTES # BLD AUTO: 0.42 K/UL
MONOCYTES NFR BLD AUTO: 8.8 %
NEUTROPHILS # BLD AUTO: 2.8 K/UL
NEUTROPHILS NFR BLD AUTO: 58.3 %
NITRITE URINE: NEGATIVE
PH URINE: 6
PLATELET # BLD AUTO: 202 K/UL
POTASSIUM SERPL-SCNC: 4.1 MMOL/L
PROLACTIN SERPL-MCNC: 8.3 NG/ML
PROT SERPL-MCNC: 7.6 G/DL
PROTEIN URINE: NEGATIVE
RBC # BLD: 4.72 M/UL
RBC # FLD: 12.7 %
SODIUM SERPL-SCNC: 138 MMOL/L
SPECIFIC GRAVITY URINE: 1.02
T3FREE SERPL-MCNC: 2.89 PG/ML
T4 FREE SERPL-MCNC: 0.7 NG/DL
TRIGL SERPL-MCNC: 98 MG/DL
TSH SERPL-ACNC: 23.1 UIU/ML
UROBILINOGEN URINE: NORMAL
VIT B12 SERPL-MCNC: 512 PG/ML
WBC # FLD AUTO: 4.8 K/UL

## 2019-05-15 PROCEDURE — 71046 X-RAY EXAM CHEST 2 VIEWS: CPT | Mod: 26

## 2019-05-15 PROCEDURE — 77080 DXA BONE DENSITY AXIAL: CPT | Mod: 26

## 2019-05-15 PROCEDURE — 99244 OFF/OP CNSLTJ NEW/EST MOD 40: CPT | Mod: 25

## 2019-05-15 PROCEDURE — 36415 COLL VENOUS BLD VENIPUNCTURE: CPT

## 2019-05-15 PROCEDURE — 77080 DXA BONE DENSITY AXIAL: CPT

## 2019-05-15 PROCEDURE — 93000 ELECTROCARDIOGRAM COMPLETE: CPT

## 2019-05-15 PROCEDURE — 71046 X-RAY EXAM CHEST 2 VIEWS: CPT

## 2019-05-16 ENCOUNTER — APPOINTMENT (OUTPATIENT)
Age: 29
End: 2019-05-16

## 2019-05-16 ENCOUNTER — OUTPATIENT (OUTPATIENT)
Dept: OUTPATIENT SERVICES | Facility: HOSPITAL | Age: 29
LOS: 1 days | End: 2019-05-16
Payer: COMMERCIAL

## 2019-05-16 DIAGNOSIS — I89.0 LYMPHEDEMA, NOT ELSEWHERE CLASSIFIED: ICD-10-CM

## 2019-05-16 DIAGNOSIS — Z98.890 OTHER SPECIFIED POSTPROCEDURAL STATES: Chronic | ICD-10-CM

## 2019-05-16 DIAGNOSIS — Z98.89 OTHER SPECIFIED POSTPROCEDURAL STATES: Chronic | ICD-10-CM

## 2019-05-16 LAB
ALBUMIN SERPL ELPH-MCNC: 4.5 G/DL
ALP BLD-CCNC: 69 U/L
ALT SERPL-CCNC: 9 U/L
ANION GAP SERPL CALC-SCNC: 14 MMOL/L
APPEARANCE: CLEAR
APTT BLD: 37.3 SEC
AST SERPL-CCNC: 20 U/L
BACTERIA UR CULT: NORMAL
BACTERIA: NEGATIVE
BASOPHILS # BLD AUTO: 0.04 K/UL
BASOPHILS NFR BLD AUTO: 0.5 %
BILIRUB SERPL-MCNC: 0.3 MG/DL
BILIRUBIN URINE: NEGATIVE
BLOOD URINE: NEGATIVE
BUN SERPL-MCNC: 15 MG/DL
CALCIUM SERPL-MCNC: 9.6 MG/DL
CHLORIDE SERPL-SCNC: 104 MMOL/L
CO2 SERPL-SCNC: 20 MMOL/L
COLOR: NORMAL
CREAT SERPL-MCNC: 0.61 MG/DL
DHEA SERPL-MCNC: 491 NG/DL
EOSINOPHIL # BLD AUTO: 0.11 K/UL
EOSINOPHIL NFR BLD AUTO: 1.4 %
GLUCOSE QUALITATIVE U: NEGATIVE
GLUCOSE SERPL-MCNC: 88 MG/DL
HCT VFR BLD CALC: 41.1 %
HGB BLD-MCNC: 12.9 G/DL
HYALINE CASTS: 0 /LPF
IMM GRANULOCYTES NFR BLD AUTO: 0.2 %
INR PPP: 0.98 RATIO
KETONES URINE: NEGATIVE
LEUKOCYTE ESTERASE URINE: NEGATIVE
LYMPHOCYTES # BLD AUTO: 1.61 K/UL
LYMPHOCYTES NFR BLD AUTO: 20.1 %
MAN DIFF?: NORMAL
MCHC RBC-ENTMCNC: 29.6 PG
MCHC RBC-ENTMCNC: 31.4 GM/DL
MCV RBC AUTO: 94.3 FL
MICROSCOPIC-UA: NORMAL
MONOCYTES # BLD AUTO: 0.56 K/UL
MONOCYTES NFR BLD AUTO: 7 %
NEUTROPHILS # BLD AUTO: 5.67 K/UL
NEUTROPHILS NFR BLD AUTO: 70.8 %
NITRITE URINE: NEGATIVE
PH URINE: 6
PLATELET # BLD AUTO: 201 K/UL
POTASSIUM SERPL-SCNC: 4.1 MMOL/L
PROT SERPL-MCNC: 7.1 G/DL
PROTEIN URINE: NEGATIVE
PT BLD: 11.3 SEC
RBC # BLD: 4.36 M/UL
RBC # FLD: 13.1 %
RED BLOOD CELLS URINE: 1 /HPF
SODIUM SERPL-SCNC: 138 MMOL/L
SPECIFIC GRAVITY URINE: 1.02
SQUAMOUS EPITHELIAL CELLS: 1 /HPF
T3 SERPL-MCNC: 110 NG/DL
T3FREE SERPL-MCNC: 2.91 PG/ML
T3RU NFR SERPL: 1.1 TBI
T4 FREE SERPL-MCNC: 0.8 NG/DL
T4 SERPL-MCNC: 4.8 UG/DL
TESTOST BND SERPL-MCNC: 1.9 PG/ML
TESTOST SERPL-MCNC: 43.7 NG/DL
TSH SERPL-ACNC: 13.4 UIU/ML
UROBILINOGEN URINE: NORMAL
WBC # FLD AUTO: 8.01 K/UL
WHITE BLOOD CELLS URINE: 1 /HPF

## 2019-05-16 PROCEDURE — 97110 THERAPEUTIC EXERCISES: CPT

## 2019-05-16 PROCEDURE — 97140 MANUAL THERAPY 1/> REGIONS: CPT

## 2019-05-17 LAB — 17OHP SERPL-MCNC: 38 NG/DL

## 2019-05-20 ENCOUNTER — APPOINTMENT (OUTPATIENT)
Dept: SURGERY | Facility: CLINIC | Age: 29
End: 2019-05-20
Payer: COMMERCIAL

## 2019-05-20 ENCOUNTER — APPOINTMENT (OUTPATIENT)
Dept: CARDIOLOGY | Facility: CLINIC | Age: 29
End: 2019-05-20
Payer: COMMERCIAL

## 2019-05-20 VITALS
DIASTOLIC BLOOD PRESSURE: 64 MMHG | WEIGHT: 143 LBS | BODY MASS INDEX: 27 KG/M2 | HEART RATE: 93 BPM | HEIGHT: 61 IN | SYSTOLIC BLOOD PRESSURE: 118 MMHG | OXYGEN SATURATION: 99 %

## 2019-05-20 DIAGNOSIS — Z79.899 OTHER LONG TERM (CURRENT) DRUG THERAPY: ICD-10-CM

## 2019-05-20 PROCEDURE — 99214 OFFICE O/P EST MOD 30 MIN: CPT

## 2019-05-20 NOTE — DISCUSSION/SUMMARY
[FreeTextEntry1] : Pt is a 28 y/o F who presents today for evaluation prior to breast reconstruction 6/6/2019.  She was diagnosed with L breast cancer 05/2018 and is now s/p b/l mastectomy 6/29/2018, chemo treatments 08/03/2018 - 12/26/2018.  She states that she is feeling well and denies cardiac complaints.  She denies CP, SOB, diaphoresis, palpitations, dizziness, syncope, LE edema, PND. \par She remains very active - 5 mile walks, weight training \par ECG shows NSR with short IL without signs of pre-excitation, no ectopy - this is unchanged\par TTE 06/2018 shows EF = 70% without significant valvular disease\par There are no signs or symptoms of HF, active ischemia, arrhythmias\par VSS\par I will repeat TTE after chemo\par The described plan was discussed with the pt.  All questions and concerns were addressed to the best of my knowledge. \par

## 2019-05-20 NOTE — HISTORY OF PRESENT ILLNESS
[FreeTextEntry1] : Pt is a 30 y/o F who presents today for f/u.  She was diagnosed with L breast cancer 05/2018 and is now s/p b/l mastectomy 6/29/2018, chemo treatments 08/03/2018 - 12/26/2018.  She states that she is feeling well and denies cardiac complaints.  She denies CP, SOB, diaphoresis, palpitations, dizziness, syncope, LE edema, PND. \par She is scheduled for breast reconstruction surgery 6/6/2019. \par She remains very active - 5 mile walks, weight training \par TTE 06/2018 normal LV function without significant valvular disease\par \par PMH: breast cancer, asthma\par Smoking status: never\par Current exercise: walks a couple of miles daily\par Daily water intake: 2-3 cups\par Daily caffeine intake: 1 cup coffee\par OTC medications: none\par Family hx: pt denies any immediate fam hx cardiac disease\par Previous cardiac testing: TTE recently\par Previous hospitalizations: appy 2004 - no problems with anesthesia

## 2019-05-20 NOTE — PHYSICAL EXAM
[General Appearance - Well Developed] : well developed [Normal Appearance] : normal appearance [Well Groomed] : well groomed [General Appearance - Well Nourished] : well nourished [No Deformities] : no deformities [General Appearance - In No Acute Distress] : no acute distress [Normal Conjunctiva] : the conjunctiva exhibited no abnormalities [Eyelids - No Xanthelasma] : the eyelids demonstrated no xanthelasmas [No Oral Pallor] : no oral pallor [Normal Oral Mucosa] : normal oral mucosa [FreeTextEntry1] : no JVD or bruits  [No Oral Cyanosis] : no oral cyanosis [Respiration, Rhythm And Depth] : normal respiratory rhythm and effort [Exaggerated Use Of Accessory Muscles For Inspiration] : no accessory muscle use [Auscultation Breath Sounds / Voice Sounds] : lungs were clear to auscultation bilaterally [Heart Rate And Rhythm] : heart rate and rhythm were normal [Murmurs] : no murmurs present [Heart Sounds] : normal S1 and S2 [Edema] : no peripheral edema present [Arterial Pulses Normal] : the arterial pulses were normal [Abdomen Tenderness] : non-tender [Abdomen Soft] : soft [Abnormal Walk] : normal gait [Abdomen Mass (___ Cm)] : no abdominal mass palpated [Gait - Sufficient For Exercise Testing] : the gait was sufficient for exercise testing [Nail Clubbing] : no clubbing of the fingernails [Cyanosis, Localized] : no localized cyanosis [Petechial Hemorrhages (___cm)] : no petechial hemorrhages [] : no ischemic changes [Oriented To Time, Place, And Person] : oriented to person, place, and time [Impaired Insight] : insight and judgment were intact [Affect] : the affect was normal [Mood] : the mood was normal [No Anxiety] : not feeling anxious

## 2019-05-21 ENCOUNTER — APPOINTMENT (OUTPATIENT)
Dept: HEMATOLOGY ONCOLOGY | Facility: CLINIC | Age: 29
End: 2019-05-21
Payer: COMMERCIAL

## 2019-05-21 VITALS
SYSTOLIC BLOOD PRESSURE: 120 MMHG | DIASTOLIC BLOOD PRESSURE: 81 MMHG | HEIGHT: 61 IN | TEMPERATURE: 98 F | BODY MASS INDEX: 26.63 KG/M2 | OXYGEN SATURATION: 98 % | WEIGHT: 141.04 LBS | HEART RATE: 103 BPM

## 2019-05-21 LAB — DHEA-SULFATE, SERUM: 205 UG/DL

## 2019-05-21 PROCEDURE — 99214 OFFICE O/P EST MOD 30 MIN: CPT

## 2019-05-22 ENCOUNTER — FORM ENCOUNTER (OUTPATIENT)
Age: 29
End: 2019-05-22

## 2019-05-22 LAB
APTT BLD: 34.2 SEC
APTT IMM NP/PRE NP PPP: NORMAL SEC
APTT INV RATIO PPP: 34.2 SEC
FACT IX ACT/NOR PPP: 105 %
FACT XI ACT/NOR PPP: 110 %
NPP NORMAL POOLED PLASMA: NORMAL SECS
VWF AG PPP IA-ACNC: 116 %
VWF:RCO ACT/NOR PPP PL AGG: 107 %

## 2019-05-22 NOTE — HISTORY OF PRESENT ILLNESS
[Disease: _____________________] : Disease: [unfilled] [T: ___] : T[unfilled] [N: ___] : N[unfilled] [AJCC Stage: ____] : AJCC Stage: [unfilled] [de-identified] : Ms. Kennedy was diagnosed with left triple negative breast cancer at age 28. \par \par She delivered her first child on 4/25/18 following which she self palpated a left breast mass.  It was  thought to be a blocked milk duct / mastitis and was treated with antibiotics and then antifungals.  The mass did not resolve and she sought a 2nd opinion.  \par \par She then had a breast ultrasound on 5/30/18 which showed a 2.3 cm left breast mass at 1-2:00, 9 cm from the nipple, and a single axillary lymph node which does not contain a prominent benign appearing fatty hilum.  \par \par 5/31/18 left breast core biopsy - invasive poorly differentiated ductal carcinoma, [place] score 9/9, measures at least 17 cm w/ extensive necrosis. ER 0%, [place] 0%, HER 2 (0/1+) -negative. \par \par On 6/7/18 she had a diagnostic mammogram + ultrasound - 2.1 cm rounded mass of left breast with overall coarsening of parenchymal pattern and increased parenchymal density in upper outer left breast. US - 2.4 cm left breast mass at 1-2:00 and left axilla 0.8 cm rounded hypoechoic mass consistent with abnormal left axillary node.  \par \par On 6/7/18 she also had a breast MRI - 2.9 x 3.2 x 3.1 cm mass in left upper inner breast at 1:00.  Suspicious left axillary nodes with ultrasound correlate for which US guided biopsy is recommended.   \par \par On 6/8/17 she had biopsy of the left axillary node - pathology : reactive lymph node. \par \par 6/7/18 - CT chest/abd/pelvis -  Known left upper outer breast malignancy. 2 small, round left axillary lymph nodes for which patient will undergo percutaneous \par sampling, as per report of breast MRI from 6/7/2018. A 3 mm subpleural nodular density in the right middle lobe probably represents a focus of atelectasis. No evidence of metastatic disease in the chest, abdomen, and pelvis.\par \par 6/8/18 bone scan - Normal bone scan. No radionuclide evidence of osseous metastasis.\par \par  6/29/18 -s/p bilateral mastectomy \par Pathology: left breast IDC 3.5 cm, joo score 9/9, margins negative, 2 SNL nodes negative.  pT2 pN0\par Right mastectomy - negative for malignancy. \par \par Family History: 2 maternal great aunts with breast cancer.\par paternal cousin with breast cancer\par paternal GM - pancreatic cancer\par paternal GF - pancreatic cancer\par \par [facility]  [employer] panel - no clinically significant mutations\par \par PMH: asthma, DPD deficiency (dihydropyrimidine dehydrogenase deficiency).\par \par Sx hx: tonsillectomy\par \par Social: non-smoker, social ETOH  [de-identified] : poorly differentiated invasive ductal carcinoma [de-identified] : ER 0% [place] 0% HER2 negative [de-identified] : Returns for clearance for surgery, will have implant exchange surgery on 6/7/2019.\par Recently diagnosed with Hashimoto and started on levothyroxine.\par Following with Dr. Elke Silver, endocrinologist.\par Still has a tender spot on her thoracic spine and has CT scans pending later this week. \par She denies CP, SOB, diaphoresis, palpitations, dizziness.\par \par 3/18/19:XR LS spine flex ext min 4v; XR T spine 2 view\par partially visualized right chest wall CT compatible power infusion port present with tip in sVC region.\par Elongated riblike L1 transverse processes.\par No compression fractures, spondylolistheses, spondylolysis defects, or evidence for dynamic instability.\par Disk space heights preserved and facet alignment maintained.\par Unremarkable SI Joint and partially visualized hips.\par No lytic or blastic lesions.\par \par

## 2019-05-22 NOTE — RESULTS/DATA
[FreeTextEntry1] : EXAM: CT CHEST IC \par \par PROCEDURE DATE: 11/15/2018 \par \par INTERPRETATION: EXAMINATION: CT CHEST IC \par \par CLINICAL INDICATION: Left breast cancer. \par \par TECHNIQUE: CT of the chest was obtained after the administration of 50 ml of \par [id number]. \par \par COMPARISON: None. \par \par FINDINGS: \par \par AIRWAYS AND LUNGS: The central tracheobronchial tree is patent. The lungs \par are clear. \par \par MEDIASTINUM AND PLEURA: There are no enlarged mediastinal, hilar or axillary \par lymph nodes. The visualized portion of the thyroid gland is unremarkable. \par There is no pleural effusion. There is no pneumothorax. \par \par HEART AND VESSELS: The heart is normal in size. There are no \par atherosclerotic calcifications of the aorta. There is no pericardial \par effusion. \par \par UPPER ABDOMEN: Images of the upper abdomen demonstrate no abnormality. \par \par BONES AND SOFT TISSUES: The bones are unremarkable. Bilateral mastectomy. \par \par TUBES/LINES: None. \par \par IMPRESSION: \par Clear lungs. \par \par \par \par

## 2019-05-22 NOTE — ADDENDUM
[FreeTextEntry1] : Documented by Justin Rodrigues acting as a scribe for Dr. Eloy Mcclain on 05/21/2019.\par

## 2019-05-22 NOTE — ASSESSMENT
[FreeTextEntry1] : 29 year old female with stage IIA left breast TNBC (T2N0) S/P bilateral mastectomy on 6/29/18. She has DPD deficiency.  6/19/18 ECHO - EF 72%. S/p 4 cycles of dose dense AC, followed by weekly taxol completed 12/26/18. \par \par Has point tenderness in lower thoracic spine, and has CT scan pending for later this week.\par She is also noted to have elevated PTT and was referred for hematology clearance prior to breast revision surgery.  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 \par She has had no bleeding complications with previous b/l mastectomy or first breast revision surgery in 2018  \par Repeat PTT today was normal at 34 seconds.  I do not expect clinically significant bleeding given her prior history.  She is optimized from hematology standpoint for above procedure. \par \par Plan: \par Follow up in July 2019 as scheduled. \par

## 2019-05-22 NOTE — PHYSICAL EXAM
[Ambulatory and capable of all self care but unable to carry out any work activities] : Status 2- Ambulatory and capable of all self care but unable to carry out any work activities. Up and about more than 50% of waking hours [Normal] : affect appropriate [de-identified] : supple [de-identified] : BS+, soft

## 2019-05-23 ENCOUNTER — APPOINTMENT (OUTPATIENT)
Dept: CT IMAGING | Facility: CLINIC | Age: 29
End: 2019-05-23
Payer: COMMERCIAL

## 2019-05-23 ENCOUNTER — OUTPATIENT (OUTPATIENT)
Dept: OUTPATIENT SERVICES | Facility: HOSPITAL | Age: 29
LOS: 1 days | End: 2019-05-23
Payer: COMMERCIAL

## 2019-05-23 DIAGNOSIS — R10.11 RIGHT UPPER QUADRANT PAIN: ICD-10-CM

## 2019-05-23 DIAGNOSIS — Z98.89 OTHER SPECIFIED POSTPROCEDURAL STATES: Chronic | ICD-10-CM

## 2019-05-23 DIAGNOSIS — Z98.890 OTHER SPECIFIED POSTPROCEDURAL STATES: Chronic | ICD-10-CM

## 2019-05-23 DIAGNOSIS — N64.4 MASTODYNIA: ICD-10-CM

## 2019-05-23 LAB — FACT VIII ACT/NOR PPP: 108 %

## 2019-05-23 PROCEDURE — 74177 CT ABD & PELVIS W/CONTRAST: CPT | Mod: 26

## 2019-05-23 PROCEDURE — 71260 CT THORAX DX C+: CPT | Mod: 26

## 2019-05-23 PROCEDURE — 74177 CT ABD & PELVIS W/CONTRAST: CPT

## 2019-05-23 PROCEDURE — 71260 CT THORAX DX C+: CPT

## 2019-05-24 ENCOUNTER — OTHER (OUTPATIENT)
Age: 29
End: 2019-05-24

## 2019-05-29 ENCOUNTER — LABORATORY RESULT (OUTPATIENT)
Age: 29
End: 2019-05-29

## 2019-05-31 ENCOUNTER — APPOINTMENT (OUTPATIENT)
Dept: CARDIOLOGY | Facility: CLINIC | Age: 29
End: 2019-05-31
Payer: COMMERCIAL

## 2019-05-31 PROCEDURE — 93306 TTE W/DOPPLER COMPLETE: CPT

## 2019-06-03 ENCOUNTER — APPOINTMENT (OUTPATIENT)
Dept: THORACIC SURGERY | Facility: CLINIC | Age: 29
End: 2019-06-03
Payer: COMMERCIAL

## 2019-06-03 VITALS
OXYGEN SATURATION: 97 % | HEIGHT: 61 IN | BODY MASS INDEX: 26.62 KG/M2 | WEIGHT: 141 LBS | DIASTOLIC BLOOD PRESSURE: 85 MMHG | HEART RATE: 79 BPM | TEMPERATURE: 97.9 F | SYSTOLIC BLOOD PRESSURE: 130 MMHG | RESPIRATION RATE: 16 BRPM

## 2019-06-03 PROCEDURE — 99245 OFF/OP CONSLTJ NEW/EST HI 55: CPT

## 2019-06-03 RX ORDER — VALACYCLOVIR 500 MG/1
500 TABLET, FILM COATED ORAL
Qty: 90 | Refills: 1 | Status: COMPLETED | COMMUNITY
Start: 2018-02-28 | End: 2019-06-03

## 2019-06-03 RX ORDER — MELOXICAM 15 MG/1
15 TABLET ORAL
Qty: 14 | Refills: 2 | Status: COMPLETED | COMMUNITY
Start: 2019-04-23 | End: 2019-06-03

## 2019-06-03 RX ORDER — GABAPENTIN 400 MG/1
400 CAPSULE ORAL 3 TIMES DAILY
Qty: 90 | Refills: 3 | Status: COMPLETED | COMMUNITY
Start: 2019-02-26 | End: 2019-06-03

## 2019-06-03 RX ORDER — ALBUTEROL SULFATE 90 UG/1
108 (90 BASE) AEROSOL, METERED RESPIRATORY (INHALATION)
Qty: 1 | Refills: 1 | Status: COMPLETED | COMMUNITY
Start: 2018-12-06 | End: 2019-06-03

## 2019-06-03 RX ORDER — LIDOCAINE AND PRILOCAINE 25; 25 MG/G; MG/G
2.5-2.5 CREAM TOPICAL
Qty: 1 | Refills: 2 | Status: COMPLETED | COMMUNITY
Start: 2018-08-02 | End: 2019-06-03

## 2019-06-03 RX ORDER — DIAZEPAM 5 MG/1
5 TABLET ORAL
Qty: 30 | Refills: 0 | Status: COMPLETED | COMMUNITY
Start: 2019-02-27 | End: 2019-06-03

## 2019-06-03 NOTE — HISTORY OF PRESENT ILLNESS
[FreeTextEntry1] : Ms. JEANE GUTIERREZ, 29 year old female, never smoker, w/ hx of Lt breast cancer s/p bilateral mastectomy and chemotherapy, who presented with lower back pain and was sent for CT abdomen. On CT, there is an incidental finding of a soft tissue mass in mediastinum. \par \par CT C/A/P with IV contrast on 5/23/19:\par - new soft tissue in the anterior superior mediastinum, measures 2.4 x 1.5 cm\par - bilateral breast tissue expander in place \par \par Pt presents today for CT Surgery for consultation. Pt reports asymptomatic and is plan for surgery this Friday for removal of expander and breast implants.

## 2019-06-03 NOTE — CONSULT LETTER
[Dear  ___] : Dear  [unfilled], [Consult Letter:] : I had the pleasure of evaluating your patient, [unfilled]. [( Thank you for referring [unfilled] for consultation for _____ )] : Thank you for referring [unfilled] for consultation for [unfilled] [Please see my note below.] : Please see my note below. [Consult Closing:] : Thank you very much for allowing me to participate in the care of this patient.  If you have any questions, please do not hesitate to contact me. [Sincerely,] : Sincerely, [FreeTextEntry2] : Dr. Tiffani Sousa (PCP/ref) [FreeTextEntry3] : Josiah Pearce MD \par Department of Cardiovascular and Thoracic Surgery \par  \par [facility] \par \par \par

## 2019-06-03 NOTE — REASON FOR VISIT
[Consultation] : a consultation visit [Family Member] : family member [FreeTextEntry1] : Mediastinum mass

## 2019-06-03 NOTE — PHYSICAL EXAM
[General Appearance - Alert] : alert [General Appearance - In No Acute Distress] : in no acute distress [Sclera] : the sclera and conjunctiva were normal [PERRL With Normal Accommodation] : pupils were equal in size, round, and reactive to light [Extraocular Movements] : extraocular movements were intact [Outer Ear] : the ears and nose were normal in appearance [Oropharynx] : the oropharynx was normal [Neck Appearance] : the appearance of the neck was normal [Neck Cervical Mass (___cm)] : no neck mass was observed [Thyroid Diffuse Enlargement] : the thyroid was not enlarged [Jugular Venous Distention Increased] : there was no jugular-venous distention [Thyroid Nodule] : there were no palpable thyroid nodules [Auscultation Breath Sounds / Voice Sounds] : lungs were clear to auscultation bilaterally [Heart Rate And Rhythm] : heart rate was normal and rhythm regular [Heart Sounds Gallop] : no gallops [Heart Sounds] : normal S1 and S2 [Heart Sounds Pericardial Friction Rub] : no pericardial rub [Murmurs] : no murmurs [Examination Of The Chest] : the chest was normal in appearance [Diminished Respiratory Excursion] : normal chest expansion [Chest Visual Inspection Thoracic Asymmetry] : no chest asymmetry [2+] : left 2+ [Bowel Sounds] : normal bowel sounds [Abdomen Soft] : soft [Abdomen Tenderness] : non-tender [Abdomen Mass (___ Cm)] : no abdominal mass palpated [Cervical Lymph Nodes Enlarged Posterior Bilaterally] : posterior cervical [Cervical Lymph Nodes Enlarged Anterior Bilaterally] : anterior cervical [Supraclavicular Lymph Nodes Enlarged Bilaterally] : supraclavicular [No CVA Tenderness] : no ~M costovertebral angle tenderness [No Spinal Tenderness] : no spinal tenderness [Abnormal Walk] : normal gait [Nail Clubbing] : no clubbing  or cyanosis of the fingernails [Musculoskeletal - Swelling] : no joint swelling seen [Motor Tone] : muscle strength and tone were normal [Skin Color & Pigmentation] : normal skin color and pigmentation [Skin Turgor] : normal skin turgor [] : no rash [Deep Tendon Reflexes (DTR)] : deep tendon reflexes were 2+ and symmetric [Sensation] : the sensory exam was normal to light touch and pinprick [No Focal Deficits] : no focal deficits [Oriented To Time, Place, And Person] : oriented to person, place, and time [Impaired Insight] : insight and judgment were intact [Affect] : the affect was normal [FreeTextEntry1] : deferred

## 2019-06-03 NOTE — DATA REVIEWED
[FreeTextEntry1] : CT C/A/P with IV contrast on 5/23/19:\par - new soft tissue in the anterior superior mediastinum, measures 2.4 x 1.5 cm\par - bilateral breast tissue expander in place

## 2019-06-03 NOTE — ASSESSMENT
[FreeTextEntry1] : Ms. JEANE GUTIERREZ, 29 year old female, never smoker, w/ hx of Lt breast cancer s/p bilateral mastectomy and chemotherapy, who presented with lower back pain and was sent for CT abdomen. On CT, there is an incidental finding of a soft tissue mass in mediastinum. \par \par CT C/A/P with IV contrast on 5/23/19:\par - new soft tissue in the anterior superior mediastinum, measures 2.4 x 1.5 cm\par - bilateral breast tissue expander in place \par \par I have reviewed the patient's medical records and diagnostic images at time of this office consultation and have made the following recommendation:\par 1. CT reviewed with pt. I recommended resection of mediastinal mass at [facility]. Risks and benefits and alternatives explained to patient, all questions answered, patient agreed to proceed with surgery.\par 2. Medical clearance and PST\par \par Written by Tatum Stacy NP, acting as a scribe for Dr. Josiah Traore. \par \par The documentation recorded by the scribe accurately reflects the service I personally performed and the decisions made by me. JOSIAH TRAORE MD\par

## 2019-06-04 ENCOUNTER — OTHER (OUTPATIENT)
Age: 29
End: 2019-06-04

## 2019-06-04 ENCOUNTER — APPOINTMENT (OUTPATIENT)
Dept: ENDOCRINOLOGY | Facility: CLINIC | Age: 29
End: 2019-06-04
Payer: COMMERCIAL

## 2019-06-04 ENCOUNTER — RESULT CHARGE (OUTPATIENT)
Age: 29
End: 2019-06-04

## 2019-06-04 VITALS
HEIGHT: 61 IN | BODY MASS INDEX: 26.24 KG/M2 | WEIGHT: 139 LBS | SYSTOLIC BLOOD PRESSURE: 128 MMHG | HEART RATE: 101 BPM | DIASTOLIC BLOOD PRESSURE: 84 MMHG | OXYGEN SATURATION: 100 %

## 2019-06-04 LAB
CORTIS SERPL-MCNC: 17 UG/DL
T3 SERPL-MCNC: 103 NG/DL
T3FREE SERPL-MCNC: 3.05 PG/ML
T3RU NFR SERPL: 0.9 TBI
T4 FREE SERPL-MCNC: 1.1 NG/DL
T4 SERPL-MCNC: 6.3 UG/DL
THYROGLOB AB SERPL-ACNC: 437 IU/ML
THYROPEROXIDASE AB SERPL IA-ACNC: 442 IU/ML
TSH SERPL-ACNC: 4.71 UIU/ML
TSI ACT/NOR SER: 0.17 IU/L

## 2019-06-04 PROCEDURE — 99213 OFFICE O/P EST LOW 20 MIN: CPT

## 2019-06-05 LAB — VWF MULTIMERS PPP IA-ACNC: NORMAL

## 2019-06-05 NOTE — PHYSICAL EXAM
[Alert] : alert [Well Nourished] : well nourished [Well Developed] : well developed [No Acute Distress] : no acute distress [No Proptosis] : no proptosis [Normal Sclera/Conjunctiva] : normal sclera/conjunctiva [EOMI] : extra ocular movement intact [No Thyroid Nodules] : there were no palpable thyroid nodules [Thyroid Not Enlarged] : the thyroid was not enlarged [No Accessory Muscle Use] : no accessory muscle use [No Respiratory Distress] : no respiratory distress [Clear to Auscultation] : lungs were clear to auscultation bilaterally [Normal S1, S2] : normal S1 and S2 [Normal Rate] : heart rate was normal  [Regular Rhythm] : with a regular rhythm [Post Cervical Nodes] : posterior cervical nodes [Anterior Cervical Nodes] : anterior cervical nodes [No Edema] : there was no peripheral edema [Normal] : normal and non tender [Normal Gait] : normal gait [No Stigmata of Cushings Syndrome] : no stigmata of cushings syndrome [Normal Strength/Tone] : muscle strength and tone were normal [Acanthosis Nigricans] : no acanthosis nigricans [No Rash] : no rash [Oriented x3] : oriented to person, place, and time [Normal Reflexes] : deep tendon reflexes were 2+ and symmetric [No Tremors] : no tremors

## 2019-06-05 NOTE — HISTORY OF PRESENT ILLNESS
[FreeTextEntry1] : Pt here for follow up Abnromal TFT  \par started  on Synthroid 0.05 mg tolerating it well\par  \par to have  breast surgery on friday \par \par Found to have mass near thymus- saw CT surgeon-  recommended surgery - Pt not sure - wants to get second opinion \par \par \par

## 2019-06-05 NOTE — ASSESSMENT
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

## 2019-06-05 NOTE — REVIEW OF SYSTEMS
[Muscle Weakness] : muscle weakness [Joint Pain] : joint pain [Muscle Cramps] : muscle cramps [Myalgia] : myalgia  [Negative] : Psychiatric

## 2019-06-06 ENCOUNTER — APPOINTMENT (OUTPATIENT)
Dept: THORACIC SURGERY | Facility: CLINIC | Age: 29
End: 2019-06-06
Payer: COMMERCIAL

## 2019-06-06 VITALS
DIASTOLIC BLOOD PRESSURE: 76 MMHG | SYSTOLIC BLOOD PRESSURE: 115 MMHG | HEIGHT: 61 IN | TEMPERATURE: 98.1 F | BODY MASS INDEX: 26.62 KG/M2 | OXYGEN SATURATION: 98 % | HEART RATE: 88 BPM | WEIGHT: 141 LBS | RESPIRATION RATE: 15 BRPM

## 2019-06-06 PROCEDURE — 99214 OFFICE O/P EST MOD 30 MIN: CPT

## 2019-06-07 ENCOUNTER — RESULT REVIEW (OUTPATIENT)
Age: 29
End: 2019-06-07

## 2019-06-07 ENCOUNTER — OUTPATIENT (OUTPATIENT)
Dept: OUTPATIENT SERVICES | Facility: HOSPITAL | Age: 29
LOS: 1 days | Discharge: HOME | End: 2019-06-07
Payer: COMMERCIAL

## 2019-06-07 ENCOUNTER — APPOINTMENT (OUTPATIENT)
Dept: ORTHOPEDIC SURGERY | Facility: HOSPITAL | Age: 29
End: 2019-06-07
Payer: COMMERCIAL

## 2019-06-07 VITALS
WEIGHT: 143.3 LBS | DIASTOLIC BLOOD PRESSURE: 70 MMHG | SYSTOLIC BLOOD PRESSURE: 119 MMHG | RESPIRATION RATE: 15 BRPM | TEMPERATURE: 98 F | HEART RATE: 78 BPM | OXYGEN SATURATION: 100 %

## 2019-06-07 VITALS
SYSTOLIC BLOOD PRESSURE: 120 MMHG | HEART RATE: 72 BPM | DIASTOLIC BLOOD PRESSURE: 60 MMHG | OXYGEN SATURATION: 100 % | RESPIRATION RATE: 16 BRPM | TEMPERATURE: 98 F

## 2019-06-07 DIAGNOSIS — Z98.890 OTHER SPECIFIED POSTPROCEDURAL STATES: Chronic | ICD-10-CM

## 2019-06-07 DIAGNOSIS — Z98.89 OTHER SPECIFIED POSTPROCEDURAL STATES: Chronic | ICD-10-CM

## 2019-06-07 DIAGNOSIS — Z90.13 ACQUIRED ABSENCE OF BILATERAL BREASTS AND NIPPLES: ICD-10-CM

## 2019-06-07 LAB
HCG UR QL: NEGATIVE — SIGNIFICANT CHANGE UP
MRSA PCR RESULT.: SIGNIFICANT CHANGE UP
S AUREUS DNA NOSE QL NAA+PROBE: SIGNIFICANT CHANGE UP

## 2019-06-07 PROCEDURE — 25111 REMOVE WRIST TENDON LESION: CPT | Mod: RT

## 2019-06-07 PROCEDURE — 88304 TISSUE EXAM BY PATHOLOGIST: CPT | Mod: 26

## 2019-06-07 PROCEDURE — 88300 SURGICAL PATH GROSS: CPT | Mod: 26,59

## 2019-06-07 RX ORDER — OXYCODONE HYDROCHLORIDE 5 MG/1
5 TABLET ORAL ONCE
Refills: 0 | Status: DISCONTINUED | OUTPATIENT
Start: 2019-06-07 | End: 2019-06-07

## 2019-06-07 RX ORDER — MORPHINE SULFATE 50 MG/1
4 CAPSULE, EXTENDED RELEASE ORAL EVERY 4 HOURS
Refills: 0 | Status: DISCONTINUED | OUTPATIENT
Start: 2019-06-07 | End: 2019-06-07

## 2019-06-07 RX ORDER — ESCITALOPRAM OXALATE 10 MG/1
1 TABLET, FILM COATED ORAL
Qty: 0 | Refills: 0 | DISCHARGE

## 2019-06-07 RX ORDER — OXYCODONE AND ACETAMINOPHEN 5; 325 MG/1; MG/1
2 TABLET ORAL EVERY 6 HOURS
Refills: 0 | Status: DISCONTINUED | OUTPATIENT
Start: 2019-06-07 | End: 2019-06-07

## 2019-06-07 RX ORDER — FENTANYL CITRATE 50 UG/ML
25 INJECTION INTRAVENOUS
Refills: 0 | Status: DISCONTINUED | OUTPATIENT
Start: 2019-06-07 | End: 2019-06-07

## 2019-06-07 RX ORDER — OXYCODONE HYDROCHLORIDE 5 MG/1
10 TABLET ORAL ONCE
Refills: 0 | Status: DISCONTINUED | OUTPATIENT
Start: 2019-06-07 | End: 2019-06-07

## 2019-06-07 RX ORDER — ACETAMINOPHEN 500 MG
1000 TABLET ORAL ONCE
Refills: 0 | Status: COMPLETED | OUTPATIENT
Start: 2019-06-07 | End: 2019-06-07

## 2019-06-07 RX ORDER — ONDANSETRON 8 MG/1
4 TABLET, FILM COATED ORAL ONCE
Refills: 0 | Status: DISCONTINUED | OUTPATIENT
Start: 2019-06-07 | End: 2019-06-07

## 2019-06-07 RX ORDER — FAMOTIDINE 10 MG/ML
20 INJECTION INTRAVENOUS ONCE
Refills: 0 | Status: COMPLETED | OUTPATIENT
Start: 2019-06-07 | End: 2019-06-07

## 2019-06-07 RX ORDER — CELECOXIB 200 MG/1
200 CAPSULE ORAL ONCE
Refills: 0 | Status: COMPLETED | OUTPATIENT
Start: 2019-06-07 | End: 2019-06-07

## 2019-06-07 RX ORDER — OXYCODONE AND ACETAMINOPHEN 5; 325 MG/1; MG/1
1 TABLET ORAL EVERY 4 HOURS
Refills: 0 | Status: DISCONTINUED | OUTPATIENT
Start: 2019-06-07 | End: 2019-06-07

## 2019-06-07 RX ORDER — FENTANYL CITRATE 50 UG/ML
50 INJECTION INTRAVENOUS
Refills: 0 | Status: DISCONTINUED | OUTPATIENT
Start: 2019-06-07 | End: 2019-06-07

## 2019-06-07 RX ORDER — PROCHLORPERAZINE MALEATE 5 MG
10 TABLET ORAL ONCE
Refills: 0 | Status: DISCONTINUED | OUTPATIENT
Start: 2019-06-07 | End: 2019-06-22

## 2019-06-07 RX ORDER — ONDANSETRON 8 MG/1
4 TABLET, FILM COATED ORAL EVERY 6 HOURS
Refills: 0 | Status: DISCONTINUED | OUTPATIENT
Start: 2019-06-07 | End: 2019-06-22

## 2019-06-07 RX ORDER — ACETAMINOPHEN 500 MG
975 TABLET ORAL ONCE
Refills: 0 | Status: COMPLETED | OUTPATIENT
Start: 2019-06-07 | End: 2019-06-07

## 2019-06-07 RX ADMIN — CELECOXIB 200 MILLIGRAM(S): 200 CAPSULE ORAL at 07:23

## 2019-06-07 RX ADMIN — MORPHINE SULFATE 4 MILLIGRAM(S): 50 CAPSULE, EXTENDED RELEASE ORAL at 16:53

## 2019-06-07 RX ADMIN — OXYCODONE AND ACETAMINOPHEN 2 TABLET(S): 5; 325 TABLET ORAL at 12:48

## 2019-06-07 RX ADMIN — ONDANSETRON 4 MILLIGRAM(S): 8 TABLET, FILM COATED ORAL at 12:48

## 2019-06-07 RX ADMIN — Medication 975 MILLIGRAM(S): at 07:22

## 2019-06-07 RX ADMIN — FENTANYL CITRATE 50 MICROGRAM(S): 50 INJECTION INTRAVENOUS at 11:57

## 2019-06-07 RX ADMIN — MORPHINE SULFATE 4 MILLIGRAM(S): 50 CAPSULE, EXTENDED RELEASE ORAL at 15:07

## 2019-06-07 RX ADMIN — Medication 400 MILLIGRAM(S): at 12:01

## 2019-06-07 RX ADMIN — Medication 1000 MILLIGRAM(S): at 12:31

## 2019-06-07 RX ADMIN — Medication 975 MILLIGRAM(S): at 07:23

## 2019-06-07 RX ADMIN — CELECOXIB 200 MILLIGRAM(S): 200 CAPSULE ORAL at 07:22

## 2019-06-07 RX ADMIN — FENTANYL CITRATE 50 MICROGRAM(S): 50 INJECTION INTRAVENOUS at 12:27

## 2019-06-07 RX ADMIN — OXYCODONE HYDROCHLORIDE 10 MILLIGRAM(S): 5 TABLET ORAL at 07:22

## 2019-06-07 RX ADMIN — OXYCODONE HYDROCHLORIDE 10 MILLIGRAM(S): 5 TABLET ORAL at 07:23

## 2019-06-07 NOTE — H&P ADULT - NSICDXPASTSURGICALHX_GEN_ALL_CORE_FT
PAST SURGICAL HISTORY:  H/O left breast biopsy & lymph node biopsy 2018    History of tonsillectomy 2008    S/P appendectomy 2004    S/P breast reconstruction, bilateral

## 2019-06-07 NOTE — ASU DISCHARGE PLAN (ADULT/PEDIATRIC) - NURSING INSTRUCTIONS
Review home care information sheet Begin with liquids and light food ( tea, toast, Jello, soups). Advance to what you normally eat. Liquids should taken in adequate amounts today.Refer to multi color post op discharge instruction sheet     CALL the DOCTOR:    -Fever greater than  101F  - Signs  of infection such as : increase pain,swelling,redness,or a bad  smell coming from the wound.  -Excessive amount of bleeding.  - Any pain that appears to be getting worse.  - Vomiting  -  If you have  not urinated 8 hours after surgery or have any difficulty urinating.     A responsible adult should be with you for the rest of the day and night for your safety and to help you if you needed.    Review attached FACT SHEET if applicable. For any problems or concerns,contact your doctor. [facility] patients should call the [facility]. If you cannot reach the doctor or clinic, call [facility] at [phone] or go to your local Emergency Department.  A responsible adult should be with you for the rest of the day and night for your safety and to help you if you needed. Resume your medications as listed on the attached Medication Record.

## 2019-06-07 NOTE — H&P ADULT - NSHPPHYSICALEXAM_GEN_ALL_CORE
Neuro: A+O x4   Resp: CTA A/P  CV: RRR S1 S2  Breast: Intact breast expanders   GI: Abdomen soft non tender +BS x 4

## 2019-06-07 NOTE — H&P ADULT - PROBLEM SELECTOR PLAN 1
breast reconstruction surgery   pain management   advance diet as tolerated   OOB to chair   DC home when meeting ASU criteria

## 2019-06-07 NOTE — ASU DISCHARGE PLAN (ADULT/PEDIATRIC) - CALL YOUR DOCTOR IF YOU HAVE ANY OF THE FOLLOWING:
Unable to urinate/Inability to tolerate liquids or foods/Pain not relieved by Medications/Nausea and vomiting that does not stop/Bleeding that does not stop/Swelling that gets worse/Fever greater than (need to indicate Fahrenheit or Celsius)

## 2019-06-07 NOTE — ASU PATIENT PROFILE, ADULT - PSH
H/O left breast biopsy  & lymph node biopsy 2018  History of tonsillectomy  2008  S/P appendectomy  2004  S/P breast reconstruction, bilateral

## 2019-06-07 NOTE — BRIEF OPERATIVE NOTE - NSICDXBRIEFPROCEDURE_GEN_ALL_CORE_FT
PROCEDURES:  Removal, ganglion cyst, hand 07-Jun-2019 08:26:28  Houston Scott
PROCEDURES:  Second stage reconstruction of breast using implant and fat graft 07-Jun-2019 11:51:13  Christina Hess J

## 2019-06-07 NOTE — ASU DISCHARGE PLAN (ADULT/PEDIATRIC) - ASU DC SPECIAL INSTRUCTIONSFT
You should follow-up in the office in 3-4 days, please call for follow-up appointment if you have not already made one. [phone].   Prescription pain medications have been prescribed for you, take as needed for pain only. Do not drive a vehicle or operate machinery while taking narcotic pain medication.   Ambulating is very important post operatively, make sure you ambulate several times daily.   If you experience bleeding that does not stop, swelling, hardness of surgical site, chest pain, shortness of breath, leg pain, dizziness or any signs or symptoms of infection such as fever, redness, pus, foul smell of surgical site please contact the office immediately, [phone].   Wear abdominal binder 24/7, may remove to shower.  Straw colored drainage is expected with liposuction for the first 24-72 hours

## 2019-06-07 NOTE — H&P ADULT - NSICDXPASTMEDICALHX_GEN_ALL_CORE_FT
PAST MEDICAL HISTORY:  Anxiety     Asthma     Back pain     Breast cancer in female left breast    Breast pain, left     Hemorrhoids     IBS (irritable bowel syndrome)     Lumbar disc herniation     Migraine     Sciatic nerve disease, right

## 2019-06-07 NOTE — ASU DISCHARGE PLAN (ADULT/PEDIATRIC) - PROCEDURE
Second stage breast reconstruction, tissue expander exchange for bilateral silicone implants with fat grafting to the breast and umbilical scar revision

## 2019-06-12 LAB — SURGICAL PATHOLOGY STUDY: SIGNIFICANT CHANGE UP

## 2019-06-12 NOTE — HISTORY OF PRESENT ILLNESS
[FreeTextEntry1] : 29 year old female, never smoker, w/ hx of Lt breast cancer s/p bilateral mastectomy (6/2108) and chemotherapy (finished 12/2018), who presented with lower back pain and was sent for CT abdomen. On CT, there is an incidental finding of a soft tissue mass in mediastinum. \par \par CT C/A/P with IV contrast on 5/23/19:\par - new soft tissue in the anterior superior mediastinum, measures 2.4 x 1.5 cm\par - bilateral breast tissue expander in place \par \par Pt saw Dr. Pearce for consultation on 6/3/19 and plan to resection of mediastinal mass. \par \par Pt presents today for second opinion. Pt is planned for surgery tomorrow for removal of expanders and breast implants. The patient denies shortness of breath, fever, chills, dysphagia, weakness, or hemoptysis.

## 2019-06-12 NOTE — ASSESSMENT
[FreeTextEntry1] : 29 year old female, never smoker, w/ hx of Lt breast cancer s/p bilateral mastectomy (6/2108) and chemotherapy (finished 12/2018), who presented with lower back pain and was sent for CT abdomen. On CT, there is an incidental finding of a soft tissue mass in mediastinum. \par \par CT C/A/P with IV contrast on 5/23/19:\par - new soft tissue in the anterior superior mediastinum, measures 2.4 x 1.5 cm\par - bilateral breast tissue expander in place \par \par Pt presents today for second opinion. Pt is planned for surgery tomorrow for removal of expanders and breast implants. The patient denies shortness of breath, cough, fever, chills, dysphagia, weakness, or hemoptysis. \par \par I have reviewed the medical records and images with the patient and her . I compared the most recent scan to prior scans from November and June 2018. 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 The only definitive way to diagnose would be resection. We discussed at length surgical intervention versus surveillance with serial CT scans. At this time, I have recommended she follow up in 3 months with CT chest scan without contrast. \par \par Written by Shanelle Jovel NP, acting as a scribe for Ricky Arias MD.\par The documentation recorded by the scribe accurately reflects the service I personally performed and the decisions made by me. Ricky Arias MD\par \par

## 2019-06-12 NOTE — PHYSICAL EXAM
[Sclera] : the sclera and conjunctiva were normal [Neck Appearance] : the appearance of the neck was normal [Extraocular Movements] : extraocular movements were intact [Neck Cervical Mass (___cm)] : no neck mass was observed [Jugular Venous Distention Increased] : there was no jugular-venous distention [] : no respiratory distress [Respiration, Rhythm And Depth] : normal respiratory rhythm and effort [Auscultation Breath Sounds / Voice Sounds] : lungs were clear to auscultation bilaterally [Exaggerated Use Of Accessory Muscles For Inspiration] : no accessory muscle use [Diminished Respiratory Excursion] : normal chest expansion [Heart Rate And Rhythm] : heart rate was normal and rhythm regular [Bowel Sounds] : normal bowel sounds [Abdomen Soft] : soft [Abdomen Tenderness] : non-tender [Cervical Lymph Nodes Enlarged Posterior Bilaterally] : posterior cervical [Supraclavicular Lymph Nodes Enlarged Bilaterally] : supraclavicular [Cervical Lymph Nodes Enlarged Anterior Bilaterally] : anterior cervical [Abnormal Walk] : normal gait [No Focal Deficits] : no focal deficits [Skin Color & Pigmentation] : normal skin color and pigmentation [Oriented To Time, Place, And Person] : oriented to person, place, and time [Affect] : the affect was normal [Impaired Insight] : insight and judgment were intact [Mood] : the mood was normal [FreeTextEntry1] : Mediport right chest wall. see hpi

## 2019-06-12 NOTE — PHYSICAL EXAM
[Sclera] : the sclera and conjunctiva were normal [Neck Appearance] : the appearance of the neck was normal [Extraocular Movements] : extraocular movements were intact [Neck Cervical Mass (___cm)] : no neck mass was observed [Respiration, Rhythm And Depth] : normal respiratory rhythm and effort [Jugular Venous Distention Increased] : there was no jugular-venous distention [] : no respiratory distress [Exaggerated Use Of Accessory Muscles For Inspiration] : no accessory muscle use [Auscultation Breath Sounds / Voice Sounds] : lungs were clear to auscultation bilaterally [Heart Rate And Rhythm] : heart rate was normal and rhythm regular [Diminished Respiratory Excursion] : normal chest expansion [Bowel Sounds] : normal bowel sounds [Abdomen Soft] : soft [Cervical Lymph Nodes Enlarged Posterior Bilaterally] : posterior cervical [Abdomen Tenderness] : non-tender [Cervical Lymph Nodes Enlarged Anterior Bilaterally] : anterior cervical [Supraclavicular Lymph Nodes Enlarged Bilaterally] : supraclavicular [Abnormal Walk] : normal gait [Skin Color & Pigmentation] : normal skin color and pigmentation [No Focal Deficits] : no focal deficits [Oriented To Time, Place, And Person] : oriented to person, place, and time [Impaired Insight] : insight and judgment were intact [Affect] : the affect was normal [Mood] : the mood was normal [FreeTextEntry1] : Mediport right chest wall. see hpi

## 2019-06-17 ENCOUNTER — APPOINTMENT (OUTPATIENT)
Dept: ORTHOPEDIC SURGERY | Facility: CLINIC | Age: 29
End: 2019-06-17
Payer: COMMERCIAL

## 2019-06-17 DIAGNOSIS — M25.831 OTHER SPECIFIED JOINT DISORDERS, RIGHT WRIST: ICD-10-CM

## 2019-06-17 PROCEDURE — 99024 POSTOP FOLLOW-UP VISIT: CPT

## 2019-06-19 ENCOUNTER — APPOINTMENT (OUTPATIENT)
Dept: ENDOCRINOLOGY | Facility: CLINIC | Age: 29
End: 2019-06-19

## 2019-06-19 ENCOUNTER — OUTPATIENT (OUTPATIENT)
Dept: OUTPATIENT SERVICES | Facility: HOSPITAL | Age: 29
LOS: 1 days | Discharge: HOME | End: 2019-06-19

## 2019-06-19 DIAGNOSIS — Z98.890 OTHER SPECIFIED POSTPROCEDURAL STATES: Chronic | ICD-10-CM

## 2019-06-19 DIAGNOSIS — M71.331 OTHER BURSAL CYST, RIGHT WRIST: ICD-10-CM

## 2019-06-19 DIAGNOSIS — N65.0 DEFORMITY OF RECONSTRUCTED BREAST: ICD-10-CM

## 2019-06-19 DIAGNOSIS — Z98.89 OTHER SPECIFIED POSTPROCEDURAL STATES: Chronic | ICD-10-CM

## 2019-06-19 DIAGNOSIS — C50.412 MALIGNANT NEOPLASM OF UPPER-OUTER QUADRANT OF LEFT FEMALE BREAST: ICD-10-CM

## 2019-06-19 DIAGNOSIS — G57.01 LESION OF SCIATIC NERVE, RIGHT LOWER LIMB: ICD-10-CM

## 2019-06-19 DIAGNOSIS — K58.9 IRRITABLE BOWEL SYNDROME WITHOUT DIARRHEA: ICD-10-CM

## 2019-06-19 DIAGNOSIS — C50.912 MALIGNANT NEOPLASM OF UNSPECIFIED SITE OF LEFT FEMALE BREAST: ICD-10-CM

## 2019-06-19 DIAGNOSIS — E03.9 HYPOTHYROIDISM, UNSPECIFIED: ICD-10-CM

## 2019-06-19 DIAGNOSIS — M51.26 OTHER INTERVERTEBRAL DISC DISPLACEMENT, LUMBAR REGION: ICD-10-CM

## 2019-06-19 DIAGNOSIS — Z91.013 ALLERGY TO SEAFOOD: ICD-10-CM

## 2019-06-19 DIAGNOSIS — Z90.13 ACQUIRED ABSENCE OF BILATERAL BREASTS AND NIPPLES: ICD-10-CM

## 2019-06-19 DIAGNOSIS — Z92.21 PERSONAL HISTORY OF ANTINEOPLASTIC CHEMOTHERAPY: ICD-10-CM

## 2019-06-19 DIAGNOSIS — J45.909 UNSPECIFIED ASTHMA, UNCOMPLICATED: ICD-10-CM

## 2019-06-19 DIAGNOSIS — L90.5 SCAR CONDITIONS AND FIBROSIS OF SKIN: ICD-10-CM

## 2019-06-19 DIAGNOSIS — N65.1 DISPROPORTION OF RECONSTRUCTED BREAST: ICD-10-CM

## 2019-06-19 DIAGNOSIS — F41.9 ANXIETY DISORDER, UNSPECIFIED: ICD-10-CM

## 2019-06-20 ENCOUNTER — RESULT REVIEW (OUTPATIENT)
Age: 29
End: 2019-06-20

## 2019-06-20 ENCOUNTER — APPOINTMENT (OUTPATIENT)
Dept: HEMATOLOGY ONCOLOGY | Facility: CLINIC | Age: 29
End: 2019-06-20
Payer: COMMERCIAL

## 2019-06-20 VITALS
DIASTOLIC BLOOD PRESSURE: 81 MMHG | HEART RATE: 114 BPM | WEIGHT: 136.01 LBS | TEMPERATURE: 98 F | OXYGEN SATURATION: 98 % | BODY MASS INDEX: 25.68 KG/M2 | SYSTOLIC BLOOD PRESSURE: 117 MMHG | HEIGHT: 61 IN

## 2019-06-20 LAB
BASOPHILS # BLD AUTO: 0.1 K/UL — SIGNIFICANT CHANGE UP (ref 0–0.2)
BASOPHILS NFR BLD AUTO: 2.4 % — HIGH (ref 0–2)
BURR CELLS BLD QL SMEAR: PRESENT — SIGNIFICANT CHANGE UP
EOSINOPHIL # BLD AUTO: 0.1 K/UL — SIGNIFICANT CHANGE UP (ref 0–0.5)
EOSINOPHIL NFR BLD AUTO: 1 % — SIGNIFICANT CHANGE UP (ref 0–6)
HCT VFR BLD CALC: 40.5 % — SIGNIFICANT CHANGE UP (ref 34.5–45)
HGB BLD-MCNC: 13.6 G/DL — SIGNIFICANT CHANGE UP (ref 11.5–15.5)
HYPERCHROMIA BLD QL AUTO: SLIGHT — SIGNIFICANT CHANGE UP
HYPOCHROMIA BLD QL: SLIGHT — SIGNIFICANT CHANGE UP
LG PLATELETS BLD QL AUTO: SLIGHT — SIGNIFICANT CHANGE UP
LYMPHOCYTES # BLD AUTO: 0.7 K/UL — LOW (ref 1–3.3)
LYMPHOCYTES # BLD AUTO: 34 % — SIGNIFICANT CHANGE UP (ref 13–44)
MCHC RBC-ENTMCNC: 30.2 PG — SIGNIFICANT CHANGE UP (ref 27–34)
MCHC RBC-ENTMCNC: 33.5 G/DL — SIGNIFICANT CHANGE UP (ref 32–36)
MCV RBC AUTO: 90.1 FL — SIGNIFICANT CHANGE UP (ref 80–100)
MONOCYTES # BLD AUTO: 0.4 K/UL — SIGNIFICANT CHANGE UP (ref 0–0.9)
MONOCYTES NFR BLD AUTO: 17 % — HIGH (ref 2–14)
NEUTROPHILS # BLD AUTO: 1 K/UL — LOW (ref 1.8–7.4)
NEUTROPHILS NFR BLD AUTO: 48 % — SIGNIFICANT CHANGE UP (ref 43–77)
PLAT MORPH BLD: NORMAL — SIGNIFICANT CHANGE UP
PLATELET # BLD AUTO: 187 K/UL — SIGNIFICANT CHANGE UP (ref 150–400)
RBC # BLD: 4.5 M/UL — SIGNIFICANT CHANGE UP (ref 3.8–5.2)
RBC # FLD: 11.4 % — SIGNIFICANT CHANGE UP (ref 10.3–14.5)
RBC BLD AUTO: SIGNIFICANT CHANGE UP
WBC # BLD: 2.3 K/UL — LOW (ref 3.8–10.5)
WBC # FLD AUTO: 2.3 K/UL — LOW (ref 3.8–10.5)

## 2019-06-20 PROCEDURE — 99214 OFFICE O/P EST MOD 30 MIN: CPT

## 2019-06-20 NOTE — RESULTS/DATA
[FreeTextEntry1] : EXAM: CT CHEST IC \par \par PROCEDURE DATE: 11/15/2018 \par \par INTERPRETATION: EXAMINATION: CT CHEST IC \par \par CLINICAL INDICATION: Left breast cancer. \par \par TECHNIQUE: CT of the chest was obtained after the administration of 50 ml of \par [id number]. \par \par COMPARISON: None. \par \par FINDINGS: \par \par AIRWAYS AND LUNGS: The central tracheobronchial tree is patent. The lungs \par are clear. \par \par MEDIASTINUM AND PLEURA: There are no enlarged mediastinal, hilar or axillary \par lymph nodes. The visualized portion of the thyroid gland is unremarkable. \par There is no pleural effusion. There is no pneumothorax. \par \par HEART AND VESSELS: The heart is normal in size. There are no \par atherosclerotic calcifications of the aorta. There is no pericardial \par effusion. \par \par UPPER ABDOMEN: Images of the upper abdomen demonstrate no abnormality. \par \par BONES AND SOFT TISSUES: The bones are unremarkable. Bilateral mastectomy. \par \par TUBES/LINES: None. \par \par IMPRESSION: \par Clear lungs. \par \par \par \par

## 2019-06-20 NOTE — PHYSICAL EXAM
[Ambulatory and capable of all self care but unable to carry out any work activities] : Status 2- Ambulatory and capable of all self care but unable to carry out any work activities. Up and about more than 50% of waking hours [Normal] : grossly intact [de-identified] : S1/S2 [de-identified] : supple [de-identified] : clear bilaterally [de-identified] : BS+, soft [de-identified] : No edema [de-identified] : minimally palpable left posterior neck node, R submandibular node <5 mm

## 2019-06-20 NOTE — HISTORY OF PRESENT ILLNESS
[Disease: _____________________] : Disease: [unfilled] [N: ___] : N[unfilled] [T: ___] : T[unfilled] [AJCC Stage: ____] : AJCC Stage: [unfilled] [de-identified] : Ms. Kennedy was diagnosed with left triple negative breast cancer at age 28. \par \par She delivered her first child on 4/25/18 following which she self palpated a left breast mass.  It was  thought to be a blocked milk duct / mastitis and was treated with antibiotics and then antifungals.  The mass did not resolve and she sought a 2nd opinion.  \par \par She then had a breast ultrasound on 5/30/18 which showed a 2.3 cm left breast mass at 1-2:00, 9 cm from the nipple, and a single axillary lymph node which does not contain a prominent benign appearing fatty hilum.  \par \par 5/31/18 left breast core biopsy - invasive poorly differentiated ductal carcinoma, Hoisington score 9/9, measures at least 17 cm w/ extensive necrosis. ER 0%, MN 0%, HER 2 (0/1+) -negative. \par \par On 6/7/18 she had a diagnostic mammogram + ultrasound - 2.1 cm rounded mass of left breast with overall coarsening of parenchymal pattern and increased parenchymal density in upper outer left breast. US - 2.4 cm left breast mass at 1-2:00 and left axilla 0.8 cm rounded hypoechoic mass consistent with abnormal left axillary node.  \par \par On 6/7/18 she also had a breast MRI - 2.9 x 3.2 x 3.1 cm mass in left upper inner breast at 1:00.  Suspicious left axillary nodes with ultrasound correlate for which US guided biopsy is recommended.   \par \par On 6/8/17 she had biopsy of the left axillary node - pathology : reactive lymph node. \par \par 6/7/18 - CT chest/abd/pelvis -  Known left upper outer breast malignancy. 2 small, round left axillary lymph nodes for which patient will undergo percutaneous \par sampling, as per report of breast MRI from 6/7/2018. A 3 mm subpleural nodular density in the right middle lobe probably represents a focus of atelectasis. No evidence of metastatic disease in the chest, abdomen, and pelvis.\par \par 6/8/18 bone scan - Normal bone scan. No radionuclide evidence of osseous metastasis.\par \par  6/29/18 -s/p bilateral mastectomy \par Pathology: left breast IDC 3.5 cm, joo score 9/9, margins negative, 2 SNL nodes negative.  pT2 pN0\par Right mastectomy - negative for malignancy. \par \par Family History: 2 maternal great aunts with breast cancer.\par paternal cousin with breast cancer\par paternal GM - pancreatic cancer\par paternal GF - pancreatic cancer\par \par [facility]  [email or web address] panel - no clinically significant mutations\par \par PMH: asthma, DPD deficiency (dihydropyrimidine dehydrogenase deficiency).\par \par Sx hx: tonsillectomy\par \par Social: non-smoker, social ETOH  [de-identified] : poorly differentiated invasive ductal carcinoma [de-identified] : Returns for follow up.\par Reports lymph node enlargement behind her left neck, and as well as R submandibular area. \par No ear infections or complaints.  No tooth pain. No recent extractions. \par No fevers. No Nightsweats. She is actively trying to lose weight. \par After implant exchange surgery on 6/7/19, she was placed on Bactrim BID x 2 weeks of which she still has 2 more days to complete.\par No N/V. No headaches. \par She reports an acne type rash on her chest after exposure to sun on Memorial day. She was seen by dermatology.\par She had CT c/a/p on 5/23/19 which showed possible thymic rebound.  She has seen Dr. Josiah Pearce and Dr. Arias for consultation. \par \par 5/23/19 CT chest - soft tissue in superior mediastinum favoring thymic rebound\par \par \par \par \par 3/18/19:XR LS spine flex ext min 4v; XR T spine 2 view\par partially visualized right chest wall CT compatible power infusion port present with tip in sVC region.\par Elongated riblike L1 transverse processes.\par No compression fractures, spondylolistheses, spondylolysis defects, or evidence for dynamic instability.\par Disk space heights preserved and facet alignment maintained.\par Unremarkable SI Joint and partially visualized hips.\par No lytic or blastic lesions.\par \par  [de-identified] : ER 0% VT 0% HER2 negative

## 2019-06-20 NOTE — ASSESSMENT
[FreeTextEntry1] : 29 year old female with stage IIA left breast TNBC (T2N0) S/P bilateral mastectomy on 6/29/18. She has DPD deficiency.  6/19/18 ECHO - EF 72%. S/p 4 cycles of dose dense AC, followed by weekly taxol completed 12/26/18. \par \par 5/23/19 showed findings c/w thymic rebound. She has seen Dr. Arias and Dr. Josiah Pearce.  We discussed that this most likely is thymic rebound and not metastatic cancer, it would be an usual location for metastasis. She is deciding on whether to do follow up scans vs. proceed with resection.\par \par She also notes lymphadenopathy L posterior neck and R submandibular area - very small / subcentimeter on exam.  It likely is secondary to inflammation / infection, especially given leukopenia on CBC today, possibly viral. \par \par We also at length discussed survivorship as well as consideration for anti-anxiolytic as she does admit to thinking about recurrence very often.\par \par Plan:\par Start lexapro 20 mg daily (previously has tried 10 mg but did not feel a difference)\par Leukopenia / ANC 1000 with monocytosis, possibly low grade viral infection - advised to see PCP if she develops fever\par Follow up in 6 weeks\par \par

## 2019-06-21 ENCOUNTER — APPOINTMENT (OUTPATIENT)
Dept: THORACIC SURGERY | Facility: HOSPITAL | Age: 29
End: 2019-06-21

## 2019-06-27 ENCOUNTER — RESULT REVIEW (OUTPATIENT)
Age: 29
End: 2019-06-27

## 2019-06-27 ENCOUNTER — APPOINTMENT (OUTPATIENT)
Age: 29
End: 2019-06-27

## 2019-06-27 LAB
BASOPHILS # BLD AUTO: 0 K/UL — SIGNIFICANT CHANGE UP (ref 0–0.2)
BASOPHILS NFR BLD AUTO: 1.3 % — SIGNIFICANT CHANGE UP (ref 0–2)
EOSINOPHIL # BLD AUTO: 0.1 K/UL — SIGNIFICANT CHANGE UP (ref 0–0.5)
EOSINOPHIL NFR BLD AUTO: 3.9 % — SIGNIFICANT CHANGE UP (ref 0–6)
HCT VFR BLD CALC: 38.4 % — SIGNIFICANT CHANGE UP (ref 34.5–45)
HGB BLD-MCNC: 12.7 G/DL — SIGNIFICANT CHANGE UP (ref 11.5–15.5)
LYMPHOCYTES # BLD AUTO: 1.4 K/UL — SIGNIFICANT CHANGE UP (ref 1–3.3)
LYMPHOCYTES # BLD AUTO: 37.6 % — SIGNIFICANT CHANGE UP (ref 13–44)
MCHC RBC-ENTMCNC: 29.9 PG — SIGNIFICANT CHANGE UP (ref 27–34)
MCHC RBC-ENTMCNC: 33.2 G/DL — SIGNIFICANT CHANGE UP (ref 32–36)
MCV RBC AUTO: 90.2 FL — SIGNIFICANT CHANGE UP (ref 80–100)
MONOCYTES # BLD AUTO: 0.4 K/UL — SIGNIFICANT CHANGE UP (ref 0–0.9)
MONOCYTES NFR BLD AUTO: 10.2 % — SIGNIFICANT CHANGE UP (ref 2–14)
NEUTROPHILS # BLD AUTO: 1.8 K/UL — SIGNIFICANT CHANGE UP (ref 1.8–7.4)
NEUTROPHILS NFR BLD AUTO: 47 % — SIGNIFICANT CHANGE UP (ref 43–77)
PLATELET # BLD AUTO: 240 K/UL — SIGNIFICANT CHANGE UP (ref 150–400)
RBC # BLD: 4.25 M/UL — SIGNIFICANT CHANGE UP (ref 3.8–5.2)
RBC # FLD: 11.4 % — SIGNIFICANT CHANGE UP (ref 10.3–14.5)
WBC # BLD: 3.7 K/UL — LOW (ref 3.8–10.5)
WBC # FLD AUTO: 3.7 K/UL — LOW (ref 3.8–10.5)

## 2019-07-03 ENCOUNTER — RECORD ABSTRACTING (OUTPATIENT)
Age: 29
End: 2019-07-03

## 2019-07-03 DIAGNOSIS — Z87.59 PERSONAL HISTORY OF OTHER COMPLICATIONS OF PREGNANCY, CHILDBIRTH AND THE PUERPERIUM: ICD-10-CM

## 2019-07-03 LAB — CYTOLOGY CVX/VAG DOC THIN PREP: NORMAL

## 2019-07-05 ENCOUNTER — APPOINTMENT (OUTPATIENT)
Dept: OBGYN | Facility: CLINIC | Age: 29
End: 2019-07-05
Payer: COMMERCIAL

## 2019-07-05 VITALS
SYSTOLIC BLOOD PRESSURE: 100 MMHG | DIASTOLIC BLOOD PRESSURE: 80 MMHG | BODY MASS INDEX: 25.68 KG/M2 | WEIGHT: 136 LBS | HEIGHT: 61 IN

## 2019-07-05 LAB
BILIRUB UR QL STRIP: NORMAL
GLUCOSE UR-MCNC: NORMAL
HCG UR QL: 0.2 EU/DL
HCG UR QL: NEGATIVE
HGB UR QL STRIP.AUTO: NORMAL
KETONES UR-MCNC: NORMAL
LEUKOCYTE ESTERASE UR QL STRIP: NORMAL
NITRITE UR QL STRIP: NORMAL
PH UR STRIP: 7
PROT UR STRIP-MCNC: NORMAL
QUALITY CONTROL: YES
SP GR UR STRIP: 1.01

## 2019-07-05 PROCEDURE — 99395 PREV VISIT EST AGE 18-39: CPT

## 2019-07-05 PROCEDURE — 81003 URINALYSIS AUTO W/O SCOPE: CPT | Mod: QW

## 2019-07-05 PROCEDURE — 81025 URINE PREGNANCY TEST: CPT

## 2019-07-05 RX ORDER — ESCITALOPRAM OXALATE 10 MG/1
10 TABLET, FILM COATED ORAL
Refills: 0 | Status: DISCONTINUED | COMMUNITY
End: 2019-07-05

## 2019-07-05 NOTE — HISTORY OF PRESENT ILLNESS
[Good] : being in good health [1 Year Ago] : 1 year ago [Regular Exercise] : regular exercise [Healthy Diet] : a healthy diet [Reproductive Age] : is of reproductive age [Last Pap ___] : Last cervical pap smear was [unfilled] [Pregnancy History] : pregnancy history: [Full Term ___] : [unfilled] [Total Preg ___] : [unfilled] [Living ___] : [unfilled] [AB Spont ___] : miscarriages: [unfilled] [Irregular Menses] : irregular menses [Hot Flashes] : hot flashes [Definite:  ___ (Date)] : the last menstrual period was [unfilled] [Menarche Age: ____] : age at menarche was [unfilled] [Sexually Active] : is sexually active [Monogamous] : is monogamous [Contraception] : uses contraception [Male ___] : [unfilled] male [Condoms] : uses condoms [Yes] : yes [Weight Concerns] : no concerns with her weight [de-identified] : LEFT BREAST US 5/30/2018 [Prolonged Menses] : menses of normal duration [FreeTextEntry9] : still has some left breast discomfort at the site of her initial surgery/following up with Dr. Farr regarding this. [Night Sweats] : no night sweats [Vaginal Itching] : no vaginal itching [Dyspareunia] : no dyspareunia [Mood Changes] : no mood changes

## 2019-07-05 NOTE — DISCUSSION/SUMMARY
[FreeTextEntry1] : 1. continue f/u with breast surgeon and oncologist as planned\par 2. pelvic sono for visualization of ovaries/surveillance\par 3. discussed resuming menses/contraception- using condoms for now\par 4. weight bearing exercise encouraged when cleared by her surgeon to resume full activity, adequate nutrition/calcium and vit d in the prevention of osteoporosis\par 5. vaginal lubricants reviewed\par 6. f/u pap results- colpo if abnormal given past history of abnormal paps.  we discussed HPV.

## 2019-07-05 NOTE — REVIEW OF SYSTEMS
[Breast Pain] : breast pain [Anxiety] : anxiety [Nl] : Musculoskeletal [FreeTextEntry1] : VAGINAL DRYNESS, DECREASED LIBIDO

## 2019-07-05 NOTE — PHYSICAL EXAM
[Awake] : awake [Alert] : alert [Right Breast Absent] : a total mastectomy [Breast Reconstruction Right] : breast reconstruction [Left Breast Absent] : a total mastectomy [Breast Reconstruction Left] : breast reconstruction [Normal] : uterus [No Bleeding] : there was no active vaginal bleeding [Uterine Adnexae] : were not tender and not enlarged [Axillary Lymph Nodes Enlarged Bilaterally] : no enlarged nodes [Motion Tenderness] : there was no cervical motion tenderness [Tenderness] : nontender [Enlarged ___ wks] : not enlarged [Mass ___ cm] : no uterine mass was palpated

## 2019-07-07 LAB — HPV HIGH+LOW RISK DNA PNL CVX: NOT DETECTED

## 2019-07-09 ENCOUNTER — APPOINTMENT (OUTPATIENT)
Dept: SURGERY | Facility: CLINIC | Age: 29
End: 2019-07-09

## 2019-07-10 ENCOUNTER — RX RENEWAL (OUTPATIENT)
Age: 29
End: 2019-07-10

## 2019-07-15 ENCOUNTER — FORM ENCOUNTER (OUTPATIENT)
Age: 29
End: 2019-07-15

## 2019-07-16 ENCOUNTER — OUTPATIENT (OUTPATIENT)
Dept: OUTPATIENT SERVICES | Facility: HOSPITAL | Age: 29
LOS: 1 days | End: 2019-07-16
Payer: COMMERCIAL

## 2019-07-16 ENCOUNTER — APPOINTMENT (OUTPATIENT)
Dept: MRI IMAGING | Facility: CLINIC | Age: 29
End: 2019-07-16
Payer: COMMERCIAL

## 2019-07-16 ENCOUNTER — APPOINTMENT (OUTPATIENT)
Dept: ULTRASOUND IMAGING | Facility: CLINIC | Age: 29
End: 2019-07-16
Payer: COMMERCIAL

## 2019-07-16 DIAGNOSIS — Z98.890 OTHER SPECIFIED POSTPROCEDURAL STATES: Chronic | ICD-10-CM

## 2019-07-16 DIAGNOSIS — Z98.89 OTHER SPECIFIED POSTPROCEDURAL STATES: Chronic | ICD-10-CM

## 2019-07-16 DIAGNOSIS — Z01.419 ENCOUNTER FOR GYNECOLOGICAL EXAMINATION (GENERAL) (ROUTINE) WITHOUT ABNORMAL FINDINGS: ICD-10-CM

## 2019-07-16 PROCEDURE — 73721 MRI JNT OF LWR EXTRE W/O DYE: CPT

## 2019-07-16 PROCEDURE — 76830 TRANSVAGINAL US NON-OB: CPT | Mod: 26

## 2019-07-16 PROCEDURE — 72148 MRI LUMBAR SPINE W/O DYE: CPT

## 2019-07-16 PROCEDURE — 73721 MRI JNT OF LWR EXTRE W/O DYE: CPT | Mod: 26,RT

## 2019-07-16 PROCEDURE — 72148 MRI LUMBAR SPINE W/O DYE: CPT | Mod: 26

## 2019-07-16 PROCEDURE — 76830 TRANSVAGINAL US NON-OB: CPT

## 2019-07-18 ENCOUNTER — TRANSCRIPTION ENCOUNTER (OUTPATIENT)
Age: 29
End: 2019-07-18

## 2019-07-18 LAB — CYTOLOGY CVX/VAG DOC THIN PREP: NORMAL

## 2019-07-21 NOTE — PAST MEDICAL HISTORY
[Menstruating] : The patient is menstruating [Total Preg ___] : G[unfilled] [Age At Live Birth ___] : Age at live birth: [unfilled] [Live Births ___] : P[unfilled]  [FreeTextEntry8] : attempted but then stopped

## 2019-07-21 NOTE — HISTORY OF PRESENT ILLNESS
[FreeTextEntry1] : I had the pleasure of seeing JEANE GUTIERREZ  in the office for a followup visit secondary to left breast pain.  \par \par Jeane is a sg 29 yo female who originally presented after feeling a left breast mass after delivery of her son on 2018.  She states she had first felt the lump after delivery of her child.  She sought medical attention and was told it was a blocked milk duct which caused the mastitis and put her on antibiotics.  When the symptoms did not resolve she saw another physician for a second opinion.  She was then sent for ultrasound and ultrasound demonstrated a 2.3 cm lobulated hypoechoic nodule at in the left breast 2:00 and an ultrasound guided core biopsy was recommended.  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  Chemotherapy began on 8/3/2018.  Radiotherapy was discussed with Dr. Gage and there is no significant benefit to adjuvant radiotherapy.\par \par She denies skin changes, nipple dimpling or nipple discharge. \par \par  with 1st delivery at 28.  Menses began at age 8.\par She denies personal history of malignancy.\par Family history is significant for two maunts diagnosed with breast cancer, Pcousin diagnosed with breast cancer, PGM and MGM both diagnosed with pancreatic cancer at unknown age, MGM diagnose with skin cancer.\par \par 2018 [employer]:  Genetic results:  Negative- No clinically significant mutation identified\par \par Imaging:  [facility] IMAGING AT [place]\par 2018 MRI BREAST:  1.  2.9X 3.2X 3.1 CM MASS  in the LEFT BREAST UPPER INNER BREAST AT 1:00, middle to posterior depth, consistent with biopsy proven invasive ductal carcinoma.  2.  Suspicious left axillary lymph node with ultrasound correlate, for which ultrasound guided core biopsy has been recommended.  As per addendum to a breast ultrasound guided core biopsy report dated 2018, the patient will return for left axillary lymph node biopsy after surgical consultation.  3.  No MRI evidence of malignancy in the right breast.  BIRADS 6 Biopsy-Proven Malignancy\par \par 2018 CT CHEST/ABD/PELVIS:  Known left upper outer breast malignancy.  2 small round left axillary lymph nodes for which patient will undergo percutaneous sampling, as per report of breast MRI from 2018.  A 3 mm subpleural nodular density in the right middle lobe probably represents a focus of atelectasis.  No evidence of metastatic disease in the chest, abdomen, and pelvis.\par \par 2018  US NONVASC EXT LTD RT:  Benign appearing right groin lymph nodes the smaller of which correlates with area of patients tenderness.  Findings likely represent benign reactive lymph nodes.  Recommend continued clinical follow up.\par \par 2018  US breast limited left\par Impression:  No sonographic suspicious findings left axilla and upper outer quadrant left breast at area of concern.  Note is made of two small benign appearing axillary lymph nodes and a small amount of fluid adjacent to the axillary segment of the axillary segment of the left breast expander.  BIRADS 2 benign findings.\par \par SURGICAL PATHOLOGY:\par 1.  Breast, right simple mastectomy:  Negative for malignancy.\par 2.  [place] lymph node, left axilla, excision:  One lymph node, negative for metastatic carcinoma.\par 3.  [place] lymph node, left axilla, excision:  One lymph node, negative for metastatic carcinoma.\par 4.  Breast, left, simple mastectomy: Infiltrating ductal carcinoma, measuring 3.5 cm, with necrosis.  Infiltrating carcinoma extends to the anterior margin of the mastectomy specimen, please see final margin status in the synoptic summary below and part 5.  Negative nipple.  Negative skin.  Focal secretory change.  \par 5.  Left superior flap margin, over tumor:  negative margin.\par 6.  Skin and adipose tissue, right and left breast, excision:  Negative for malignancy.\par \par We reviewed and discussed clinical breast exam.  She understands the left breast pain and lump felt at the 8:00 position of the left breast is scar tissue building on the TE.  This can occur and when she gets her TE exchanged out the lump and pain should go away.  There are no dominant masses on exam today.  Recommendation to increase gabapentin to TID and she plans to TE exchanged in  when her  is off from work.  Will continue to follow  and all questions answered.\par \par All questions answered.

## 2019-07-21 NOTE — PHYSICAL EXAM
[Normocephalic] : normocephalic [PERRL] : pupils equal, round and reactive to light [Atraumatic] : atraumatic [Sclera nonicteric] : sclera nonicteric [Supple] : supple [No Supraclavicular Adenopathy] : no supraclavicular adenopathy [No dominant masses] : no dominant masses in right breast  [Examined in the supine and seated position] : examined in the supine and seated position [No dominant masses] : no dominant masses left breast [No Axillary Lymphadenopathy] : no left axillary lymphadenopathy [No Edema] : no edema [No Rashes] : no rashes [No Ulceration] : no ulceration [de-identified] : mastectomy flap well healed.\par  [de-identified] : mastectomy flap well healed.\par \par

## 2019-07-21 NOTE — ASSESSMENT
[FreeTextEntry1] : 27 yo female who was diagnosed April 2018 with a 3.5 cm Stage IIA left breast triple negative invasive ductal carcinoma grade 3 s/p bilateral mastectomy with L SLNB on 6/29/2018. She is undergoing adjuvant chemotherapy started on 8/3/2018.  She reports left breast pain and lump.  The palpable lump is scar tissue forming over her TE which is also causing her pain.  She plans to TE excanged in June when her  is off and this should make the pain go away.  There is a palpable lump in the left breast 8:00 region.  Recommendation for gababpentin BID and will follow up in after implant exchange.\par 1.  Follow up in 8 weeks\par 2.  Gabapentin TID\par 3. Follow up with Dr. Mcclain\par \par

## 2019-07-22 ENCOUNTER — RESULT REVIEW (OUTPATIENT)
Age: 29
End: 2019-07-22

## 2019-07-22 ENCOUNTER — APPOINTMENT (OUTPATIENT)
Dept: SURGERY | Facility: CLINIC | Age: 29
End: 2019-07-22
Payer: COMMERCIAL

## 2019-07-22 VITALS
DIASTOLIC BLOOD PRESSURE: 70 MMHG | BODY MASS INDEX: 25.68 KG/M2 | WEIGHT: 136 LBS | HEART RATE: 70 BPM | HEIGHT: 61 IN | SYSTOLIC BLOOD PRESSURE: 104 MMHG

## 2019-07-22 LAB
BASOPHILS # BLD AUTO: 0.1 K/UL — SIGNIFICANT CHANGE UP (ref 0–0.2)
BASOPHILS NFR BLD AUTO: 1.3 % — SIGNIFICANT CHANGE UP (ref 0–2)
EOSINOPHIL # BLD AUTO: 0.1 K/UL — SIGNIFICANT CHANGE UP (ref 0–0.5)
EOSINOPHIL NFR BLD AUTO: 2.8 % — SIGNIFICANT CHANGE UP (ref 0–6)
HCT VFR BLD CALC: 41.1 % — SIGNIFICANT CHANGE UP (ref 34.5–45)
HGB BLD-MCNC: 13.6 G/DL — SIGNIFICANT CHANGE UP (ref 11.5–15.5)
LYMPHOCYTES # BLD AUTO: 1.1 K/UL — SIGNIFICANT CHANGE UP (ref 1–3.3)
LYMPHOCYTES # BLD AUTO: 24.6 % — SIGNIFICANT CHANGE UP (ref 13–44)
MCHC RBC-ENTMCNC: 30.6 PG — SIGNIFICANT CHANGE UP (ref 27–34)
MCHC RBC-ENTMCNC: 33 G/DL — SIGNIFICANT CHANGE UP (ref 32–36)
MCV RBC AUTO: 92.7 FL — SIGNIFICANT CHANGE UP (ref 80–100)
MONOCYTES # BLD AUTO: 0.5 K/UL — SIGNIFICANT CHANGE UP (ref 0–0.9)
MONOCYTES NFR BLD AUTO: 11.6 % — SIGNIFICANT CHANGE UP (ref 2–14)
NEUTROPHILS # BLD AUTO: 2.7 K/UL — SIGNIFICANT CHANGE UP (ref 1.8–7.4)
NEUTROPHILS NFR BLD AUTO: 59.7 % — SIGNIFICANT CHANGE UP (ref 43–77)
PLATELET # BLD AUTO: 193 K/UL — SIGNIFICANT CHANGE UP (ref 150–400)
RBC # BLD: 4.43 M/UL — SIGNIFICANT CHANGE UP (ref 3.8–5.2)
RBC # FLD: 11.9 % — SIGNIFICANT CHANGE UP (ref 10.3–14.5)
WBC # BLD: 4.4 K/UL — SIGNIFICANT CHANGE UP (ref 3.8–10.5)
WBC # FLD AUTO: 4.4 K/UL — SIGNIFICANT CHANGE UP (ref 3.8–10.5)

## 2019-07-22 PROCEDURE — 99214 OFFICE O/P EST MOD 30 MIN: CPT

## 2019-07-22 NOTE — ASSESSMENT
[FreeTextEntry1] : 29 yo female who was diagnosed April 2018 with a 3.5 cm Stage IIA left breast triple negative invasive ductal carcinoma grade 3.  She underwent bilateral mastectomy with L SLNB on 6/29/2018, followed by adjuvant chemotherapy 2018. She then underwent implant exchange on 6/27/2019. We reviewed MRI of chest ordered by Dr. Talamantes at [facility].  She understands there is no evidence of carcinoma on MRI impression and she will be following up with Dr. Talamantes on August 12th.  She reports left lateral breast before and after implant exchange.  There is no evidence of rupture or recurrence on recent imaging.  She also complains of RUQ pain and is followed by Dr. Whitman (gastroenterologist).  There is no evidence of acute disease on exam on today.  Her clinical breast exam is benign.  Recommendation for thyroid panel and referral to rheumatologist. \par 1.  Follow up in 4 months\par 2.  Referral to rheumatologist\par 3. Follow up with Dr. Mcclain (medical oncologist)\par 4. Follow up with Dr. Talamantes ( Thoracic surgeon)\par 5.  Follow up with Dr. Whitman ( gastroenterologist).\par \par \par

## 2019-07-22 NOTE — HISTORY OF PRESENT ILLNESS
[FreeTextEntry1] : I had the pleasure of seeing JEANE GUTIERREZ  in the office for a followup visit secondary to left breast pain and RUQ pain.\par \par Jeane is a sg 27 yo female who originally presented after feeling a left breast mass after delivery of her son on 2018.  She states she had first felt the lump after delivery of her child.  She sought medical attention and was told it was a blocked milk duct which caused the mastitis and put her on antibiotics.  When the symptoms did not resolve she saw another physician for a second opinion.  She was then sent for ultrasound and ultrasound demonstrated a 2.3 cm lobulated hypoechoic nodule at in the left breast 2:00 and an ultrasound guided core biopsy was recommended.  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  Chemotherapy began on 8/3/2018.  Radiotherapy was discussed with Dr. Gage and there is no significant benefit to adjuvant radiotherapy.  She underwent implant exchange on 2019 by Dr. Falcon.\par \par She denies skin changes, nipple dimpling or nipple discharge. \par \par  with 1st delivery at 28.  Menses began at age 8.\par She denies personal history of malignancy.\par Family history is significant for two maunts diagnosed with breast cancer, Pcousin diagnosed with breast cancer, PGM and MGM both diagnosed with pancreatic cancer at unknown age, MGM diagnose with skin cancer.\par \par 2018 [employer]  Genetic results:  Negative- No clinically significant mutation identified\par \par Imaging:  [facility] IMAGING AT [place]\par 2018 MRI BREAST:  1.  2.9X 3.2X 3.1 CM MASS  in the LEFT BREAST UPPER INNER BREAST AT 1:00, middle to posterior depth, consistent with biopsy proven invasive ductal carcinoma.  2.  Suspicious left axillary lymph node with ultrasound correlate, for which ultrasound guided core biopsy has been recommended.  As per addendum to a breast ultrasound guided core biopsy report dated 2018, the patient will return for left axillary lymph node biopsy after surgical consultation.  3.  No MRI evidence of malignancy in the right breast.  BIRADS 6 Biopsy-Proven Malignancy\par \par 2018 CT CHEST/ABD/PELVIS:  Known left upper outer breast malignancy.  2 small round left axillary lymph nodes for which patient will undergo percutaneous sampling, as per report of breast MRI from 2018.  A 3 mm subpleural nodular density in the right middle lobe probably represents a focus of atelectasis.  No evidence of metastatic disease in the chest, abdomen, and pelvis.\par \par 2018  US NONVASC EXT LTD RT:  Benign appearing right groin lymph nodes the smaller of which correlates with area of patients tenderness.  Findings likely represent benign reactive lymph nodes.  Recommend continued clinical follow up.\par \par 2018  US breast limited left\par Impression:  No sonographic suspicious findings left axilla and upper outer quadrant left breast at area of concern.  Note is made of two small benign appearing axillary lymph nodes and a small amount of fluid adjacent to the axillary segment of the axillary segment of the left breast expander.  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  Follow up recommended to asses for stability/resolution.\par \par SURGICAL PATHOLOGY:\par 1.  Breast, right simple mastectomy:  Negative for malignancy.\par 2.  [place] lymph node, left axilla, excision:  One lymph node, negative for metastatic carcinoma.\par 3.  [place] lymph node, left axilla, excision:  One lymph node, negative for metastatic carcinoma.\par 4.  Breast, left, simple mastectomy: Infiltrating ductal carcinoma, measuring 3.5 cm, with necrosis.  Infiltrating carcinoma extends to the anterior margin of the mastectomy specimen, please see final margin status in the synoptic summary below and part 5.  Negative nipple.  Negative skin.  Focal secretory change.  \par 5.  Left superior flap margin, over tumor:  negative margin.\par 6.  Skin and adipose tissue, right and left breast, excision:  Negative for malignancy.\par \par We reviewed and discussed clinical breast exam and MRI of chest ordered by Dr. Talamantes at [facility].  She understands there is no evidence of carcinoma on MRI impression and she will be following up with Dr. Talamantes on .  She reports left lateral breast before and after implant exchange.  There is no evidence of rupture or recurrence on recent imaging.  She also complains of RUQ pain and is followed by Dr. Whitman (gastroenterologist).  There is no evidence of acute disease on exam on today.  Her clinical breast exam is benign.  Recommendation for thyroid panel and referral to rheumatologist.  She understands and agrees to plan.  All questions answered.\par \par All questions answered.

## 2019-07-22 NOTE — PHYSICAL EXAM
[Normocephalic] : normocephalic [Atraumatic] : atraumatic [PERRL] : pupils equal, round and reactive to light [Sclera nonicteric] : sclera nonicteric [Supple] : supple [No Supraclavicular Adenopathy] : no supraclavicular adenopathy [Examined in the supine and seated position] : examined in the supine and seated position [No dominant masses] : no dominant masses in right breast  [No dominant masses] : no dominant masses left breast [No Axillary Lymphadenopathy] : no left axillary lymphadenopathy [No Edema] : no edema [No Rashes] : no rashes [No Ulceration] : no ulceration [de-identified] : mastectomy flap well healed.\par  [de-identified] : mastectomy flap well healed.\par \par

## 2019-07-23 ENCOUNTER — MOBILE ON CALL (OUTPATIENT)
Age: 29
End: 2019-07-23

## 2019-07-24 ENCOUNTER — OUTPATIENT (OUTPATIENT)
Dept: OUTPATIENT SERVICES | Facility: HOSPITAL | Age: 29
LOS: 1 days | Discharge: HOME | End: 2019-07-24

## 2019-07-24 DIAGNOSIS — Z98.890 OTHER SPECIFIED POSTPROCEDURAL STATES: Chronic | ICD-10-CM

## 2019-07-24 DIAGNOSIS — Z98.89 OTHER SPECIFIED POSTPROCEDURAL STATES: Chronic | ICD-10-CM

## 2019-07-24 DIAGNOSIS — C50.412 MALIGNANT NEOPLASM OF UPPER-OUTER QUADRANT OF LEFT FEMALE BREAST: ICD-10-CM

## 2019-07-25 ENCOUNTER — APPOINTMENT (OUTPATIENT)
Dept: FAMILY MEDICINE | Facility: CLINIC | Age: 29
End: 2019-07-25
Payer: COMMERCIAL

## 2019-07-25 VITALS
HEART RATE: 100 BPM | SYSTOLIC BLOOD PRESSURE: 108 MMHG | HEIGHT: 61 IN | OXYGEN SATURATION: 99 % | BODY MASS INDEX: 25.68 KG/M2 | DIASTOLIC BLOOD PRESSURE: 70 MMHG | WEIGHT: 136 LBS

## 2019-07-25 PROCEDURE — 99214 OFFICE O/P EST MOD 30 MIN: CPT | Mod: 25

## 2019-07-25 PROCEDURE — 36415 COLL VENOUS BLD VENIPUNCTURE: CPT

## 2019-07-26 LAB
ALBUMIN SERPL ELPH-MCNC: 4.4 G/DL
ALP BLD-CCNC: 69 U/L
ALT SERPL-CCNC: 10 U/L
ANION GAP SERPL CALC-SCNC: 14 MMOL/L
AST SERPL-CCNC: 17 U/L
BASOPHILS # BLD AUTO: 0.03 K/UL
BASOPHILS NFR BLD AUTO: 0.6 %
BILIRUB SERPL-MCNC: 0.3 MG/DL
BUN SERPL-MCNC: 8 MG/DL
CALCIUM SERPL-MCNC: 9.3 MG/DL
CHLORIDE SERPL-SCNC: 103 MMOL/L
CK SERPL-CCNC: 140 U/L
CO2 SERPL-SCNC: 23 MMOL/L
CREAT SERPL-MCNC: 0.66 MG/DL
DSDNA AB SER-ACNC: <12 IU/ML
EOSINOPHIL # BLD AUTO: 0.08 K/UL
EOSINOPHIL NFR BLD AUTO: 1.6 %
ERYTHROCYTE [SEDIMENTATION RATE] IN BLOOD BY WESTERGREN METHOD: 3 MM/HR
GLUCOSE SERPL-MCNC: 90 MG/DL
HCT VFR BLD CALC: 40.3 %
HGB BLD-MCNC: 12.9 G/DL
IMM GRANULOCYTES NFR BLD AUTO: 0.2 %
LDH SERPL-CCNC: 177 U/L
LYMPHOCYTES # BLD AUTO: 1.26 K/UL
LYMPHOCYTES NFR BLD AUTO: 25.6 %
MAN DIFF?: NORMAL
MCHC RBC-ENTMCNC: 31 PG
MCHC RBC-ENTMCNC: 32 GM/DL
MCV RBC AUTO: 96.9 FL
MONOCYTES # BLD AUTO: 0.47 K/UL
MONOCYTES NFR BLD AUTO: 9.6 %
NEUTROPHILS # BLD AUTO: 3.07 K/UL
NEUTROPHILS NFR BLD AUTO: 62.4 %
PLATELET # BLD AUTO: 216 K/UL
POTASSIUM SERPL-SCNC: 3.9 MMOL/L
PROT SERPL-MCNC: 6.9 G/DL
RBC # BLD: 4.16 M/UL
RBC # FLD: 13.1 %
RHEUMATOID FACT SER QL: <10 IU/ML
SODIUM SERPL-SCNC: 140 MMOL/L
T PALLIDUM AB SER QL IA: NEGATIVE
WBC # FLD AUTO: 4.92 K/UL

## 2019-07-28 LAB
ANA SER IF-ACNC: NEGATIVE
ENA RNP AB SER IA-ACNC: <0.2 AL
ENA SM AB SER IA-ACNC: <0.2 AL
ENA SS-A AB SER IA-ACNC: <0.2 AL
ENA SS-B AB SER IA-ACNC: <0.2 AL

## 2019-07-29 ENCOUNTER — APPOINTMENT (OUTPATIENT)
Dept: HEMATOLOGY ONCOLOGY | Facility: CLINIC | Age: 29
End: 2019-07-29
Payer: COMMERCIAL

## 2019-07-29 VITALS
HEART RATE: 98 BPM | DIASTOLIC BLOOD PRESSURE: 75 MMHG | BODY MASS INDEX: 25.68 KG/M2 | HEIGHT: 61 IN | WEIGHT: 136 LBS | SYSTOLIC BLOOD PRESSURE: 112 MMHG | OXYGEN SATURATION: 99 % | TEMPERATURE: 98 F

## 2019-07-29 PROCEDURE — 99214 OFFICE O/P EST MOD 30 MIN: CPT

## 2019-07-29 NOTE — PHYSICAL EXAM
[Ambulatory and capable of all self care but unable to carry out any work activities] : Status 2- Ambulatory and capable of all self care but unable to carry out any work activities. Up and about more than 50% of waking hours [Normal] : affect appropriate [de-identified] : clear bilaterally [de-identified] : supple [de-identified] : S1/S2 [de-identified] : No edema

## 2019-07-29 NOTE — HISTORY OF PRESENT ILLNESS
[Disease: _____________________] : Disease: [unfilled] [T: ___] : T[unfilled] [N: ___] : N[unfilled] [AJCC Stage: ____] : AJCC Stage: [unfilled] [de-identified] : Ms. Kennedy was diagnosed with left triple negative breast cancer at age 28. \par \par She delivered her first child on 4/25/18 following which she self palpated a left breast mass.  It was  thought to be a blocked milk duct / mastitis and was treated with antibiotics and then antifungals.  The mass did not resolve and she sought a 2nd opinion.  \par \par She then had a breast ultrasound on 5/30/18 which showed a 2.3 cm left breast mass at 1-2:00, 9 cm from the nipple, and a single axillary lymph node which does not contain a prominent benign appearing fatty hilum.  \par \par 5/31/18 left breast core biopsy - invasive poorly differentiated ductal carcinoma, Isle score 9/9, measures at least 17 cm w/ extensive necrosis. ER 0%, OK 0%, HER 2 (0/1+) -negative. \par \par On 6/7/18 she had a diagnostic mammogram + ultrasound - 2.1 cm rounded mass of left breast with overall coarsening of parenchymal pattern and increased parenchymal density in upper outer left breast. US - 2.4 cm left breast mass at 1-2:00 and left axilla 0.8 cm rounded hypoechoic mass consistent with abnormal left axillary node.  \par \par On 6/7/18 she also had a breast MRI - 2.9 x 3.2 x 3.1 cm mass in left upper inner breast at 1:00.  Suspicious left axillary nodes with ultrasound correlate for which US guided biopsy is recommended.   \par \par On 6/8/17 she had biopsy of the left axillary node - pathology : reactive lymph node. \par \par 6/7/18 - CT chest/abd/pelvis -  Known left upper outer breast malignancy. 2 small, round left axillary lymph nodes for which patient will undergo percutaneous \par sampling, as per report of breast MRI from 6/7/2018. A 3 mm subpleural nodular density in the right middle lobe probably represents a focus of atelectasis. No evidence of metastatic disease in the chest, abdomen, and pelvis.\par \par 6/8/18 bone scan - Normal bone scan. No radionuclide evidence of osseous metastasis.\par \par  6/29/18 -s/p bilateral mastectomy \par Pathology: left breast IDC 3.5 cm, joo score 9/9, margins negative, 2 SNL nodes negative.  pT2 pN0\par Right mastectomy - negative for malignancy. \par \par Family History: 2 maternal great aunts with breast cancer.\par paternal cousin with breast cancer\par paternal GM - pancreatic cancer\par paternal GF - pancreatic cancer\par \par [facility]  [employer] panel - no clinically significant mutations\par \par PMH: asthma, DPD deficiency (dihydropyrimidine dehydrogenase deficiency).\par \par Sx hx: tonsillectomy\par \par Social: non-smoker, social ETOH  [de-identified] : ER 0% WI 0% HER2 negative [de-identified] : poorly differentiated invasive ductal carcinoma [de-identified] : Returns for follow up.\par She was seen at [facility] for a 3rd opinion regarding thymic rebound and had an 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 \par Notes overall fatigue and nerve pain in left breast intermittently. \par She did not start Lexapro 20 mg which was prescribed last visit. \par No N/V/D. No shortness of breath.\par She is going to [place] on vacation for 1 week. \par \par \par 7/16/19 MRI Spine Lumbar: No posterior disc herniation, canal or foraminal narrowing in the lumbar spine.\par 7/16/19 MRI Hip: Nondisplaced superior to posterior labral tear. Mild prominence of the right femoral head-neck junction. Mild insertional gluteus minimus tendinosis.\par 7/16/19 US Transvaginal: 1.6 cm likely hemorrhagic corpus luteum involving left ovary. Given the ultrasound appearance recommend follow-up in 2-3 months to assure regression. \par \par \par \par \par \par

## 2019-07-29 NOTE — ASSESSMENT
[FreeTextEntry1] : 29 year old female with stage IIA left breast TNBC (T2N0) S/P bilateral mastectomy on 6/29/18. She has DPD deficiency.  6/19/18 ECHO - EF 72%. S/p 4 cycles of dose dense AC, followed by weekly taxol completed 12/26/18. \par \par 5/23/19 showed findings c/w thymic rebound. She has seen Dr. Arias and Dr. Josiah Pearce.  Also has had a 3rd opinion at [facility] and is leaning against surgery and favors continued observation.\par \par CBC today is normal\par \par Plan:\par Follow up at [facility]\par RTO in 4 months\par \par

## 2019-08-04 LAB

## 2019-08-14 ENCOUNTER — APPOINTMENT (OUTPATIENT)
Age: 29
End: 2019-08-14

## 2019-08-19 ENCOUNTER — OTHER (OUTPATIENT)
Age: 29
End: 2019-08-19

## 2019-08-19 ENCOUNTER — APPOINTMENT (OUTPATIENT)
Dept: HEMATOLOGY ONCOLOGY | Facility: CLINIC | Age: 29
End: 2019-08-19
Payer: COMMERCIAL

## 2019-08-19 VITALS
HEIGHT: 61 IN | SYSTOLIC BLOOD PRESSURE: 128 MMHG | DIASTOLIC BLOOD PRESSURE: 86 MMHG | HEART RATE: 74 BPM | OXYGEN SATURATION: 99 % | TEMPERATURE: 98 F | WEIGHT: 133.04 LBS | BODY MASS INDEX: 25.12 KG/M2

## 2019-08-19 PROCEDURE — 99213 OFFICE O/P EST LOW 20 MIN: CPT

## 2019-08-20 ENCOUNTER — OUTPATIENT (OUTPATIENT)
Dept: OUTPATIENT SERVICES | Facility: HOSPITAL | Age: 29
LOS: 1 days | End: 2019-08-20
Payer: COMMERCIAL

## 2019-08-20 ENCOUNTER — APPOINTMENT (OUTPATIENT)
Dept: ULTRASOUND IMAGING | Facility: CLINIC | Age: 29
End: 2019-08-20
Payer: COMMERCIAL

## 2019-08-20 DIAGNOSIS — Z98.890 OTHER SPECIFIED POSTPROCEDURAL STATES: Chronic | ICD-10-CM

## 2019-08-20 DIAGNOSIS — Z00.8 ENCOUNTER FOR OTHER GENERAL EXAMINATION: ICD-10-CM

## 2019-08-20 DIAGNOSIS — C50.412 MALIGNANT NEOPLASM OF UPPER-OUTER QUADRANT OF LEFT FEMALE BREAST: ICD-10-CM

## 2019-08-20 DIAGNOSIS — Z98.89 OTHER SPECIFIED POSTPROCEDURAL STATES: Chronic | ICD-10-CM

## 2019-08-20 PROCEDURE — 76641 ULTRASOUND BREAST COMPLETE: CPT | Mod: 26,LT

## 2019-08-20 PROCEDURE — 76641 ULTRASOUND BREAST COMPLETE: CPT

## 2019-08-20 NOTE — HISTORY OF PRESENT ILLNESS
[Disease: _____________________] : Disease: [unfilled] [T: ___] : T[unfilled] [N: ___] : N[unfilled] [AJCC Stage: ____] : AJCC Stage: [unfilled] [de-identified] : Ms. Kennedy was diagnosed with left triple negative breast cancer at age 28. \par \par She delivered her first child on 4/25/18 following which she self palpated a left breast mass.  It was  thought to be a blocked milk duct / mastitis and was treated with antibiotics and then antifungals.  The mass did not resolve and she sought a 2nd opinion.  \par \par She then had a breast ultrasound on 5/30/18 which showed a 2.3 cm left breast mass at 1-2:00, 9 cm from the nipple, and a single axillary lymph node which does not contain a prominent benign appearing fatty hilum.  \par \par 5/31/18 left breast core biopsy - invasive poorly differentiated ductal carcinoma, Advance score 9/9, measures at least 17 cm w/ extensive necrosis. ER 0%, AK 0%, HER 2 (0/1+) -negative. \par \par On 6/7/18 she had a diagnostic mammogram + ultrasound - 2.1 cm rounded mass of left breast with overall coarsening of parenchymal pattern and increased parenchymal density in upper outer left breast. US - 2.4 cm left breast mass at 1-2:00 and left axilla 0.8 cm rounded hypoechoic mass consistent with abnormal left axillary node.  \par \par On 6/7/18 she also had a breast MRI - 2.9 x 3.2 x 3.1 cm mass in left upper inner breast at 1:00.  Suspicious left axillary nodes with ultrasound correlate for which US guided biopsy is recommended.   \par \par On 6/8/17 she had biopsy of the left axillary node - pathology : reactive lymph node. \par \par 6/7/18 - CT chest/abd/pelvis -  Known left upper outer breast malignancy. 2 small, round left axillary lymph nodes for which patient will undergo percutaneous \par sampling, as per report of breast MRI from 6/7/2018. A 3 mm subpleural nodular density in the right middle lobe probably represents a focus of atelectasis. No evidence of metastatic disease in the chest, abdomen, and pelvis.\par \par 6/8/18 bone scan - Normal bone scan. No radionuclide evidence of osseous metastasis.\par \par  6/29/18 -s/p bilateral mastectomy \par Pathology: left breast IDC 3.5 cm, joo score 9/9, margins negative, 2 SNL nodes negative.  pT2 pN0\par Right mastectomy - negative for malignancy. \par \par Family History: 2 maternal great aunts with breast cancer.\par paternal cousin with breast cancer\par paternal GM - pancreatic cancer\par paternal GF - pancreatic cancer\par \par [facility]  [employer] panel - no clinically significant mutations\par \par PMH: asthma, DPD deficiency (dihydropyrimidine dehydrogenase deficiency).\par \par Sx hx: tonsillectomy\par \par Social: non-smoker, social ETOH  [de-identified] : poorly differentiated invasive ductal carcinoma [de-identified] : ER 0% NV 0% HER2 negative [de-identified] : Patient returns for follow up. \par She complains of pain for the past 3 weeks in left breast where cancer was previously located that shoots into shoulder and down the arm. 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 Feeling cranky and irritable. \par Patient spoke to Dr. Farr last night, saw breast surgeon this morning.\par She took gabapentin but reports no improvement, and had been taking for months. Wanted implant removal due to pain but surgeon say that is not the problem. Dr. Farr will schedule US. Worst pain she's had since mastectomy of last year.\par No other complaints today.

## 2019-08-20 NOTE — PHYSICAL EXAM
[Ambulatory and capable of all self care but unable to carry out any work activities] : Status 2- Ambulatory and capable of all self care but unable to carry out any work activities. Up and about more than 50% of waking hours [Normal] : affect appropriate [de-identified] : clear bilaterally [de-identified] : supple [de-identified] : S1/S2 [de-identified] : No edema

## 2019-08-20 NOTE — RESULTS/DATA
[FreeTextEntry1] : EXAM: CT CHEST IC \par \par PROCEDURE DATE: 11/15/2018 \par \par INTERPRETATION: EXAMINATION: CT CHEST IC \par \par CLINICAL INDICATION: Left breast cancer. \par \par TECHNIQUE: CT of the chest was obtained after the administration of 50 ml of \par [id number]. \par \par COMPARISON: None. \par \par FINDINGS: \par \par AIRWAYS AND LUNGS: The central tracheobronchial tree is patent. The lungs \par are clear. \par \par MEDIASTINUM AND PLEURA: There are no enlarged mediastinal, hilar or axillary \par lymph nodes. The visualized portion of the thyroid gland is unremarkable. \par There is no pleural effusion. There is no pneumothorax. \par \par HEART AND VESSELS: The heart is normal in size. There are no \par atherosclerotic calcifications of the aorta. There is no pericardial \par effusion. \par \par UPPER ABDOMEN: Images of the upper abdomen demonstrate no abnormality. \par \par BONES AND SOFT TISSUES: The bones are unremarkable. Bilateral mastectomy. \par \par TUBES/LINES: None. \par \par IMPRESSION: \par Clear lungs. \par \par \par \par

## 2019-08-20 NOTE — ASSESSMENT
[FreeTextEntry1] : 29 year old female with stage IIA left breast TNBC (T2N0) S/P bilateral mastectomy on 6/29/18. She has DPD deficiency.  6/19/18 ECHO - EF 72%. S/p 4 cycles of dose dense AC, followed by weekly taxol completed 12/26/18. \par \par 5/23/19 showed findings c/w thymic rebound. She has seen Dr. Arias and Dr. Josiah Pearce.  Also has had a 3rd opinion at [facility] and is leaning against surgery and favors continued observation.\par \par CBC today is normal\par \par Plan:\par Follow up at [facility]\par RTO in 4 months\par \par Plan:\par -refer to Dr. Smiley for pain management\par -US scheduled by Dr. Farr\par

## 2019-08-20 NOTE — ADDENDUM
[FreeTextEntry1] : I, Tatum Thompson, solely acted as scribe for Dr. Devin Reno on 8/19/19.\par 
actual/infant

## 2019-08-26 ENCOUNTER — APPOINTMENT (OUTPATIENT)
Dept: PHYSICAL MEDICINE AND REHAB | Facility: CLINIC | Age: 29
End: 2019-08-26
Payer: COMMERCIAL

## 2019-08-26 VITALS
BODY MASS INDEX: 25.11 KG/M2 | DIASTOLIC BLOOD PRESSURE: 83 MMHG | WEIGHT: 133 LBS | OXYGEN SATURATION: 96 % | SYSTOLIC BLOOD PRESSURE: 124 MMHG | HEIGHT: 61 IN | HEART RATE: 100 BPM

## 2019-08-26 PROCEDURE — 99204 OFFICE O/P NEW MOD 45 MIN: CPT

## 2019-08-28 NOTE — ASSESSMENT
[FreeTextEntry1] : Ms. GUTIERREZ is a 29 year year old woman here for evaluation of left breast and axillary pain. She had bilateral mastectomy with reconstruction. 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 Recommend:\par \par 1. Trial of cymbalta 20mg PO qhs. Continue with gabapentin\par 2. Trial of topical cream for neuropathic pain\par 3. Recommend intercostal block T2-4, intercostobrachial block if pain persists\par 4. EMG\par 5. Follow up in 2 months.\par \par Patient was discussed with Dr. Farr and Dr. Reno.

## 2019-08-28 NOTE — PHYSICAL EXAM
[FreeTextEntry1] : General: NAD, alert\par Psych: normal mood and affect\par HEENT: NC/AT, normal visual tracking\par Pulmonary: no resp distress, chest expansion appears symmetrical\par CV: extremities are warm and perfused\par Abd: non-distended\par Ext: no c/c/e\par normal skin color and appearance\par \par Breast:\par TTP over the LUQ of left breast and axilla with + tinnels to medial proximal arm\par no lymphadenopathy\par UE without edema\par skin without cording\par TTP with parasthesias over the right anterior medial chest wall over the sternum\par \par ROM: shoulder within functional limits and with pain in left shoulder\par MMT: 5/5 bilateral upper extremities\par Reflexes: symmetric bilateral biceps, triceps \par Sensory: intact to light touch in all dermatomes of the bilateral upper extremities.\par Provacative testing:\par Spurlings negative \par Le negative\par Neers negative\par Penn’s negative

## 2019-08-28 NOTE — HISTORY OF PRESENT ILLNESS
[FreeTextEntry1] : Ms. JEANE GUTIERREZ is a 29 year year old female here for evaluation of left sided breast pain about 6 weeks ago. She has a hsitory of bilateral mastectomy with SLNB and reconstruction with Dr. Falcon 6/7/19 without complications. Was feeling well until recently, developed worsening LUQ breast pain radiating to her proximal arm and occasionally into her hands. She had repeat imaging and follow up which was normal. She has difficutly with her child given her pain.\par \par Location: left breast LUQ radiating to her arm\par also with burning in the mid sternum to the left\par Duration: 6 weeks\par Inciting Event/Context: no specific inciting event or trauma\par Onset/Timing: constant\par Quality: sharp, burning with allodynia\par Severity: 8/10\par Exacerbating factors: worse with palpation\par Relieving factors: nothing\par Numbness/Tingling: in distribution of radiation\par Bowel/Bladder neurological incontinence: denies\par upper ext. weakness: denies\par \par Prior Treatments:\par Medications: on gabapentin\par Injections: no prior injections\par Surgeries: no prior cervical surgery\par Imaging: recent imaging was normal post op changes\par

## 2019-09-04 ENCOUNTER — APPOINTMENT (OUTPATIENT)
Dept: PHYSICAL MEDICINE AND REHAB | Facility: CLINIC | Age: 29
End: 2019-09-04

## 2019-09-09 ENCOUNTER — APPOINTMENT (OUTPATIENT)
Dept: PHYSICAL MEDICINE AND REHAB | Facility: CLINIC | Age: 29
End: 2019-09-09
Payer: COMMERCIAL

## 2019-09-09 VITALS
SYSTOLIC BLOOD PRESSURE: 117 MMHG | OXYGEN SATURATION: 98 % | BODY MASS INDEX: 25.11 KG/M2 | WEIGHT: 133 LBS | HEART RATE: 83 BPM | DIASTOLIC BLOOD PRESSURE: 76 MMHG | HEIGHT: 61 IN

## 2019-09-09 PROCEDURE — 99213 OFFICE O/P EST LOW 20 MIN: CPT

## 2019-09-09 NOTE — HISTORY OF PRESENT ILLNESS
[FreeTextEntry1] : Ms. JEANE GUTIERREZ is a 29 year year old female here for follow up left sided breast pain. She has a hsitory of bilateral mastectomy with SLNB and reconstruction with Dr. Falcon 6/7/19 without complications. She had repeat imaging and follow up which was normal. She was started on cymbalta 20mg PO, has some fogginess with it, but has been feeling better. She takes the medication at night.\par \par Location: left breast LUQ radiating to her arm\par also with burning in the mid sternum to the left\par Duration: 6 weeks\par Inciting Event/Context: no specific inciting event or trauma\par Onset/Timing: constant\par Quality: sharp, burning with allodynia\par Severity: 6/10\par Exacerbating factors: worse with palpation\par Relieving factors: nothing\par Numbness/Tingling: in distribution of radiation\par Bowel/Bladder neurological incontinence: denies\par upper ext. weakness: denies\par \par Prior Treatments:\par Medications: on gabapentin\par Injections: no prior injections\par Surgeries: no prior cervical surgery\par Imaging: recent imaging was normal post op changes

## 2019-09-09 NOTE — PHYSICAL EXAM
[FreeTextEntry1] : General: NAD, alert\par Psych: normal mood and affect\par HEENT: NC/AT, normal visual tracking\par Pulmonary: no resp distress, chest expansion appears symmetrical\par CV: extremities are warm and perfused\par Abd: non-distended\par Ext: no c/c/e\par normal skin color and appearance\par \par Breast:\par TTP over the LUQ of left breast and axilla with + tinnels to medial proximal arm\par no lymphadenopathy\par UE without edema\par skin without cording\par TTP with parasthesias over the right anterior medial chest wall over the sternum\par \par ROM: shoulder within functional limits and with pain in left shoulder\par MMT: 5/5 bilateral upper extremities\par Reflexes: symmetric bilateral biceps, triceps \par Sensory: intact to light touch in all dermatomes of the bilateral upper extremities.

## 2019-09-09 NOTE — ASSESSMENT
[FreeTextEntry1] : Ms. GUTIERREZ is a 29 year year old woman here for evaluation of left breast and axillary pain. She had bilateral mastectomy with reconstruction. 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 Recommend:\par \par 1. increase cymbalta to 30mg PO qhs\par 2. Recommend intercostal block T2-4, intercostobrachial block if pain persists\par 3. EMG\par 4. Follow up after EMG

## 2019-09-12 ENCOUNTER — APPOINTMENT (OUTPATIENT)
Dept: THORACIC SURGERY | Facility: CLINIC | Age: 29
End: 2019-09-12
Payer: COMMERCIAL

## 2019-09-12 VITALS — RESPIRATION RATE: 16 BRPM | HEART RATE: 104 BPM | OXYGEN SATURATION: 97 %

## 2019-09-12 PROCEDURE — 99213 OFFICE O/P EST LOW 20 MIN: CPT

## 2019-09-12 NOTE — HISTORY OF PRESENT ILLNESS
[FreeTextEntry1] : Ms. JEANE GUTIERREZ, 29 year old female, never smoker, w/ hx of Lt breast cancer s/p bilateral mastectomy and chemotherapy, who presented with lower back pain and was sent for CT abdomen. On CT, there is an incidental finding of a soft tissue mass in mediastinum. \par \par CT C/A/P with IV contrast on 5/23/19:\par - new soft tissue in the anterior superior mediastinum, measures 2.4 x 1.5 cm\par - bilateral breast tissue expander in place

## 2019-09-12 NOTE — PHYSICAL EXAM
[Neck Appearance] : the appearance of the neck was normal [Neck Cervical Mass (___cm)] : no neck mass was observed [Respiration, Rhythm And Depth] : normal respiratory rhythm and effort [Exaggerated Use Of Accessory Muscles For Inspiration] : no accessory muscle use [Examination Of The Chest] : the chest was normal in appearance [Chest Visual Inspection Thoracic Asymmetry] : no chest asymmetry [Bowel Sounds] : normal bowel sounds [Abdomen Soft] : soft [Abdomen Tenderness] : non-tender [Cervical Lymph Nodes Enlarged Posterior Bilaterally] : posterior cervical [Cervical Lymph Nodes Enlarged Anterior Bilaterally] : anterior cervical [Supraclavicular Lymph Nodes Enlarged Bilaterally] : supraclavicular [Skin Color & Pigmentation] : normal skin color and pigmentation [Skin Turgor] : normal skin turgor [] : no rash [Oriented To Time, Place, And Person] : oriented to person, place, and time

## 2019-09-12 NOTE — ASSESSMENT
[FreeTextEntry1] : Ms. Manley is doing well clinically since her breast surgery.  She has a residual thymic lesion that currently can be observed.  She will return in three months for a surveillance cat scan of the chest.  She will return at that time.

## 2019-09-23 ENCOUNTER — FORM ENCOUNTER (OUTPATIENT)
Age: 29
End: 2019-09-23

## 2019-09-24 ENCOUNTER — APPOINTMENT (OUTPATIENT)
Dept: ULTRASOUND IMAGING | Facility: CLINIC | Age: 29
End: 2019-09-24
Payer: COMMERCIAL

## 2019-09-24 ENCOUNTER — OUTPATIENT (OUTPATIENT)
Dept: OUTPATIENT SERVICES | Facility: HOSPITAL | Age: 29
LOS: 1 days | End: 2019-09-24
Payer: COMMERCIAL

## 2019-09-24 DIAGNOSIS — Z98.890 OTHER SPECIFIED POSTPROCEDURAL STATES: Chronic | ICD-10-CM

## 2019-09-24 DIAGNOSIS — Z98.89 OTHER SPECIFIED POSTPROCEDURAL STATES: Chronic | ICD-10-CM

## 2019-09-24 DIAGNOSIS — N83.202 UNSPECIFIED OVARIAN CYST, LEFT SIDE: ICD-10-CM

## 2019-09-24 PROCEDURE — 76830 TRANSVAGINAL US NON-OB: CPT | Mod: 26

## 2019-09-24 PROCEDURE — 76830 TRANSVAGINAL US NON-OB: CPT

## 2019-09-24 NOTE — END OF VISIT
ED Nurse Note:

Patietn resting comfortably with 2 family members at bedside. IV fluid, NS 1L 
hung. [>50% of Time Spent on Counseling for ____] : Greater than 50% of the encounter time was spent on counseling for [unfilled] [Time Spent: ___ minutes] : I have spent [unfilled] minutes of face to face time with the patient

## 2019-09-25 ENCOUNTER — OUTPATIENT (OUTPATIENT)
Dept: OUTPATIENT SERVICES | Facility: HOSPITAL | Age: 29
LOS: 1 days | Discharge: HOME | End: 2019-09-25

## 2019-09-25 DIAGNOSIS — C50.412 MALIGNANT NEOPLASM OF UPPER-OUTER QUADRANT OF LEFT FEMALE BREAST: ICD-10-CM

## 2019-09-25 DIAGNOSIS — Z98.89 OTHER SPECIFIED POSTPROCEDURAL STATES: Chronic | ICD-10-CM

## 2019-09-25 DIAGNOSIS — Z98.890 OTHER SPECIFIED POSTPROCEDURAL STATES: Chronic | ICD-10-CM

## 2019-09-26 ENCOUNTER — APPOINTMENT (OUTPATIENT)
Dept: HEMATOLOGY ONCOLOGY | Facility: CLINIC | Age: 29
End: 2019-09-26

## 2019-09-26 ENCOUNTER — APPOINTMENT (OUTPATIENT)
Dept: PHYSICAL MEDICINE AND REHAB | Facility: CLINIC | Age: 29
End: 2019-09-26
Payer: COMMERCIAL

## 2019-09-26 VITALS
SYSTOLIC BLOOD PRESSURE: 127 MMHG | DIASTOLIC BLOOD PRESSURE: 84 MMHG | WEIGHT: 133 LBS | BODY MASS INDEX: 25.11 KG/M2 | HEIGHT: 61 IN

## 2019-09-26 PROCEDURE — 95913 NRV CNDJ TEST 13/> STUDIES: CPT

## 2019-09-26 PROCEDURE — 95886 MUSC TEST DONE W/N TEST COMP: CPT

## 2019-09-27 PROBLEM — M25.831 MASS OF JOINT OF RIGHT WRIST: Status: RESOLVED | Noted: 2019-04-23 | Resolved: 2019-09-27

## 2019-09-27 NOTE — HISTORY OF PRESENT ILLNESS
[Clean/Dry/Intact] : clean, dry and intact [___ Days Post Op] : post op day #[unfilled] [Swelling] : not swollen [Erythema] : not erythematous [Vascular Intact] : ~T peripheral vascular exam normal [Neuro Intact] : an unremarkable neurological exam [Doing Well] : is doing well [No Sign of Infection] : is showing no signs of infection [Excellent Pain Control] : has excellent pain control [de-identified] : range of motion of the wrist slightly limited secondary to pain, range of motion of the digits intact [de-identified] : the patient returns today 10 days status post right dorsal ganglion cyst excision. She has mild pain at the surgical site is within normal limits. She is recovering well. [de-identified] : the patient is a 29-year-old female 10 days status post excision of right dorsal ganglion cyst. She isrecovering well. She will continue light activity for one week after which she may resume activity as tolerated. She'll followup p.r.n.

## 2019-10-08 ENCOUNTER — LABORATORY RESULT (OUTPATIENT)
Age: 29
End: 2019-10-08

## 2019-10-09 NOTE — ASU PREOP CHECKLIST - NOTHING BY MOUTH SINCE
Bed: ED25  Expected date:   Expected time:   Means of arrival: Ambulance  Comments:  Ambulance   29-Jun-2018 00:00

## 2019-10-10 ENCOUNTER — APPOINTMENT (OUTPATIENT)
Dept: FAMILY MEDICINE | Facility: CLINIC | Age: 29
End: 2019-10-10
Payer: COMMERCIAL

## 2019-10-10 PROCEDURE — G0008: CPT

## 2019-10-10 PROCEDURE — 90686 IIV4 VACC NO PRSV 0.5 ML IM: CPT

## 2019-10-15 ENCOUNTER — RESULT REVIEW (OUTPATIENT)
Age: 29
End: 2019-10-15

## 2019-10-15 ENCOUNTER — APPOINTMENT (OUTPATIENT)
Dept: HEMATOLOGY ONCOLOGY | Facility: CLINIC | Age: 29
End: 2019-10-15
Payer: COMMERCIAL

## 2019-10-15 ENCOUNTER — APPOINTMENT (OUTPATIENT)
Dept: HEMATOLOGY ONCOLOGY | Facility: CLINIC | Age: 29
End: 2019-10-15

## 2019-10-15 VITALS
HEART RATE: 84 BPM | HEIGHT: 61 IN | SYSTOLIC BLOOD PRESSURE: 125 MMHG | OXYGEN SATURATION: 99 % | DIASTOLIC BLOOD PRESSURE: 87 MMHG | TEMPERATURE: 98.8 F

## 2019-10-15 LAB
BASOPHILS # BLD AUTO: 0 K/UL — SIGNIFICANT CHANGE UP (ref 0–0.2)
BASOPHILS NFR BLD AUTO: 1.2 % — SIGNIFICANT CHANGE UP (ref 0–2)
EOSINOPHIL # BLD AUTO: 0.1 K/UL — SIGNIFICANT CHANGE UP (ref 0–0.5)
EOSINOPHIL NFR BLD AUTO: 2 % — SIGNIFICANT CHANGE UP (ref 0–6)
HCT VFR BLD CALC: 41.4 % — SIGNIFICANT CHANGE UP (ref 34.5–45)
HGB BLD-MCNC: 13.7 G/DL — SIGNIFICANT CHANGE UP (ref 11.5–15.5)
LYMPHOCYTES # BLD AUTO: 1.6 K/UL — SIGNIFICANT CHANGE UP (ref 1–3.3)
LYMPHOCYTES # BLD AUTO: 40.7 % — SIGNIFICANT CHANGE UP (ref 13–44)
MCHC RBC-ENTMCNC: 30.3 PG — SIGNIFICANT CHANGE UP (ref 27–34)
MCHC RBC-ENTMCNC: 33.1 G/DL — SIGNIFICANT CHANGE UP (ref 32–36)
MCV RBC AUTO: 91.6 FL — SIGNIFICANT CHANGE UP (ref 80–100)
MONOCYTES # BLD AUTO: 0.3 K/UL — SIGNIFICANT CHANGE UP (ref 0–0.9)
MONOCYTES NFR BLD AUTO: 7.7 % — SIGNIFICANT CHANGE UP (ref 2–14)
NEUTROPHILS # BLD AUTO: 1.9 K/UL — SIGNIFICANT CHANGE UP (ref 1.8–7.4)
NEUTROPHILS NFR BLD AUTO: 48.3 % — SIGNIFICANT CHANGE UP (ref 43–77)
PLATELET # BLD AUTO: 192 K/UL — SIGNIFICANT CHANGE UP (ref 150–400)
RBC # BLD: 4.52 M/UL — SIGNIFICANT CHANGE UP (ref 3.8–5.2)
RBC # FLD: 11.2 % — SIGNIFICANT CHANGE UP (ref 10.3–14.5)
WBC # BLD: 3.8 K/UL — SIGNIFICANT CHANGE UP (ref 3.8–10.5)
WBC # FLD AUTO: 3.8 K/UL — SIGNIFICANT CHANGE UP (ref 3.8–10.5)

## 2019-10-15 PROCEDURE — 99214 OFFICE O/P EST MOD 30 MIN: CPT

## 2019-10-15 NOTE — PHYSICAL EXAM
[Ambulatory and capable of all self care but unable to carry out any work activities] : Status 2- Ambulatory and capable of all self care but unable to carry out any work activities. Up and about more than 50% of waking hours [Normal] : bilateral breasts without nipple retraction, skin dimpling or palpable masses; the bilateral axillae are without adenopathy

## 2019-10-16 LAB
ALBUMIN SERPL ELPH-MCNC: 4.8 G/DL
ALP BLD-CCNC: 67 U/L
ALT SERPL-CCNC: 12 U/L
ANION GAP SERPL CALC-SCNC: 14 MMOL/L
AST SERPL-CCNC: 17 U/L
BILIRUB SERPL-MCNC: 0.4 MG/DL
BUN SERPL-MCNC: 11 MG/DL
CALCIUM SERPL-MCNC: 9.5 MG/DL
CHLORIDE SERPL-SCNC: 104 MMOL/L
CO2 SERPL-SCNC: 22 MMOL/L
CREAT SERPL-MCNC: 0.71 MG/DL
GLUCOSE SERPL-MCNC: 89 MG/DL
POTASSIUM SERPL-SCNC: 4.1 MMOL/L
PROT SERPL-MCNC: 7.2 G/DL
SODIUM SERPL-SCNC: 140 MMOL/L

## 2019-10-16 NOTE — HISTORY OF PRESENT ILLNESS
[T: ___] : T[unfilled] [Disease: _____________________] : Disease: [unfilled] [AJCC Stage: ____] : AJCC Stage: [unfilled] [N: ___] : N[unfilled] [de-identified] : Ms. Kennedy was diagnosed with left triple negative breast cancer at age 28. \par \par She delivered her first child on 4/25/18 following which she self palpated a left breast mass.  It was  thought to be a blocked milk duct / mastitis and was treated with antibiotics and then antifungals.  The mass did not resolve and she sought a 2nd opinion.  \par \par She then had a breast ultrasound on 5/30/18 which showed a 2.3 cm left breast mass at 1-2:00, 9 cm from the nipple, and a single axillary lymph node which does not contain a prominent benign appearing fatty hilum.  \par \par 5/31/18 left breast core biopsy - invasive poorly differentiated ductal carcinoma, Amsterdam score 9/9, measures at least 17 cm w/ extensive necrosis. ER 0%, [place] 0%, HER 2 (0/1+) -negative. \par \par On 6/7/18 she had a diagnostic mammogram + ultrasound - 2.1 cm rounded mass of left breast with overall coarsening of parenchymal pattern and increased parenchymal density in upper outer left breast. US - 2.4 cm left breast mass at 1-2:00 and left axilla 0.8 cm rounded hypoechoic mass consistent with abnormal left axillary node.  \par \par On 6/7/18 she also had a breast MRI - 2.9 x 3.2 x 3.1 cm mass in left upper inner breast at 1:00.  Suspicious left axillary nodes with ultrasound correlate for which US guided biopsy is recommended.   \par \par On 6/8/17 she had biopsy of the left axillary node - pathology : reactive lymph node. \par \par 6/7/18 - CT chest/abd/pelvis -  Known left upper outer breast malignancy. 2 small, round left axillary lymph nodes for which patient will undergo percutaneous \par sampling, as per report of breast MRI from 6/7/2018. A 3 mm subpleural nodular density in the right middle lobe probably represents a focus of atelectasis. No evidence of metastatic disease in the chest, abdomen, and pelvis.\par \par 6/8/18 bone scan - Normal bone scan. No radionuclide evidence of osseous metastasis.\par \par 6/29/18 -s/p bilateral mastectomy \par Pathology: left breast IDC 3.5 cm, joo score 9/9, margins negative, 2 SNL nodes negative.  pT2 pN0\par Right mastectomy - negative for malignancy. \par \par Family History: 2 maternal great aunts with breast cancer.\par paternal cousin with breast cancer\par paternal GM - pancreatic cancer\par paternal GF - pancreatic cancer\par \par [facility]  [employer] panel - no clinically significant mutations\par \par PMH: asthma, DPD deficiency (dihydropyrimidine dehydrogenase deficiency).\par \par Sx hx: tonsillectomy\par \par Social: non-smoker, social ETOH  [de-identified] : poorly differentiated invasive ductal carcinoma [de-identified] : ER 0% [place] 0% HER2 negative [de-identified] : \par C/o persistent pain in superior aspect of left breast \par

## 2019-10-16 NOTE — RESULTS/DATA
[FreeTextEntry1] : EXAM: CT CHEST IC \par \par PROCEDURE DATE: 11/15/2018 \par \par INTERPRETATION: EXAMINATION: CT CHEST IC \par \par CLINICAL INDICATION: Left breast cancer. \par \par TECHNIQUE: CT of the chest was obtained after the administration of 50 ml of \par [id number]. \par \par COMPARISON: None. \par \par FINDINGS: \par \par AIRWAYS AND LUNGS: The central tracheobronchial tree is patent. The lungs \par are clear. \par \par MEDIASTINUM AND PLEURA: There are no enlarged mediastinal, hilar or axillary \par lymph nodes. The visualized portion of the thyroid gland is unremarkable. \par There is no pleural effusion. There is no pneumothorax. \par \par HEART AND VESSELS: The heart is normal in size. There are no \par atherosclerotic calcifications of the aorta. There is no pericardial \par effusion. \par \par UPPER ABDOMEN: Images of the upper abdomen demonstrate no abnormality. \par \par BONES AND SOFT TISSUES: The bones are unremarkable. Bilateral mastectomy. \par \par TUBES/LINES: None. \par \par IMPRESSION: \par Clear lungs. \par \par \par \par

## 2019-10-16 NOTE — ASSESSMENT
[FreeTextEntry1] : 29 year old female with stage IIA left breast TNBC (T2N0) S/P bilateral mastectomy on 6/29/18. She has DPD deficiency.  6/19/18 ECHO - EF 72%. S/p 4 cycles of dose dense AC, followed by weekly taxol completed 12/26/18. \par Persistent left chest pain. Dr. Smiley has planned intercostal nerve block in event that gabapentin was ineffective.\par \par 5/23/19 showed findings c/w thymic rebound. She has seen Dr. Arias and Dr. Josiah Pearce.  Also [place] [facility] leaning against surgery and favors continued observation.\par \par Plan:\par -Follow up with Dr. Smiley for intercostal nerve block \par -Follow up in 3 months

## 2019-10-21 ENCOUNTER — OUTPATIENT (OUTPATIENT)
Dept: OUTPATIENT SERVICES | Facility: HOSPITAL | Age: 29
LOS: 1 days | Discharge: HOME | End: 2019-10-21

## 2019-10-21 DIAGNOSIS — Z98.890 OTHER SPECIFIED POSTPROCEDURAL STATES: Chronic | ICD-10-CM

## 2019-10-21 DIAGNOSIS — Z98.89 OTHER SPECIFIED POSTPROCEDURAL STATES: Chronic | ICD-10-CM

## 2019-10-21 DIAGNOSIS — C50.412 MALIGNANT NEOPLASM OF UPPER-OUTER QUADRANT OF LEFT FEMALE BREAST: ICD-10-CM

## 2019-10-28 ENCOUNTER — APPOINTMENT (OUTPATIENT)
Dept: PHYSICAL MEDICINE AND REHAB | Facility: CLINIC | Age: 29
End: 2019-10-28
Payer: COMMERCIAL

## 2019-10-28 ENCOUNTER — APPOINTMENT (OUTPATIENT)
Age: 29
End: 2019-10-28

## 2019-10-28 VITALS
WEIGHT: 129 LBS | OXYGEN SATURATION: 98 % | HEIGHT: 61 IN | HEART RATE: 87 BPM | BODY MASS INDEX: 24.35 KG/M2 | SYSTOLIC BLOOD PRESSURE: 136 MMHG | DIASTOLIC BLOOD PRESSURE: 89 MMHG

## 2019-10-28 DIAGNOSIS — R20.2 PARESTHESIA OF SKIN: ICD-10-CM

## 2019-10-28 PROCEDURE — 99214 OFFICE O/P EST MOD 30 MIN: CPT

## 2019-10-28 NOTE — ASSESSMENT
[FreeTextEntry1] : Ms. GUTIERREZ is a 29 year year old woman here for evaluation of left breast and axillary pain. She had bilateral mastectomy with reconstruction. 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 Recommend:\par \par 1. increase cymbalta to 60mg PO qhs\par 2. Recommend intercostal block T2-4, intercostobrachial block. The procedure, along with risks and benefits were discussed with her.\par 3. MRI brachial plexus\par 4. follow up for procedure

## 2019-10-28 NOTE — DATA REVIEWED
[Ultrasound] : ultrasound [FreeTextEntry1] : Available imaging including MRI imaging was personally reviewed and discussed with the patient.\par

## 2019-10-28 NOTE — HISTORY OF PRESENT ILLNESS
[FreeTextEntry1] : Ms. JEANE GUTIERREZ is a 29 year year old female here for follow up left sided breast pain. She has a hsitory of bilateral mastectomy with SLNB and reconstruction with Dr. Falcon 19 without complications. She had repeat imaging and follow up which was normal. She was started on cymbalta 30mg PO was initially helping, but pain has worsened again. Associated with difficulty holding her . EMG was normal.\par \par Location: left breast LUQ radiating to her arm\par also with burning in the mid sternum to the left\par Duration: 2-3 months\par Inciting Event/Context: no specific inciting event or trauma\par Onset/Timing: constant\par Quality: sharp, burning with allodynia\par Severity: 6/10\par Exacerbating factors: worse with palpation\par Relieving factors: nothing\par Numbness/Tingling: in distribution of radiation\par Bowel/Bladder neurological incontinence: denies\par upper ext. weakness: denies\par \par Prior Treatments:\par Medications: on gabapentin\par Injections: no prior injections\par Surgeries: no prior cervical surgery\par Imaging: recent imaging was normal post op changes

## 2019-10-30 ENCOUNTER — APPOINTMENT (OUTPATIENT)
Dept: ENDOCRINOLOGY | Facility: CLINIC | Age: 29
End: 2019-10-30
Payer: COMMERCIAL

## 2019-10-30 VITALS
HEIGHT: 61.5 IN | SYSTOLIC BLOOD PRESSURE: 114 MMHG | HEART RATE: 83 BPM | WEIGHT: 129.5 LBS | DIASTOLIC BLOOD PRESSURE: 70 MMHG | BODY MASS INDEX: 24.14 KG/M2

## 2019-10-30 PROCEDURE — 99214 OFFICE O/P EST MOD 30 MIN: CPT

## 2019-10-30 RX ORDER — ZOLPIDEM TARTRATE 10 MG/1
10 TABLET, FILM COATED ORAL
Refills: 0 | Status: DISCONTINUED | COMMUNITY
End: 2019-10-30

## 2019-10-30 RX ORDER — PRENATAL WITH FERROUS FUM AND FOLIC ACID 3080; 920; 120; 400; 22; 1.84; 3; 20; 10; 1; 12; 200; 27; 25; 2 [IU]/1; [IU]/1; MG/1; [IU]/1; MG/1; MG/1; MG/1; MG/1; MG/1; MG/1; UG/1; MG/1; MG/1; MG/1; MG/1
27-1 TABLET ORAL DAILY
Qty: 90 | Refills: 3 | Status: DISCONTINUED | COMMUNITY
Start: 2019-09-17 | End: 2019-10-30

## 2019-10-30 NOTE — REVIEW OF SYSTEMS
[Joint Pain] : joint pain [Muscle Weakness] : muscle weakness [Muscle Cramps] : muscle cramps [Myalgia] : myalgia  [Negative] : Heme/Lymph

## 2019-10-31 NOTE — HISTORY OF PRESENT ILLNESS
[FreeTextEntry1] : Pt here for follow up Hypothyroidsm \par   on Synthroid 0.05 mg tolerating it well\par  \par for thymic mass-  2 different opinion s- \par  [facility]  [facility]  to ge repeat  Ct in NOv - if growin will get removed \par \par insomnia is bad \par  goes to bed at 8PM but can t sleep  gets up at 8 am  but still tired\par  no snoring \par \par \par

## 2019-10-31 NOTE — ASSESSMENT
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

## 2019-10-31 NOTE — PHYSICAL EXAM
[Alert] : alert [No Acute Distress] : no acute distress [Well Nourished] : well nourished [Well Developed] : well developed [Normal Sclera/Conjunctiva] : normal sclera/conjunctiva [EOMI] : extra ocular movement intact [No Proptosis] : no proptosis [Thyroid Not Enlarged] : the thyroid was not enlarged [No Thyroid Nodules] : there were no palpable thyroid nodules [No Respiratory Distress] : no respiratory distress [No Accessory Muscle Use] : no accessory muscle use [Clear to Auscultation] : lungs were clear to auscultation bilaterally [Normal Rate] : heart rate was normal  [Normal S1, S2] : normal S1 and S2 [Regular Rhythm] : with a regular rhythm [No Edema] : there was no peripheral edema [Post Cervical Nodes] : posterior cervical nodes [Anterior Cervical Nodes] : anterior cervical nodes [Normal] : normal and non tender [No Stigmata of Cushings Syndrome] : no stigmata of cushings syndrome [Normal Gait] : normal gait [Normal Strength/Tone] : muscle strength and tone were normal [No Rash] : no rash [Normal Reflexes] : deep tendon reflexes were 2+ and symmetric [No Tremors] : no tremors [Oriented x3] : oriented to person, place, and time [Acanthosis Nigricans] : no acanthosis nigricans

## 2019-11-07 ENCOUNTER — APPOINTMENT (OUTPATIENT)
Dept: PHYSICAL MEDICINE AND REHAB | Facility: CLINIC | Age: 29
End: 2019-11-07
Payer: COMMERCIAL

## 2019-11-07 PROCEDURE — 99213 OFFICE O/P EST LOW 20 MIN: CPT | Mod: 25

## 2019-11-07 PROCEDURE — 64420 NJX AA&/STRD NTRCOST NRV 1: CPT

## 2019-11-08 VITALS — BODY MASS INDEX: 24.14 KG/M2 | RESPIRATION RATE: 14 BRPM | WEIGHT: 129.5 LBS | HEIGHT: 61.5 IN | HEART RATE: 80 BPM

## 2019-11-08 NOTE — HISTORY OF PRESENT ILLNESS
[FreeTextEntry1] : Ms. JEANE GUTIERREZ is a 29 year year old female here for follow up left sided breast pain. She has a hsitory of bilateral mastectomy with SLNB and reconstruction with Dr. Falcon 6/7/19 without complications. She had repeat imaging and follow up which was normal. Cymbalta recently increased, does not feel any improvement in her pain. EMG was normal.\par \par Location: left breast LUQ radiating to her arm\par also with burning in the mid sternum to the left\par Duration: 2-3 months\par Inciting Event/Context: no specific inciting event or trauma\par Onset/Timing: constant\par Quality: sharp, burning with allodynia\par Severity: 6/10\par Exacerbating factors: worse with palpation\par Relieving factors: nothing\par Numbness/Tingling: in distribution of radiation\par Bowel/Bladder neurological incontinence: denies\par upper ext. weakness: denies\par \par Prior Treatments:\par Medications: on gabapentin, cynmbalta\par Injections: no prior injections\par Surgeries: no prior cervical surgery\par Imaging: recent imaging was normal post op changes

## 2019-11-08 NOTE — ASSESSMENT
[FreeTextEntry1] : Ms. GUTIERREZ is a 29 year year old woman here for evaluation of left breast and axillary pain. She had bilateral mastectomy with reconstruction. 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 Recommend:\par \par 1. continue cymbalta to 60mg PO qhs\par 2. intercostal block T2 intercostobrachial block performed today (see procedure note). The procedure, along with risks and benefits were discussed with her.\par 3. MRI brachial plexus reviewed\par 4. follow up 2-4 weeks

## 2019-11-08 NOTE — PROCEDURE
[de-identified] : Reason for procedure: intercostal neuralgia\par \par Procedure: \par 1. left T2 intercostobrachial nerve block\par 2. ultrasound guidance\par \par Physician: Dr. Smiley\par Medication injected: 40mg Kenalog, 2cc Lidocaine 1%\par Sedation medications: None\par Estimated blood loss: None\par Complications: None\par \par Technique: The procedure was explained in detail including associated risk and informed consent was obtained. The patient was placed in supine position. Ultrasound evaluation demonstrated the thoracic ribs at the posterior body, the pleura, overlying muscles and vasculature. The area was prepped in normal sterile fashion with Chloroprep. A 25 gauge 1.5 inch needle was advanced toward the inferior border of the thoracic ribs above the pleura. After negative aspiration of heme, the above medications were injected into the bursa with ultrasound guidance. Needle was then removed, bandaid placed over injection site. There was no complications, the patient was provided with post injection instructions and noted improvement in pain and ROM after injection.\par

## 2019-11-14 ENCOUNTER — APPOINTMENT (OUTPATIENT)
Dept: CT IMAGING | Facility: CLINIC | Age: 29
End: 2019-11-14
Payer: COMMERCIAL

## 2019-11-14 ENCOUNTER — OUTPATIENT (OUTPATIENT)
Dept: OUTPATIENT SERVICES | Facility: HOSPITAL | Age: 29
LOS: 1 days | End: 2019-11-14
Payer: COMMERCIAL

## 2019-11-14 DIAGNOSIS — Z98.890 OTHER SPECIFIED POSTPROCEDURAL STATES: Chronic | ICD-10-CM

## 2019-11-14 DIAGNOSIS — J98.59 OTHER DISEASES OF MEDIASTINUM, NOT ELSEWHERE CLASSIFIED: ICD-10-CM

## 2019-11-14 DIAGNOSIS — Z98.89 OTHER SPECIFIED POSTPROCEDURAL STATES: Chronic | ICD-10-CM

## 2019-11-14 PROCEDURE — 71250 CT THORAX DX C-: CPT

## 2019-11-14 PROCEDURE — 71250 CT THORAX DX C-: CPT | Mod: 26

## 2019-11-18 ENCOUNTER — APPOINTMENT (OUTPATIENT)
Dept: HEMATOLOGY ONCOLOGY | Facility: CLINIC | Age: 29
End: 2019-11-18

## 2019-11-19 ENCOUNTER — APPOINTMENT (OUTPATIENT)
Dept: SURGERY | Facility: CLINIC | Age: 29
End: 2019-11-19
Payer: COMMERCIAL

## 2019-11-19 ENCOUNTER — APPOINTMENT (OUTPATIENT)
Dept: FAMILY MEDICINE | Facility: CLINIC | Age: 29
End: 2019-11-19
Payer: COMMERCIAL

## 2019-11-19 VITALS
WEIGHT: 129 LBS | HEIGHT: 61 IN | TEMPERATURE: 98.3 F | OXYGEN SATURATION: 98 % | BODY MASS INDEX: 24.35 KG/M2 | DIASTOLIC BLOOD PRESSURE: 74 MMHG | SYSTOLIC BLOOD PRESSURE: 122 MMHG | HEART RATE: 103 BPM

## 2019-11-19 VITALS
WEIGHT: 129 LBS | SYSTOLIC BLOOD PRESSURE: 124 MMHG | DIASTOLIC BLOOD PRESSURE: 82 MMHG | RESPIRATION RATE: 13 BRPM | BODY MASS INDEX: 24.35 KG/M2 | HEIGHT: 61 IN

## 2019-11-19 PROCEDURE — 99214 OFFICE O/P EST MOD 30 MIN: CPT

## 2019-11-19 PROCEDURE — 99215 OFFICE O/P EST HI 40 MIN: CPT

## 2019-11-19 NOTE — END OF VISIT
[FreeTextEntry3] : Medical record entries made by the scribe today today, were at my direction and personally dictated to them by me, Dr. Chelsea Del Rio on Nov 19, 2019. I have reviewed the chart and agree that the record accurately reflects my personal performance of the history, physical exam, assessment, and plan.\par \par

## 2019-11-19 NOTE — PLAN
[FreeTextEntry1] : - Zpack ordered \par - Take with food\par - Take Advil/Tylenol for pain relief \par - Likely a weakness in abd wall (ventral wall hernia)\par - May follow up with general surgeon

## 2019-11-19 NOTE — ADDENDUM
[FreeTextEntry1] : I, Elvi Morris acting as a scribe for Dr. Chelsea Del Rio on Nov 19, 2019  at 2:28 PM\par

## 2019-11-19 NOTE — HISTORY OF PRESENT ILLNESS
[FreeTextEntry8] : JEANE is a 29 year female here c/o cold sxs for x3 days. Experiencing sinus pressure/PND/cough/dark nasal discharge/tender glands. Pain/pressure worse in RT side of face. Felt she could barely swallow yesterday. Not eating much. Denies fever/chills. + sick contact. Pt also c/o positional clicking/pain in RUQ. If she lays down, there is no clicking. Has had imaging in past. Has been occurring for many months.

## 2019-11-19 NOTE — PHYSICAL EXAM
[No Acute Distress] : no acute distress [Well Nourished] : well nourished [Well Developed] : well developed [Well-Appearing] : well-appearing [Normal Sclera/Conjunctiva] : normal sclera/conjunctiva [PERRL] : pupils equal round and reactive to light [EOMI] : extraocular movements intact [Normal Outer Ear/Nose] : the outer ears and nose were normal in appearance [Normal Oropharynx] : the oropharynx was normal [No JVD] : no jugular venous distention [No Lymphadenopathy] : no lymphadenopathy [Supple] : supple [Thyroid Normal, No Nodules] : the thyroid was normal and there were no nodules present [No Respiratory Distress] : no respiratory distress  [No Accessory Muscle Use] : no accessory muscle use [Clear to Auscultation] : lungs were clear to auscultation bilaterally [Normal Rate] : normal rate  [Regular Rhythm] : with a regular rhythm [Normal S1, S2] : normal S1 and S2 [No Murmur] : no murmur heard [No Abdominal Bruit] : a ~M bruit was not heard ~T in the abdomen [No Carotid Bruits] : no carotid bruits [No Varicosities] : no varicosities [Pedal Pulses Present] : the pedal pulses are present [No Edema] : there was no peripheral edema [No Palpable Aorta] : no palpable aorta [No Extremity Clubbing/Cyanosis] : no extremity clubbing/cyanosis [Non Tender] : non-tender [Soft] : abdomen soft [Non-distended] : non-distended [No Masses] : no abdominal mass palpated [No HSM] : no HSM [Normal Bowel Sounds] : normal bowel sounds [Normal Posterior Cervical Nodes] : no posterior cervical lymphadenopathy [Normal Anterior Cervical Nodes] : no anterior cervical lymphadenopathy [No CVA Tenderness] : no CVA  tenderness [No Spinal Tenderness] : no spinal tenderness [No Joint Swelling] : no joint swelling [Grossly Normal Strength/Tone] : grossly normal strength/tone [No Rash] : no rash [Coordination Grossly Intact] : coordination grossly intact [No Focal Deficits] : no focal deficits [Deep Tendon Reflexes (DTR)] : deep tendon reflexes were 2+ and symmetric [Normal Affect] : the affect was normal [Normal Gait] : normal gait [Normal Insight/Judgement] : insight and judgment were intact

## 2019-11-20 NOTE — ASSESSMENT
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 She underwent bilateral mastectomy with L SLNB on 6/29/2018, followed by adjuvant chemotherapy 2018. She then underwent implant exchange on 6/27/2019. We reviewed CT of chest ordered She understands there is no evidence of carcinoma but incidental finding of 3mm pulmonary nodule.She is scheduled to follow up with thoracic surgery in jan 2020.  She also complains of RUQ pain and is followed by Dr. Whitman (gastroenterologist). There is no evidence of acute disease on exam on today. Her clinical breast exam is benign. \par 1. Follow up in 6 months, CBE and repeat US per radiology recommendation in 1/2020\par 2. Repeat CT chest in 6months to follow pulmonary nodule unless differing opinion from CT surgery. \par 3. Follow up with Dr. Mcclain (medical oncologist)\par 4. Follow up with Dr. Talamantes ( Thoracic surgeon), scheduled for Jan 2020\par 5. Follow up with Dr. Whitman ( gastroenterologist), consider MRI abdomen\par 6. Follow up with Dr. Falcon regarding the possibility of converting implant to pre pectoral placement. \par

## 2019-11-20 NOTE — HISTORY OF PRESENT ILLNESS
[FreeTextEntry1] : I had the pleasure of seeing JEANE GUTIERREZ in the office for a followup visit secondary to left breast pain and RUQ pain. She also presents s/p recent CT chest ordered for follow up of thymic hyperplasia/mass. \par \par Jeane is a sg 28 yo female who originally presented after feeling a left breast mass after delivery of her son on 2018. She states she had first felt the lump after delivery of her child. She sought medical attention and was told it was a blocked milk duct which caused the mastitis and put her on antibiotics. When the symptoms did not resolve she saw another physician for a second opinion. She was then sent for ultrasound and ultrasound demonstrated a 2.3 cm lobulated hypoechoic nodule at in the left breast 2:00 and an ultrasound guided core biopsy was recommended. 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 Chemotherapy began on 8/3/2018. She has since completed chemo and  underwent implant exchange on 2019 by Dr. Falcon.\par \par She has been followed by thoracic surgery to monitor for any changes in her chest wall tissue specifically some rebound hypertrophy noted in the thymic tissue. Recent CT on  noted interval decrease in size. However incidental findings of 3mm left upper lobe pulmonary nodule. \par \par She denies skin changes, nipple dimpling or nipple discharge. \par \par  with 1st delivery at 28. Menses began at age 8.\par She denies personal history of malignancy.\par Family history is significant for two maunts diagnosed with breast cancer, Pcousin diagnosed with breast cancer, PGM and MGM both diagnosed with pancreatic cancer at unknown age, MGM diagnose with skin cancer.\par \par 2018 Inocente Petcube: Genetic results: Negative- No clinically significant mutation identified\par \par Imaging: [facility] AT [place]\par 2018 MRI BREAST: 1. 2.9X 3.2X 3.1 CM MASS in the LEFT BREAST UPPER INNER BREAST AT 1:00, middle to posterior depth, consistent with biopsy proven invasive ductal carcinoma. 2. Suspicious left axillary lymph node with ultrasound correlate, for which ultrasound guided core biopsy has been recommended. As per addendum to a breast ultrasound guided core biopsy report dated 2018, the patient will return for left axillary lymph node biopsy after surgical consultation. 3. No MRI evidence of malignancy in the right breast. BIRADS 6 Biopsy-Proven Malignancy\par \par 2018 CT CHEST/ABD/PELVIS: Known left upper outer breast malignancy. 2 small round left axillary lymph nodes for which patient will undergo percutaneous sampling, as per report of breast MRI from 2018. A 3 mm subpleural nodular density in the right middle lobe probably represents a focus of atelectasis. No evidence of metastatic disease in the chest, abdomen, and pelvis.\par \par 2018 US NONVASC EXT LTD RT: Benign appearing right groin lymph nodes the smaller of which correlates with area of patients tenderness. Findings likely represent benign reactive lymph nodes. Recommend continued clinical follow up.\par \par 2018 US breast limited left\par Impression: No sonographic suspicious findings left axilla and upper outer quadrant left breast at area of concern. Note is made of two small benign appearing axillary lymph nodes and a small amount of fluid adjacent to the axillary segment of the axillary segment of the left breast expander. BIRADS 2 benign findings.\par \par 19  CT chest abd pelvis: increased soft tissue seen in superior mediastinum with radiographic features favoring thymic rebound in a patient who had recent chemotherapy\par no soft tissue mass of chest wall \par \par \par US breast 19: no evidence of malignancy, several areas of at necrosis upper inner quad and oval shaped mixed echotexture nodule left breast at 5 oclock 4cm from nipple 1.2cm compatible with probable fat necrosis BIRads 3 prob benign findings, recommend follow US in 6 months. 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 Follow up recommended to asses for stability/resolution.\par \par CT chest 2019 : Impression:  3mm pulmonary nodule left upper lobe slightly increased in size since May 23, 2019 and indeterminate finding, thymic tissue within anterior mediastinum appears ot have minimally decreased since May 23, 2019 given differences in technique.\par \par \par SURGICAL PATHOLOGY:\par 1. Breast, right simple mastectomy: Negative for malignancy.\par 2. [place] lymph node, left axilla, excision: One lymph node, negative for metastatic carcinoma.\par 3. [place] lymph node, left axilla, excision: One lymph node, negative for metastatic carcinoma.\par 4. Breast, left, simple mastectomy: Infiltrating ductal carcinoma, measuring 3.5 cm, with necrosis. Infiltrating carcinoma extends to the anterior margin of the mastectomy specimen, please see final margin status in the synoptic summary below and part 5. Negative nipple. Negative skin. Focal secretory change. \par 5. Left superior flap margin, over tumor: negative margin.\par 6. Skin and adipose tissue, right and left breast, excision: Negative for malignancy.\par \par Jeane reports significant pain at the 5:00 position of the reconstructed left breast (with previous ultrasound) performed demonstrating fat necrosis including tightness of the overall reconstructed breast and burning sensation. 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  She just recently underwent injection of nerve block by Dr. Junior without relief. \par \par She performed several exercises in the office to reproduce the pain she reports and it may be associated with post pectoral implant placement.\par \par In addition she reports "popping" sensation in palpation of the right upper outer quadrant of the abdomen and pain that is intermittent and reproducible only when sitting up, however the palpable area according to the patient disappears when she lays down. On abdominal exam and deep palpation, the bowel and liver edge can be somewhat palpated during examination, but there is nothing abnormal that I can feel on examination.\par \par In addition, Jeane is extremely concerned about the finding reported on recent CT Chest however this is a subcentimeter nodule of the left lung without highly suspicious associated correlation and repeat CT Chest in 6 months to 1 year is recommended. We discussed this at length.\par \par We reviewed and discussed clinical breast exam and CT of chest. She understands the presence of the pulmonary nodule and need for surveillance at this time without any highly suspicious finding for malignancy.  In addition thymic tissue has decreased and she may follow up with one of the cardiothoracic specialists she consulted. She reports a follow up appoinmtnet at [facility] with Dr. Giang in January and we can assist in getting that appointment earlier although this was not his repeat CT chest. I have reviewed the importance of informing the cancer team of any upcoming studies so we can provide coordinated care and be able to follow results.\par \par There is no evidence of acute disease on exam on today. Her clinical breast exam is benign. Recommendation for follow up with thoracic surgery. She understands and agrees to plan. \par \par All questions answered.\par \par \par

## 2019-11-25 ENCOUNTER — FORM ENCOUNTER (OUTPATIENT)
Age: 29
End: 2019-11-25

## 2019-11-26 ENCOUNTER — OUTPATIENT (OUTPATIENT)
Dept: OUTPATIENT SERVICES | Facility: HOSPITAL | Age: 29
LOS: 1 days | End: 2019-11-26
Payer: COMMERCIAL

## 2019-11-26 ENCOUNTER — APPOINTMENT (OUTPATIENT)
Dept: MRI IMAGING | Facility: CLINIC | Age: 29
End: 2019-11-26
Payer: COMMERCIAL

## 2019-11-26 DIAGNOSIS — Z98.89 OTHER SPECIFIED POSTPROCEDURAL STATES: Chronic | ICD-10-CM

## 2019-11-26 DIAGNOSIS — Z98.890 OTHER SPECIFIED POSTPROCEDURAL STATES: Chronic | ICD-10-CM

## 2019-11-26 DIAGNOSIS — R10.11 RIGHT UPPER QUADRANT PAIN: ICD-10-CM

## 2019-11-26 PROCEDURE — 74183 MRI ABD W/O CNTR FLWD CNTR: CPT

## 2019-11-26 PROCEDURE — 74183 MRI ABD W/O CNTR FLWD CNTR: CPT | Mod: 26

## 2019-12-04 ENCOUNTER — RX RENEWAL (OUTPATIENT)
Age: 29
End: 2019-12-04

## 2019-12-05 ENCOUNTER — OUTPATIENT (OUTPATIENT)
Dept: OUTPATIENT SERVICES | Facility: HOSPITAL | Age: 29
LOS: 1 days | Discharge: HOME | End: 2019-12-05

## 2019-12-05 DIAGNOSIS — Z98.890 OTHER SPECIFIED POSTPROCEDURAL STATES: Chronic | ICD-10-CM

## 2019-12-05 DIAGNOSIS — C50.412 MALIGNANT NEOPLASM OF UPPER-OUTER QUADRANT OF LEFT FEMALE BREAST: ICD-10-CM

## 2019-12-05 DIAGNOSIS — Z98.89 OTHER SPECIFIED POSTPROCEDURAL STATES: Chronic | ICD-10-CM

## 2019-12-10 ENCOUNTER — APPOINTMENT (OUTPATIENT)
Age: 29
End: 2019-12-10

## 2019-12-10 ENCOUNTER — APPOINTMENT (OUTPATIENT)
Dept: SURGERY | Facility: CLINIC | Age: 29
End: 2019-12-10
Payer: COMMERCIAL

## 2019-12-10 VITALS
BODY MASS INDEX: 23.98 KG/M2 | HEIGHT: 61 IN | TEMPERATURE: 97.6 F | SYSTOLIC BLOOD PRESSURE: 120 MMHG | DIASTOLIC BLOOD PRESSURE: 75 MMHG | WEIGHT: 127.04 LBS

## 2019-12-10 PROCEDURE — 99214 OFFICE O/P EST MOD 30 MIN: CPT

## 2019-12-10 NOTE — HISTORY OF PRESENT ILLNESS
[FreeTextEntry1] : I had the pleasure of seeing JEANE GUTIERREZ in the office for a followup visit secondary to left breast pain and RUQ pain. She also presents s/p recent CT chest ordered for follow up of thymic hyperplasia/mass and MRI of the abdomen for RUQ pain. \par \par Jeane is a sg 28 yo female who originally presented after feeling a left breast mass after delivery of her son on 2018. She states she had first felt the lump after delivery of her child. She sought medical attention and was told it was a blocked milk duct which caused the mastitis and put her on antibiotics. When the symptoms did not resolve she saw another physician for a second opinion. She was then sent for ultrasound and ultrasound demonstrated a 2.3 cm lobulated hypoechoic nodule at in the left breast 2:00 and an ultrasound guided core biopsy was recommended. 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 Chemotherapy began on 8/3/2018. She has since completed chemo and underwent implant exchange on 2019 by Dr. Falcon.\par \par She has been followed by thoracic surgery to monitor for any changes in her chest wall tissue specifically some rebound hypertrophy noted in the thymic tissue. Recent CT on  noted interval decrease in size. However incidental findings of 3mm left upper lobe pulmonary nodule. \par \par She denies skin changes, nipple dimpling or nipple discharge. \par \par  with 1st delivery at 28. Menses began at age 8.\par She denies personal history of malignancy.\par Family history is significant for two maunts diagnosed with breast cancer, Pcousin diagnosed with breast cancer, PGM and MGM both diagnosed with pancreatic cancer at unknown age, MGM diagnose with skin cancer.\par \par 2018 Inocente Beem: Genetic results: Negative- No clinically significant mutation identified\par \par Imaging: [facility] AT [place]\par 2018 MRI BREAST: 1. 2.9X 3.2X 3.1 CM MASS in the LEFT BREAST UPPER INNER BREAST AT 1:00, middle to posterior depth, consistent with biopsy proven invasive ductal carcinoma. 2. Suspicious left axillary lymph node with ultrasound correlate, for which ultrasound guided core biopsy has been recommended. As per addendum to a breast ultrasound guided core biopsy report dated 2018, the patient will return for left axillary lymph node biopsy after surgical consultation. 3. No MRI evidence of malignancy in the right breast. BIRADS 6 Biopsy-Proven Malignancy\par \par 2018 CT CHEST/ABD/PELVIS: Known left upper outer breast malignancy. 2 small round left axillary lymph nodes for which patient will undergo percutaneous sampling, as per report of breast MRI from 2018. A 3 mm subpleural nodular density in the right middle lobe probably represents a focus of atelectasis. No evidence of metastatic disease in the chest, abdomen, and pelvis.\par \par 2018 US NONVASC EXT LTD RT: Benign appearing right groin lymph nodes the smaller of which correlates with area of patients tenderness. Findings likely represent benign reactive lymph nodes. Recommend continued clinical follow up.\par \par 2018 US breast limited left\par Impression: No sonographic suspicious findings left axilla and upper outer quadrant left breast at area of concern. Note is made of two small benign appearing axillary lymph nodes and a small amount of fluid adjacent to the axillary segment of the axillary segment of the left breast expander. BIRADS 2 benign findings.\par \par 19 CT chest abd pelvis: increased soft tissue seen in superior mediastinum with radiographic features favoring thymic rebound in a patient who had recent chemotherapy\par no soft tissue mass of chest wall \par \par \par US breast 19: no evidence of malignancy, several areas of at necrosis upper inner quad and oval shaped mixed echotexture nodule left breast at 5 oclock 4cm from nipple 1.2cm compatible with probable fat necrosis BIRads 3 prob benign findings, recommend follow US in 6 months. 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 Follow up recommended to asses for stability/resolution.\par \par CT chest 2019 : Impression: 3mm pulmonary nodule left upper lobe slightly increased in size since May 23, 2019 and indeterminate finding, thymic tissue within anterior mediastinum appears ot have minimally decreased since May 23, 2019 given differences in technique.\par \par \par SURGICAL PATHOLOGY:\par 1. Breast, right simple mastectomy: Negative for malignancy.\par 2. [place] lymph node, left axilla, excision: One lymph node, negative for metastatic carcinoma.\par 3. [place] lymph node, left axilla, excision: One lymph node, negative for metastatic carcinoma.\par 4. Breast, left, simple mastectomy: Infiltrating ductal carcinoma, measuring 3.5 cm, with necrosis. Infiltrating carcinoma extends to the anterior margin of the mastectomy specimen, please see final margin status in the synoptic summary below and part 5. Negative nipple. Negative skin. Focal secretory change. \par 5. Left superior flap margin, over tumor: negative margin.\par 6. Skin and adipose tissue, right and left breast, excision: Negative for malignancy.\par \par 2019 MRI of the abdomen \par Impression: No cholelithiasis, choledocholithiasis or biliary ductal dilatation. \par \par Jeane reports significant pain at the 5:00 position of the reconstructed left breast (with previous ultrasound) performed demonstrating fat necrosis including tightness of the overall reconstructed breast and burning sensation. 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 She just recently underwent injection of nerve block by Dr. Junior without relief. \par \par She performed several exercises in the office to reproduce the pain she reports and it may be associated with post pectoral implant placement.\par \par In addition she reports "popping" sensation in palpation of the right upper outer quadrant of the abdomen and pain that is intermittent and reproducible only when sitting up, however the palpable area according to the patient disappears when she lays down. On abdominal exam and deep palpation, the bowel and liver edge can be somewhat palpated during examination, but there is nothing abnormal that I can feel on examination.\par \par In addition, Jeane is extremely concerned about the finding reported on recent CT Chest however this is a subcentimeter nodule of the left lung without highly suspicious associated correlation and repeat CT Chest in 6 months to 1 year is recommended. We discussed this at length.\par \par We reviewed and discussed clinical breast exam and CT of chest and MRI of the abdomen. There is nothing concerning on MRI of the abdomen.  She understands the presence of the pulmonary nodule and need for surveillance at this time without any highly suspicious finding for malignancy. In addition thymic tissue has decreased and she may follow up with one of the cardiothoracic specialists she consulted. She reports a follow up appointment with Dr. Pearce on Thursday at [facility] with Dr. Giang in January and we can assist in getting that appointment earlier although this was not his repeat CT chest. I have reviewed the importance of informing the cancer team of any upcoming studies so we can provide coordinated care and be able to follow results.\par \par There is no evidence of recurrent disease on examination or review of studies. Her clinical breast exam is benign. Recommendation for follow up with thoracic surgery. She understands and agrees to plan. Clinical follow up is recommended in 4 months. \par \par All questions answered.\par \par

## 2019-12-10 NOTE — ASSESSMENT
[FreeTextEntry1] : 30 yo premenopausal female with a history of triple negative left breast cancer treated with bilateral mastectomy and left SLNB, and final pathology demonstrating 3.5cm IDC grade 3 triple negative with 0/2 lymph nodes. She has had a series of complaints including abdominal pain, dyspnea and has been extremely stressed and anxious about the risk of recurrence. Recent MRI of the abdomen is benign. CT chest demonstrated an incidental finding of a 3mm left lung nodule. Recommendation is to follow up with Medical Oncology and Thoracic Surgery. She is undergoing revision left breast surgery secondary to complaint of pain. no evidence of recurrence. \par 1. Clinical examination in 4 months\par 2. Follow up with Dr. Mcclain\par 3. Follow up with Dr. Pearce\par 4. Follow up with Dr. Falcon

## 2019-12-10 NOTE — PHYSICAL EXAM
[Normocephalic] : normocephalic [Atraumatic] : atraumatic [PERRL] : pupils equal, round and reactive to light [EOMI] : extra ocular movement intact [Sclera nonicteric] : sclera nonicteric [No Supraclavicular Adenopathy] : no supraclavicular adenopathy [Supple] : supple [Examined in the supine and seated position] : examined in the supine and seated position [No dominant masses] : no dominant masses in right breast  [No dominant masses] : no dominant masses left breast [No Axillary Lymphadenopathy] : no right axillary lymphadenopathy [No Edema] : no edema [No Rashes] : no rashes [No Ulceration] : no ulceration [de-identified] : s/p mastectomy with implant reconstruction, well healed.  [de-identified] : s/p mastectomy with implant reconstruction, well healed.

## 2019-12-11 LAB
ALBUMIN SERPL ELPH-MCNC: 4.4 G/DL
ALP BLD-CCNC: 68 U/L
ALT SERPL-CCNC: 7 U/L
ANA SER IF-ACNC: NEGATIVE
ANION GAP SERPL CALC-SCNC: 13 MMOL/L
AST SERPL-CCNC: 15 U/L
BILIRUB SERPL-MCNC: 0.5 MG/DL
BUN SERPL-MCNC: 11 MG/DL
CALCIUM SERPL-MCNC: 9.4 MG/DL
CHLORIDE SERPL-SCNC: 107 MMOL/L
CO2 SERPL-SCNC: 23 MMOL/L
CREAT SERPL-MCNC: 0.67 MG/DL
GLUCOSE SERPL-MCNC: 91 MG/DL
POTASSIUM SERPL-SCNC: 4.2 MMOL/L
PROT SERPL-MCNC: 6.9 G/DL
SODIUM SERPL-SCNC: 143 MMOL/L
THYROGLOB AB SERPL-ACNC: 120 IU/ML
THYROPEROXIDASE AB SERPL IA-ACNC: 212 IU/ML
TSH SERPL-ACNC: 3.22 UIU/ML

## 2019-12-12 ENCOUNTER — APPOINTMENT (OUTPATIENT)
Dept: THORACIC SURGERY | Facility: CLINIC | Age: 29
End: 2019-12-12
Payer: COMMERCIAL

## 2019-12-12 VITALS
DIASTOLIC BLOOD PRESSURE: 80 MMHG | OXYGEN SATURATION: 98 % | SYSTOLIC BLOOD PRESSURE: 120 MMHG | BODY MASS INDEX: 23.54 KG/M2 | HEIGHT: 61 IN | WEIGHT: 124.7 LBS | RESPIRATION RATE: 15 BRPM | HEART RATE: 105 BPM

## 2019-12-12 PROCEDURE — 99213 OFFICE O/P EST LOW 20 MIN: CPT

## 2019-12-12 RX ORDER — AZITHROMYCIN 250 MG/1
250 TABLET, FILM COATED ORAL
Qty: 1 | Refills: 0 | Status: COMPLETED | COMMUNITY
Start: 2019-11-19 | End: 2019-12-12

## 2019-12-12 NOTE — PHYSICAL EXAM
[Neck Appearance] : the appearance of the neck was normal [Neck Cervical Mass (___cm)] : no neck mass was observed [Sclera] : the sclera and conjunctiva were normal [Respiration, Rhythm And Depth] : normal respiratory rhythm and effort [Heart Sounds] : normal S1 and S2 [Apical Impulse] : the apical impulse was normal [Examination Of The Chest] : the chest was normal in appearance [Murmurs] : no murmurs [2+] : left 2+ [No Pulse Delay] : no pulse delay [Breast Appearance] : normal in appearance [Bowel Sounds] : normal bowel sounds [Supraclavicular Lymph Nodes Enlarged Bilaterally] : supraclavicular [No CVA Tenderness] : no ~M costovertebral angle tenderness [Nail Clubbing] : no clubbing  or cyanosis of the fingernails [Motor Tone] : muscle strength and tone were normal [Motor Exam] : the motor exam was normal [Sensation] : the sensory exam was normal to light touch and pinprick [] : no respiratory distress [Exaggerated Use Of Accessory Muscles For Inspiration] : no accessory muscle use [Chest Visual Inspection Thoracic Asymmetry] : no chest asymmetry [Fingers] :  capillary refill of the fingers was normal [Toes] :  capillary refill of the toes was normal [Abdomen Soft] : soft [Abdomen Tenderness] : non-tender [FreeTextEntry1] : defer [Cervical Lymph Nodes Enlarged Posterior Bilaterally] : posterior cervical [Cervical Lymph Nodes Enlarged Anterior Bilaterally] : anterior cervical [Skin Color & Pigmentation] : normal skin color and pigmentation [Skin Turgor] : normal skin turgor [Oriented To Time, Place, And Person] : oriented to person, place, and time

## 2019-12-12 NOTE — REVIEW OF SYSTEMS
[Feeling Poorly] : not feeling poorly [Feeling Tired] : not feeling tired [Eyesight Problems] : no eyesight problems [Discharge From Eyes] : no purulent discharge from the eyes [Nosebleeds] : no nosebleeds [Nasal Discharge] : no nasal discharge [Chest Pain] : no chest pain [Palpitations] : no palpitations [Cough] : no cough [SOB on Exertion] : no shortness of breath during exertion [Constipation] : no constipation [Diarrhea] : no diarrhea [Pelvic Pain] : no pelvic pain [Dysmenorrhea] : no dysmenorrhea [Joint Swelling] : no joint swelling [Joint Stiffness] : no joint stiffness [Itching] : no itching [Change In A Mole] : no change in a mole [Dizziness] : no dizziness [Fainting] : no fainting [Anxiety] : no anxiety [Depression] : no depression [Muscle Weakness] : no muscle weakness [Deepening Of The Voice] : no deepening of the voice [Swollen Glands] : no swollen glands [Swollen Glands In The Neck] : no swollen glands in the neck [Negative] : Heme/Lymph

## 2019-12-12 NOTE — ASSESSMENT
[FreeTextEntry1] : Ms. Manley's most recent cat scan of the chest shows a decrease in the size of the anterior mediastinal mass.  She does have a new 3 mm nodule that will need to be followed.  I am recommending a repeat cat scan of the chest in six months for surveillance purposes.

## 2019-12-12 NOTE — CONSULT LETTER
[Consult Letter:] : I had the pleasure of evaluating your patient, [unfilled]. [Dear  ___] : Dear  [unfilled], [Please see my note below.] : Please see my note below. [Sincerely,] : Sincerely, [Consult Closing:] : Thank you very much for allowing me to participate in the care of this patient.  If you have any questions, please do not hesitate to contact me. [FreeTextEntry2] : Bernardo Tran MD

## 2019-12-12 NOTE — DATA REVIEWED
[FreeTextEntry1] : CT Scan performed on 11/14/2019 at [facility] (at [place]) demonstrated\par 3 mm left upper lobe pulmonary nodule slightly increased in size since May 23, 2019 an indeterminate finding. Differential diagnosis include but is not limited to metastatic disease. \par Thymic tissue within the anterior mediastinum appears to have minimally decreased in size since May 23, 2019 given differences in technique. \par

## 2019-12-12 NOTE — HISTORY OF PRESENT ILLNESS
[FreeTextEntry1] : Ms. Manley is a 29 year old female, never smoke with a history of left breast cancer s/p bilateral mastectomy and chemotherapy. Her past medical history is significant for anxiety, colitis, Hashimoto's thyroiditis, Genital Herpes, Hypothyroidism, abdominal mass, and lower back pain. During previous visit she was send for CT abdomen for lower back pain. On CT scan there was a incidental finding of a soft tissue mass in mediastinum.  \par \par Today she is here for follow up CT Scan surveillance. She reports shortness of breath whenever she get anxious. Denies any chest pain, dizziness, palpitations, syncopal episodes or lower extremity edema.

## 2019-12-14 NOTE — PATIENT PROFILE ADULT. - LAST BOWEL MOVEMENT
Problem: Patient Care Overview  Goal: Plan of Care Review  Outcome: Ongoing (interventions implemented as appropriate)  Flowsheets (Taken 12/14/2019 8558)  Progress: improving  Plan of Care Reviewed With: patient  Note:   VSS Pt resting comfortably in bed.  Antibiotic therapy, steroids, and Duo Nebs initiated.      [birth year]/29

## 2019-12-16 RX ORDER — MUPIROCIN 20 MG/G
1 OINTMENT TOPICAL
Qty: 0 | Refills: 0 | DISCHARGE
Start: 2019-12-16 | End: 2019-12-20

## 2019-12-17 ENCOUNTER — LABORATORY RESULT (OUTPATIENT)
Age: 29
End: 2019-12-17

## 2019-12-17 ENCOUNTER — APPOINTMENT (OUTPATIENT)
Dept: RHEUMATOLOGY | Facility: CLINIC | Age: 29
End: 2019-12-17
Payer: COMMERCIAL

## 2019-12-17 VITALS
OXYGEN SATURATION: 98 % | TEMPERATURE: 98.3 F | DIASTOLIC BLOOD PRESSURE: 82 MMHG | BODY MASS INDEX: 23.98 KG/M2 | WEIGHT: 127 LBS | HEART RATE: 91 BPM | HEIGHT: 61 IN | RESPIRATION RATE: 15 BRPM | SYSTOLIC BLOOD PRESSURE: 112 MMHG

## 2019-12-17 DIAGNOSIS — Z86.2 PERSONAL HISTORY OF DISEASES OF THE BLOOD AND BLOOD-FORMING ORGANS AND CERTAIN DISORDERS INVOLVING THE IMMUNE MECHANISM: ICD-10-CM

## 2019-12-17 DIAGNOSIS — Z86.39 PERSONAL HISTORY OF OTHER ENDOCRINE, NUTRITIONAL AND METABOLIC DISEASE: ICD-10-CM

## 2019-12-17 PROCEDURE — 99204 OFFICE O/P NEW MOD 45 MIN: CPT

## 2019-12-17 NOTE — HISTORY OF PRESENT ILLNESS
[FreeTextEntry1] : 29 year old female with PMH as listed below presents today for an initial evaluation for raynauds. \par \par Pt states she has hx raynauds to BL toes for >10 years now. Notes her toes to become cold, change color- white, blue, and numb with some tingling when exposes to cold temperature.  More recently has noted symptoms to be constant, last longer and getting worse. She wears warm clothing year round, warm socks, shoes with no improvement. Denies symptoms to hands, ears, nose, elsewhere\par \par In 2018 she was dx with Left breast CA, s/p double mastectomy and chemo and currently planned to undergoing reconstruction surgery. After chemo she states she was dx with Hashimoto disease. \par \par Notes to have photosensitivity. \par \par denies fever, chills, weight loss, weight gain, fatigue, malaise, denies dry eye,eye pain, eye redness, vision changes, dry mouth, oral ulcers, nasal congestion, difficulty swallowing,  denies ear pain, hearing changes, denies chest pain, chest palpitations, denies sob, denies nausea, vomiting, abdominal pain, diarrhea, constipation, blood in stool, denies dysuria, blood in the urine, denies joint pain, joint swelling, muscle pain, muscle weakness, denies rashes, hair loss, skin thickening, denies blood clots, denies weakness, syncope, falls, headaches, denies depression, anxiety, denies bruising easily\par \par

## 2019-12-17 NOTE — PHYSICAL EXAM
[General Appearance - Alert] : alert [General Appearance - Well Nourished] : well nourished [General Appearance - In No Acute Distress] : in no acute distress [Sclera] : the sclera and conjunctiva were normal [General Appearance - Well Developed] : well developed [Outer Ear] : the ears and nose were normal in appearance [PERRL With Normal Accommodation] : pupils were equal in size, round, and reactive to light [Examination Of The Oral Cavity] : the lips and gums were normal [Oropharynx] : the oropharynx was normal [Neck Appearance] : the appearance of the neck was normal [Auscultation Breath Sounds / Voice Sounds] : lungs were clear to auscultation bilaterally [Heart Rate And Rhythm] : heart rate was normal and rhythm regular [Heart Sounds] : normal S1 and S2 [Murmurs] : no murmurs [Heart Sounds Gallop] : no gallops [Heart Sounds Pericardial Friction Rub] : no pericardial rub [Bowel Sounds] : normal bowel sounds [Abdomen Soft] : soft [Abdomen Tenderness] : non-tender [Skin Lesions] : no skin lesions [] : no rash [No Focal Deficits] : no focal deficits [Oriented To Time, Place, And Person] : oriented to person, place, and time [No CVA Tenderness] : no ~M costovertebral angle tenderness [No Spinal Tenderness] : no spinal tenderness [Abnormal Walk] : normal gait [Nail Clubbing] : no clubbing  or cyanosis of the fingernails [Involuntary Movements] : no involuntary movements were seen [Musculoskeletal - Swelling] : no joint swelling seen [Motor Tone] : muscle strength and tone were normal [FreeTextEntry1] : +toes cold to touch BL

## 2019-12-17 NOTE — ASSESSMENT
[FreeTextEntry1] : 29 year old female presents today for an initial evaluation for raynauds. Has hx of chronic raynauds with symptoms to BL toes.  More recently has noted symptoms to be constant, last longer and getting worse. Denies symptoms to hands, ears, nose, elsewhere. Denies any occupational/environmental aggravating factors. Has hx of chemo for Left breast CA 1 year ago, but her symptoms occurred prior to chemo. Will do further w/o as below for underlying classic CTD/ systemic autoimmune disease. Her symptoms likely from primary raynauds. In the meantime she was instructed to keep core body temperature warm, wear warm protective clothing, socks, shoes. Can consider treatment with CCB in future if her BP allows. \par \par Discussed treatment plan with the patient. The patient was given the opportunity to ask questions and all questions were answered to their satisfaction.\par

## 2019-12-18 LAB
25(OH)D3 SERPL-MCNC: 45.6 NG/ML
ALBUMIN SERPL ELPH-MCNC: 5.2 G/DL
ALP BLD-CCNC: 64 U/L
ALT SERPL-CCNC: 15 U/L
ANION GAP SERPL CALC-SCNC: 15 MMOL/L
AST SERPL-CCNC: 21 U/L
BASOPHILS # BLD AUTO: 0.05 K/UL
BASOPHILS NFR BLD AUTO: 1.1 %
BILIRUB SERPL-MCNC: 0.5 MG/DL
BUN SERPL-MCNC: 9 MG/DL
C3 SERPL-MCNC: 121 MG/DL
CALCIUM SERPL-MCNC: 9.9 MG/DL
CENTROMERE IGG SER-ACNC: <0.2 CD:130001892
CHLORIDE SERPL-SCNC: 102 MMOL/L
CK SERPL-CCNC: 120 U/L
CO2 SERPL-SCNC: 21 MMOL/L
CREAT SERPL-MCNC: 0.67 MG/DL
CRP SERPL-MCNC: <0.1 MG/DL
DEPRECATED KAPPA LC FREE/LAMBDA SER: 1.13 RATIO
ENA RNP AB SER IA-ACNC: <0.2 AL
ENA RNP AB SER IA-ACNC: <0.2 AL
ENA SM AB SER IA-ACNC: <0.2 AL
ENA SS-A AB SER IA-ACNC: <0.2 AL
ENA SS-B AB SER IA-ACNC: <0.2 AL
EOSINOPHIL # BLD AUTO: 0.11 K/UL
EOSINOPHIL NFR BLD AUTO: 2.5 %
ERYTHROCYTE [SEDIMENTATION RATE] IN BLOOD BY WESTERGREN METHOD: 14 MM/HR
GLUCOSE SERPL-MCNC: 63 MG/DL
HAV IGM SER QL: NONREACTIVE
HBV CORE IGG+IGM SER QL: NONREACTIVE
HBV CORE IGM SER QL: NONREACTIVE
HBV SURFACE AB SER QL: REACTIVE
HBV SURFACE AG SER QL: NONREACTIVE
HCT VFR BLD CALC: 46.7 %
HCV AB SER QL: NONREACTIVE
HCV S/CO RATIO: 0.08 S/CO
HGB BLD-MCNC: 14.9 G/DL
IMM GRANULOCYTES NFR BLD AUTO: 0.2 %
KAPPA LC CSF-MCNC: 0.92 MG/DL
KAPPA LC SERPL-MCNC: 1.04 MG/DL
LYMPHOCYTES # BLD AUTO: 1.56 K/UL
LYMPHOCYTES NFR BLD AUTO: 35.9 %
MAN DIFF?: NORMAL
MCHC RBC-ENTMCNC: 31.1 PG
MCHC RBC-ENTMCNC: 31.9 GM/DL
MCV RBC AUTO: 97.5 FL
MONOCYTES # BLD AUTO: 0.32 K/UL
MONOCYTES NFR BLD AUTO: 7.4 %
NEUTROPHILS # BLD AUTO: 2.3 K/UL
NEUTROPHILS NFR BLD AUTO: 52.9 %
PLATELET # BLD AUTO: 197 K/UL
POTASSIUM SERPL-SCNC: 4.1 MMOL/L
PROT SERPL-MCNC: 7.6 G/DL
RBC # BLD: 4.79 M/UL
RBC # FLD: 12.7 %
RHEUMATOID FACT SER QL: <10 IU/ML
SODIUM SERPL-SCNC: 138 MMOL/L
TSH SERPL-ACNC: 1.15 UIU/ML
WBC # FLD AUTO: 4.35 K/UL

## 2019-12-19 RX ORDER — ONDANSETRON 8 MG/1
4 TABLET, FILM COATED ORAL ONCE
Refills: 0 | Status: DISCONTINUED | OUTPATIENT
Start: 2019-12-20 | End: 2019-12-20

## 2019-12-19 RX ORDER — MEPERIDINE HYDROCHLORIDE 50 MG/ML
12.5 INJECTION INTRAMUSCULAR; INTRAVENOUS; SUBCUTANEOUS
Refills: 0 | Status: DISCONTINUED | OUTPATIENT
Start: 2019-12-20 | End: 2019-12-20

## 2019-12-19 RX ORDER — FENTANYL CITRATE 50 UG/ML
50 INJECTION INTRAVENOUS
Refills: 0 | Status: DISCONTINUED | OUTPATIENT
Start: 2019-12-20 | End: 2019-12-20

## 2019-12-19 RX ORDER — SODIUM CHLORIDE 9 MG/ML
1000 INJECTION, SOLUTION INTRAVENOUS
Refills: 0 | Status: DISCONTINUED | OUTPATIENT
Start: 2019-12-20 | End: 2019-12-20

## 2019-12-20 ENCOUNTER — RESULT REVIEW (OUTPATIENT)
Age: 29
End: 2019-12-20

## 2019-12-20 ENCOUNTER — OUTPATIENT (OUTPATIENT)
Dept: INPATIENT UNIT | Facility: HOSPITAL | Age: 29
LOS: 1 days | Discharge: HOME | End: 2019-12-20
Payer: COMMERCIAL

## 2019-12-20 VITALS
DIASTOLIC BLOOD PRESSURE: 74 MMHG | HEART RATE: 96 BPM | OXYGEN SATURATION: 98 % | RESPIRATION RATE: 14 BRPM | TEMPERATURE: 98 F | SYSTOLIC BLOOD PRESSURE: 106 MMHG

## 2019-12-20 VITALS
HEIGHT: 61 IN | DIASTOLIC BLOOD PRESSURE: 77 MMHG | RESPIRATION RATE: 15 BRPM | SYSTOLIC BLOOD PRESSURE: 123 MMHG | TEMPERATURE: 98 F | OXYGEN SATURATION: 100 % | HEART RATE: 89 BPM | WEIGHT: 126.99 LBS

## 2019-12-20 DIAGNOSIS — F41.9 ANXIETY DISORDER, UNSPECIFIED: ICD-10-CM

## 2019-12-20 DIAGNOSIS — Z98.890 OTHER SPECIFIED POSTPROCEDURAL STATES: Chronic | ICD-10-CM

## 2019-12-20 DIAGNOSIS — N65.0 DEFORMITY OF RECONSTRUCTED BREAST: ICD-10-CM

## 2019-12-20 DIAGNOSIS — Z85.3 PERSONAL HISTORY OF MALIGNANT NEOPLASM OF BREAST: ICD-10-CM

## 2019-12-20 DIAGNOSIS — J45.909 UNSPECIFIED ASTHMA, UNCOMPLICATED: ICD-10-CM

## 2019-12-20 DIAGNOSIS — T85.848A PAIN DUE TO OTHER INTERNAL PROSTHETIC DEVICES, IMPLANTS AND GRAFTS, INITIAL ENCOUNTER: ICD-10-CM

## 2019-12-20 DIAGNOSIS — K58.9 IRRITABLE BOWEL SYNDROME WITHOUT DIARRHEA: ICD-10-CM
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
## 2019-12-20 DIAGNOSIS — K21.9 GASTRO-ESOPHAGEAL REFLUX DISEASE WITHOUT ESOPHAGITIS: ICD-10-CM
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
## 2019-12-20 DIAGNOSIS — N64.4 MASTODYNIA: ICD-10-CM

## 2019-12-20 DIAGNOSIS — Z98.89 OTHER SPECIFIED POSTPROCEDURAL STATES: Chronic | ICD-10-CM

## 2019-12-20 DIAGNOSIS — L90.5 SCAR CONDITIONS AND FIBROSIS OF SKIN: ICD-10-CM

## 2019-12-20 DIAGNOSIS — Z91.013 ALLERGY TO SEAFOOD: ICD-10-CM

## 2019-12-20 DIAGNOSIS — E03.9 HYPOTHYROIDISM, UNSPECIFIED: ICD-10-CM

## 2019-12-20 DIAGNOSIS — Z90.10 ACQUIRED ABSENCE OF UNSPECIFIED BREAST AND NIPPLE: ICD-10-CM

## 2019-12-20 DIAGNOSIS — M51.26 OTHER INTERVERTEBRAL DISC DISPLACEMENT, LUMBAR REGION: ICD-10-CM

## 2019-12-20 DIAGNOSIS — Z90.13 ACQUIRED ABSENCE OF BILATERAL BREASTS AND NIPPLES: ICD-10-CM

## 2019-12-20 DIAGNOSIS — M54.31 SCIATICA, RIGHT SIDE: ICD-10-CM

## 2019-12-20 LAB — HCG SERPL-ACNC: <1 MIU/ML — SIGNIFICANT CHANGE UP

## 2019-12-20 PROCEDURE — C1789: CPT

## 2019-12-20 PROCEDURE — 84702 CHORIONIC GONADOTROPIN TEST: CPT

## 2019-12-20 PROCEDURE — 81025 URINE PREGNANCY TEST: CPT

## 2019-12-20 PROCEDURE — 88304 TISSUE EXAM BY PATHOLOGIST: CPT | Mod: 26

## 2019-12-20 PROCEDURE — 36415 COLL VENOUS BLD VENIPUNCTURE: CPT

## 2019-12-20 PROCEDURE — 88300 SURGICAL PATH GROSS: CPT

## 2019-12-20 PROCEDURE — 88300 SURGICAL PATH GROSS: CPT | Mod: 26,59

## 2019-12-20 PROCEDURE — 88304 TISSUE EXAM BY PATHOLOGIST: CPT

## 2019-12-20 RX ORDER — ONDANSETRON 8 MG/1
4 TABLET, FILM COATED ORAL EVERY 6 HOURS
Refills: 0 | Status: DISCONTINUED | OUTPATIENT
Start: 2019-12-20 | End: 2019-12-20

## 2019-12-20 RX ORDER — OXYCODONE AND ACETAMINOPHEN 5; 325 MG/1; MG/1
1 TABLET ORAL EVERY 4 HOURS
Refills: 0 | Status: DISCONTINUED | OUTPATIENT
Start: 2019-12-20 | End: 2019-12-20

## 2019-12-20 RX ORDER — OXYCODONE AND ACETAMINOPHEN 5; 325 MG/1; MG/1
2 TABLET ORAL EVERY 6 HOURS
Refills: 0 | Status: DISCONTINUED | OUTPATIENT
Start: 2019-12-20 | End: 2019-12-20

## 2019-12-20 RX ORDER — OXYCODONE HYDROCHLORIDE 5 MG/1
5 TABLET ORAL ONCE
Refills: 0 | Status: DISCONTINUED | OUTPATIENT
Start: 2019-12-20 | End: 2019-12-20

## 2019-12-20 RX ORDER — MORPHINE SULFATE 50 MG/1
4 CAPSULE, EXTENDED RELEASE ORAL EVERY 4 HOURS
Refills: 0 | Status: DISCONTINUED | OUTPATIENT
Start: 2019-12-20 | End: 2019-12-20

## 2019-12-20 RX ADMIN — FENTANYL CITRATE 50 MICROGRAM(S): 50 INJECTION INTRAVENOUS at 10:19

## 2019-12-20 RX ADMIN — SODIUM CHLORIDE 75 MILLILITER(S): 9 INJECTION, SOLUTION INTRAVENOUS at 10:05

## 2019-12-20 RX ADMIN — FENTANYL CITRATE 50 MICROGRAM(S): 50 INJECTION INTRAVENOUS at 10:23

## 2019-12-20 RX ADMIN — OXYCODONE HYDROCHLORIDE 5 MILLIGRAM(S): 5 TABLET ORAL at 10:05

## 2019-12-20 NOTE — ASU DISCHARGE PLAN (ADULT/PEDIATRIC) - CALL YOUR DOCTOR IF YOU HAVE ANY OF THE FOLLOWING:
Wound/Surgical Site with redness, or foul smelling discharge or pus/Nausea and vomiting that does not stop/Unable to urinate/Bleeding that does not stop/Swelling that gets worse/Pain not relieved by Medications/Fever greater than (need to indicate Fahrenheit or Celsius)

## 2019-12-20 NOTE — H&P ADULT - PROBLEM SELECTOR PLAN 2
Elective breast surgery today  OOB to chair   Pain management   advance diet as tolerated Dc to home today when meeting ASU criteria

## 2019-12-20 NOTE — H&P ADULT - NSICDXPASTMEDICALHX_GEN_ALL_CORE_FT
PAST MEDICAL HISTORY:  Anxiety     Asthma     Back pain     Breast cancer in female left breast    Breast pain, left     H/O thyroid disease post chemo Hoshimotos    Hemorrhoids     IBS (irritable bowel syndrome)     Lumbar disc herniation     Migraine     Sciatic nerve disease, right

## 2019-12-20 NOTE — ASU DISCHARGE PLAN (ADULT/PEDIATRIC) - ASU DC SPECIAL INSTRUCTIONSFT
You should follow-up in the office in 1 week, please call for follow-up appointment if you have not already made one. [phone].   Prescription pain medications have been prescribed for you, take as needed for pain only. Do not drive a vehicle or operate machinery while taking narcotic pain medication.   Ambulating is very important post operatively, make sure you ambulate several times daily.   If you experience bleeding that does not stop, swelling, hardness of surgical site, chest pain, shortness of breath, leg pain, dizziness or any signs or symptoms of infection such as fever, redness, pus, foul smell of surgical site please contact the office immediately, [phone]. You should follow-up in the office on 12/27/19, please call for follow-up appointment if you have not already made one. [phone] with Christina Hickman NP.  Prescription pain medications have been prescribed for you, take as needed for pain only. Do not drive a vehicle or operate machinery while taking narcotic pain medication.   Ambulating is very important post operatively, make sure you ambulate several times daily.   If you experience bleeding that does not stop, swelling, hardness of surgical site, chest pain, shortness of breath, leg pain, dizziness or any signs or symptoms of infection such as fever, redness, pus, foul smell of surgical site please contact the office immediately, [phone].

## 2019-12-20 NOTE — H&P ADULT - NSHPPHYSICALEXAM_GEN_ALL_CORE
Neuro: A+Ox 4 kent C/O left breast pain and tenderness  Resp: CTA A/P  CV: RRR S1 S2  Breast: Intact silicone implant B/L, Palpable band on left breast   Abd: Soft non tender +BS

## 2019-12-20 NOTE — H&P ADULT - HISTORY OF PRESENT ILLNESS
Pt comes to the office with c/o left breast pain and tenderness.  Scheduled for a remove and replace left breast implant with pocket revision.

## 2019-12-20 NOTE — ASU DISCHARGE PLAN (ADULT/PEDIATRIC) - CARE PROVIDER_API CALL
Salvtaore Falcon)  Surgery  [address] [place] [address]  Phone: [phone]  Fax: [phone]  Follow Up Time:

## 2019-12-20 NOTE — ASU PATIENT PROFILE, ADULT - PMH
Anxiety    Asthma    Back pain    Breast cancer in female  left breast  Breast pain, left    H/O thyroid disease  post chemo Hoshimotos  Hemorrhoids    IBS (irritable bowel syndrome)    Lumbar disc herniation    Migraine    Sciatic nerve disease, right

## 2019-12-20 NOTE — H&P ADULT - NSICDXPASTSURGICALHX_GEN_ALL_CORE_FT
PAST SURGICAL HISTORY:  H/O left breast biopsy & lymph node biopsy 2018    History of tonsillectomy 2008    History of vascular access device     S/P appendectomy 2004    S/P breast reconstruction, bilateral post mastectomy

## 2019-12-20 NOTE — ASU PATIENT PROFILE, ADULT - PSH
H/O left breast biopsy  & lymph node biopsy 2018  History of tonsillectomy  2008  History of vascular access device    S/P appendectomy  2004  S/P breast reconstruction, bilateral  post mastectomy

## 2019-12-20 NOTE — BRIEF OPERATIVE NOTE - OPERATION/FINDINGS
Remove and replace Left breast silicone implant implants with pocket revision and bilateral scar revision.

## 2019-12-24 LAB
ANA SER IF-ACNC: NEGATIVE
CCP AB SER IA-ACNC: <8 UNITS
DSDNA AB SER-ACNC: <12 IU/ML
HBV E AB SER QL: NEGATIVE
HBV E AG SER QL: NEGATIVE
HISTONE AB SER QL: 0.7 UNITS
IGA 24H UR QL IFE: NORMAL
M PROTEIN SPEC IFE-MCNC: NORMAL
RF+CCP IGG SER-IMP: NEGATIVE
THYROGLOB AB SERPL-ACNC: <20 IU/ML
THYROPEROXIDASE AB SERPL IA-ACNC: 51.8 IU/ML

## 2019-12-30 ENCOUNTER — RX RENEWAL (OUTPATIENT)
Age: 29
End: 2019-12-30

## 2019-12-31 ENCOUNTER — RX CHANGE (OUTPATIENT)
Age: 29
End: 2019-12-31

## 2019-12-31 RX ORDER — DULOXETINE HYDROCHLORIDE 60 MG/1
60 CAPSULE, DELAYED RELEASE PELLETS ORAL
Qty: 30 | Refills: 0 | Status: DISCONTINUED | COMMUNITY
Start: 2019-10-28 | End: 2019-12-31

## 2020-01-02 ENCOUNTER — OUTPATIENT (OUTPATIENT)
Dept: OUTPATIENT SERVICES | Facility: HOSPITAL | Age: 30
LOS: 1 days | Discharge: HOME | End: 2020-01-02

## 2020-01-02 DIAGNOSIS — Z98.89 OTHER SPECIFIED POSTPROCEDURAL STATES: Chronic | ICD-10-CM

## 2020-01-02 DIAGNOSIS — Z98.890 OTHER SPECIFIED POSTPROCEDURAL STATES: Chronic | ICD-10-CM

## 2020-01-02 DIAGNOSIS — C50.412 MALIGNANT NEOPLASM OF UPPER-OUTER QUADRANT OF LEFT FEMALE BREAST: ICD-10-CM

## 2020-01-02 PROBLEM — Z86.39 PERSONAL HISTORY OF OTHER ENDOCRINE, NUTRITIONAL AND METABOLIC DISEASE: Chronic | Status: ACTIVE | Noted: 2019-12-20

## 2020-01-07 ENCOUNTER — APPOINTMENT (OUTPATIENT)
Dept: HEMATOLOGY ONCOLOGY | Facility: CLINIC | Age: 30
End: 2020-01-07
Payer: COMMERCIAL

## 2020-01-07 ENCOUNTER — APPOINTMENT (OUTPATIENT)
Age: 30
End: 2020-01-07

## 2020-01-07 ENCOUNTER — APPOINTMENT (OUTPATIENT)
Dept: RHEUMATOLOGY | Facility: CLINIC | Age: 30
End: 2020-01-07
Payer: COMMERCIAL

## 2020-01-07 VITALS
HEART RATE: 88 BPM | TEMPERATURE: 98.5 F | HEIGHT: 61 IN | DIASTOLIC BLOOD PRESSURE: 72 MMHG | OXYGEN SATURATION: 99 % | BODY MASS INDEX: 23.67 KG/M2 | SYSTOLIC BLOOD PRESSURE: 114 MMHG | RESPIRATION RATE: 15 BRPM | WEIGHT: 125.38 LBS

## 2020-01-07 VITALS
TEMPERATURE: 97.5 F | OXYGEN SATURATION: 100 % | DIASTOLIC BLOOD PRESSURE: 85 MMHG | WEIGHT: 126 LBS | SYSTOLIC BLOOD PRESSURE: 124 MMHG | HEIGHT: 61 IN | HEART RATE: 87 BPM | BODY MASS INDEX: 23.79 KG/M2

## 2020-01-07 DIAGNOSIS — J98.59 OTHER DISEASES OF MEDIASTINUM, NOT ELSEWHERE CLASSIFIED: ICD-10-CM

## 2020-01-07 PROCEDURE — 99214 OFFICE O/P EST MOD 30 MIN: CPT

## 2020-01-07 PROCEDURE — 99213 OFFICE O/P EST LOW 20 MIN: CPT

## 2020-01-07 RX ORDER — DULOXETINE HYDROCHLORIDE 60 MG/1
60 CAPSULE, DELAYED RELEASE PELLETS ORAL
Qty: 90 | Refills: 1 | Status: DISCONTINUED | COMMUNITY
Start: 2019-12-31 | End: 2020-01-07

## 2020-01-07 RX ORDER — DULOXETINE HYDROCHLORIDE 30 MG/1
30 CAPSULE, DELAYED RELEASE PELLETS ORAL
Qty: 30 | Refills: 0 | Status: DISCONTINUED | COMMUNITY
Start: 2019-08-26 | End: 2020-01-07

## 2020-01-07 RX ORDER — LEVOTHYROXINE SODIUM 200 MCG
VIAL (EA) INTRAVENOUS
Refills: 0 | Status: DISCONTINUED | COMMUNITY
End: 2020-01-07

## 2020-01-07 NOTE — PHYSICAL EXAM
[General Appearance - Alert] : alert [General Appearance - In No Acute Distress] : in no acute distress [General Appearance - Well Nourished] : well nourished [PERRL With Normal Accommodation] : pupils were equal in size, round, and reactive to light [General Appearance - Well Developed] : well developed [Sclera] : the sclera and conjunctiva were normal [Outer Ear] : the ears and nose were normal in appearance [Examination Of The Oral Cavity] : the lips and gums were normal [Oropharynx] : the oropharynx was normal [Neck Appearance] : the appearance of the neck was normal [Auscultation Breath Sounds / Voice Sounds] : lungs were clear to auscultation bilaterally [Heart Rate And Rhythm] : heart rate was normal and rhythm regular [Heart Sounds] : normal S1 and S2 [Heart Sounds Gallop] : no gallops [Heart Sounds Pericardial Friction Rub] : no pericardial rub [Murmurs] : no murmurs [Abdomen Soft] : soft [Bowel Sounds] : normal bowel sounds [Abdomen Tenderness] : non-tender [No CVA Tenderness] : no ~M costovertebral angle tenderness [No Spinal Tenderness] : no spinal tenderness [Abnormal Walk] : normal gait [Involuntary Movements] : no involuntary movements were seen [Nail Clubbing] : no clubbing  or cyanosis of the fingernails [Musculoskeletal - Swelling] : no joint swelling seen [Motor Tone] : muscle strength and tone were normal [Skin Lesions] : no skin lesions [] : no rash [No Focal Deficits] : no focal deficits [Oriented To Time, Place, And Person] : oriented to person, place, and time [FreeTextEntry1] : +toes cold to touch BL

## 2020-01-07 NOTE — PHYSICAL EXAM
[Ambulatory and capable of all self care but unable to carry out any work activities] : Status 2- Ambulatory and capable of all self care but unable to carry out any work activities. Up and about more than 50% of waking hours [Normal] : full range of motion and no deformities appreciated [de-identified] : +focal tenderness to palpation of right orbit where eyebrow begins, no frontal sinus tenderness [de-identified] : supple [de-identified] : clear [de-identified] : S1/S2 [de-identified] : no edema [de-identified] : bilateral reconstructed breasts [de-identified] : soft, non tender

## 2020-01-07 NOTE — HISTORY OF PRESENT ILLNESS
[Disease: _____________________] : Disease: [unfilled] [T: ___] : T[unfilled] [N: ___] : N[unfilled] [AJCC Stage: ____] : AJCC Stage: [unfilled] [de-identified] : Ms. Kennedy was diagnosed with left triple negative breast cancer at age 28. \par \par She delivered her first child on 4/25/18 following which she self palpated a left breast mass.  It was  thought to be a blocked milk duct / mastitis and was treated with antibiotics and then antifungals.  The mass did not resolve and she sought a 2nd opinion.  \par \par She then had a breast ultrasound on 5/30/18 which showed a 2.3 cm left breast mass at 1-2:00, 9 cm from the nipple, and a single axillary lymph node which does not contain a prominent benign appearing fatty hilum.  \par \par 5/31/18 left breast core biopsy - invasive poorly differentiated ductal carcinoma, Elmo score 9/9, measures at least 17 cm w/ extensive necrosis. ER 0%, RI 0%, HER 2 (0/1+) -negative. \par \par On 6/7/18 she had a diagnostic mammogram + ultrasound - 2.1 cm rounded mass of left breast with overall coarsening of parenchymal pattern and increased parenchymal density in upper outer left breast. US - 2.4 cm left breast mass at 1-2:00 and left axilla 0.8 cm rounded hypoechoic mass consistent with abnormal left axillary node.  \par \par On 6/7/18 she also had a breast MRI - 2.9 x 3.2 x 3.1 cm mass in left upper inner breast at 1:00.  Suspicious left axillary nodes with ultrasound correlate for which US guided biopsy is recommended.   \par \par On 6/8/17 she had biopsy of the left axillary node - pathology : reactive lymph node. \par \par 6/7/18 - CT chest/abd/pelvis -  Known left upper outer breast malignancy. 2 small, round left axillary lymph nodes for which patient will undergo percutaneous \par sampling, as per report of breast MRI from 6/7/2018. A 3 mm subpleural nodular density in the right middle lobe probably represents a focus of atelectasis. No evidence of metastatic disease in the chest, abdomen, and pelvis.\par \par 6/8/18 bone scan - Normal bone scan. No radionuclide evidence of osseous metastasis.\par \par 6/29/18 -s/p bilateral mastectomy \par Pathology: left breast IDC 3.5 cm, joo score 9/9, margins negative, 2 SNL nodes negative.  pT2 pN0\par Right mastectomy - negative for malignancy. \par \par Family History: 2 maternal great aunts with breast cancer.\par paternal cousin with breast cancer\par paternal GM - pancreatic cancer\par paternal GF - pancreatic cancer\par \par MYRIAD  NETpeasSK panel - no clinically significant mutations\par \par PMH: asthma, DPD deficiency (dihydropyrimidine dehydrogenase deficiency).\par \par Sx hx: tonsillectomy\par \par Social: non-smoker, social ETOH  [de-identified] : ER 0% [place] 0% HER2 negative [de-identified] : poorly differentiated invasive ductal carcinoma [de-identified] : Pt returns for f/u. \par She has been following with Dr. Linton for Raynaud's. States her feet are purple and they hurt more when she's active/after exercising. \par C/o of joint pain especially in her knees. She also has persistent pain in her right lower back and states that she has no feeling in her left thumb. \par Says she always feels a pressure under her right eyebrow and behind her right eye. Vision in her right eye sometimes get blurry and feels strained. \par No N/V/D. No cough. No nasal discharge.  She has steadily lost weight due to exercise. \par \par 11/14/19 CT Chest: \par 3 mm left upper lobe pulmonary nodule slightly increased in size since May 23, 2019, an indeterminate finding. \par Differential diagnosis include but is not limited to metastatic disease. \par Thymic tissue within the anterior mediastinum appears to have minimally decreased in size since May 23, 2019 given differences in technique. \par \par 11/26/19 MRI abd - no significant findings

## 2020-01-07 NOTE — REVIEW OF SYSTEMS
[Vision Problems] : vision problems [Joint Pain] : joint pain [Negative] : Genitourinary [FreeTextEntry9] : +

## 2020-01-07 NOTE — ASSESSMENT
[FreeTextEntry1] : 29 year old female presents today for an f.u visit for raynauds. Has hx of chronic raynauds with symptoms to BL toes.  More recently has noted symptoms to be constant, last longer and getting worse. Denies symptoms to hands, ears, nose, elsewhere. Denies any occupational/environmental aggravating factors. Has hx of chemo for Left breast CA 1 year ago, but her symptoms occurred prior to chemo. Labs unremarkable. Her symptoms likely from primary raynauds. She was instructed to keep core body temperature warm, wear warm protective clothing, socks, shoes. Can consider treatment with CCB in future if her BP allows. (pt would not like start any medications at this time as she is trying to conceive)\par \par Discussed treatment plan with the patient. The patient was given the opportunity to ask questions and all questions were answered to their satisfaction.\par

## 2020-01-07 NOTE — HISTORY OF PRESENT ILLNESS
[FreeTextEntry1] : Pt presenting today for a f.u visit for raynauds. Has symptoms to  BL toes.   Denies symptoms to hands, ears, nose, elsewhere. Denies any occupational/environmental aggravating factors.  Labs unremarkable. \par Denies fever, chills, CP, sob, abd pain, rashes. ROS otherwise unchanged from prior.

## 2020-01-07 NOTE — RESULTS/DATA
[FreeTextEntry1] : EXAM: CT CHEST IC \par \par PROCEDURE DATE: 11/15/2018 \par \par INTERPRETATION: EXAMINATION: CT CHEST IC \par \par CLINICAL INDICATION: Left breast cancer. \par \par TECHNIQUE: CT of the chest was obtained after the administration of 50 ml of \par [id number]. \par \par COMPARISON: None. \par \par FINDINGS: \par \par AIRWAYS AND LUNGS: The central tracheobronchial tree is patent. The lungs \par are clear. \par \par MEDIASTINUM AND PLEURA: There are no enlarged mediastinal, hilar or axillary \par lymph nodes. The visualized portion of the thyroid gland is unremarkable. \par There is no pleural effusion. There is no pneumothorax. \par \par HEART AND VESSELS: The heart is normal in size. There are no \par atherosclerotic calcifications of the aorta. There is no pericardial \par effusion. \par \par UPPER ABDOMEN: Images of the upper abdomen demonstrate no abnormality. \par \par BONES AND SOFT TISSUES: The bones are unremarkable. Bilateral mastectomy. \par \par TUBES/LINES: None. \par \par IMPRESSION: \par Clear lungs. \par \par \par \par

## 2020-01-14 ENCOUNTER — APPOINTMENT (OUTPATIENT)
Dept: HEMATOLOGY ONCOLOGY | Facility: CLINIC | Age: 30
End: 2020-01-14

## 2020-01-27 ENCOUNTER — APPOINTMENT (OUTPATIENT)
Dept: FAMILY MEDICINE | Facility: CLINIC | Age: 30
End: 2020-01-27
Payer: COMMERCIAL

## 2020-01-27 VITALS
TEMPERATURE: 98.4 F | BODY MASS INDEX: 24.92 KG/M2 | DIASTOLIC BLOOD PRESSURE: 68 MMHG | HEART RATE: 92 BPM | HEIGHT: 61 IN | WEIGHT: 132 LBS | OXYGEN SATURATION: 98 % | SYSTOLIC BLOOD PRESSURE: 110 MMHG

## 2020-01-27 PROCEDURE — 87804 INFLUENZA ASSAY W/OPTIC: CPT | Mod: 59,QW

## 2020-01-27 PROCEDURE — 87880 STREP A ASSAY W/OPTIC: CPT | Mod: QW

## 2020-01-27 PROCEDURE — 99214 OFFICE O/P EST MOD 30 MIN: CPT | Mod: 25

## 2020-02-11 ENCOUNTER — APPOINTMENT (OUTPATIENT)
Dept: FAMILY MEDICINE | Facility: CLINIC | Age: 30
End: 2020-02-11
Payer: COMMERCIAL

## 2020-02-11 ENCOUNTER — OUTPATIENT (OUTPATIENT)
Dept: OUTPATIENT SERVICES | Facility: HOSPITAL | Age: 30
LOS: 1 days | End: 2020-02-11
Payer: COMMERCIAL

## 2020-02-11 ENCOUNTER — APPOINTMENT (OUTPATIENT)
Dept: ULTRASOUND IMAGING | Facility: CLINIC | Age: 30
End: 2020-02-11
Payer: COMMERCIAL

## 2020-02-11 VITALS
WEIGHT: 130 LBS | HEART RATE: 71 BPM | OXYGEN SATURATION: 98 % | DIASTOLIC BLOOD PRESSURE: 72 MMHG | HEIGHT: 61 IN | BODY MASS INDEX: 24.55 KG/M2 | TEMPERATURE: 97.7 F | SYSTOLIC BLOOD PRESSURE: 104 MMHG

## 2020-02-11 DIAGNOSIS — Z98.89 OTHER SPECIFIED POSTPROCEDURAL STATES: Chronic | ICD-10-CM

## 2020-02-11 DIAGNOSIS — R10.9 UNSPECIFIED ABDOMINAL PAIN: ICD-10-CM

## 2020-02-11 DIAGNOSIS — Z00.8 ENCOUNTER FOR OTHER GENERAL EXAMINATION: ICD-10-CM

## 2020-02-11 DIAGNOSIS — Z98.890 OTHER SPECIFIED POSTPROCEDURAL STATES: Chronic | ICD-10-CM

## 2020-02-11 PROCEDURE — 99214 OFFICE O/P EST MOD 30 MIN: CPT

## 2020-02-11 PROCEDURE — 76700 US EXAM ABDOM COMPLETE: CPT

## 2020-02-11 PROCEDURE — 76700 US EXAM ABDOM COMPLETE: CPT | Mod: 26

## 2020-02-17 ENCOUNTER — OUTPATIENT (OUTPATIENT)
Dept: OUTPATIENT SERVICES | Facility: HOSPITAL | Age: 30
LOS: 1 days | Discharge: HOME | End: 2020-02-17

## 2020-02-17 DIAGNOSIS — Z98.890 OTHER SPECIFIED POSTPROCEDURAL STATES: Chronic | ICD-10-CM

## 2020-02-17 DIAGNOSIS — Z98.89 OTHER SPECIFIED POSTPROCEDURAL STATES: Chronic | ICD-10-CM

## 2020-02-17 DIAGNOSIS — C50.412 MALIGNANT NEOPLASM OF UPPER-OUTER QUADRANT OF LEFT FEMALE BREAST: ICD-10-CM

## 2020-02-21 ENCOUNTER — APPOINTMENT (OUTPATIENT)
Dept: HEMATOLOGY ONCOLOGY | Facility: CLINIC | Age: 30
End: 2020-02-21

## 2020-02-21 ENCOUNTER — APPOINTMENT (OUTPATIENT)
Dept: OBGYN | Facility: CLINIC | Age: 30
End: 2020-02-21
Payer: COMMERCIAL

## 2020-02-21 ENCOUNTER — RESULT CHARGE (OUTPATIENT)
Age: 30
End: 2020-02-21

## 2020-02-21 VITALS
WEIGHT: 131 LBS | SYSTOLIC BLOOD PRESSURE: 110 MMHG | DIASTOLIC BLOOD PRESSURE: 84 MMHG | HEIGHT: 61 IN | BODY MASS INDEX: 24.73 KG/M2

## 2020-02-21 LAB
HCG UR QL: NEGATIVE
QUALITY CONTROL: YES

## 2020-02-21 PROCEDURE — 81025 URINE PREGNANCY TEST: CPT

## 2020-02-21 PROCEDURE — 99213 OFFICE O/P EST LOW 20 MIN: CPT

## 2020-02-22 NOTE — DISCUSSION/SUMMARY
[FreeTextEntry1] : Normal pelvic exam today.\par \par Check pelvic US- if normal, monitor bleeding patterns.  If bleeding returns to normal/monthly will continue to monitor, if irregular bleeding persists, will check endometrial biopsy.\par \par Annual due June/July.\par \par Her port is coming out late dec 2020

## 2020-02-22 NOTE — PHYSICAL EXAM
[Awake] : awake [Alert] : alert [Soft] : soft [Oriented x3] : oriented to person, place, and time [Vulvar Atrophy] : vulvar atrophy [Normal] : uterus [No Bleeding] : there was no active vaginal bleeding [Uterine Adnexae] : were not tender and not enlarged [Tender] : non tender [Distended] : not distended [Motion Tenderness] : there was no cervical motion tenderness [Tenderness] : nontender [Enlarged ___ wks] : not enlarged [Mass ___ cm] : no uterine mass was palpated

## 2020-02-22 NOTE — HISTORY OF PRESENT ILLNESS
[Definite:  ___ (Date)] : the last menstrual period was [unfilled] [Menarche Age: ____] : age at menarche was [unfilled] [Sexually Active] : is sexually active [Monogamous] : is monogamous [Contraception] : does not use contraception

## 2020-02-24 ENCOUNTER — FORM ENCOUNTER (OUTPATIENT)
Age: 30
End: 2020-02-24

## 2020-02-25 ENCOUNTER — APPOINTMENT (OUTPATIENT)
Dept: ULTRASOUND IMAGING | Facility: CLINIC | Age: 30
End: 2020-02-25
Payer: COMMERCIAL

## 2020-02-25 ENCOUNTER — APPOINTMENT (OUTPATIENT)
Age: 30
End: 2020-02-25

## 2020-02-25 ENCOUNTER — OUTPATIENT (OUTPATIENT)
Dept: OUTPATIENT SERVICES | Facility: HOSPITAL | Age: 30
LOS: 1 days | End: 2020-02-25
Payer: COMMERCIAL

## 2020-02-25 DIAGNOSIS — Z98.89 OTHER SPECIFIED POSTPROCEDURAL STATES: Chronic | ICD-10-CM

## 2020-02-25 DIAGNOSIS — Z98.890 OTHER SPECIFIED POSTPROCEDURAL STATES: Chronic | ICD-10-CM

## 2020-02-25 DIAGNOSIS — C50.919 MALIGNANT NEOPLASM OF UNSPECIFIED SITE OF UNSPECIFIED FEMALE BREAST: ICD-10-CM

## 2020-02-25 DIAGNOSIS — D49.3 NEOPLASM OF UNSPECIFIED BEHAVIOR OF BREAST: ICD-10-CM

## 2020-02-25 DIAGNOSIS — N64.4 MASTODYNIA: ICD-10-CM

## 2020-02-25 DIAGNOSIS — Z90.10 ACQUIRED ABSENCE OF UNSPECIFIED BREAST AND NIPPLE: ICD-10-CM

## 2020-02-25 DIAGNOSIS — R11.10 VOMITING, UNSPECIFIED: ICD-10-CM

## 2020-02-25 PROCEDURE — 76830 TRANSVAGINAL US NON-OB: CPT

## 2020-02-25 PROCEDURE — 76642 ULTRASOUND BREAST LIMITED: CPT | Mod: 26,LT

## 2020-02-25 PROCEDURE — 76830 TRANSVAGINAL US NON-OB: CPT | Mod: 26

## 2020-02-25 PROCEDURE — 76642 ULTRASOUND BREAST LIMITED: CPT

## 2020-03-12 ENCOUNTER — FORM ENCOUNTER (OUTPATIENT)
Age: 30
End: 2020-03-12

## 2020-03-13 ENCOUNTER — APPOINTMENT (OUTPATIENT)
Dept: ULTRASOUND IMAGING | Facility: CLINIC | Age: 30
End: 2020-03-13
Payer: COMMERCIAL

## 2020-03-13 ENCOUNTER — OUTPATIENT (OUTPATIENT)
Dept: OUTPATIENT SERVICES | Facility: HOSPITAL | Age: 30
LOS: 1 days | End: 2020-03-13
Payer: COMMERCIAL

## 2020-03-13 DIAGNOSIS — N83.201 UNSPECIFIED OVARIAN CYST, RIGHT SIDE: ICD-10-CM

## 2020-03-13 DIAGNOSIS — Z98.89 OTHER SPECIFIED POSTPROCEDURAL STATES: Chronic | ICD-10-CM

## 2020-03-13 DIAGNOSIS — Z98.890 OTHER SPECIFIED POSTPROCEDURAL STATES: Chronic | ICD-10-CM

## 2020-03-13 PROCEDURE — 76830 TRANSVAGINAL US NON-OB: CPT | Mod: 26

## 2020-03-13 PROCEDURE — 76830 TRANSVAGINAL US NON-OB: CPT

## 2020-04-01 ENCOUNTER — APPOINTMENT (OUTPATIENT)
Dept: ENDOCRINOLOGY | Facility: CLINIC | Age: 30
End: 2020-04-01
Payer: COMMERCIAL

## 2020-04-01 PROCEDURE — G2012 BRIEF CHECK IN BY MD/QHP: CPT

## 2020-04-14 ENCOUNTER — APPOINTMENT (OUTPATIENT)
Dept: FAMILY MEDICINE | Facility: CLINIC | Age: 30
End: 2020-04-14

## 2020-04-14 ENCOUNTER — APPOINTMENT (OUTPATIENT)
Dept: SURGERY | Facility: CLINIC | Age: 30
End: 2020-04-14

## 2020-05-05 ENCOUNTER — OUTPATIENT (OUTPATIENT)
Dept: OUTPATIENT SERVICES | Facility: HOSPITAL | Age: 30
LOS: 1 days | Discharge: HOME | End: 2020-05-05

## 2020-05-05 DIAGNOSIS — Z98.890 OTHER SPECIFIED POSTPROCEDURAL STATES: Chronic | ICD-10-CM

## 2020-05-05 DIAGNOSIS — Z98.89 OTHER SPECIFIED POSTPROCEDURAL STATES: Chronic | ICD-10-CM

## 2020-05-05 DIAGNOSIS — C50.412 MALIGNANT NEOPLASM OF UPPER-OUTER QUADRANT OF LEFT FEMALE BREAST: ICD-10-CM

## 2020-05-11 ENCOUNTER — APPOINTMENT (OUTPATIENT)
Dept: HEMATOLOGY ONCOLOGY | Facility: CLINIC | Age: 30
End: 2020-05-11
Payer: COMMERCIAL

## 2020-05-11 PROCEDURE — 99214 OFFICE O/P EST MOD 30 MIN: CPT | Mod: 95

## 2020-05-11 NOTE — PHYSICAL EXAM
[Ambulatory and capable of all self care but unable to carry out any work activities] : Status 2- Ambulatory and capable of all self care but unable to carry out any work activities. Up and about more than 50% of waking hours [Normal] : well developed, well nourished, in no acute distress

## 2020-05-11 NOTE — RESULTS/DATA
[FreeTextEntry1] : EXAM: CT CHEST IC \par PROCEDURE DATE: 11/15/2018\par Clear lungs. \par \par \par \par 11/14/19 CT Chest: \par 3 mm left upper lobe pulmonary nodule slightly increased in size since May 23, 2019, an indeterminate finding. \par Differential diagnosis include but is not limited to metastatic disease. \par Thymic tissue within the anterior mediastinum appears to have minimally decreased in size since May 23, 2019 given differences in technique. \par \par 11/26/19 MRI abd - no significant findings

## 2020-05-11 NOTE — HISTORY OF PRESENT ILLNESS
[Disease: _____________________] : Disease: [unfilled] [T: ___] : T[unfilled] [N: ___] : N[unfilled] [AJCC Stage: ____] : AJCC Stage: [unfilled] [de-identified] : Ms. Kenendy was diagnosed with left triple negative breast cancer at age 28. \par \par She delivered her first child on 4/25/18 following which she self palpated a left breast mass.  It was  thought to be a blocked milk duct / mastitis and was treated with antibiotics and then antifungals.  The mass did not resolve and she sought a 2nd opinion.  \par \par She then had a breast ultrasound on 5/30/18 which showed a 2.3 cm left breast mass at 1-2:00, 9 cm from the nipple, and a single axillary lymph node which does not contain a prominent benign appearing fatty hilum.  \par \par 5/31/18 left breast core biopsy - invasive poorly differentiated ductal carcinoma, Trafalgar score 9/9, measures at least 17 cm w/ extensive necrosis. ER 0%, MT 0%, HER 2 (0/1+) -negative. \par \par On 6/7/18 she had a diagnostic mammogram + ultrasound - 2.1 cm rounded mass of left breast with overall coarsening of parenchymal pattern and increased parenchymal density in upper outer left breast. US - 2.4 cm left breast mass at 1-2:00 and left axilla 0.8 cm rounded hypoechoic mass consistent with abnormal left axillary node.  \par \par On 6/7/18 she also had a breast MRI - 2.9 x 3.2 x 3.1 cm mass in left upper inner breast at 1:00.  Suspicious left axillary nodes with ultrasound correlate for which US guided biopsy is recommended.   \par \par On 6/8/17 she had biopsy of the left axillary node - pathology : reactive lymph node. \par \par 6/7/18 - CT chest/abd/pelvis -  Known left upper outer breast malignancy. 2 small, round left axillary lymph nodes for which patient will undergo percutaneous \par sampling, as per report of breast MRI from 6/7/2018. A 3 mm subpleural nodular density in the right middle lobe probably represents a focus of atelectasis. No evidence of metastatic disease in the chest, abdomen, and pelvis.\par \par 6/8/18 bone scan - Normal bone scan. No radionuclide evidence of osseous metastasis.\par \par 6/29/18 -s/p bilateral mastectomy \par Pathology: left breast IDC 3.5 cm, joo score 9/9, margins negative, 2 SNL nodes negative.  pT2 pN0\par Right mastectomy - negative for malignancy. \par \par Family History: 2 maternal great aunts with breast cancer.\par paternal cousin with breast cancer\par paternal GM - pancreatic cancer\par paternal GF - pancreatic cancer\par \par [facility]  [employer]ac[employer]SK panel - no clinically significant mutations\par \par PMH: asthma, DPD deficiency (dihydropyrimidine dehydrogenase deficiency).\par \par Sx hx: tonsillectomy\par \par Social: non-smoker, social ETOH  [de-identified] : poorly differentiated invasive ductal carcinoma [de-identified] : ER 0% [place] 0% HER2 negative [de-identified] : Verbal consent given by patient to conduct this telehealth visit via audio-video conferencing. \par \par Toe nails are falling off, they started to split 2 weeks ago.\par She notices similar findings on finger nails\par She has lymph nodes on the left side which come and go\par She has right shoulder pain since 2/2020, not any worse than before.\par She is working out, she does lift weights.\par Her periods are coming every 2 weeks since 1/2020, she has seen GYN, S/p pelvic sono and was noted to have ovarian cysts.\par She notes constant right sided lower back pain. \par Notes that reynaud's is terrible.\par Notes increased hair loss\par

## 2020-05-11 NOTE — ASSESSMENT
[FreeTextEntry1] : 29 year old female with stage IIA left breast TNBC (T2N0) S/P bilateral mastectomy on 6/29/18. She has DPD deficiency.  S/p 4 cycles of dose dense AC, followed by weekly taxol completed 12/26/18.\par Post chemotherapy she was incidentally found to have anterior mediastinal mass c/w thymic rebound. \par \par Now with complaint so nail brittleness and breakage\par Right shoulder pain\par \par \par Plan:\par -right shoulder pain - Xray ordered\par -nail chages and hair loss - derm evaluation advised, will check iron panel for hair loss due (has increased frequency of periods, need to r/o iron deficiency)\par -thymus neoplasm - follow up with CT surgery\par -Follow up 3 months

## 2020-05-18 ENCOUNTER — APPOINTMENT (OUTPATIENT)
Age: 30
End: 2020-05-18

## 2020-05-18 ENCOUNTER — LABORATORY RESULT (OUTPATIENT)
Age: 30
End: 2020-05-18

## 2020-05-18 ENCOUNTER — RESULT REVIEW (OUTPATIENT)
Age: 30
End: 2020-05-18

## 2020-05-18 LAB
BASOPHILS # BLD AUTO: 0 K/UL — SIGNIFICANT CHANGE UP (ref 0–0.2)
BASOPHILS NFR BLD AUTO: 0.9 % — SIGNIFICANT CHANGE UP (ref 0–2)
EOSINOPHIL # BLD AUTO: 0.2 K/UL — SIGNIFICANT CHANGE UP (ref 0–0.5)
EOSINOPHIL NFR BLD AUTO: 4.2 % — SIGNIFICANT CHANGE UP (ref 0–6)
HCT VFR BLD CALC: 38.6 % — SIGNIFICANT CHANGE UP (ref 34.5–45)
HGB BLD-MCNC: 12.4 G/DL — SIGNIFICANT CHANGE UP (ref 11.5–15.5)
LYMPHOCYTES # BLD AUTO: 1.2 K/UL — SIGNIFICANT CHANGE UP (ref 1–3.3)
LYMPHOCYTES # BLD AUTO: 32.2 % — SIGNIFICANT CHANGE UP (ref 13–44)
MCHC RBC-ENTMCNC: 30.5 PG — SIGNIFICANT CHANGE UP (ref 27–34)
MCHC RBC-ENTMCNC: 32 G/DL — SIGNIFICANT CHANGE UP (ref 32–36)
MCV RBC AUTO: 95.1 FL — SIGNIFICANT CHANGE UP (ref 80–100)
MONOCYTES # BLD AUTO: 0.4 K/UL — SIGNIFICANT CHANGE UP (ref 0–0.9)
MONOCYTES NFR BLD AUTO: 10.5 % — SIGNIFICANT CHANGE UP (ref 2–14)
NEUTROPHILS # BLD AUTO: 1.9 K/UL — SIGNIFICANT CHANGE UP (ref 1.8–7.4)
NEUTROPHILS NFR BLD AUTO: 52.2 % — SIGNIFICANT CHANGE UP (ref 43–77)
PLATELET # BLD AUTO: 159 K/UL — SIGNIFICANT CHANGE UP (ref 150–400)
RBC # BLD: 4.06 M/UL — SIGNIFICANT CHANGE UP (ref 3.8–5.2)
RBC # FLD: 11.7 % — SIGNIFICANT CHANGE UP (ref 10.3–14.5)
WBC # BLD: 3.7 K/UL — LOW (ref 3.8–10.5)
WBC # FLD AUTO: 3.7 K/UL — LOW (ref 3.8–10.5)

## 2020-05-20 ENCOUNTER — OUTPATIENT (OUTPATIENT)
Dept: OUTPATIENT SERVICES | Facility: HOSPITAL | Age: 30
LOS: 1 days | End: 2020-05-20
Payer: COMMERCIAL

## 2020-05-20 ENCOUNTER — APPOINTMENT (OUTPATIENT)
Dept: RADIOLOGY | Facility: CLINIC | Age: 30
End: 2020-05-20
Payer: COMMERCIAL

## 2020-05-20 ENCOUNTER — APPOINTMENT (OUTPATIENT)
Dept: ULTRASOUND IMAGING | Facility: CLINIC | Age: 30
End: 2020-05-20
Payer: COMMERCIAL

## 2020-05-20 ENCOUNTER — RESULT REVIEW (OUTPATIENT)
Age: 30
End: 2020-05-20

## 2020-05-20 DIAGNOSIS — C50.412 MALIGNANT NEOPLASM OF UPPER-OUTER QUADRANT OF LEFT FEMALE BREAST: ICD-10-CM

## 2020-05-20 DIAGNOSIS — Z98.890 OTHER SPECIFIED POSTPROCEDURAL STATES: Chronic | ICD-10-CM

## 2020-05-20 DIAGNOSIS — N64.4 MASTODYNIA: ICD-10-CM

## 2020-05-20 DIAGNOSIS — Z98.89 OTHER SPECIFIED POSTPROCEDURAL STATES: Chronic | ICD-10-CM

## 2020-05-20 DIAGNOSIS — N63.20 UNSPECIFIED LUMP IN THE LEFT BREAST, UNSPECIFIED QUADRANT: ICD-10-CM

## 2020-05-20 DIAGNOSIS — M25.511 PAIN IN RIGHT SHOULDER: ICD-10-CM

## 2020-05-20 DIAGNOSIS — L60.9 NAIL DISORDER, UNSPECIFIED: ICD-10-CM

## 2020-05-20 PROCEDURE — 76642 ULTRASOUND BREAST LIMITED: CPT | Mod: 26,LT

## 2020-05-20 PROCEDURE — 73030 X-RAY EXAM OF SHOULDER: CPT

## 2020-05-20 PROCEDURE — 73030 X-RAY EXAM OF SHOULDER: CPT | Mod: 26,RT

## 2020-05-20 PROCEDURE — 76642 ULTRASOUND BREAST LIMITED: CPT

## 2020-06-05 ENCOUNTER — APPOINTMENT (OUTPATIENT)
Dept: FAMILY MEDICINE | Facility: CLINIC | Age: 30
End: 2020-06-05

## 2020-06-08 ENCOUNTER — APPOINTMENT (OUTPATIENT)
Dept: ORTHOPEDIC SURGERY | Facility: CLINIC | Age: 30
End: 2020-06-08

## 2020-06-12 ENCOUNTER — OUTPATIENT (OUTPATIENT)
Dept: OUTPATIENT SERVICES | Facility: HOSPITAL | Age: 30
LOS: 1 days | Discharge: HOME | End: 2020-06-12

## 2020-06-12 DIAGNOSIS — Z98.89 OTHER SPECIFIED POSTPROCEDURAL STATES: Chronic | ICD-10-CM

## 2020-06-12 DIAGNOSIS — C50.412 MALIGNANT NEOPLASM OF UPPER-OUTER QUADRANT OF LEFT FEMALE BREAST: ICD-10-CM

## 2020-06-12 DIAGNOSIS — Z98.890 OTHER SPECIFIED POSTPROCEDURAL STATES: Chronic | ICD-10-CM

## 2020-06-16 ENCOUNTER — OUTPATIENT (OUTPATIENT)
Dept: OUTPATIENT SERVICES | Facility: HOSPITAL | Age: 30
LOS: 1 days | End: 2020-06-16
Payer: COMMERCIAL

## 2020-06-16 DIAGNOSIS — Z98.890 OTHER SPECIFIED POSTPROCEDURAL STATES: Chronic | ICD-10-CM

## 2020-06-16 DIAGNOSIS — Z98.89 OTHER SPECIFIED POSTPROCEDURAL STATES: Chronic | ICD-10-CM

## 2020-06-16 DIAGNOSIS — Z11.59 ENCOUNTER FOR SCREENING FOR OTHER VIRAL DISEASES: ICD-10-CM

## 2020-06-16 PROBLEM — R91.1 LEFT UPPER LOBE PULMONARY NODULE: Status: ACTIVE | Noted: 2020-06-16

## 2020-06-16 PROCEDURE — U0003: CPT

## 2020-06-17 ENCOUNTER — APPOINTMENT (OUTPATIENT)
Dept: HEMATOLOGY ONCOLOGY | Facility: CLINIC | Age: 30
End: 2020-06-17

## 2020-06-17 DIAGNOSIS — Z11.59 ENCOUNTER FOR SCREENING FOR OTHER VIRAL DISEASES: ICD-10-CM

## 2020-06-18 ENCOUNTER — APPOINTMENT (OUTPATIENT)
Dept: PLASTIC SURGERY | Facility: CLINIC | Age: 30
End: 2020-06-18

## 2020-06-18 ENCOUNTER — APPOINTMENT (OUTPATIENT)
Dept: THORACIC SURGERY | Facility: CLINIC | Age: 30
End: 2020-06-18
Payer: COMMERCIAL

## 2020-06-18 DIAGNOSIS — R91.1 SOLITARY PULMONARY NODULE: ICD-10-CM

## 2020-06-18 RX ORDER — DIAZEPAM 5 MG
1 TABLET ORAL
Qty: 0 | Refills: 0 | DISCHARGE

## 2020-06-19 ENCOUNTER — OUTPATIENT (OUTPATIENT)
Dept: INPATIENT UNIT | Facility: HOSPITAL | Age: 30
LOS: 1 days | Discharge: HOME | End: 2020-06-19
Payer: COMMERCIAL

## 2020-06-19 VITALS
HEIGHT: 61 IN | TEMPERATURE: 98 F | WEIGHT: 128.09 LBS | DIASTOLIC BLOOD PRESSURE: 69 MMHG | SYSTOLIC BLOOD PRESSURE: 116 MMHG | HEART RATE: 86 BPM | OXYGEN SATURATION: 99 % | RESPIRATION RATE: 14 BRPM

## 2020-06-19 VITALS — DIASTOLIC BLOOD PRESSURE: 69 MMHG | SYSTOLIC BLOOD PRESSURE: 120 MMHG

## 2020-06-19 DIAGNOSIS — Z98.890 OTHER SPECIFIED POSTPROCEDURAL STATES: Chronic | ICD-10-CM

## 2020-06-19 DIAGNOSIS — N64.89 OTHER SPECIFIED DISORDERS OF BREAST: ICD-10-CM

## 2020-06-19 DIAGNOSIS — T84.498A OTHER MECHANICAL COMPLICATION OF OTHER INTERNAL ORTHOPEDIC DEVICES, IMPLANTS AND GRAFTS, INITIAL ENCOUNTER: ICD-10-CM

## 2020-06-19 DIAGNOSIS — Z85.3 PERSONAL HISTORY OF MALIGNANT NEOPLASM OF BREAST: ICD-10-CM

## 2020-06-19 DIAGNOSIS — Z98.89 OTHER SPECIFIED POSTPROCEDURAL STATES: Chronic | ICD-10-CM

## 2020-06-19 LAB — HCG UR QL: NEGATIVE — SIGNIFICANT CHANGE UP

## 2020-06-19 PROCEDURE — 81025 URINE PREGNANCY TEST: CPT

## 2020-06-19 RX ORDER — ONDANSETRON 8 MG/1
4 TABLET, FILM COATED ORAL EVERY 6 HOURS
Refills: 0 | Status: DISCONTINUED | OUTPATIENT
Start: 2020-06-19 | End: 2020-06-19

## 2020-06-19 RX ORDER — ONDANSETRON 8 MG/1
4 TABLET, FILM COATED ORAL ONCE
Refills: 0 | Status: DISCONTINUED | OUTPATIENT
Start: 2020-06-19 | End: 2020-06-19

## 2020-06-19 RX ORDER — OXYCODONE AND ACETAMINOPHEN 5; 325 MG/1; MG/1
2 TABLET ORAL EVERY 6 HOURS
Refills: 0 | Status: DISCONTINUED | OUTPATIENT
Start: 2020-06-19 | End: 2020-06-19

## 2020-06-19 RX ORDER — FENTANYL CITRATE 50 UG/ML
50 INJECTION INTRAVENOUS
Refills: 0 | Status: DISCONTINUED | OUTPATIENT
Start: 2020-06-19 | End: 2020-06-19

## 2020-06-19 RX ORDER — OXYCODONE HYDROCHLORIDE 5 MG/1
10 TABLET ORAL ONCE
Refills: 0 | Status: DISCONTINUED | OUTPATIENT
Start: 2020-06-19 | End: 2020-06-19

## 2020-06-19 RX ORDER — SODIUM CHLORIDE 9 MG/ML
1000 INJECTION, SOLUTION INTRAVENOUS
Refills: 0 | Status: DISCONTINUED | OUTPATIENT
Start: 2020-06-19 | End: 2020-06-19

## 2020-06-19 RX ORDER — OXYCODONE AND ACETAMINOPHEN 5; 325 MG/1; MG/1
1 TABLET ORAL EVERY 4 HOURS
Refills: 0 | Status: DISCONTINUED | OUTPATIENT
Start: 2020-06-19 | End: 2020-06-19

## 2020-06-19 RX ADMIN — ONDANSETRON 4 MILLIGRAM(S): 8 TABLET, FILM COATED ORAL at 19:01

## 2020-06-19 RX ADMIN — OXYCODONE HYDROCHLORIDE 10 MILLIGRAM(S): 5 TABLET ORAL at 17:27

## 2020-06-19 NOTE — BRIEF OPERATIVE NOTE - NSICDXBRIEFPROCEDURE_GEN_ALL_CORE_FT
PROCEDURES:  Fat grafting 19-Jun-2020 16:09:27  Christina Hess  Reconstruction, nipple and areola 19-Jun-2020 16:09:17  Christina Hess

## 2020-06-19 NOTE — ASU DISCHARGE PLAN (ADULT/PEDIATRIC) - CARE PROVIDER_API CALL
Salvatore Falcon  SURGERY  [address] [place] [address]  Phone: [phone]  Fax: [phone]  Follow Up Time:

## 2020-06-19 NOTE — ASU DISCHARGE PLAN (ADULT/PEDIATRIC) - NURSING INSTRUCTIONS
Refer to the multicolored fact sheet for any problems you experience.  If you have difficulty urinating or unable to urinate in 8 hours after surgery, call your Dr., or return to [facility] emergency room.  Notify your Dr. if your fever is 101 or greater.  If the pain medicine your  reccomends does not help you, and you have severe pain call your Dr..   If you cannot reach the doctor, call [facility] Emergency Department at [phone] or go to your local Emergency Department.  A responsible adult should be with you for the rest of the day and night for your safety, and to help you.  Resume your medications as listed on the attached Medication Record.

## 2020-06-19 NOTE — H&P ADULT - NSHPPHYSICALEXAM_GEN_ALL_CORE
Neuro: A=Ox4 RAY   Resp: CTA A/P   CV: RRR s1 s2  Breast: absences of nipples  GI: soft non tender +BS

## 2020-06-19 NOTE — H&P ADULT - PROBLEM SELECTOR PLAN 1
Elective surgery today  OOB to chair  pain management  Incentive spirometer  advance diet as tolerated  DC to home when meeting asu criteria

## 2020-06-19 NOTE — ASU DISCHARGE PLAN (ADULT/PEDIATRIC) - ASU DC SPECIAL INSTRUCTIONSFT
You should follow-up in the office in 7 days, please call for follow-up appointment if you have not already made one. [phone]. Do not shower until nipple bolsters are removed   Prescription pain medications have been prescribed for you, take as needed for pain only. Do not drive a vehicle or operate machinery while taking narcotic pain medication.   Ambulating is very important post operatively, make sure you ambulate several times daily.   If you experience bleeding that does not stop, swelling, hardness of surgical site, chest pain, shortness of breath, leg pain, dizziness or any signs or symptoms of infection such as fever, redness, pus, foul smell of surgical site please contact the office immediately, [phone].

## 2020-06-19 NOTE — ASU DISCHARGE PLAN (ADULT/PEDIATRIC) - PROCEDURE
revision of bilateral breast reconstruction with fat grafting and bilateral nipple areolar reconstruction

## 2020-06-26 DIAGNOSIS — J45.909 UNSPECIFIED ASTHMA, UNCOMPLICATED: ICD-10-CM

## 2020-06-26 DIAGNOSIS — Z90.13 ACQUIRED ABSENCE OF BILATERAL BREASTS AND NIPPLES: ICD-10-CM

## 2020-06-26 DIAGNOSIS — E06.3 AUTOIMMUNE THYROIDITIS: ICD-10-CM

## 2020-06-26 DIAGNOSIS — K64.9 UNSPECIFIED HEMORRHOIDS: ICD-10-CM

## 2020-06-26 DIAGNOSIS — M54.41 LUMBAGO WITH SCIATICA, RIGHT SIDE: ICD-10-CM

## 2020-06-26 DIAGNOSIS — Z91.010 ALLERGY TO PEANUTS: ICD-10-CM

## 2020-06-26 DIAGNOSIS — Z85.3 PERSONAL HISTORY OF MALIGNANT NEOPLASM OF BREAST: ICD-10-CM

## 2020-06-26 DIAGNOSIS — Z88.9 ALLERGY STATUS TO UNSPECIFIED DRUGS, MEDICAMENTS AND BIOLOGICAL SUBSTANCES: ICD-10-CM

## 2020-06-26 DIAGNOSIS — Z91.013 ALLERGY TO SEAFOOD: ICD-10-CM

## 2020-06-26 DIAGNOSIS — F41.9 ANXIETY DISORDER, UNSPECIFIED: ICD-10-CM

## 2020-06-26 DIAGNOSIS — M51.26 OTHER INTERVERTEBRAL DISC DISPLACEMENT, LUMBAR REGION: ICD-10-CM

## 2020-06-26 DIAGNOSIS — K58.9 IRRITABLE BOWEL SYNDROME WITHOUT DIARRHEA: ICD-10-CM

## 2020-07-07 NOTE — ASU PREOP CHECKLIST - TYPE OF SOLUTION
SL Mirvaso Counseling: Mirvaso is a topical medication which can decrease superficial blood flow where applied. Side effects are uncommon and include stinging, redness and allergic reactions.

## 2020-07-13 ENCOUNTER — OUTPATIENT (OUTPATIENT)
Dept: OUTPATIENT SERVICES | Facility: HOSPITAL | Age: 30
LOS: 1 days | Discharge: HOME | End: 2020-07-13

## 2020-07-13 DIAGNOSIS — Z98.890 OTHER SPECIFIED POSTPROCEDURAL STATES: Chronic | ICD-10-CM

## 2020-07-13 DIAGNOSIS — Z98.89 OTHER SPECIFIED POSTPROCEDURAL STATES: Chronic | ICD-10-CM

## 2020-07-13 DIAGNOSIS — C50.412 MALIGNANT NEOPLASM OF UPPER-OUTER QUADRANT OF LEFT FEMALE BREAST: ICD-10-CM

## 2020-07-14 ENCOUNTER — OUTPATIENT (OUTPATIENT)
Dept: OUTPATIENT SERVICES | Facility: HOSPITAL | Age: 30
LOS: 1 days | End: 2020-07-14
Payer: COMMERCIAL

## 2020-07-14 ENCOUNTER — APPOINTMENT (OUTPATIENT)
Dept: CT IMAGING | Facility: CLINIC | Age: 30
End: 2020-07-14
Payer: COMMERCIAL

## 2020-07-14 ENCOUNTER — RESULT REVIEW (OUTPATIENT)
Age: 30
End: 2020-07-14

## 2020-07-14 DIAGNOSIS — Z98.890 OTHER SPECIFIED POSTPROCEDURAL STATES: Chronic | ICD-10-CM

## 2020-07-14 DIAGNOSIS — Z98.89 OTHER SPECIFIED POSTPROCEDURAL STATES: Chronic | ICD-10-CM

## 2020-07-14 DIAGNOSIS — R91.1 SOLITARY PULMONARY NODULE: ICD-10-CM

## 2020-07-14 PROCEDURE — 71250 CT THORAX DX C-: CPT | Mod: 26

## 2020-07-14 PROCEDURE — 71250 CT THORAX DX C-: CPT

## 2020-07-16 ENCOUNTER — APPOINTMENT (OUTPATIENT)
Dept: THORACIC SURGERY | Facility: CLINIC | Age: 30
End: 2020-07-16
Payer: COMMERCIAL

## 2020-07-16 DIAGNOSIS — R91.1 SOLITARY PULMONARY NODULE: ICD-10-CM

## 2020-07-16 DIAGNOSIS — D49.89 NEOPLASM OF UNSPECIFIED BEHAVIOR OF OTHER SPECIFIED SITES: ICD-10-CM

## 2020-07-16 PROCEDURE — 99443: CPT

## 2020-07-16 RX ORDER — AZITHROMYCIN 250 MG/1
250 TABLET, FILM COATED ORAL
Qty: 1 | Refills: 0 | Status: COMPLETED | COMMUNITY
Start: 2020-01-27 | End: 2020-07-16

## 2020-07-16 RX ORDER — AMOXICILLIN AND CLAVULANATE POTASSIUM 875; 125 MG/1; MG/1
875-125 TABLET, COATED ORAL
Qty: 20 | Refills: 0 | Status: COMPLETED | COMMUNITY
Start: 2020-01-31 | End: 2020-07-16

## 2020-07-16 RX ORDER — METHYLPREDNISOLONE 4 MG/1
4 TABLET ORAL
Qty: 1 | Refills: 0 | Status: COMPLETED | COMMUNITY
Start: 2020-01-31 | End: 2020-07-16

## 2020-07-17 PROBLEM — D49.89 THYMUS NEOPLASM: Status: ACTIVE | Noted: 2019-07-10

## 2020-07-17 PROBLEM — R91.1 LUNG NODULE: Status: RESOLVED | Noted: 2020-07-17 | Resolved: 2020-07-17

## 2020-07-17 NOTE — DATA REVIEWED
[FreeTextEntry1] : CT scan performed on 07/14/2020 at [facility] revealed\par stable very small solid nodule in the left upper lobe when compared to previous exam.

## 2020-07-17 NOTE — CONSULT LETTER
[FreeTextEntry2] : Dr. Tiffani Lawton  [FreeTextEntry3] : Josiah Pearce MD\par Director of Thoracic, [facility]\par Cardiovascular & Thoracic Surgery\par \par Cardiovascular & Thoracic Surgery\par [employer]\par \par [facility] \par [address] [place] [address]\[place] [address]\par [phone] Tel\par [phone] Fax\par

## 2020-07-17 NOTE — ASSESSMENT
[FreeTextEntry1] : PLAN:\par I had the pleasure of evaluating Mrs. Manley today during our telephone encounter. I have reviewed the patient's medical records and diagnostic images during the time of this office visit, and I have made the following recommendation: She will return for a follow up CT scan of the chest in 1 year for surveillance of thymic tissue and small subcentimeter lung nodule.\par \par \par Written by Karen Lawson NP acting as a scribe for \par “The documentation recorded by the scribe accurately reflects the service I personally performed and the decisions made by me.” \par Ramses ULLOA.\par \par Total time spend during this telephone visit was 25 minutes\par

## 2020-07-17 NOTE — HISTORY OF PRESENT ILLNESS
[Home] : at home, [unfilled] , at the time of the visit. [Medical Office: ([facility])___] : at the medical office located in  [Verbal consent obtained from patient] : the patient, [unfilled] [FreeTextEntry1] : Mrs. Manley is a 30 year old female never smoke with a history of left breast cancer s/p bilateral mastectomy and chemotherapy, here today for follow up surveillance CT scan for a 3 mm left upper lobe pulmonary nodule and thymis. Her past medical history is significant for anxiety, colitis, Hashimoto's thyroiditis, Genital Herpes, Hypothyroidism, abdominal mass, and lower back pain. During previous visit she was send for CT abdomen for lower back pain. On CT scan there was a incidental finding of a soft tissue mass in mediastinum.\par \par Today she reports feeling well and offers no complaints. Denies any chest pain, dizziness, palpitations, shortness of breath or syncopal episode.

## 2020-07-22 ENCOUNTER — APPOINTMENT (OUTPATIENT)
Dept: HEMATOLOGY ONCOLOGY | Facility: CLINIC | Age: 30
End: 2020-07-22
Payer: COMMERCIAL

## 2020-07-22 VITALS
HEART RATE: 86 BPM | WEIGHT: 128.01 LBS | HEIGHT: 61 IN | OXYGEN SATURATION: 98 % | SYSTOLIC BLOOD PRESSURE: 112 MMHG | BODY MASS INDEX: 24.17 KG/M2 | DIASTOLIC BLOOD PRESSURE: 79 MMHG

## 2020-07-22 PROCEDURE — 99214 OFFICE O/P EST MOD 30 MIN: CPT

## 2020-07-22 NOTE — ASSESSMENT
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

## 2020-07-22 NOTE — PHYSICAL EXAM
[Fully active, able to carry on all pre-disease performance without restriction] : Status 0 - Fully active, able to carry on all pre-disease performance without restriction [Normal] : affect appropriate [de-identified] : reconstructed bilateral breasts, l inner quadrant w point tenderness.No asymmetry.

## 2020-07-22 NOTE — HISTORY OF PRESENT ILLNESS
[de-identified] : Chart reviewed in detail.31y/o w/f w h/o TNBC currently complted w primary CRTSX over the past 2 y, has had persistent c/o pain in l breast region where she has had complete reconstruction.In addition, she has c/o l axillary pain where she had ALND.Pt is a  and feels pain constantly.Does not take opioids.Feels 30-60 mg duloxetine helps her sufficient to optimize function.She does desire additional treatment considerations.Preferably non-interventionally.

## 2020-07-31 ENCOUNTER — APPOINTMENT (OUTPATIENT)
Age: 30
End: 2020-07-31

## 2020-08-01 ENCOUNTER — LABORATORY RESULT (OUTPATIENT)
Age: 30
End: 2020-08-01

## 2020-08-01 ENCOUNTER — APPOINTMENT (OUTPATIENT)
Dept: OBGYN | Facility: CLINIC | Age: 30
End: 2020-08-01
Payer: COMMERCIAL

## 2020-08-01 VITALS
BODY MASS INDEX: 23.98 KG/M2 | SYSTOLIC BLOOD PRESSURE: 110 MMHG | DIASTOLIC BLOOD PRESSURE: 70 MMHG | HEIGHT: 61 IN | WEIGHT: 127 LBS

## 2020-08-01 DIAGNOSIS — Z86.79 PERSONAL HISTORY OF OTHER DISEASES OF THE CIRCULATORY SYSTEM: ICD-10-CM

## 2020-08-01 DIAGNOSIS — Z87.09 PERSONAL HISTORY OF OTHER DISEASES OF THE RESPIRATORY SYSTEM: ICD-10-CM

## 2020-08-01 DIAGNOSIS — R19.09 OTHER INTRA-ABDOMINAL AND PELVIC SWELLING, MASS AND LUMP: ICD-10-CM

## 2020-08-01 DIAGNOSIS — Z87.898 PERSONAL HISTORY OF OTHER SPECIFIED CONDITIONS: ICD-10-CM

## 2020-08-01 PROCEDURE — 99213 OFFICE O/P EST LOW 20 MIN: CPT

## 2020-08-01 NOTE — DISCUSSION/SUMMARY
[FreeTextEntry1] : Pt aware pending culture results any further tx.  Pt has appointment for annual scheduled.  All the pt's  questions and concerns were addressed.

## 2020-08-01 NOTE — HISTORY OF PRESENT ILLNESS
[Last Pap ___] : Last cervical pap smear was [unfilled] [No Previous Mammogram] : no previous mammogram [Pregnancy History] : pregnancy history: [No Previous Colonoscopy] : no previous colonoscopy [Total Preg ___] : [unfilled] [Full Term ___] : [unfilled] [Living ___] : [unfilled] [AB Spont ___] : miscarriages: [unfilled] [Definite:  ___ (Date)] : the last menstrual period was [unfilled] [Sexually Active] : is sexually active [Menarche Age: ____] : age at menarche was [unfilled] [Monogamous] : is monogamous [Male ___] : [unfilled] male [Contraception] : does not use contraception

## 2020-08-01 NOTE — PHYSICAL EXAM
[No Tenderness] : no rectal tenderness [Nl Sphincter Tone] : normal sphincter tone [FreeTextEntry9] : small lesion noted, consistent with HSV I, culture preformed.

## 2020-08-07 NOTE — ASSESSMENT
[FreeTextEntry1] : 27 yo female who was diagnosed April 2018 with a 3.5 cm Stage IIA left breast triple negative invasive ductal carcinoma grade 3 s/p bilateral mastectomy with L SLNB on 6/29/2018. She is undergoing adjuvant chemotherapy started on 8/3/2018.  She reports left breast pain and lump.  The palpable lump is scar tissue forming over her TE which is also causing her pain.  She plans to TE excanged in June when her  is off and this should make the pain go away.  There is a palpable lump in the left breast 8:00 region.  Recommendation for gababpentin BID and will follow up in may prior to her next surgery with plastics.\par 1.  Follow up in May 2019\par 2.  Gabapentin TID\par 3. Follow up with Dr. Mcclain\par \par 
Negative

## 2020-08-08 LAB — HHV SPEC CULT: ABNORMAL

## 2020-08-10 ENCOUNTER — OUTPATIENT (OUTPATIENT)
Dept: OUTPATIENT SERVICES | Facility: HOSPITAL | Age: 30
LOS: 1 days | Discharge: HOME | End: 2020-08-10

## 2020-08-10 DIAGNOSIS — C50.412 MALIGNANT NEOPLASM OF UPPER-OUTER QUADRANT OF LEFT FEMALE BREAST: ICD-10-CM

## 2020-08-10 DIAGNOSIS — Z98.89 OTHER SPECIFIED POSTPROCEDURAL STATES: Chronic | ICD-10-CM

## 2020-08-10 DIAGNOSIS — Z98.890 OTHER SPECIFIED POSTPROCEDURAL STATES: Chronic | ICD-10-CM

## 2020-08-11 ENCOUNTER — APPOINTMENT (OUTPATIENT)
Dept: COLORECTAL SURGERY | Facility: CLINIC | Age: 30
End: 2020-08-11
Payer: COMMERCIAL

## 2020-08-11 VITALS
BODY MASS INDEX: 23.98 KG/M2 | TEMPERATURE: 98 F | RESPIRATION RATE: 14 BRPM | DIASTOLIC BLOOD PRESSURE: 80 MMHG | WEIGHT: 127 LBS | SYSTOLIC BLOOD PRESSURE: 118 MMHG | HEIGHT: 61 IN | HEART RATE: 85 BPM

## 2020-08-11 PROCEDURE — 99243 OFF/OP CNSLTJ NEW/EST LOW 30: CPT | Mod: 25

## 2020-08-11 PROCEDURE — 46600 DIAGNOSTIC ANOSCOPY SPX: CPT

## 2020-08-11 NOTE — ASSESSMENT
[FreeTextEntry1] : Ms. Manley presents to the office for consultation.  Her main complaints involve frequent and loose bowel movements every morning, occasional bloody mucus per rectum as well as bloating and cramping.  She also reports the need to undergo a repeat colonoscopy despite absence of polyps on her initial colonoscopy and no pathological findings on biopsy.\par I have advised her that I do not feel as if a routine colonoscopy is necessary at this junction.\par I suspect she has a component of IBS, diarrhea predominant.\par Anoscopy in office today did not reveal any evidence of proctitis to account for her reports of occasional bloody mucus.\par I advised her to adhere to a high-fiber diet, but she reports that fiber causes an increase in stool frequency for her.  Based on this, I recommended using Imodium at night so that in the morning, she could try to consolidate the frequency of her stools and allow for evacuation only once in the morning.  I have reassured her regarding the appearance of mucus in the stool, which is normal.  I suspect the occasional blood may be secondary to hemorrhoidal irritation from her frequent stools.  With regards to her bloating and crampiness, I have recommended Levsin as needed.  She will adhere to this regimen over the course of a month, and if no improvement, will return to the office at which point a repeat colonoscopy will be considered.\par

## 2020-08-11 NOTE — PHYSICAL EXAM
[Normal rectal exam] : exam was normal [Reduce Spontaneously] : a spontaneously reducible (grade II) [Skin Tags] : there were no residual hemorrhoidal skin tags seen [Normal] : was normal [None] : there was no rectal mass  [Gross Blood] : no gross blood [No Rash or Lesion] : No rash or lesion [Alert] : alert [Oriented to Person] : oriented to person [Oriented to Place] : oriented to place [Oriented to Time] : oriented to time [Calm] : calm [de-identified] : No apparent distress [de-identified] : Normocephalic atraumatic [de-identified] : Moving all extremities x4

## 2020-08-11 NOTE — HISTORY OF PRESENT ILLNESS
[FreeTextEntry1] : Ms. Manley presents to the office for consultation.  She reports that in 2018, she was diagnosed with triple negative breast cancer.  She underwent treatment and completed her chemotherapy December 2018.  During that time, she experienced colitis with resultant bloody diarrhea multiple times daily.  She underwent a diagnostic colonoscopy including biopsies which were all negative.  She also had an EGD completed.  \par At baseline, her bowel movements are typically loose and frequent, but after completion of chemotherapy and this colitis episode, they have never returned to baseline.  She reports bowel movements that are passed daily, in the morning, and at least 6 times over the course of the morning, all loose in consistency.  There is also occasional bloody mucus that is passed.  Throughout the day, she has a sensation of abdominal discomfort and cramping and bloating.  She has tried adhering to a high-fiber diet, but this only exacerbates her stool frequency.\par She reports a family history of ulcerative colitis in a maternal cousin and maternal grandfather.\par She was advised after her last colonoscopy to undergo yearly colonoscopies.

## 2020-08-13 ENCOUNTER — APPOINTMENT (OUTPATIENT)
Age: 30
End: 2020-08-13

## 2020-08-13 ENCOUNTER — APPOINTMENT (OUTPATIENT)
Dept: HEMATOLOGY ONCOLOGY | Facility: CLINIC | Age: 30
End: 2020-08-13
Payer: COMMERCIAL

## 2020-08-13 VITALS
SYSTOLIC BLOOD PRESSURE: 119 MMHG | DIASTOLIC BLOOD PRESSURE: 77 MMHG | HEART RATE: 8 BPM | HEIGHT: 61 IN | OXYGEN SATURATION: 98 % | BODY MASS INDEX: 24.36 KG/M2 | WEIGHT: 129.01 LBS

## 2020-08-13 PROCEDURE — 99214 OFFICE O/P EST MOD 30 MIN: CPT

## 2020-08-13 RX ORDER — DULOXETINE HYDROCHLORIDE 30 MG/1
CAPSULE, DELAYED RELEASE ORAL
Refills: 0 | Status: DISCONTINUED | COMMUNITY
End: 2020-08-13

## 2020-08-13 NOTE — PHYSICAL EXAM
[Ambulatory and capable of all self care but unable to carry out any work activities] : Status 2- Ambulatory and capable of all self care but unable to carry out any work activities. Up and about more than 50% of waking hours [Normal] : normal appearance, no rash, nodules, vesicles, ulcers, erythema [de-identified] : clear to auscultation b/l [de-identified] : no edema [de-identified] : b/l reconstructed breasts

## 2020-08-13 NOTE — HISTORY OF PRESENT ILLNESS
[Disease: _____________________] : Disease: [unfilled] [T: ___] : T[unfilled] [N: ___] : N[unfilled] [AJCC Stage: ____] : AJCC Stage: [unfilled] [de-identified] : Ms. Kennedy was diagnosed with left triple negative breast cancer at age 28. \par \par She delivered her first child on 4/25/18 following which she self palpated a left breast mass.  It was  thought to be a blocked milk duct / mastitis and was treated with antibiotics and then antifungals.  The mass did not resolve and she sought a 2nd opinion.  \par \par She then had a breast ultrasound on 5/30/18 which showed a 2.3 cm left breast mass at 1-2:00, 9 cm from the nipple, and a single axillary lymph node which does not contain a prominent benign appearing fatty hilum.  \par \par 5/31/18 left breast core biopsy - invasive poorly differentiated ductal carcinoma, Caraway score 9/9, measures at least 17 cm w/ extensive necrosis. ER 0%, TX 0%, HER 2 (0/1+) -negative. \par \par On 6/7/18 she had a diagnostic mammogram + ultrasound - 2.1 cm rounded mass of left breast with overall coarsening of parenchymal pattern and increased parenchymal density in upper outer left breast. US - 2.4 cm left breast mass at 1-2:00 and left axilla 0.8 cm rounded hypoechoic mass consistent with abnormal left axillary node.  \par \par On 6/7/18 she also had a breast MRI - 2.9 x 3.2 x 3.1 cm mass in left upper inner breast at 1:00.  Suspicious left axillary nodes with ultrasound correlate for which US guided biopsy is recommended.   \par \par On 6/8/17 she had biopsy of the left axillary node - pathology : reactive lymph node. \par \par 6/7/18 - CT chest/abd/pelvis -  Known left upper outer breast malignancy. 2 small, round left axillary lymph nodes for which patient will undergo percutaneous \par sampling, as per report of breast MRI from 6/7/2018. A 3 mm subpleural nodular density in the right middle lobe probably represents a focus of atelectasis. No evidence of metastatic disease in the chest, abdomen, and pelvis.\par \par 6/8/18 bone scan - Normal bone scan. No radionuclide evidence of osseous metastasis.\par \par 6/29/18 -s/p bilateral mastectomy \par Pathology: left breast IDC 3.5 cm, joo score 9/9, margins negative, 2 SNL nodes negative.  pT2 pN0\par Right mastectomy - negative for malignancy. \par \par Family History: 2 maternal great aunts with breast cancer.\par paternal cousin with breast cancer\par paternal GM - pancreatic cancer\par paternal GF - pancreatic cancer\par \par MYRIAD  YmagisSK panel - no clinically significant mutations\par \par PMH: asthma, DPD deficiency (dihydropyrimidine dehydrogenase deficiency).\par \par Sx hx: tonsillectomy\par \par Social: non-smoker, social ETOH  [de-identified] : ER 0% KS 0% HER2 negative [de-identified] : poorly differentiated invasive ductal carcinoma [de-identified] : Returns for follow up.\par She has chronic chest wall pain post mastectomy.\par She is on 60 mg / day of duloxetine which is more effective than before.\par R shoulder pain is better.\par She has right hip pain - started 1 month ago. Right hip pain is worst at night. \par She has bone pain in shins.\par No leg swelling. \par She has hair still falling out.\par And nails are continuing to split. \par She tried oral iron but noted indigestion and stomach was hurting, also has IBS. \par \par

## 2020-08-13 NOTE — ASSESSMENT
[FreeTextEntry1] : 30 year old female with stage IIA left breast TNBC (T2N0) S/P bilateral mastectomy on 6/29/18. She has DPD deficiency.  S/p 4 cycles of dose dense AC, followed by weekly taxol completed 12/26/18.\par Post chemotherapy she was incidentally found to have anterior mediastinal mass c/w thymic rebound. \par \par Right shoulder pain - Xray negative\par Now with R hip pain\par \par Plan:\par -right hip  pain -CT bony pelvis ordered\par -nail changes and hair loss possibly due to iron deficiency w/o anemia - seen by derm, intolerant of oral iron, will give feraheme weekly x 2\par -thymus neoplasm - continue follow up with CT surgery\par -Follow up in 12/2020

## 2020-08-19 ENCOUNTER — APPOINTMENT (OUTPATIENT)
Dept: CT IMAGING | Facility: CLINIC | Age: 30
End: 2020-08-19

## 2020-09-02 ENCOUNTER — APPOINTMENT (OUTPATIENT)
Age: 30
End: 2020-09-02

## 2020-09-03 ENCOUNTER — APPOINTMENT (OUTPATIENT)
Dept: SURGERY | Facility: CLINIC | Age: 30
End: 2020-09-03
Payer: COMMERCIAL

## 2020-09-03 VITALS
WEIGHT: 131 LBS | BODY MASS INDEX: 24.73 KG/M2 | DIASTOLIC BLOOD PRESSURE: 85 MMHG | OXYGEN SATURATION: 100 % | SYSTOLIC BLOOD PRESSURE: 123 MMHG | HEIGHT: 61 IN | HEART RATE: 84 BPM | TEMPERATURE: 98.1 F

## 2020-09-03 PROCEDURE — 99214 OFFICE O/P EST MOD 30 MIN: CPT

## 2020-09-04 ENCOUNTER — APPOINTMENT (OUTPATIENT)
Age: 30
End: 2020-09-04

## 2020-09-04 NOTE — ASSESSMENT
[FreeTextEntry1] : 31 yo premenopausal female with a history of triple negative left breast cancer treated with bilateral mastectomy and left SLNB.  Final pathology demonstrating 3.5cm IDC grade 3 triple negative with 0/2 lymph nodes. Repeat left breast imaging and clinical exam are benign today.  Recommendation for annual MR breast due to history and followup in 6 months if benign.\par 1. Clinical examination in 6 months\par 2. Follow up with Dr. Mcclain\par 3. MR breast

## 2020-09-04 NOTE — PHYSICAL EXAM
[Atraumatic] : atraumatic [Normocephalic] : normocephalic [EOMI] : extra ocular movement intact [Supple] : supple [PERRL] : pupils equal, round and reactive to light [Sclera nonicteric] : sclera nonicteric [Examined in the supine and seated position] : examined in the supine and seated position [No Supraclavicular Adenopathy] : no supraclavicular adenopathy [No dominant masses] : no dominant masses in right breast  [No dominant masses] : no dominant masses left breast [No Edema] : no edema [No Axillary Lymphadenopathy] : no left axillary lymphadenopathy [No Rashes] : no rashes [No Ulceration] : no ulceration [de-identified] : s/p mastectomy with implant reconstruction, well healed.  [de-identified] : s/p mastectomy with implant reconstruction, well healed.

## 2020-09-04 NOTE — HISTORY OF PRESENT ILLNESS
[FreeTextEntry1] : I had the pleasure of seeing JEANE GUTIERREZ in the office for a followup visit secondary to diagnosed and treated left breast cancer.\par \par Jeane is a sg 29 yo female who originally presented after feeling a left breast mass after delivery of her son on 2018. She states she had first felt the lump after delivery of her child. She sought medical attention and was told it was a blocked milk duct which caused the mastitis and put her on antibiotics. When the symptoms did not resolve she saw another physician for a second opinion. She was then sent for ultrasound and ultrasound demonstrated a 2.3 cm lobulated hypoechoic nodule at in the left breast 2:00 and an ultrasound guided core biopsy was recommended. 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 Chemotherapy began on 8/3/2018. She has since completed chemo and underwent implant exchange on 2019 by Dr. Falcon.\par \par She has been followed by thoracic surgery to monitor for any changes in her chest wall tissue specifically some rebound hypertrophy noted in the thymic tissue. Recent CT on 2020 showed stable very small nodule in the left upper lobe.\par \par She denies skin changes, nipple dimpling or nipple discharge. \par \par  with 1st delivery at 28. Menses began at age 8.\par She denies personal history of malignancy.\par Family history is significant for two maunts diagnosed with breast cancer, Pcousin diagnosed with breast cancer, PGM and MGM both diagnosed with pancreatic cancer at unknown age, MGM diagnose with skin cancer.\par \par 2018 [employer]: Genetic results: Negative- No clinically significant mutation identified\par \par Imaging: [facility] IMAGING AT [place]\par 2018 MRI BREAST: 1. 2.9X 3.2X 3.1 CM MASS in the LEFT BREAST UPPER INNER BREAST AT 1:00, middle to posterior depth, consistent with biopsy proven invasive ductal carcinoma. 2. Suspicious left axillary lymph node with ultrasound correlate, for which ultrasound guided core biopsy has been recommended. As per addendum to a breast ultrasound guided core biopsy report dated 2018, the patient will return for left axillary lymph node biopsy after surgical consultation. 3. No MRI evidence of malignancy in the right breast. BIRADS 6 Biopsy-Proven Malignancy\par \par 2018 CT CHEST/ABD/PELVIS: Known left upper outer breast malignancy. 2 small round left axillary lymph nodes for which patient will undergo percutaneous sampling, as per report of breast MRI from 2018. A 3 mm subpleural nodular density in the right middle lobe probably represents a focus of atelectasis. No evidence of metastatic disease in the chest, abdomen, and pelvis.\par \par 2018 US NONVASC EXT LTD RT: Benign appearing right groin lymph nodes the smaller of which correlates with area of patients tenderness. Findings likely represent benign reactive lymph nodes. Recommend continued clinical follow up.\par \par 2018 US breast limited left\par Impression: No sonographic suspicious findings left axilla and upper outer quadrant left breast at area of concern. Note is made of two small benign appearing axillary lymph nodes and a small amount of fluid adjacent to the axillary segment of the axillary segment of the left breast expander. BIRADS 2 benign findings.\par \par 19 CT chest abd pelvis: increased soft tissue seen in superior mediastinum with radiographic features favoring thymic rebound in a patient who had recent chemotherapy\par no soft tissue mass of chest wall \par \par 2020  US breast limited left:\par Impression:  Left breast \par \par 2020  CT Chest:  Stable very small soild nodule in the left upper lobe when compared to previous exam.\par \par \par US breast 19: no evidence of malignancy, several areas of at necrosis upper inner quad and oval shaped mixed echotexture nodule left breast at 5 oclock 4cm from nipple 1.2cm compatible with probable fat necrosis BIRads 3 prob benign findings, recommend follow US in 6 months. 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 Follow up recommended to asses for stability/resolution.\par \par CT chest 2019 : Impression: 3mm pulmonary nodule left upper lobe slightly increased in size since May 23, 2019 and indeterminate finding, thymic tissue within anterior mediastinum appears ot have minimally decreased since May 23, 2019 given differences in technique.\par \par \par SURGICAL PATHOLOGY:\par 1. Breast, right simple mastectomy: Negative for malignancy.\par 2. [place] lymph node, left axilla, excision: One lymph node, negative for metastatic carcinoma.\par 3. [place] lymph node, left axilla, excision: One lymph node, negative for metastatic carcinoma.\par 4. Breast, left, simple mastectomy: Infiltrating ductal carcinoma, measuring 3.5 cm, with necrosis. Infiltrating carcinoma extends to the anterior margin of the mastectomy specimen, please see final margin status in the synoptic summary below and part 5. Negative nipple. Negative skin. Focal secretory change. \par 5. Left superior flap margin, over tumor: negative margin.\par 6. Skin and adipose tissue, right and left breast, excision: Negative for malignancy.\par \par 2019 MRI of the abdomen \par Impression: No cholelithiasis, choledocholithiasis or biliary ductal dilatation. \par \par 2020  LT breast US\par IMPRESSION: Left breast 5:00 4 cm from the nipple 1.1 cm benign-appearing nodule decreased in size from 2019 compatible with benign finding likely representing fat necrosis. Left breast 7:00 8 cm from the nipple at the area of patient's palpable concern demonstrates no sonographic suspicious finding. \par RECOMMENDATION: Clinical follow up. BI-RADS 2 - Benign Finding(s)\par \par We reviewed and discussed repeat left breast ultrasound. There is no evidence of recurrent disease on examination or review of studies. Her clinical breast exam is benign. Recommendation for annul  breast given history of breast cancer. She understands and agrees to plan. Clinical follow up is recommended in 6 months. \par \par All questions answered.\par \par

## 2020-09-08 DIAGNOSIS — D50.9 IRON DEFICIENCY ANEMIA, UNSPECIFIED: ICD-10-CM

## 2020-09-10 ENCOUNTER — OUTPATIENT (OUTPATIENT)
Dept: OUTPATIENT SERVICES | Facility: HOSPITAL | Age: 30
LOS: 1 days | Discharge: HOME | End: 2020-09-10

## 2020-09-10 DIAGNOSIS — Z98.890 OTHER SPECIFIED POSTPROCEDURAL STATES: Chronic | ICD-10-CM

## 2020-09-10 DIAGNOSIS — C50.412 MALIGNANT NEOPLASM OF UPPER-OUTER QUADRANT OF LEFT FEMALE BREAST: ICD-10-CM

## 2020-09-10 DIAGNOSIS — Z98.89 OTHER SPECIFIED POSTPROCEDURAL STATES: Chronic | ICD-10-CM

## 2020-09-11 ENCOUNTER — APPOINTMENT (OUTPATIENT)
Age: 30
End: 2020-09-11

## 2020-09-14 DIAGNOSIS — D50.9 IRON DEFICIENCY ANEMIA, UNSPECIFIED: ICD-10-CM

## 2020-09-15 ENCOUNTER — RESULT REVIEW (OUTPATIENT)
Age: 30
End: 2020-09-15

## 2020-09-15 ENCOUNTER — APPOINTMENT (OUTPATIENT)
Dept: MRI IMAGING | Facility: CLINIC | Age: 30
End: 2020-09-15
Payer: COMMERCIAL

## 2020-09-15 ENCOUNTER — OUTPATIENT (OUTPATIENT)
Dept: OUTPATIENT SERVICES | Facility: HOSPITAL | Age: 30
LOS: 1 days | End: 2020-09-15
Payer: COMMERCIAL

## 2020-09-15 DIAGNOSIS — Z98.890 OTHER SPECIFIED POSTPROCEDURAL STATES: Chronic | ICD-10-CM

## 2020-09-15 DIAGNOSIS — Z98.89 OTHER SPECIFIED POSTPROCEDURAL STATES: Chronic | ICD-10-CM

## 2020-09-15 DIAGNOSIS — Z00.8 ENCOUNTER FOR OTHER GENERAL EXAMINATION: ICD-10-CM

## 2020-09-15 DIAGNOSIS — C50.412 MALIGNANT NEOPLASM OF UPPER-OUTER QUADRANT OF LEFT FEMALE BREAST: ICD-10-CM

## 2020-09-15 PROCEDURE — C8908: CPT

## 2020-09-15 PROCEDURE — 77049 MRI BREAST C-+ W/CAD BI: CPT | Mod: 26

## 2020-09-15 PROCEDURE — A9585: CPT

## 2020-09-15 PROCEDURE — C8937: CPT

## 2020-09-17 ENCOUNTER — RESULT CHARGE (OUTPATIENT)
Age: 30
End: 2020-09-17

## 2020-09-17 ENCOUNTER — APPOINTMENT (OUTPATIENT)
Dept: OBGYN | Facility: CLINIC | Age: 30
End: 2020-09-17
Payer: COMMERCIAL

## 2020-09-17 VITALS
HEIGHT: 61 IN | TEMPERATURE: 97.5 F | DIASTOLIC BLOOD PRESSURE: 60 MMHG | BODY MASS INDEX: 24.17 KG/M2 | WEIGHT: 128 LBS | SYSTOLIC BLOOD PRESSURE: 90 MMHG

## 2020-09-17 LAB
BILIRUB UR QL STRIP: NORMAL
COLLECTION METHOD: NORMAL
GLUCOSE UR-MCNC: NORMAL
HCG UR QL: 0.2 EU/DL
HGB UR QL STRIP.AUTO: NORMAL
KETONES UR-MCNC: NORMAL
LEUKOCYTE ESTERASE UR QL STRIP: NORMAL
NITRITE UR QL STRIP: NORMAL
PH UR STRIP: 6.5
PROT UR STRIP-MCNC: NORMAL
SP GR UR STRIP: 1.02

## 2020-09-17 PROCEDURE — 99395 PREV VISIT EST AGE 18-39: CPT

## 2020-09-17 PROCEDURE — 99213 OFFICE O/P EST LOW 20 MIN: CPT | Mod: 25

## 2020-09-17 PROCEDURE — 81003 URINALYSIS AUTO W/O SCOPE: CPT | Mod: QW

## 2020-09-17 NOTE — PLAN
[FreeTextEntry1] : - pelvic US for evaluation of her c/o fullness.  if negative- recommend following up with urology.\par - we discussed her plan to conceive next year- plans to see Dr. Matute for IVF- she has her 27 yo eggs from before her breast cancer treatment.  I would like her to make an appointment with [facility] prior to pregnancy to discuss any concerns she may have and discuss any recommendations for her during pregnancy.\par - pt plans to have an oophorectomy after having her second child- will refer to MD to discuss after her pregnancy.\par - follow up with Dr. Farr regarding her ongoing left breast pain- we reviewed her MRI results which did not show suspicious findings, but as her pain is on going she will need follow up- message sent to Dr. Farr's NP, Tammi.\par - f/u pap results\par

## 2020-09-17 NOTE — HISTORY OF PRESENT ILLNESS
[N] : Patient does not use contraception [Y] : Patient is sexually active [Menarche Age: ____] : age at menarche was [unfilled] [Currently Active] : currently active [Men] : men [Mammogramdate] : 6/07/2018  [TextBox_19] : B6 [BreastSonogramDate] : 6/07/2018 [TextBox_25] : B6 [PapSmeardate] : 7/05/2019 [TextBox_31] : WNL [BoneDensityDate] : 5/15/2019 [TextBox_37] : OSTEOPENIA [LMPDate] : 9/11/2020 [PGHxTotal] : 2 [[facility] HospitalxFulerm] : 1 [[facility] Hospitaliving] : 1 [PGHxABSpont] : 1 [FreeTextEntry1] : 9/11/2020 [No] : Patient does not have concerns regarding sex [Patient would like to be screened for STIs] : Patient would like to be screened for STIs

## 2020-09-17 NOTE — PHYSICAL EXAM
[Appropriately responsive] : appropriately responsive [Alert] : alert [No Acute Distress] : no acute distress [FreeTextEntry6] : bilateral mastectomies with reconstruction, nipple reconstruction complete [Right Breast Absent] : a total mastectomy [Breast Reconstruction Right] : breast reconstruction [Tenderness Of The Left Breast] : tenderness [Left Breast Absent] : a total mastectomy [Breast Reconstruction Left] : breast reconstruction [No Masses] : no breast masses were palpable [Axillary Lymph Nodes Enlarged Bilaterally] : enlarged nodes bilaterally [Labia Majora] : normal [Labia Minora] : normal [Normal] : normal [Uterine Adnexae] : normal

## 2020-09-17 NOTE — COUNSELING
[Nutrition/ Exercise/ Weight Management] : nutrition, exercise, weight management [Preconception Care/ Fertility options] : preconception care, fertility options

## 2020-09-21 LAB
C TRACH RRNA SPEC QL NAA+PROBE: NOT DETECTED
HPV HIGH+LOW RISK DNA PNL CVX: NOT DETECTED
N GONORRHOEA RRNA SPEC QL NAA+PROBE: NOT DETECTED
SOURCE AMPLIFICATION: NORMAL
SOURCE TP AMPLIFICATION: NORMAL
T VAGINALIS RRNA SPEC QL NAA+PROBE: NOT DETECTED

## 2020-09-24 LAB — CYTOLOGY CVX/VAG DOC THIN PREP: NORMAL

## 2020-10-08 ENCOUNTER — APPOINTMENT (OUTPATIENT)
Dept: CT IMAGING | Facility: CLINIC | Age: 30
End: 2020-10-08

## 2020-10-08 ENCOUNTER — APPOINTMENT (OUTPATIENT)
Dept: ULTRASOUND IMAGING | Facility: CLINIC | Age: 30
End: 2020-10-08

## 2020-10-14 ENCOUNTER — APPOINTMENT (OUTPATIENT)
Dept: FAMILY MEDICINE | Facility: CLINIC | Age: 30
End: 2020-10-14
Payer: COMMERCIAL

## 2020-10-14 ENCOUNTER — APPOINTMENT (OUTPATIENT)
Dept: ENDOCRINOLOGY | Facility: CLINIC | Age: 30
End: 2020-10-14

## 2020-10-14 PROCEDURE — G0008: CPT

## 2020-10-14 PROCEDURE — 90686 IIV4 VACC NO PRSV 0.5 ML IM: CPT

## 2020-10-17 ENCOUNTER — OUTPATIENT (OUTPATIENT)
Dept: OUTPATIENT SERVICES | Facility: HOSPITAL | Age: 30
LOS: 1 days | Discharge: HOME | End: 2020-10-17

## 2020-10-17 DIAGNOSIS — Z98.89 OTHER SPECIFIED POSTPROCEDURAL STATES: Chronic | ICD-10-CM

## 2020-10-17 DIAGNOSIS — Z98.890 OTHER SPECIFIED POSTPROCEDURAL STATES: Chronic | ICD-10-CM

## 2020-10-17 DIAGNOSIS — C50.412 MALIGNANT NEOPLASM OF UPPER-OUTER QUADRANT OF LEFT FEMALE BREAST: ICD-10-CM

## 2020-10-27 ENCOUNTER — APPOINTMENT (OUTPATIENT)
Dept: CT IMAGING | Facility: CLINIC | Age: 30
End: 2020-10-27
Payer: COMMERCIAL

## 2020-10-27 ENCOUNTER — LABORATORY RESULT (OUTPATIENT)
Age: 30
End: 2020-10-27

## 2020-10-27 ENCOUNTER — RESULT REVIEW (OUTPATIENT)
Age: 30
End: 2020-10-27

## 2020-10-27 ENCOUNTER — APPOINTMENT (OUTPATIENT)
Dept: ULTRASOUND IMAGING | Facility: CLINIC | Age: 30
End: 2020-10-27
Payer: COMMERCIAL

## 2020-10-27 ENCOUNTER — OUTPATIENT (OUTPATIENT)
Dept: OUTPATIENT SERVICES | Facility: HOSPITAL | Age: 30
LOS: 1 days | End: 2020-10-27
Payer: COMMERCIAL

## 2020-10-27 DIAGNOSIS — Z98.890 OTHER SPECIFIED POSTPROCEDURAL STATES: Chronic | ICD-10-CM

## 2020-10-27 DIAGNOSIS — Z98.89 OTHER SPECIFIED POSTPROCEDURAL STATES: Chronic | ICD-10-CM

## 2020-10-27 DIAGNOSIS — M25.551 PAIN IN RIGHT HIP: ICD-10-CM

## 2020-10-27 DIAGNOSIS — C50.412 MALIGNANT NEOPLASM OF UPPER-OUTER QUADRANT OF LEFT FEMALE BREAST: ICD-10-CM

## 2020-10-27 PROCEDURE — 73700 CT LOWER EXTREMITY W/O DYE: CPT | Mod: 26,RT

## 2020-10-27 PROCEDURE — 76830 TRANSVAGINAL US NON-OB: CPT

## 2020-10-27 PROCEDURE — 76856 US EXAM PELVIC COMPLETE: CPT | Mod: 26,59

## 2020-10-27 PROCEDURE — 76856 US EXAM PELVIC COMPLETE: CPT

## 2020-10-27 PROCEDURE — 76830 TRANSVAGINAL US NON-OB: CPT | Mod: 26

## 2020-10-27 PROCEDURE — 73700 CT LOWER EXTREMITY W/O DYE: CPT

## 2020-10-28 ENCOUNTER — NON-APPOINTMENT (OUTPATIENT)
Age: 30
End: 2020-10-28

## 2020-10-29 ENCOUNTER — TRANSCRIPTION ENCOUNTER (OUTPATIENT)
Age: 30
End: 2020-10-29

## 2020-10-29 ENCOUNTER — APPOINTMENT (OUTPATIENT)
Age: 30
End: 2020-10-29

## 2020-11-02 ENCOUNTER — NON-APPOINTMENT (OUTPATIENT)
Age: 30
End: 2020-11-02

## 2020-11-04 ENCOUNTER — APPOINTMENT (OUTPATIENT)
Dept: ENDOCRINOLOGY | Facility: CLINIC | Age: 30
End: 2020-11-04
Payer: COMMERCIAL

## 2020-11-04 PROCEDURE — 99212 OFFICE O/P EST SF 10 MIN: CPT | Mod: 95

## 2020-11-04 NOTE — ASSESSMENT
[FreeTextEntry1] : Imp/Plan \par \par Hypothryoidsm- stbale \par  hyperhidrosis-can check hormonal levles again \par  if all ok- will see Derm ? RX strenght deoderant or  Botox \par hyperhydrosis  can checkhormonal [jovon\par

## 2020-11-04 NOTE — END OF VISIT
Left message for patient to return my call to discuss upcoming surgery. Sent surgery information letter.    [Time Spent: ___ minutes] : I have spent [unfilled] minutes of time on the encounter.

## 2020-11-04 NOTE — HISTORY OF PRESENT ILLNESS
[Home] : at home, [unfilled] , at the time of the visit. [Medical Office: ([facility])___] : at the medical office located in  [Verbal consent obtained from patient] : the patient, [unfilled] [FreeTextEntry1] : \par  Telehealth  start time 1133AM\par  end time 1141 AM \par follow up \par Hypothryoidsm \par \par still with sweats in axilla attimes \par \par mensesarestable  o some inc hair gorwth on chest area    nochaeg in hand michelle or shoe size \par Noacne \par ROS No Neck pain No voice changes  No Chest pain  No Pressure  No dyspnea   No n/v abd pain diarrhea or constipation  No LE edema \par \par \par \par \par

## 2020-11-05 ENCOUNTER — APPOINTMENT (OUTPATIENT)
Age: 30
End: 2020-11-05

## 2020-11-06 ENCOUNTER — TRANSCRIPTION ENCOUNTER (OUTPATIENT)
Age: 30
End: 2020-11-06

## 2020-11-15 ENCOUNTER — TRANSCRIPTION ENCOUNTER (OUTPATIENT)
Age: 30
End: 2020-11-15

## 2020-11-30 ENCOUNTER — APPOINTMENT (OUTPATIENT)
Dept: BREAST CENTER | Facility: CLINIC | Age: 30
End: 2020-11-30
Payer: COMMERCIAL

## 2020-11-30 ENCOUNTER — APPOINTMENT (OUTPATIENT)
Dept: HEMATOLOGY ONCOLOGY | Facility: CLINIC | Age: 30
End: 2020-11-30
Payer: COMMERCIAL

## 2020-11-30 ENCOUNTER — OUTPATIENT (OUTPATIENT)
Dept: OUTPATIENT SERVICES | Facility: HOSPITAL | Age: 30
LOS: 1 days | Discharge: HOME | End: 2020-11-30

## 2020-11-30 ENCOUNTER — NON-APPOINTMENT (OUTPATIENT)
Age: 30
End: 2020-11-30

## 2020-11-30 ENCOUNTER — TRANSCRIPTION ENCOUNTER (OUTPATIENT)
Age: 30
End: 2020-11-30

## 2020-11-30 VITALS
BODY MASS INDEX: 24.2 KG/M2 | OXYGEN SATURATION: 100 % | DIASTOLIC BLOOD PRESSURE: 75 MMHG | SYSTOLIC BLOOD PRESSURE: 125 MMHG | HEIGHT: 61 IN | HEART RATE: 89 BPM | WEIGHT: 128.19 LBS

## 2020-11-30 DIAGNOSIS — Z98.89 OTHER SPECIFIED POSTPROCEDURAL STATES: Chronic | ICD-10-CM

## 2020-11-30 DIAGNOSIS — Z98.890 OTHER SPECIFIED POSTPROCEDURAL STATES: Chronic | ICD-10-CM

## 2020-11-30 DIAGNOSIS — D50.9 IRON DEFICIENCY ANEMIA, UNSPECIFIED: ICD-10-CM

## 2020-11-30 PROCEDURE — 99214 OFFICE O/P EST MOD 30 MIN: CPT

## 2020-11-30 PROCEDURE — 99072 ADDL SUPL MATRL&STAF TM PHE: CPT

## 2020-11-30 RX ORDER — HYOSCYAMINE SULFATE 0.12 MG/1
0.12 TABLET, ORALLY DISINTEGRATING ORAL
Qty: 30 | Refills: 3 | Status: DISCONTINUED | COMMUNITY
Start: 2020-08-11 | End: 2020-11-30

## 2020-11-30 RX ORDER — BENZONATATE 100 MG/1
100 CAPSULE ORAL 3 TIMES DAILY
Qty: 30 | Refills: 0 | Status: DISCONTINUED | COMMUNITY
Start: 2020-01-31 | End: 2020-11-30

## 2020-11-30 RX ORDER — DULOXETINE HYDROCHLORIDE 30 MG/1
30 CAPSULE, DELAYED RELEASE PELLETS ORAL
Qty: 60 | Refills: 0 | Status: DISCONTINUED | COMMUNITY
Start: 2020-07-22 | End: 2020-11-30

## 2020-11-30 RX ORDER — METHYLDOPA 500 MG
160 (50 FE) TABLET ORAL
Qty: 90 | Refills: 2 | Status: DISCONTINUED | COMMUNITY
Start: 2020-05-27 | End: 2020-11-30

## 2020-11-30 RX ORDER — DULOXETINE HYDROCHLORIDE 60 MG/1
60 CAPSULE, DELAYED RELEASE PELLETS ORAL
Qty: 90 | Refills: 0 | Status: DISCONTINUED | COMMUNITY
Start: 2020-02-20 | End: 2020-11-30

## 2020-11-30 NOTE — PHYSICAL EXAM
[Ambulatory and capable of all self care but unable to carry out any work activities] : Status 2- Ambulatory and capable of all self care but unable to carry out any work activities. Up and about more than 50% of waking hours [Normal] : grossly intact [de-identified] : clear to auscultation b/l [de-identified] : no edema [de-identified] : b/l reconstructed breasts, no significant palpable masses

## 2020-11-30 NOTE — ASSESSMENT
[FreeTextEntry1] : 31 yo premenopausal female with a history of triple negative left breast cancer treated with bilateral mastectomy and left SLNB.  Final pathology demonstrating 3.5cm IDC grade 3 triple negative with 0/2 lymph nodes.  There is a palpable pea size nodule in the left breast in the area of concern.  This is most likely a dermal cyst.  Recommendation for left breast US and will review results via mobile.  If benign then followup in March as scheduled.  \par 1. Clinical examination in March\par 2. Follow up with Dr. Mcclain\par 3. Left breast US

## 2020-11-30 NOTE — HISTORY OF PRESENT ILLNESS
[Disease: _____________________] : Disease: [unfilled] [T: ___] : T[unfilled] [N: ___] : N[unfilled] [AJCC Stage: ____] : AJCC Stage: [unfilled] [de-identified] : Ms. Kennedy was diagnosed with left triple negative breast cancer at age 28. \par \par She delivered her first child on 4/25/18 following which she self palpated a left breast mass.  It was  thought to be a blocked milk duct / mastitis and was treated with antibiotics and then antifungals.  The mass did not resolve and she sought a 2nd opinion.  \par \par She then had a breast ultrasound on 5/30/18 which showed a 2.3 cm left breast mass at 1-2:00, 9 cm from the nipple, and a single axillary lymph node which does not contain a prominent benign appearing fatty hilum.  \par \par 5/31/18 left breast core biopsy - invasive poorly differentiated ductal carcinoma, Isom score 9/9, measures at least 17 cm w/ extensive necrosis. ER 0%, VT 0%, HER 2 (0/1+) -negative. \par \par On 6/7/18 she had a diagnostic mammogram + ultrasound - 2.1 cm rounded mass of left breast with overall coarsening of parenchymal pattern and increased parenchymal density in upper outer left breast. US - 2.4 cm left breast mass at 1-2:00 and left axilla 0.8 cm rounded hypoechoic mass consistent with abnormal left axillary node.  \par \par On 6/7/18 she also had a breast MRI - 2.9 x 3.2 x 3.1 cm mass in left upper inner breast at 1:00.  Suspicious left axillary nodes with ultrasound correlate for which US guided biopsy is recommended.   \par \par On 6/8/17 she had biopsy of the left axillary node - pathology : reactive lymph node. \par \par 6/7/18 - CT chest/abd/pelvis -  Known left upper outer breast malignancy. 2 small, round left axillary lymph nodes for which patient will undergo percutaneous \par sampling, as per report of breast MRI from 6/7/2018. A 3 mm subpleural nodular density in the right middle lobe probably represents a focus of atelectasis. No evidence of metastatic disease in the chest, abdomen, and pelvis.\par \par 6/8/18 bone scan - Normal bone scan. No radionuclide evidence of osseous metastasis.\par \par 6/29/18 -s/p bilateral mastectomy \par Pathology: left breast IDC 3.5 cm, joo score 9/9, margins negative, 2 SNL nodes negative.  pT2 pN0\par Right mastectomy - negative for malignancy. \par \par Family History: 2 maternal great aunts with breast cancer.\par paternal cousin with breast cancer\par paternal GM - pancreatic cancer\par paternal GF - pancreatic cancer\par \par [facility]  [employer] panel - no clinically significant mutations\par \par PMH: asthma, DPD deficiency (dihydropyrimidine dehydrogenase deficiency).\par \par Sx hx: tonsillectomy\par \par Social: non-smoker, social ETOH  [de-identified] : poorly differentiated invasive ductal carcinoma [de-identified] : ER 0% MO 0% HER2 negative [de-identified] : Returns for follow up.\par She had her period 4 times in November 2020, reports they are "martha" heavy.\par She had tried oral iron for iron deficiency but noted indigestion and stomach was hurting, also has IBS. S/p 2 doses of feraheme on 9/4 and 9/11/20. \par She stopped taking duloxetine as she was still getting neuropathic breast pain despite taking it.\par She continues to feel very tired and intermittently dizzy.\par No weight loss. \par

## 2020-11-30 NOTE — PHYSICAL EXAM
[Normocephalic] : normocephalic [Atraumatic] : atraumatic [EOMI] : extra ocular movement intact [PERRL] : pupils equal, round and reactive to light [Sclera nonicteric] : sclera nonicteric [Supple] : supple [No Supraclavicular Adenopathy] : no supraclavicular adenopathy [Examined in the supine and seated position] : examined in the supine and seated position [No dominant masses] : no dominant masses in right breast  [No dominant masses] : no dominant masses left breast [No Axillary Lymphadenopathy] : no left axillary lymphadenopathy [No Edema] : no edema [No Rashes] : no rashes [No Ulceration] : no ulceration [de-identified] : s/p mastectomy with implant reconstruction, well healed.  [de-identified] : s/p mastectomy with implant reconstruction, well healed.   Pea size nodule left breast 5:00 1 cmfn.

## 2020-11-30 NOTE — ASSESSMENT
[FreeTextEntry1] : 30 year old female with stage IIA left breast TNBC (T2N0) S/P bilateral mastectomy on 6/29/18. She has DPD deficiency.  S/p 4 cycles of dose dense AC, followed by weekly taxol completed 12/26/18.\par Post chemotherapy she was incidentally found to have anterior mediastinal mass c/w thymic rebound. \par \par 5/15/20 MRI breast - RORY\par 8/27/20 Xray R hip / 10/27/20 Rt Hip CT - negative. 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

## 2020-11-30 NOTE — HISTORY OF PRESENT ILLNESS
[FreeTextEntry1] : I had the pleasure of seeing JEANE GUTIERREZ in the office for an acute visit secondary to palpable left breast 5:00 1 cmfn pea size nodule.\par \par Jeane is a sg 31 yo female who originally presented after feeling a left breast mass after delivery of her son on 2018. She states she had first felt the lump after delivery of her child. She sought medical attention and was told it was a blocked milk duct which caused the mastitis and put her on antibiotics. When the symptoms did not resolve she saw another physician for a second opinion. She was then sent for ultrasound and ultrasound demonstrated a 2.3 cm lobulated hypoechoic nodule at in the left breast 2:00 and an ultrasound guided core biopsy was recommended. 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 Chemotherapy began on 8/3/2018. She has since completed chemo and underwent implant exchange on 2019 by Dr. Falcon.\par \par She has been followed by thoracic surgery to monitor for any changes in her chest wall tissue specifically some rebound hypertrophy noted in the thymic tissue. Recent CT on 2020 showed stable very small nodule in the left upper lobe.\par \par She denies skin changes, nipple dimpling or nipple discharge. \par \par  with 1st delivery at 28. Menses began at age 8.\par She denies personal history of malignancy.\par Family history is significant for two maunts diagnosed with breast cancer, Pcousin diagnosed with breast cancer, PGM and MGM both diagnosed with pancreatic cancer at unknown age, MGM diagnose with skin cancer.\par \par 2018 [employer]: Genetic results: Negative- No clinically significant mutation identified\par \par Imaging: [facility] IMAGING AT [place]\par 2018 MRI BREAST: 1. 2.9X 3.2X 3.1 CM MASS in the LEFT BREAST UPPER INNER BREAST AT 1:00, middle to posterior depth, consistent with biopsy proven invasive ductal carcinoma. 2. Suspicious left axillary lymph node with ultrasound correlate, for which ultrasound guided core biopsy has been recommended. As per addendum to a breast ultrasound guided core biopsy report dated 2018, the patient will return for left axillary lymph node biopsy after surgical consultation. 3. No MRI evidence of malignancy in the right breast. BIRADS 6 Biopsy-Proven Malignancy\par \par 2018 CT CHEST/ABD/PELVIS: Known left upper outer breast malignancy. 2 small round left axillary lymph nodes for which patient will undergo percutaneous sampling, as per report of breast MRI from 2018. A 3 mm subpleural nodular density in the right middle lobe probably represents a focus of atelectasis. No evidence of metastatic disease in the chest, abdomen, and pelvis.\par \par 2018 US NONVASC EXT LTD RT: Benign appearing right groin lymph nodes the smaller of which correlates with area of patients tenderness. Findings likely represent benign reactive lymph nodes. Recommend continued clinical follow up.\par \par 2018 US breast limited left\par Impression: No sonographic suspicious findings left axilla and upper outer quadrant left breast at area of concern. Note is made of two small benign appearing axillary lymph nodes and a small amount of fluid adjacent to the axillary segment of the axillary segment of the left breast expander. BIRADS 2 benign findings.\par \par 19 CT chest abd pelvis: increased soft tissue seen in superior mediastinum with radiographic features favoring thymic rebound in a patient who had recent chemotherapy\par no soft tissue mass of chest wall \par \par 2020  US breast limited left:\par Impression:  Left breast \par \par 2020  CT Chest:  Stable very small soild nodule in the left upper lobe when compared to previous exam.\par \par \par US breast 19: no evidence of malignancy, several areas of at necrosis upper inner quad and oval shaped mixed echotexture nodule left breast at 5 oclock 4cm from nipple 1.2cm compatible with probable fat necrosis BIRads 3 prob benign findings, recommend follow US in 6 months. 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 Follow up recommended to asses for stability/resolution.\par \par CT chest 2019 : Impression: 3mm pulmonary nodule left upper lobe slightly increased in size since May 23, 2019 and indeterminate finding, thymic tissue within anterior mediastinum appears ot have minimally decreased since May 23, 2019 given differences in technique.\par \par \par SURGICAL PATHOLOGY:\par 1. Breast, right simple mastectomy: Negative for malignancy.\par 2. [place] lymph node, left axilla, excision: One lymph node, negative for metastatic carcinoma.\par 3. [place] lymph node, left axilla, excision: One lymph node, negative for metastatic carcinoma.\par 4. Breast, left, simple mastectomy: Infiltrating ductal carcinoma, measuring 3.5 cm, with necrosis. Infiltrating carcinoma extends to the anterior margin of the mastectomy specimen, please see final margin status in the synoptic summary below and part 5. Negative nipple. Negative skin. Focal secretory change. \par 5. Left superior flap margin, over tumor: negative margin.\par 6. Skin and adipose tissue, right and left breast, excision: Negative for malignancy.\par \par 2019 MRI of the abdomen \par Impression: No cholelithiasis, choledocholithiasis or biliary ductal dilatation. \par \par 2020  LT breast US\par IMPRESSION: Left breast 5:00 4 cm from the nipple 1.1 cm benign-appearing nodule decreased in size from 2019 compatible with benign finding likely representing fat necrosis. Left breast 7:00 8 cm from the nipple at the area of patient's palpable concern demonstrates no sonographic suspicious finding. \par RECOMMENDATION: Clinical follow up. BI-RADS 2 - Benign Finding(s)\par \par 9/15/2020  MR breast\par IMPRESSION: No MRI evidence of malignancy. Intact bilateral implants. \par RECOMMENDATION: Clinical follow up. BI-RADS 2 - Benign Finding(s)\par \par We reviewed and discussed clinical exam.  There is a palpable pea size nodule in the left breast in the area of concern.  This is most likely a dermal cyst.  Recommendation for left breast US and will review results via mobile.  If benign then followup in March as scheduled.  She understands and agrees to plan.  All questions answered.\par \par All questions answered.\par \par

## 2020-12-01 ENCOUNTER — APPOINTMENT (OUTPATIENT)
Dept: OBGYN | Facility: CLINIC | Age: 30
End: 2020-12-01
Payer: COMMERCIAL

## 2020-12-01 ENCOUNTER — ASOB RESULT (OUTPATIENT)
Age: 30
End: 2020-12-01

## 2020-12-01 VITALS
BODY MASS INDEX: 24.35 KG/M2 | DIASTOLIC BLOOD PRESSURE: 70 MMHG | SYSTOLIC BLOOD PRESSURE: 110 MMHG | WEIGHT: 129 LBS | TEMPERATURE: 97.3 F | HEIGHT: 61 IN

## 2020-12-01 PROCEDURE — 99214 OFFICE O/P EST MOD 30 MIN: CPT | Mod: 25

## 2020-12-01 PROCEDURE — 76830 TRANSVAGINAL US NON-OB: CPT

## 2020-12-01 PROCEDURE — 99072 ADDL SUPL MATRL&STAF TM PHE: CPT

## 2020-12-01 PROCEDURE — 36415 COLL VENOUS BLD VENIPUNCTURE: CPT

## 2020-12-02 ENCOUNTER — RESULT REVIEW (OUTPATIENT)
Age: 30
End: 2020-12-02

## 2020-12-02 ENCOUNTER — APPOINTMENT (OUTPATIENT)
Dept: ULTRASOUND IMAGING | Facility: CLINIC | Age: 30
End: 2020-12-02
Payer: COMMERCIAL

## 2020-12-02 ENCOUNTER — OUTPATIENT (OUTPATIENT)
Dept: OUTPATIENT SERVICES | Facility: HOSPITAL | Age: 30
LOS: 1 days | End: 2020-12-02
Payer: COMMERCIAL

## 2020-12-02 DIAGNOSIS — Z98.890 OTHER SPECIFIED POSTPROCEDURAL STATES: Chronic | ICD-10-CM

## 2020-12-02 DIAGNOSIS — Z98.89 OTHER SPECIFIED POSTPROCEDURAL STATES: Chronic | ICD-10-CM

## 2020-12-02 DIAGNOSIS — N63.20 UNSPECIFIED LUMP IN THE LEFT BREAST, UNSPECIFIED QUADRANT: ICD-10-CM

## 2020-12-02 PROCEDURE — 76642 ULTRASOUND BREAST LIMITED: CPT | Mod: 26,LT

## 2020-12-02 PROCEDURE — 76642 ULTRASOUND BREAST LIMITED: CPT

## 2020-12-03 ENCOUNTER — TRANSCRIPTION ENCOUNTER (OUTPATIENT)
Age: 30
End: 2020-12-03

## 2020-12-03 LAB
BASOPHILS # BLD AUTO: 0.04 K/UL
BASOPHILS NFR BLD AUTO: 0.6 %
EOSINOPHIL # BLD AUTO: 0.11 K/UL
EOSINOPHIL NFR BLD AUTO: 1.5 %
ESTRADIOL SERPL-MCNC: 247 PG/ML
FSH SERPL-MCNC: 5.6 IU/L
HCT VFR BLD CALC: 45 %
HGB BLD-MCNC: 14.3 G/DL
IMM GRANULOCYTES NFR BLD AUTO: 0.4 %
LH SERPL-ACNC: 6.7 IU/L
LYMPHOCYTES # BLD AUTO: 1.88 K/UL
LYMPHOCYTES NFR BLD AUTO: 26.4 %
MAN DIFF?: NORMAL
MCHC RBC-ENTMCNC: 31.8 GM/DL
MCHC RBC-ENTMCNC: 31.9 PG
MCV RBC AUTO: 100.4 FL
MONOCYTES # BLD AUTO: 0.56 K/UL
MONOCYTES NFR BLD AUTO: 7.9 %
NEUTROPHILS # BLD AUTO: 4.49 K/UL
NEUTROPHILS NFR BLD AUTO: 63.2 %
PLATELET # BLD AUTO: 210 K/UL
PROLACTIN SERPL-MCNC: 9.1 NG/ML
RBC # BLD: 4.48 M/UL
RBC # FLD: 12.7 %
TSH SERPL-ACNC: 3.5 UIU/ML
WBC # FLD AUTO: 7.11 K/UL

## 2020-12-03 NOTE — DISCUSSION/SUMMARY
[FreeTextEntry1] : We discussed the results of pelvic sonogram that she had today which revealed retroverted uterus appears wnl. Right ovary simple cyst with a septation. Left ovary appears wnl. Bilateral adnexal free fluid noted.We thoroughly reviewed the many etiologies of dysfunctional uterine bleeding including hormonal causes as well as structural causes. We discussed performing blood work to evaluate possible hormonal causes for the patient's irregular bleeding. I recommended to try Ibuprofen therapy. I advised to her to take Ibuprofen/ Advil every 6 hours. She will send me a portal message to discuss lab results. All questions and concerns were addressed.\par \par During this visit 20 minutes were spent face-to-face with greater than 50% of the time dedicated to counseling.\par

## 2020-12-03 NOTE — END OF VISIT
[FreeTextEntry3] : I, Deandre Pearce, acted solely as a scribe for Dr. Oneil on this date 12/01/2020.\par All medical record entries made by the Scribe were at my, Dr. Oneil's direction and personally dictated by me on  12/01/2020. I have reviewed the chart and agree that the record accurately reflects my personal performance of the history, physical exam, assessment and plan. I have also personally directed, reviewed, and agreed with the chart.\par

## 2020-12-03 NOTE — HISTORY OF PRESENT ILLNESS
[Patient reported mammogram was abnormal] : Patient reported mammogram was abnormal [Patient reported PAP Smear was normal] : Patient reported PAP Smear was normal [Patient reported bone density results were abnormal] : Patient reported bone density results were abnormal [Menarche Age: ____] : age at menarche was [unfilled] [Currently Active] : currently active [Men] : men [Vaginal] : vaginal [No] : No [FreeTextEntry1] : Brianna presents for follow up sonogram to review the results. She is doing well. She reports having irregular bleeding. She states that this month she had her period x 4. She denies having any bleeding at this time. She has history of triple negative intraductal carcinoma with treatment of ACT chemotherapy. She had double mastectomy and breast reconstruction. She is not on any contraception. She states she frozen her eggs before chemotherapy treatment. She is considering conception in 2021. \par \par \par  [Mammogramdate] : 06/07/18 BR-6 [TextBox_25] : Breast MRI 09/15/20 BR-2 [PapSmeardate] : 09/17/20 [BoneDensityDate] : 05/15/19 [PGHxTotal] : 2 [[facility]] : 1 [PGHxABSpont] : 1

## 2020-12-04 ENCOUNTER — APPOINTMENT (OUTPATIENT)
Age: 30
End: 2020-12-04

## 2020-12-04 ENCOUNTER — RESULT REVIEW (OUTPATIENT)
Age: 30
End: 2020-12-04

## 2020-12-04 ENCOUNTER — LABORATORY RESULT (OUTPATIENT)
Age: 30
End: 2020-12-04

## 2020-12-04 LAB
BASOPHILS # BLD AUTO: 0 K/UL — SIGNIFICANT CHANGE UP (ref 0–0.2)
BASOPHILS NFR BLD AUTO: 0.8 % — SIGNIFICANT CHANGE UP (ref 0–2)
EOSINOPHIL # BLD AUTO: 0.1 K/UL — SIGNIFICANT CHANGE UP (ref 0–0.5)
EOSINOPHIL NFR BLD AUTO: 2.6 % — SIGNIFICANT CHANGE UP (ref 0–6)
HCT VFR BLD CALC: 38.8 % — SIGNIFICANT CHANGE UP (ref 34.5–45)
HGB BLD-MCNC: 12.8 G/DL — SIGNIFICANT CHANGE UP (ref 11.5–15.5)
LYMPHOCYTES # BLD AUTO: 1.6 K/UL — SIGNIFICANT CHANGE UP (ref 1–3.3)
LYMPHOCYTES # BLD AUTO: 32.9 % — SIGNIFICANT CHANGE UP (ref 13–44)
MCHC RBC-ENTMCNC: 32.4 PG — SIGNIFICANT CHANGE UP (ref 27–34)
MCHC RBC-ENTMCNC: 33.1 G/DL — SIGNIFICANT CHANGE UP (ref 32–36)
MCV RBC AUTO: 97.8 FL — SIGNIFICANT CHANGE UP (ref 80–100)
MONOCYTES # BLD AUTO: 0.4 K/UL — SIGNIFICANT CHANGE UP (ref 0–0.9)
MONOCYTES NFR BLD AUTO: 8.9 % — SIGNIFICANT CHANGE UP (ref 2–14)
NEUTROPHILS # BLD AUTO: 2.7 K/UL — SIGNIFICANT CHANGE UP (ref 1.8–7.4)
NEUTROPHILS NFR BLD AUTO: 54.7 % — SIGNIFICANT CHANGE UP (ref 43–77)
PLATELET # BLD AUTO: 163 K/UL — SIGNIFICANT CHANGE UP (ref 150–400)
RBC # BLD: 3.97 M/UL — SIGNIFICANT CHANGE UP (ref 3.8–5.2)
RBC # FLD: 11.2 % — SIGNIFICANT CHANGE UP (ref 10.3–14.5)
WBC # BLD: 5 K/UL — SIGNIFICANT CHANGE UP (ref 3.8–10.5)
WBC # FLD AUTO: 5 K/UL — SIGNIFICANT CHANGE UP (ref 3.8–10.5)

## 2020-12-07 ENCOUNTER — TRANSCRIPTION ENCOUNTER (OUTPATIENT)
Age: 30
End: 2020-12-07

## 2020-12-08 ENCOUNTER — TRANSCRIPTION ENCOUNTER (OUTPATIENT)
Age: 30
End: 2020-12-08

## 2020-12-10 ENCOUNTER — APPOINTMENT (OUTPATIENT)
Dept: FAMILY MEDICINE | Facility: CLINIC | Age: 30
End: 2020-12-10
Payer: COMMERCIAL

## 2020-12-10 VITALS
HEIGHT: 61 IN | BODY MASS INDEX: 24.55 KG/M2 | OXYGEN SATURATION: 98 % | SYSTOLIC BLOOD PRESSURE: 116 MMHG | HEART RATE: 75 BPM | DIASTOLIC BLOOD PRESSURE: 80 MMHG | WEIGHT: 130 LBS | TEMPERATURE: 98.7 F

## 2020-12-10 PROCEDURE — 81003 URINALYSIS AUTO W/O SCOPE: CPT | Mod: QW

## 2020-12-10 PROCEDURE — G0444 DEPRESSION SCREEN ANNUAL: CPT

## 2020-12-10 PROCEDURE — 99072 ADDL SUPL MATRL&STAF TM PHE: CPT

## 2020-12-10 PROCEDURE — 99395 PREV VISIT EST AGE 18-39: CPT | Mod: 25

## 2020-12-10 NOTE — HISTORY OF PRESENT ILLNESS
[FreeTextEntry1] : CPE\par  [de-identified] : JEANE is a 30 year female here for CPE. Mood is good. Leads an active lifestyle. Eats a well balanced diet. Hx is significant for left breast CA. Pt underwent b/l mastectomy and began chemotherapy 8/2018 which she has since completed. Had implant exchange back in 2019. States she is going for reconstruction surgery next week. Follows up with Hemeonc Dr. Mcclain, Breast surgeon Dr. Nguyen, GYN Dr. Oneil, thoracic surgery, cardiologist Dr. Collins and endocrinologist Dr. Khurram Amezcua 2x a year given hx of Hashimoto's thyroiditis. Pt sees a pulmonologist/allergy specialist given hx of asthma. Last seen two weeks ago. Uses a rescue inhaler when needed. Last CT on 7/14/20 showed stable small nodule in the upper left lobe. Reports she had her period 5x this month and plan is to go on progesterone per Dr. Oneil. Currently taking levothyroxine 50 mcg, valacyclovir 500 mg prn, xanax 0.5 mg prn. Has an epinephrine pen due to peanut and shellfish allergy.  Pt c/o right sided back pain x 2 weeks. Pain presents with movement. Reports her urine has been dark in color. Does note she keeps her gun around that area.\par \par

## 2020-12-10 NOTE — PLAN
[FreeTextEntry1] : \par - Up to date on blood work \par - Blood work for Covid antibodies\par - Flank Pain: Likely musculoskeletal, will check urine. Currently menstruating.  \par - Meds refilled \par - Discussed Pneumovax 23

## 2020-12-10 NOTE — END OF VISIT
[FreeTextEntry3] : Medical record entries made by the scribe today today, were at my direction and personally dictated to them by me, Dr. Chelsea Del Rio on Dec 10, 2020. I have reviewed the chart and agree that the record accurately reflects my personal performance of the history, physical exam, assessment, and plan.\par

## 2020-12-10 NOTE — HEALTH RISK ASSESSMENT
[Patient reported mammogram was normal] : Patient reported mammogram was normal [Patient reported PAP Smear was normal] : Patient reported PAP Smear was normal [Good] : ~his/her~  mood as  good [No] : In the past 12 months have you used drugs other than those required for medical reasons? No [0] : 2) Feeling down, depressed, or hopeless: Not at all (0) [None] : None [With Family] : lives with family [Employed] : employed [] :  [Feels Safe at Home] : Feels safe at home [Fully functional (bathing, dressing, toileting, transferring, walking, feeding)] : Fully functional (bathing, dressing, toileting, transferring, walking, feeding) [Fully functional (using the telephone, shopping, preparing meals, housekeeping, doing laundry, using] : Fully functional and needs no help or supervision to perform IADLs (using the telephone, shopping, preparing meals, housekeeping, doing laundry, using transportation, managing medications and managing finances) [] : No [[id number]] : 0 [MammogramDate] : 06/18 [PapSmearDate] : 09/20 [FreeTextEntry2] :  in [place]

## 2020-12-10 NOTE — ADDENDUM
[FreeTextEntry1] : I, Yumiko Garcia acting as a scribe for Dr. Chelsea Del Rio on Dec 10, 2020  at 10:20 AM\par

## 2020-12-11 LAB
APPEARANCE: CLEAR
BACTERIA: NEGATIVE
BILIRUBIN URINE: NEGATIVE
BLOOD URINE: ABNORMAL
COLOR: COLORLESS
GLUCOSE QUALITATIVE U: NEGATIVE
HYALINE CASTS: 0 /LPF
KETONES URINE: NEGATIVE
LEUKOCYTE ESTERASE URINE: NEGATIVE
MICROSCOPIC-UA: NORMAL
NITRITE URINE: NEGATIVE
PH URINE: 7
PROTEIN URINE: NEGATIVE
RED BLOOD CELLS URINE: 0 /HPF
SARS-COV-2 IGG SERPL IA-ACNC: 0.07 INDEX
SARS-COV-2 IGG SERPL QL IA: NEGATIVE
SPECIFIC GRAVITY URINE: 1
SQUAMOUS EPITHELIAL CELLS: 0 /HPF
UROBILINOGEN URINE: NORMAL
WHITE BLOOD CELLS URINE: 0 /HPF

## 2020-12-12 LAB — BACTERIA UR CULT: NORMAL

## 2020-12-16 ENCOUNTER — TRANSCRIPTION ENCOUNTER (OUTPATIENT)
Age: 30
End: 2020-12-16

## 2020-12-20 ENCOUNTER — TRANSCRIPTION ENCOUNTER (OUTPATIENT)
Age: 30
End: 2020-12-20

## 2020-12-26 ENCOUNTER — TRANSCRIPTION ENCOUNTER (OUTPATIENT)
Age: 30
End: 2020-12-26

## 2020-12-29 ENCOUNTER — APPOINTMENT (OUTPATIENT)
Dept: CARDIOLOGY | Facility: CLINIC | Age: 30
End: 2020-12-29

## 2021-01-05 ENCOUNTER — TRANSCRIPTION ENCOUNTER (OUTPATIENT)
Age: 31
End: 2021-01-05

## 2021-01-08 NOTE — DISCHARGE NOTE OB - FUNCTIONAL SCREEN CURRENT LEVEL: DRESSING, MLM
Advair  mcg-21 mcg/inh inhalation aerosol: 2 puff(s) inhaled 2 times a day  Aspirin Enteric Coated 81 mg oral delayed release tablet: 1 tab(s) orally once a day  clonazePAM 0.5 mg oral tablet: 1 tab(s) orally once a day (at bedtime)  furosemide 40 mg oral tablet: 1 tab(s) orally once a day    **NOTE: As per pharmacy, furosemide dosage is 20 mg, NOT 40 mg**  hydrocodone-acetaminophen 7.5 mg-325 mg oral tablet: 1 tab(s) orally 3 times a day, As Needed  Levemir 100 units/mL subcutaneous solution: 80 unit(s) subcutaneous 2 times a day  NovoLOG FlexPen 100 units/mL injectable solution: 15 unit(s) injectable 3 times a day  ondansetron 4 mg oral tablet: 1 tab(s) orally once a day, As Needed  OxyCONTIN 80 mg oral tablet, extended release: 1 tab(s) orally every 12 hours  polyethylene glycol 3350 oral powder for reconstitution: 17 gram(s) orally once a day  ProAir HFA 90 mcg/inh inhalation aerosol: 2 puff(s) inhaled every 6 hours, As Needed  rosuvastatin 20 mg oral tablet: 1 tab(s) orally once a day (at bedtime)  Vitamin D2 2000 intl units (50 mcg) oral capsule: 1 cap(s) orally once a day   (0) independent Advair  mcg-21 mcg/inh inhalation aerosol: 2 puff(s) inhaled 2 times a day  Aspirin Enteric Coated 81 mg oral delayed release tablet: 1 tab(s) orally once a day  Augmentin 875 mg-125 mg oral tablet: 1 tab(s) orally 2 times a day   clonazePAM 0.5 mg oral tablet: 1 tab(s) orally once a day (at bedtime)  furosemide 40 mg oral tablet: 1 tab(s) orally once a day    **NOTE: As per pharmacy, furosemide dosage is 20 mg, NOT 40 mg**  hydrocodone-acetaminophen 7.5 mg-325 mg oral tablet: 1 tab(s) orally 3 times a day, As Needed  Levemir 100 units/mL subcutaneous solution: 80 unit(s) subcutaneous 2 times a day  NovoLOG FlexPen 100 units/mL injectable solution: 15 unit(s) injectable 3 times a day  ondansetron 4 mg oral tablet: 1 tab(s) orally once a day, As Needed  OxyCONTIN 80 mg oral tablet, extended release: 1 tab(s) orally every 12 hours  polyethylene glycol 3350 oral powder for reconstitution: 17 gram(s) orally once a day  ProAir HFA 90 mcg/inh inhalation aerosol: 2 puff(s) inhaled every 6 hours, As Needed  rosuvastatin 20 mg oral tablet: 1 tab(s) orally once a day (at bedtime)  Vitamin D2 2000 intl units (50 mcg) oral capsule: 1 cap(s) orally once a day

## 2021-01-12 ENCOUNTER — APPOINTMENT (OUTPATIENT)
Dept: DISASTER EMERGENCY | Facility: CLINIC | Age: 31
End: 2021-01-12

## 2021-01-13 ENCOUNTER — TRANSCRIPTION ENCOUNTER (OUTPATIENT)
Age: 31
End: 2021-01-13

## 2021-01-13 LAB — SARS-COV-2 N GENE NPH QL NAA+PROBE: NOT DETECTED

## 2021-01-14 ENCOUNTER — OUTPATIENT (OUTPATIENT)
Dept: OUTPATIENT SERVICES | Facility: HOSPITAL | Age: 31
LOS: 1 days | End: 2021-01-14
Payer: COMMERCIAL

## 2021-01-14 ENCOUNTER — NON-APPOINTMENT (OUTPATIENT)
Age: 31
End: 2021-01-14

## 2021-01-14 ENCOUNTER — APPOINTMENT (OUTPATIENT)
Dept: ULTRASOUND IMAGING | Facility: CLINIC | Age: 31
End: 2021-01-14
Payer: COMMERCIAL

## 2021-01-14 ENCOUNTER — RESULT REVIEW (OUTPATIENT)
Age: 31
End: 2021-01-14

## 2021-01-14 DIAGNOSIS — Z98.890 OTHER SPECIFIED POSTPROCEDURAL STATES: Chronic | ICD-10-CM

## 2021-01-14 DIAGNOSIS — Z98.89 OTHER SPECIFIED POSTPROCEDURAL STATES: Chronic | ICD-10-CM

## 2021-01-14 DIAGNOSIS — N64.4 MASTODYNIA: ICD-10-CM

## 2021-01-14 PROCEDURE — 76642 ULTRASOUND BREAST LIMITED: CPT | Mod: 26,LT

## 2021-01-14 PROCEDURE — 76642 ULTRASOUND BREAST LIMITED: CPT

## 2021-01-14 RX ORDER — HYDROMORPHONE HYDROCHLORIDE 2 MG/ML
0.5 INJECTION INTRAMUSCULAR; INTRAVENOUS; SUBCUTANEOUS
Refills: 0 | Status: DISCONTINUED | OUTPATIENT
Start: 2021-01-15 | End: 2021-01-15

## 2021-01-14 RX ORDER — FAMOTIDINE 10 MG/ML
20 INJECTION INTRAVENOUS ONCE
Refills: 0 | Status: COMPLETED | OUTPATIENT
Start: 2021-01-15 | End: 2021-01-15

## 2021-01-14 RX ORDER — FENTANYL CITRATE 50 UG/ML
50 INJECTION INTRAVENOUS
Refills: 0 | Status: DISCONTINUED | OUTPATIENT
Start: 2021-01-15 | End: 2021-01-15

## 2021-01-14 RX ORDER — ACETAMINOPHEN 500 MG
975 TABLET ORAL ONCE
Refills: 0 | Status: COMPLETED | OUTPATIENT
Start: 2021-01-15 | End: 2021-01-15

## 2021-01-14 RX ORDER — SODIUM CHLORIDE 9 MG/ML
1000 INJECTION, SOLUTION INTRAVENOUS
Refills: 0 | Status: DISCONTINUED | OUTPATIENT
Start: 2021-01-15 | End: 2021-01-15

## 2021-01-14 RX ORDER — MEPERIDINE HYDROCHLORIDE 50 MG/ML
12.5 INJECTION INTRAMUSCULAR; INTRAVENOUS; SUBCUTANEOUS
Refills: 0 | Status: DISCONTINUED | OUTPATIENT
Start: 2021-01-15 | End: 2021-01-15

## 2021-01-15 ENCOUNTER — NON-APPOINTMENT (OUTPATIENT)
Age: 31
End: 2021-01-15

## 2021-01-15 ENCOUNTER — RESULT REVIEW (OUTPATIENT)
Age: 31
End: 2021-01-15

## 2021-01-15 ENCOUNTER — OUTPATIENT (OUTPATIENT)
Dept: INPATIENT UNIT | Facility: HOSPITAL | Age: 31
LOS: 1 days | Discharge: HOME | End: 2021-01-15
Payer: COMMERCIAL

## 2021-01-15 VITALS
RESPIRATION RATE: 15 BRPM | OXYGEN SATURATION: 100 % | HEART RATE: 85 BPM | TEMPERATURE: 98 F | DIASTOLIC BLOOD PRESSURE: 65 MMHG | SYSTOLIC BLOOD PRESSURE: 110 MMHG | WEIGHT: 127.21 LBS | HEIGHT: 62 IN

## 2021-01-15 VITALS
OXYGEN SATURATION: 98 % | HEART RATE: 90 BPM | RESPIRATION RATE: 16 BRPM | SYSTOLIC BLOOD PRESSURE: 121 MMHG | DIASTOLIC BLOOD PRESSURE: 72 MMHG

## 2021-01-15 DIAGNOSIS — J45.909 UNSPECIFIED ASTHMA, UNCOMPLICATED: ICD-10-CM

## 2021-01-15 DIAGNOSIS — E03.9 HYPOTHYROIDISM, UNSPECIFIED: ICD-10-CM

## 2021-01-15 DIAGNOSIS — Z98.890 OTHER SPECIFIED POSTPROCEDURAL STATES: Chronic | ICD-10-CM

## 2021-01-15 DIAGNOSIS — C50.919 MALIGNANT NEOPLASM OF UNSPECIFIED SITE OF UNSPECIFIED FEMALE BREAST: ICD-10-CM

## 2021-01-15 DIAGNOSIS — N60.32 FIBROSCLEROSIS OF LEFT BREAST: ICD-10-CM

## 2021-01-15 DIAGNOSIS — Z90.49 ACQUIRED ABSENCE OF OTHER SPECIFIED PARTS OF DIGESTIVE TRACT: ICD-10-CM

## 2021-01-15 DIAGNOSIS — N60.22 FIBROADENOSIS OF LEFT BREAST: ICD-10-CM

## 2021-01-15 DIAGNOSIS — N60.31 FIBROSCLEROSIS OF RIGHT BREAST: ICD-10-CM

## 2021-01-15 DIAGNOSIS — L08.9 LOCAL INFECTION OF THE SKIN AND SUBCUTANEOUS TISSUE, UNSPECIFIED: ICD-10-CM

## 2021-01-15 DIAGNOSIS — N65.0 DEFORMITY OF RECONSTRUCTED BREAST: ICD-10-CM

## 2021-01-15 DIAGNOSIS — Z98.89 OTHER SPECIFIED POSTPROCEDURAL STATES: Chronic | ICD-10-CM

## 2021-01-15 DIAGNOSIS — T85.41XA BREAKDOWN (MECHANICAL) OF BREAST PROSTHESIS AND IMPLANT, INITIAL ENCOUNTER: ICD-10-CM

## 2021-01-15 DIAGNOSIS — N65.1 DISPROPORTION OF RECONSTRUCTED BREAST: ICD-10-CM

## 2021-01-15 DIAGNOSIS — Z85.3 PERSONAL HISTORY OF MALIGNANT NEOPLASM OF BREAST: ICD-10-CM

## 2021-01-15 DIAGNOSIS — Z91.010 ALLERGY TO PEANUTS: ICD-10-CM

## 2021-01-15 DIAGNOSIS — G43.909 MIGRAINE, UNSPECIFIED, NOT INTRACTABLE, WITHOUT STATUS MIGRAINOSUS: ICD-10-CM

## 2021-01-15 DIAGNOSIS — L90.5 SCAR CONDITIONS AND FIBROSIS OF SKIN: ICD-10-CM

## 2021-01-15 DIAGNOSIS — Z91.013 ALLERGY TO SEAFOOD: ICD-10-CM

## 2021-01-15 DIAGNOSIS — F41.9 ANXIETY DISORDER, UNSPECIFIED: ICD-10-CM

## 2021-01-15 LAB — HCG UR QL: NEGATIVE — SIGNIFICANT CHANGE UP

## 2021-01-15 PROCEDURE — 88304 TISSUE EXAM BY PATHOLOGIST: CPT

## 2021-01-15 PROCEDURE — 81025 URINE PREGNANCY TEST: CPT

## 2021-01-15 PROCEDURE — 88304 TISSUE EXAM BY PATHOLOGIST: CPT | Mod: 26

## 2021-01-15 RX ORDER — OXYCODONE HYDROCHLORIDE 5 MG/1
10 TABLET ORAL ONCE
Refills: 0 | Status: DISCONTINUED | OUTPATIENT
Start: 2021-01-15 | End: 2021-01-15

## 2021-01-15 RX ORDER — ONDANSETRON 8 MG/1
4 TABLET, FILM COATED ORAL ONCE
Refills: 0 | Status: COMPLETED | OUTPATIENT
Start: 2021-01-15 | End: 2021-01-15

## 2021-01-15 RX ADMIN — FENTANYL CITRATE 50 MICROGRAM(S): 50 INJECTION INTRAVENOUS at 18:11

## 2021-01-15 RX ADMIN — ONDANSETRON 4 MILLIGRAM(S): 8 TABLET, FILM COATED ORAL at 20:42

## 2021-01-15 RX ADMIN — FENTANYL CITRATE 50 MICROGRAM(S): 50 INJECTION INTRAVENOUS at 18:30

## 2021-01-15 RX ADMIN — OXYCODONE HYDROCHLORIDE 10 MILLIGRAM(S): 5 TABLET ORAL at 18:11

## 2021-01-15 RX ADMIN — Medication 975 MILLIGRAM(S): at 14:41

## 2021-01-15 NOTE — ASU DISCHARGE PLAN (ADULT/PEDIATRIC) - CARE PROVIDER_API CALL
Salvatore Falcon)  Surgery  [address] [place] [address]  Phone: [phone]  Fax: [phone]  Established Patient  Follow Up Time:

## 2021-01-15 NOTE — BRIEF OPERATIVE NOTE - OPERATION/FINDINGS
Asymmetry of nipple areolar complex, lesion of left anterior chest wall, abdominal depression Asymmetry of nipple areolar complex, lesion of left mastectomy skin flap, abdominal contour depression

## 2021-01-15 NOTE — ASU DISCHARGE PLAN (ADULT/PEDIATRIC) - ASU DC SPECIAL INSTRUCTIONSFT
You should follow-up in the office on this Thursday, please call for follow-up appointment if you have not already made one. [phone].   Prescription pain medications have been prescribed for you, take as needed for pain only. Do not drive a vehicle or operate machinery while taking narcotic pain medication.   Ambulating is very important post operatively, make sure you ambulate several times daily.   If you experience bleeding that does not stop, swelling, hardness of surgical site, chest pain, shortness of breath, leg pain, dizziness or any signs or symptoms of infection such as fever, redness, pus, foul smell of surgical site please contact the office immediately, [phone].

## 2021-01-15 NOTE — H&P ADULT - PROBLEM SELECTOR PLAN 1
Revision of breast reconstruction surgery today  OOB to chair  Pain management   Advance diet as tolerated   DC to home when meeting asu criteria

## 2021-01-15 NOTE — H&P ADULT - NSHPPHYSICALEXAM_GEN_ALL_CORE
Neuro: A+O x4 RAY  RESP: CTA A/P  CV: RRR S1 S2, Left ACW lesion  Breast: NAC asymmetry  ABD: Soft non tender +BS, contour irregularity

## 2021-01-15 NOTE — BRIEF OPERATIVE NOTE - NSICDXBRIEFPROCEDURE_GEN_ALL_CORE_FT
PROCEDURES:  Shave biopsy of lesion of skin 15-Liam-2021 17:07:40 Left ACW Christina Hess  Fat grafting 15-Liam-2021 17:07:22 Abdominal depression Christina Hess  Nipple reconstruction 15-Liam-2021 17:07:12  Christina Hess

## 2021-01-15 NOTE — ASU DISCHARGE PLAN (ADULT/PEDIATRIC) - PROCEDURE
Bilateral Nipple aerolar reconstruction, Left anterior chest wall shave excision, fat grafting to abdominal contour depression

## 2021-01-18 NOTE — ASU PATIENT PROFILE, ADULT - ATTEMPT TO OOB
Patient Name: Temi Jarrett  : 1930    MRN: [id number]                              Today's Date: 2021       Admit Date: 2021    Visit Dx:     ICD-10-CM ICD-9-CM   1. Acute sepsis (CMS/[facility])  A41.9 038.9     995.91   2. Gastric outlet obstruction  K31.1 537.0   3. Aspiration pneumonia, unspecified aspiration pneumonia type, unspecified laterality, unspecified part of lung (CMS/[facility]ly [facility])  J69.0 507.0   4. Acute abdominal pain  R10.9 789.00     338.19   5. Anticoagulated  Z79.01 V58.61   6. Dysphagia, unspecified type  R13.10 787.20   7. Impaired functional mobility, balance, gait, and endurance  Z74.09 V49.89     Patient Active Problem List   Diagnosis   • Esophageal reflux   • Hypothyroidism (acquired)   • Anxiety with depression   • Peripheral neuropathy   • Spinal stenosis of lumbar region   • Osteoporosis   • Disc disorder of lumbar region   • Bladder prolapse, female, acquired   • Essential hypertension   • Pernicious anemia   • Annual physical exam   • Primary osteoarthritis of both knees   • Hiatal hernia   • Multifocal aspiration pneumonia due to large intrathoracic hiatal hernia   • Paroxysmal atrial fibrillation (CMS/[facility])   • Pericardial effusion   • Mild cognitive impairment   • Acute sepsis (CMS/[facility])   • At high risk for aspiration   • Chronic pain     Past Medical History:   Diagnosis Date   • Arthritis    • Bladder prolapse, female, acquired    • Closed fracture of neck of left femur (CMS/[facility]) 2019   • Dementia (CMS/[facility])    • Depression    • Disease of thyroid gland    • Gastric polyp    • GERD (gastroesophageal reflux disease)    • History of colonic polyps    • Hyperlipidemia    • Hypertension    • IBS (irritable bowel syndrome)    • Macular degeneration    • Torn meniscus     right knee      Past Surgical History:   Procedure Laterality Date   • CHOLECYSTECTOMY     • ENDOSCOPY N/A 2021    Procedure: ESOPHAGOGASTRODUODENOSCOPY;  Surgeon: Serjio Guerrero MD;  Location:   [facility];  Service: General;  Laterality: N/A;   • EYE SURGERY  1998    Cataract extraction   • HERNIA REPAIR     • HIP HEMIARTHROPLASTY Left 9/28/2019    Procedure: HIP HEMIARTHROPLASTY LEFT;  Surgeon: Reddy Segundo Jr., MD;  Location: [facility] OR;  Service: Orthopedics   • HYSTERECTOMY  1976   • KNEE SURGERY Right     arthroscopy- 2000   • TONSILLECTOMY       General Information     Row Name 01/18/21 1004          Physical Therapy Time and Intention    Document Type  therapy note (daily note)  -SC     Mode of Treatment  physical therapy  -SC     Row Name 01/18/21 1004          General Information    Patient Profile Reviewed  yes  -SC     Existing Precautions/Restrictions  fall  -SC     Row Name 01/18/21 1004          Cognition    Orientation Status (Cognition)  oriented x 4  -SC     Row Name 01/18/21 1004          Safety Issues, Functional Mobility    Impairments Affecting Function (Mobility)  balance;endurance/activity tolerance;strength  -SC     Comment, Safety Issues/Impairments (Mobility)  alert, following directions  -SC       User Key  (r) = Recorded By, (t) = Taken By, (c) = Cosigned By    Initials Name Provider Type    SC Erika Ocampo PT Physical Therapist        Mobility     Row Name 01/18/21 1005          Bed Mobility    Bed Mobility  scooting/bridging;supine-sit  -SC     Scooting/Bridging [place] (Bed Mobility)  independent  -SC     Supine-Sit [place] (Bed Mobility)  modified independence  -SC     Assistive Device (Bed Mobility)  bed rails  -SC     Comment (Bed Mobility)  up to edge of bed  -SC     Row Name 01/18/21 1005          Transfers    Comment (Transfers)  STS from edge of bed with cues for hand placement.  -SC     Row Name 01/18/21 1005          Sit-Stand Transfer    Sit-Stand [place] (Transfers)  verbal cues;contact guard  -SC     Assistive Device (Sit-Stand Transfers)  walker, front-wheeled  -SC     Row Name 01/18/21 1005          Gait/Stairs (Locomotion)     [place] Level (Gait)  contact guard  -SC     Assistive Device (Gait)  walker, front-wheeled  -SC     Distance in Feet (Gait)  200  -SC     Deviations/Abnormal Patterns (Gait)  kane decreased;right sided deviations;antalgic  -SC     Bilateral Gait Deviations  forward flexed posture  -SC     Right Sided Gait Deviations  weight shift ability decreased  -SC     Comment (Gait/Stairs)  Gt training focused on controling walker in hallway . Cues for upright posture. Cues to stay closer to walker on turns  -SC       User Key  (r) = Recorded By, (t) = Taken By, (c) = Cosigned By    Initials Name Provider Type    SC Erika Ocampo, PT Physical Therapist        Obj/Interventions     [facility] Name 01/18/21 1008          Motor Skills    Therapeutic Exercise  knee  -[facility] Name 01/18/21 1008          Knee (Therapeutic Exercise)    Knee (Therapeutic Exercise)  AROM (active range of motion)  -SC     Knee AROM (Therapeutic Exercise)  LAQ (long arc quad);10 repetitions  -SC     Row Name 01/18/21 1008          Balance    Dynamic Standing Balance  supported;moderate impairment  -SC     Comment, Balance  needs walker support  -SC       User Key  (r) = Recorded By, (t) = Taken By, (c) = Cosigned By    Initials Name Provider Type    SC Erika Ocampo, PT Physical Therapist        Goals/Plan    No documentation.       Clinical Impression     [facility] Name 01/18/21 1009          Pain    Additional Documentation  Pain Scale: FACES Pre/Post-Treatment (Group)  -SC     Row Name 01/18/21 1009          Pain Scale: Numbers Pre/Post-Treatment    Pain Location - Side  Right  -SC     Pain Location  knee  -SC     Pain Intervention(s)  Repositioned  -[facility] Name 01/18/21 1009          Pain Scale: FACES Pre/Post-Treatment    Pain: FACES Scale, Pretreatment  4-->hurts little more  -SC     Posttreatment Pain Rating  4-->hurts little more  -[facility] Name 01/18/21 1009          Plan of Care Review    Plan of Care Reviewed With  patient  -SC      Progress  improving  -SC     Outcome Summary  Patient able to get out of bed and ambulated using walker 200 feet. She is able to tolerate this activitiy with out SOA.  -SC     Row Name 01/18/21 1009          Therapy Assessment/Plan (PT)    Rehab Potential (PT)  good, to achieve stated therapy goals  -SC     Criteria for Skilled Interventions Met (PT)  yes;meets criteria;skilled treatment is necessary  -SC     Row Name 01/18/21 1009          Positioning and Restraints    Pre-Treatment Position  in bed  -SC     Post Treatment Position  chair  -SC     In Chair  sitting;call light within reach;encouraged to call for assist;exit alarm on;notified nsg  -SC       User Key  (r) = Recorded By, (t) = Taken By, (c) = Cosigned By    Initials Name Provider Type    SC Erika Ocampo PT Physical Therapist        Outcome Measures     Row Name 01/18/21 1011          How much help from another person do you currently need...    Turning from your back to your side while in flat bed without using bedrails?  4  -SC     Moving from lying on back to sitting on the side of a flat bed without bedrails?  3  -SC     Moving to and from a bed to a chair (including a wheelchair)?  3  -SC     Standing up from a chair using your arms (e.g., wheelchair, bedside chair)?  3  -SC     Climbing 3-5 steps with a railing?  3  -SC     To walk in hospital room?  3  -SC     AM-PAC 6 Clicks Score (PT)  19  -SC     Row Name 01/18/21 1011          Functional Assessment    Outcome Measure Options  AM-PAC 6 Clicks Basic Mobility (PT)  -SC       User Key  (r) = Recorded By, (t) = Taken By, (c) = Cosigned By    Initials Name Provider Type    SC Erika Ocampo PT Physical Therapist        Physical Therapy Education                 Title: PT OT SLP Therapies (Done)     Topic: Physical Therapy (Done)     Point: Mobility training (Done)     Learning Progress Summary           Patient IZABELLA Gonzalez VU by SC at 1/18/2021 1012    Comment: reviewed benefits of activity     Eager, E, VU by  at 1/18/2021 0339                   Point: Home exercise program (Done)     Learning Progress Summary           Patient Eager, E, VU by SC at 1/18/2021 1012    Comment: reviewed benefits of activity    Eager, E, VU by  at 1/18/2021 0339    Acceptance, E, VU by  at 1/16/2021 1430    Comment: Discussed benefits of activity.                   Point: Body mechanics (Done)     Learning Progress Summary           Patient Eager, E, VU by SC at 1/18/2021 1012    Comment: reviewed benefits of activity    Eager, E, VU by  at 1/18/2021 0339                   Point: Precautions (Done)     Learning Progress Summary           Patient Eager, E, VU by SC at 1/18/2021 1012    Comment: reviewed benefits of activity    Eager, E, VU by  at 1/18/2021 0339                               User Key     Initials Effective Dates Name Provider Type Discipline    SC 06/19/15 -  Erika Ocampo PT Physical Therapist PT     07/17/19 -  Kaleigh Miller, PT Physical Therapist PT     07/03/19 -  Flaquito Knutson, RN Registered Nurse Nurse              PT Recommendation and Plan     Plan of Care Reviewed With: patient  Progress: improving  Outcome Summary: Patient able to get out of bed and ambulated using walker 200 feet. She is able to tolerate this activitiy with out SOA.     Time Calculation:   PT Charges     Row Name 01/18/21 0927             Time Calculation    Start Time  0927  -SC      PT Received On  01/18/21  -SC      PT Goal Re-Cert Due Date  01/18/21  -SC         Time Calculation- PT    Total Timed Code Minutes- PT  23 minute(s)  -SC         Timed Charges    89777 - PT Therapeutic Exercise Minutes  3  -SC      24126 - Gait Training Minutes   15  -SC      17214 - PT Therapeutic Activity Minutes  5  -SC        User Key  (r) = Recorded By, (t) = Taken By, (c) = Cosigned By    Initials Name Provider Type    SC Erika Ocampo PT Physical Therapist        Therapy Charges for Today     Code Description Service  Date Service Provider Modifiers Qty    44526793262  GAIT TRAINING EA 15 MIN 1/18/2021 Erika Ocampo, PT GP 1    69968504421 HC PT THERAPEUTIC ACT EA 15 MIN 1/18/2021 Erika Ocampo, PT GP 1          PT G-Codes  Outcome Measure Options: AM-PAC 6 Clicks Basic Mobility (PT)  AM-PAC 6 Clicks Score (PT): 19  AM-PAC 6 Clicks Score (OT): 23    Erika Ocampo, PT  1/18/2021     no

## 2021-01-21 ENCOUNTER — APPOINTMENT (OUTPATIENT)
Dept: CARDIOLOGY | Facility: CLINIC | Age: 31
End: 2021-01-21

## 2021-01-22 ENCOUNTER — APPOINTMENT (OUTPATIENT)
Dept: FAMILY MEDICINE | Facility: CLINIC | Age: 31
End: 2021-01-22
Payer: COMMERCIAL

## 2021-01-22 PROCEDURE — 99213 OFFICE O/P EST LOW 20 MIN: CPT | Mod: 95

## 2021-01-22 NOTE — PHYSICAL EXAM
[de-identified] : no obvious acute distress over video [de-identified] : speaking in full sentences

## 2021-01-22 NOTE — HISTORY OF PRESENT ILLNESS
[Home] : at home, [unfilled] , at the time of the visit. [Medical Office: ([facility])___] : at the medical office located in  [Verbal consent obtained from patient] : the patient, [unfilled] [FreeTextEntry8] : 31 yo female presents with complaint of sinus pressure, nasal congestion. She reports symptoms began 3 days ago. Nose is stuffy without discharge. Similar to previous sinus infections. Denies fever, chills, cp, palpitaitons, sob, cough, nv, heat/cold intolerance, dizziness or calf pain.

## 2021-01-22 NOTE — ASSESSMENT
[FreeTextEntry1] : Acute rhinosinusitis: likely viral etiology, recommend covid testing, start flonase/nasal saline prn for congestion, start Tylenol prn for fever, will consider abx therapy if no improvement\par RTC 1 wk

## 2021-01-22 NOTE — REVIEW OF SYSTEMS
[Earache] : no earache [Hearing Loss] : no hearing loss [Hoarseness] : no hoarseness [Postnasal Drip] : postnasal drip [Negative] : Integumentary

## 2021-01-28 ENCOUNTER — APPOINTMENT (OUTPATIENT)
Dept: HUMAN REPRODUCTION | Facility: CLINIC | Age: 31
End: 2021-01-28
Payer: COMMERCIAL

## 2021-01-28 PROCEDURE — 99215 OFFICE O/P EST HI 40 MIN: CPT | Mod: 95

## 2021-02-01 ENCOUNTER — TRANSCRIPTION ENCOUNTER (OUTPATIENT)
Age: 31
End: 2021-02-01

## 2021-02-03 ENCOUNTER — NON-APPOINTMENT (OUTPATIENT)
Age: 31
End: 2021-02-03

## 2021-02-08 ENCOUNTER — APPOINTMENT (OUTPATIENT)
Age: 31
End: 2021-02-08

## 2021-02-08 ENCOUNTER — OUTPATIENT (OUTPATIENT)
Dept: OUTPATIENT SERVICES | Facility: HOSPITAL | Age: 31
LOS: 1 days | Discharge: HOME | End: 2021-02-08

## 2021-02-08 DIAGNOSIS — Z98.890 OTHER SPECIFIED POSTPROCEDURAL STATES: Chronic | ICD-10-CM

## 2021-02-08 DIAGNOSIS — C50.412 MALIGNANT NEOPLASM OF UPPER-OUTER QUADRANT OF LEFT FEMALE BREAST: ICD-10-CM

## 2021-02-08 DIAGNOSIS — Z98.89 OTHER SPECIFIED POSTPROCEDURAL STATES: Chronic | ICD-10-CM

## 2021-02-12 ENCOUNTER — APPOINTMENT (OUTPATIENT)
Dept: HUMAN REPRODUCTION | Facility: CLINIC | Age: 31
End: 2021-02-12
Payer: COMMERCIAL

## 2021-02-12 PROCEDURE — 36415 COLL VENOUS BLD VENIPUNCTURE: CPT

## 2021-02-12 PROCEDURE — 76830 TRANSVAGINAL US NON-OB: CPT

## 2021-02-12 PROCEDURE — 99072 ADDL SUPL MATRL&STAF TM PHE: CPT

## 2021-02-12 PROCEDURE — 99213 OFFICE O/P EST LOW 20 MIN: CPT | Mod: 25

## 2021-02-17 ENCOUNTER — APPOINTMENT (OUTPATIENT)
Dept: HUMAN REPRODUCTION | Facility: CLINIC | Age: 31
End: 2021-02-17
Payer: COMMERCIAL

## 2021-02-17 PROCEDURE — 99214 OFFICE O/P EST MOD 30 MIN: CPT | Mod: 95

## 2021-02-18 ENCOUNTER — APPOINTMENT (OUTPATIENT)
Dept: HUMAN REPRODUCTION | Facility: CLINIC | Age: 31
End: 2021-02-18
Payer: COMMERCIAL

## 2021-02-18 PROCEDURE — 99213 OFFICE O/P EST LOW 20 MIN: CPT | Mod: 25

## 2021-02-18 PROCEDURE — 76831 ECHO EXAM UTERUS: CPT

## 2021-02-18 PROCEDURE — 99072 ADDL SUPL MATRL&STAF TM PHE: CPT

## 2021-02-18 PROCEDURE — 58340 CATHETER FOR HYSTEROGRAPHY: CPT

## 2021-02-25 ENCOUNTER — APPOINTMENT (OUTPATIENT)
Dept: HUMAN REPRODUCTION | Facility: CLINIC | Age: 31
End: 2021-02-25
Payer: COMMERCIAL

## 2021-02-25 PROCEDURE — 99072 ADDL SUPL MATRL&STAF TM PHE: CPT

## 2021-02-25 PROCEDURE — 58999 UNLISTED PX FML GENITAL SYS: CPT

## 2021-02-25 PROCEDURE — 76856 US EXAM PELVIC COMPLETE: CPT

## 2021-02-25 PROCEDURE — 99213 OFFICE O/P EST LOW 20 MIN: CPT | Mod: 25

## 2021-03-02 NOTE — ASU PREOP CHECKLIST - SPO2 (%)
99 Information: Selecting Yes will display possible errors in your note based on the variables you have selected. This validation is only offered as a suggestion for you. PLEASE NOTE THAT THE VALIDATION TEXT WILL BE REMOVED WHEN YOU FINALIZE YOUR NOTE. IF YOU WANT TO FAX A PRELIMINARY NOTE YOU WILL NEED TO TOGGLE THIS TO 'NO' IF YOU DO NOT WANT IT IN YOUR FAXED NOTE.

## 2021-03-09 ENCOUNTER — OUTPATIENT (OUTPATIENT)
Dept: OUTPATIENT SERVICES | Facility: HOSPITAL | Age: 31
LOS: 1 days | Discharge: HOME | End: 2021-03-09

## 2021-03-09 DIAGNOSIS — Z98.890 OTHER SPECIFIED POSTPROCEDURAL STATES: Chronic | ICD-10-CM

## 2021-03-09 DIAGNOSIS — Z98.89 OTHER SPECIFIED POSTPROCEDURAL STATES: Chronic | ICD-10-CM

## 2021-03-09 DIAGNOSIS — C50.412 MALIGNANT NEOPLASM OF UPPER-OUTER QUADRANT OF LEFT FEMALE BREAST: ICD-10-CM

## 2021-03-09 DIAGNOSIS — D50.9 IRON DEFICIENCY ANEMIA, UNSPECIFIED: ICD-10-CM

## 2021-03-11 ENCOUNTER — APPOINTMENT (OUTPATIENT)
Dept: SURGERY | Facility: CLINIC | Age: 31
End: 2021-03-11
Payer: COMMERCIAL

## 2021-03-11 ENCOUNTER — APPOINTMENT (OUTPATIENT)
Age: 31
End: 2021-03-11

## 2021-03-11 VITALS
TEMPERATURE: 97.8 F | BODY MASS INDEX: 24.55 KG/M2 | OXYGEN SATURATION: 99 % | HEART RATE: 82 BPM | SYSTOLIC BLOOD PRESSURE: 106 MMHG | DIASTOLIC BLOOD PRESSURE: 68 MMHG | WEIGHT: 130 LBS | HEIGHT: 61 IN

## 2021-03-11 PROCEDURE — 99213 OFFICE O/P EST LOW 20 MIN: CPT

## 2021-03-11 PROCEDURE — 99072 ADDL SUPL MATRL&STAF TM PHE: CPT

## 2021-03-13 ENCOUNTER — APPOINTMENT (OUTPATIENT)
Dept: OBGYN | Facility: CLINIC | Age: 31
End: 2021-03-13
Payer: COMMERCIAL

## 2021-03-13 VITALS
DIASTOLIC BLOOD PRESSURE: 70 MMHG | HEIGHT: 61 IN | WEIGHT: 130 LBS | BODY MASS INDEX: 24.55 KG/M2 | SYSTOLIC BLOOD PRESSURE: 112 MMHG | TEMPERATURE: 97.2 F

## 2021-03-13 DIAGNOSIS — Z87.42 PERSONAL HISTORY OF OTHER DISEASES OF THE FEMALE GENITAL TRACT: ICD-10-CM

## 2021-03-13 DIAGNOSIS — R19.00 INTRA-ABDOMINAL AND PELVIC SWELLING, MASS AND LUMP, UNSPECIFIED SITE: ICD-10-CM

## 2021-03-13 DIAGNOSIS — R92.8 OTHER ABNORMAL AND INCONCLUSIVE FINDINGS ON DIAGNOSTIC IMAGING OF BREAST: ICD-10-CM

## 2021-03-13 DIAGNOSIS — Z01.411 ENCOUNTER FOR GYNECOLOGICAL EXAMINATION (GENERAL) (ROUTINE) WITH ABNORMAL FINDINGS: ICD-10-CM

## 2021-03-13 DIAGNOSIS — Z87.898 PERSONAL HISTORY OF OTHER SPECIFIED CONDITIONS: ICD-10-CM

## 2021-03-13 DIAGNOSIS — Z85.3 ENCOUNTER FOR FOLLOW-UP EXAMINATION AFTER COMPLETED TREATMENT FOR MALIGNANT NEOPLASM: ICD-10-CM

## 2021-03-13 DIAGNOSIS — Z08 ENCOUNTER FOR FOLLOW-UP EXAMINATION AFTER COMPLETED TREATMENT FOR MALIGNANT NEOPLASM: ICD-10-CM

## 2021-03-13 DIAGNOSIS — Z31.84 ENCOUNTER FOR FERTILITY PRESERVATION PROCEDURE: ICD-10-CM

## 2021-03-13 PROCEDURE — 99213 OFFICE O/P EST LOW 20 MIN: CPT

## 2021-03-13 PROCEDURE — 99072 ADDL SUPL MATRL&STAF TM PHE: CPT

## 2021-03-13 RX ORDER — AMOXICILLIN AND CLAVULANATE POTASSIUM 875; 125 MG/1; MG/1
875-125 TABLET, COATED ORAL
Qty: 14 | Refills: 0 | Status: DISCONTINUED | COMMUNITY
Start: 2021-01-26 | End: 2021-03-13

## 2021-03-13 RX ORDER — MEDROXYPROGESTERONE ACETATE 10 MG/1
10 TABLET ORAL DAILY
Qty: 10 | Refills: 0 | Status: DISCONTINUED | COMMUNITY
Start: 2021-01-14 | End: 2021-03-13

## 2021-03-13 RX ORDER — PROGESTERONE 100 MG/1
100 CAPSULE ORAL
Qty: 10 | Refills: 0 | Status: DISCONTINUED | COMMUNITY
Start: 2020-12-20 | End: 2021-03-13

## 2021-03-13 RX ORDER — FLUTICASONE PROPIONATE 50 UG/1
50 SPRAY, METERED NASAL TWICE DAILY
Qty: 1 | Refills: 1 | Status: DISCONTINUED | COMMUNITY
Start: 2021-01-22 | End: 2021-03-13

## 2021-03-13 NOTE — PLAN
[FreeTextEntry1] : Patient made aware her exam notes an elevated vaginal pH and BV type infection with external candidal versus allergic dermatitis\par \par I advised we treat for both BV and yeast\par \par Patient advised to call for any increasing or persisting signs and symptoms\par \par All questions answered

## 2021-03-13 NOTE — PHYSICAL EXAM
[Appropriately responsive] : appropriately responsive [Alert] : alert [No Acute Distress] : no acute distress [No Lesions] : no lesions  [Labia Majora] : normal [Labia Majora Erythema] : erythema [Labia Minora] : normal [Labia Minora Erythema] : erythema [Normal] : normal [Discharge] : a  ~M vaginal discharge was present [Scant] : scant [Foul Smelling] : foul smelling [[place]] : yellow [Thin] : thin

## 2021-03-13 NOTE — HISTORY OF PRESENT ILLNESS
[Patient reported mammogram was normal] : Patient reported mammogram was normal [Patient reported breast sonogram was normal] : Patient reported breast sonogram was normal [Patient reported PAP Smear was normal] : Patient reported PAP Smear was normal [Patient reported bone density results were abnormal] : Patient reported bone density results were abnormal [N] : Patient does not use contraception [Y] : Patient is sexually active [Monogamous (Male Partner)] : is monogamous with a male partner [Menarche Age: ____] : age at menarche was [unfilled] [No] : Patient does not have concerns regarding sex [Currently Active] : currently active [TextBox_4] : 31-year-old patient presents with a possible yeast infection stating she started having symptoms of vaginal itching and red vulvar skin 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 and sitting all day\par \par She tried Monistat which helps a little bit but symptoms persist\par \par Patient has a significant medical history of bilateral mastectomy for triple negative left breast cancer- [Mammogramdate] : 06/07/18 [TextBox_19] : BR 6 [BreastSonogramDate] : 01/14/21 [TextBox_25] : LT BR 3 [PapSmeardate] : 09/17/20 [TextBox_31] : NEG [BoneDensityDate] : 05/15/19 [TextBox_37] : OSTEOPENIA [LMPDate] : 03/09/21 [PGHxTotal] : 2 [[facility] Center[facility]erm] : 1 [[facility]] : 1 [PGHxABSpont] : 1 [TextBox_6] : 03/09/21 [TextBox_9] : 12 [FreeTextEntry1] : 03/09/21

## 2021-03-15 LAB
CANDIDA VAG CYTO: NOT DETECTED
G VAGINALIS+PREV SP MTYP VAG QL MICRO: NOT DETECTED
T VAGINALIS VAG QL WET PREP: NOT DETECTED

## 2021-03-29 ENCOUNTER — APPOINTMENT (OUTPATIENT)
Dept: ULTRASOUND IMAGING | Facility: CLINIC | Age: 31
End: 2021-03-29
Payer: COMMERCIAL

## 2021-03-29 ENCOUNTER — OUTPATIENT (OUTPATIENT)
Dept: OUTPATIENT SERVICES | Facility: HOSPITAL | Age: 31
LOS: 1 days | End: 2021-03-29
Payer: COMMERCIAL

## 2021-03-29 ENCOUNTER — RESULT REVIEW (OUTPATIENT)
Age: 31
End: 2021-03-29

## 2021-03-29 DIAGNOSIS — Z98.89 OTHER SPECIFIED POSTPROCEDURAL STATES: Chronic | ICD-10-CM

## 2021-03-29 DIAGNOSIS — N63.20 UNSPECIFIED LUMP IN THE LEFT BREAST, UNSPECIFIED QUADRANT: ICD-10-CM

## 2021-03-29 DIAGNOSIS — Z98.890 OTHER SPECIFIED POSTPROCEDURAL STATES: Chronic | ICD-10-CM

## 2021-03-29 PROCEDURE — 76642 ULTRASOUND BREAST LIMITED: CPT | Mod: 26,LT

## 2021-03-29 PROCEDURE — 76642 ULTRASOUND BREAST LIMITED: CPT

## 2021-03-29 NOTE — ASSESSMENT
[FreeTextEntry1] : 31 yo premenopausal female with a history of triple negative left breast cancer treated with bilateral mastectomy and left SLNB.  Final pathology demonstrating 3.5cm IDC grade 3 triple negative with 0/2 lymph nodes.  Clinical exam is benign today.  Recommendation for annual MR breast prior to egg transfer in July.\par 1. Clinical examination in June\par 2. MR breast in [place]

## 2021-03-29 NOTE — HISTORY OF PRESENT ILLNESS
[FreeTextEntry1] : I had the pleasure of seeing JEANE GUTIERREZ in the office for a follow up visit secondary to diagnosed and treated left breast cancer.\par \par Jeane is a sg 29 yo female who originally presented after feeling a left breast mass after delivery of her son on 2018. She states she had first felt the lump after delivery of her child. She sought medical attention and was told it was a blocked milk duct which caused the mastitis and put her on antibiotics. When the symptoms did not resolve she saw another physician for a second opinion. She was then sent for ultrasound and ultrasound demonstrated a 2.3 cm lobulated hypoechoic nodule at in the left breast 2:00 and an ultrasound guided core biopsy was recommended. 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 Chemotherapy began on 8/3/2018. She has since completed chemo and underwent implant exchange on 2019 by Dr. Falcon.\par \par She has been followed by thoracic surgery to monitor for any changes in her chest wall tissue specifically some rebound hypertrophy noted in the thymic tissue. Recent CT on 2020 showed stable very small nodule in the left upper lobe.\par \par She denies skin changes, nipple dimpling or nipple discharge. \par \par  with 1st delivery at 28. Menses began at age 8.\par She denies personal history of malignancy.\par Family history is significant for two maunts diagnosed with breast cancer, Pcousin diagnosed with breast cancer, PGM and MGM both diagnosed with pancreatic cancer at unknown age, MGM diagnose with skin cancer.\par \par 2018 Inocente Henable: Genetic results: Negative- No clinically significant mutation identified\par \par 2018 SURGICAL PATHOLOGY:\par 1. Breast, right simple mastectomy: Negative for malignancy.\par 2. [place] lymph node, left axilla, excision: One lymph node, negative for metastatic carcinoma.\par 3. [place] lymph node, left axilla, excision: One lymph node, negative for metastatic carcinoma.\par 4. Breast, left, simple mastectomy: Infiltrating ductal carcinoma, measuring 3.5 cm, with necrosis. Infiltrating carcinoma extends to the anterior margin of the mastectomy specimen, please see final margin status in the synoptic summary below and part 5. Negative nipple. Negative skin. Focal secretory change. \par 5. Left superior flap margin, over tumor: negative margin.\par 6. Skin and adipose tissue, right and left breast, excision: Negative for malignancy.\par \par 2018 US NONVASC EXT LTD RT: Benign appearing right groin lymph nodes the smaller of which correlates with area of patients tenderness. Findings likely represent benign reactive lymph nodes. Recommend continued clinical follow up.\par \par 2018 US breast limited left\par Impression: No sonographic suspicious findings left axilla and upper outer quadrant left breast at area of concern. Note is made of two small benign appearing axillary lymph nodes and a small amount of fluid adjacent to the axillary segment of the axillary segment of the left breast expander. BIRADS 2 benign findings.\par \par US breast 19: no evidence of malignancy, several areas of at necrosis upper inner quad and oval shaped mixed echotexture nodule left breast at 5 oclock 4cm from nipple 1.2cm compatible with probable fat necrosis BIRads 3 prob benign findings, recommend follow US in 6 months. 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 Follow up recommended to asses for stability/resolution.\par \par CT chest 2019 : Impression: 3mm pulmonary nodule left upper lobe slightly increased in size since May 23, 2019 and indeterminate finding, thymic tissue within anterior mediastinum appears ot have minimally decreased since May 23, 2019 given differences in technique.\par \par 2019 MRI of the abdomen \par Impression: No cholelithiasis, choledocholithiasis or biliary ductal dilatation. \par \par 2020  LT breast US\par IMPRESSION: Left breast 5:00 4 cm from the nipple 1.1 cm benign-appearing nodule decreased in size from 2019 compatible with benign finding likely representing fat necrosis. Left breast 7:00 8 cm from the nipple at the area of patient's palpable concern demonstrates no sonographic suspicious finding. \par RECOMMENDATION: Clinical follow up. BI-RADS 2 - Benign Finding(s)\par \par 2020  US breast limited left:\par Impression:  Left breast \par \par 2020  CT Chest:  Stable very small soild nodule in the left upper lobe when compared to previous exam.\par \par 9/15/2020  MR breast\par IMPRESSION: No MRI evidence of malignancy. Intact bilateral implants. \par RECOMMENDATION: Clinical follow up. BI-RADS 2 - Benign Finding(s)\par \par We reviewed and discussed clinical exam.  Clinical exam is benign today.  She will be undergoing egg transfer in 2021.  Recommendation for annual MR breast in  before egg transfer for annual.  \par \par All questions answered.\par \par

## 2021-03-29 NOTE — PHYSICAL EXAM
[Normocephalic] : normocephalic [Atraumatic] : atraumatic [EOMI] : extra ocular movement intact [PERRL] : pupils equal, round and reactive to light [Sclera nonicteric] : sclera nonicteric [Supple] : supple [No Supraclavicular Adenopathy] : no supraclavicular adenopathy [Examined in the supine and seated position] : examined in the supine and seated position [No dominant masses] : no dominant masses in right breast  [No dominant masses] : no dominant masses left breast [No Axillary Lymphadenopathy] : no left axillary lymphadenopathy [No Edema] : no edema [No Rashes] : no rashes [No Ulceration] : no ulceration [de-identified] : s/p mastectomy with implant reconstruction, well healed.  [de-identified] : s/p mastectomy with implant reconstruction, well healed.   Pea size nodule left breast 5:00 1 cmfn.

## 2021-03-30 ENCOUNTER — RESULT REVIEW (OUTPATIENT)
Age: 31
End: 2021-03-30

## 2021-04-12 ENCOUNTER — TRANSCRIPTION ENCOUNTER (OUTPATIENT)
Age: 31
End: 2021-04-12

## 2021-04-14 ENCOUNTER — APPOINTMENT (OUTPATIENT)
Dept: COLORECTAL SURGERY | Facility: CLINIC | Age: 31
End: 2021-04-14

## 2021-04-15 ENCOUNTER — ASOB RESULT (OUTPATIENT)
Age: 31
End: 2021-04-15

## 2021-04-15 ENCOUNTER — APPOINTMENT (OUTPATIENT)
Dept: OBGYN | Facility: CLINIC | Age: 31
End: 2021-04-15
Payer: COMMERCIAL

## 2021-04-15 VITALS
WEIGHT: 125 LBS | HEIGHT: 61 IN | DIASTOLIC BLOOD PRESSURE: 68 MMHG | BODY MASS INDEX: 23.6 KG/M2 | TEMPERATURE: 97.3 F | SYSTOLIC BLOOD PRESSURE: 110 MMHG

## 2021-04-15 LAB
HCG UR QL: POSITIVE
QUALITY CONTROL: YES

## 2021-04-15 PROCEDURE — 36415 COLL VENOUS BLD VENIPUNCTURE: CPT

## 2021-04-15 PROCEDURE — 99072 ADDL SUPL MATRL&STAF TM PHE: CPT

## 2021-04-15 PROCEDURE — 81025 URINE PREGNANCY TEST: CPT

## 2021-04-15 PROCEDURE — 99213 OFFICE O/P EST LOW 20 MIN: CPT | Mod: 25

## 2021-04-15 PROCEDURE — 76817 TRANSVAGINAL US OBSTETRIC: CPT

## 2021-04-15 RX ORDER — LIDOCAINE 5 G/100G
5 OINTMENT TOPICAL
Qty: 50 | Refills: 0 | Status: COMPLETED | COMMUNITY
Start: 2021-01-13

## 2021-04-15 NOTE — HISTORY OF PRESENT ILLNESS
[Patient reported PAP Smear was normal] : Patient reported PAP Smear was normal [N] : Patient does not use contraception [Y] : Patient is sexually active [Monogamous (Male Partner)] : is monogamous with a male partner [Menarche Age: ____] : age at menarche was [unfilled] [Currently Active] : currently active [Men] : men [Vaginal] : vaginal [FreeTextEntry1] : Brianna is a , LMP unknown. Pt presents today for a confirmation of pregnancy. She had a +HPT yesterday.  She reports spotting in February and march. She reports pelvic discomfort. She has a history of triple negative breast cancer. She was diagnosed in  and treated from Aug 2018-Dec 2018. She has a history of an  in 2018. She reports bloating and pelvic discomfort for the last 2 weeks . \par \par BUS a month ago was normal \par Hx of colitis\par She reports that she was going to start IVF in August.\par \par She denies any current vaginal bleeding or spotting\par She denies any vaginal issues or urinary symptoms\par \par She denies any use of tobacco, alcohol or drugs\par \par She is taking a PNV\par \par TV sono:\par \par gestational sac with yolk sac seen in the endo cavity, retroverted uterus\par complex RT ovarian cyst 2.0 cm\par Clear Rt ovarian cyst 1.4 cm\par Moderate free fluid seen\par GS sac measures 5.2 weeks\par fetal pole not seen [PapSmeardate] : 9/17/2020 [HPVDate] : 9/17/2020 [TextBox_78] : NEG [PGHxTotal] : 2 [[facility]] : 1 [[facility]] : 1 [PGHxABSpont] : 1

## 2021-04-15 NOTE — DISCUSSION/SUMMARY
[FreeTextEntry1] : Positive ICON\par \par TV sono reviewed with pt\par \par Reviewed sono with BM\par \par Advised pt to RTO in 2 days for repeat HCG and progesterone\par \par HCG, prog, CBC, and blood type drawn\par \par GC Chlamydia of of urine\par \par Call parameters reviewed\par \par Pt advised to avoid raw fish, deli meats and unpasteurized dairy products

## 2021-04-17 ENCOUNTER — APPOINTMENT (OUTPATIENT)
Dept: OBGYN | Facility: CLINIC | Age: 31
End: 2021-04-17
Payer: COMMERCIAL

## 2021-04-17 PROCEDURE — 36415 COLL VENOUS BLD VENIPUNCTURE: CPT

## 2021-04-17 PROCEDURE — 99072 ADDL SUPL MATRL&STAF TM PHE: CPT

## 2021-04-18 LAB
ABO + RH PNL BLD: NORMAL
BASOPHILS # BLD AUTO: 0.03 K/UL
BASOPHILS NFR BLD AUTO: 0.5 %
EOSINOPHIL # BLD AUTO: 0.07 K/UL
EOSINOPHIL NFR BLD AUTO: 1.1 %
HCG SERPL-MCNC: 9291 MIU/ML
HCT VFR BLD CALC: 42.4 %
HGB BLD-MCNC: 13.9 G/DL
IMM GRANULOCYTES NFR BLD AUTO: 0.3 %
LYMPHOCYTES # BLD AUTO: 1.81 K/UL
LYMPHOCYTES NFR BLD AUTO: 29.1 %
MAN DIFF?: NORMAL
MCHC RBC-ENTMCNC: 31.2 PG
MCHC RBC-ENTMCNC: 32.8 GM/DL
MCV RBC AUTO: 95.1 FL
MONOCYTES # BLD AUTO: 0.5 K/UL
MONOCYTES NFR BLD AUTO: 8.1 %
NEUTROPHILS # BLD AUTO: 3.78 K/UL
NEUTROPHILS NFR BLD AUTO: 60.9 %
PLATELET # BLD AUTO: 209 K/UL
PROGEST SERPL-MCNC: 22.6 NG/ML
RBC # BLD: 4.46 M/UL
RBC # FLD: 12.5 %
WBC # FLD AUTO: 6.21 K/UL

## 2021-04-19 ENCOUNTER — NON-APPOINTMENT (OUTPATIENT)
Age: 31
End: 2021-04-19

## 2021-04-19 ENCOUNTER — OUTPATIENT (OUTPATIENT)
Dept: OUTPATIENT SERVICES | Facility: HOSPITAL | Age: 31
LOS: 1 days | Discharge: HOME | End: 2021-04-19

## 2021-04-19 DIAGNOSIS — C50.412 MALIGNANT NEOPLASM OF UPPER-OUTER QUADRANT OF LEFT FEMALE BREAST: ICD-10-CM

## 2021-04-19 DIAGNOSIS — Z98.890 OTHER SPECIFIED POSTPROCEDURAL STATES: Chronic | ICD-10-CM

## 2021-04-19 DIAGNOSIS — Z98.89 OTHER SPECIFIED POSTPROCEDURAL STATES: Chronic | ICD-10-CM

## 2021-04-20 ENCOUNTER — RX RENEWAL (OUTPATIENT)
Age: 31
End: 2021-04-20

## 2021-04-20 ENCOUNTER — APPOINTMENT (OUTPATIENT)
Dept: OBGYN | Facility: CLINIC | Age: 31
End: 2021-04-20
Payer: COMMERCIAL

## 2021-04-20 ENCOUNTER — ASOB RESULT (OUTPATIENT)
Age: 31
End: 2021-04-20

## 2021-04-20 VITALS
BODY MASS INDEX: 23.41 KG/M2 | DIASTOLIC BLOOD PRESSURE: 74 MMHG | WEIGHT: 124 LBS | SYSTOLIC BLOOD PRESSURE: 110 MMHG | TEMPERATURE: 98.1 F | HEIGHT: 61 IN

## 2021-04-20 LAB
HCG SERPL-MCNC: ABNORMAL MIU/ML
PROGEST SERPL-MCNC: 23.7 NG/ML

## 2021-04-20 PROCEDURE — 76817 TRANSVAGINAL US OBSTETRIC: CPT

## 2021-04-20 PROCEDURE — 36415 COLL VENOUS BLD VENIPUNCTURE: CPT

## 2021-04-20 PROCEDURE — 99213 OFFICE O/P EST LOW 20 MIN: CPT

## 2021-04-20 PROCEDURE — 99072 ADDL SUPL MATRL&STAF TM PHE: CPT

## 2021-04-22 ENCOUNTER — APPOINTMENT (OUTPATIENT)
Age: 31
End: 2021-04-22

## 2021-04-22 NOTE — END OF VISIT
[FreeTextEntry3] : I, Sarah Tristan solely acted as scribe for Dr. Lenin Oneil on 04/20/2021. All medical entries made by the Scribe were at my, Dr. Oneil's, direction and personally dictated by me on 04/20/2021. I have reviewed the chart and agree that the record accurately reflects my personal performance of the history, physical exam, assessment, and plan. I have also personally directed, reviewed, and agreed with the chart.\par

## 2021-04-22 NOTE — DISCUSSION/SUMMARY
[FreeTextEntry1] : We discussed the need to perform a pelvic sonogram to confirm fetal viability, pelvic sonogram to be performed today. \par \par Patient will return in 2 weeks for a follow-up visit as a new OB patient. \par \par During this visit 20 minutes were spent face-to-face with greater than 50% of the time dedicated to counseling.\par

## 2021-04-22 NOTE — HISTORY OF PRESENT ILLNESS
[N] : Patient does not use contraception [Y] : Positive pregnancy history [Menarche Age: ____] : age at menarche was [unfilled] [Currently Active] : currently active [Men] : men [Vaginal] : vaginal [No] : No [Patient refuses STI testing] : Patient refuses STI testing [TextBox_4] : pt states coming in for repeat blood work for pregnancy [Mammogramdate] : 2019 [TextBox_19] : br6 [BreastSonogramDate] : 3/29/2021 [TextBox_25] : br2 [PapSmeardate] : 9/17/2020 [TextBox_31] : neg [BoneDensityDate] : 2019 [TextBox_37] : osteopenia [GonorrheaDate] : 9/17/2020 [TextBox_63] : neg [ChlamydiaDate] : 9/17/2020 [TextBox_68] : neg [TrichomonasDate] : 9/17/2020 [TextBox_73] : neg [LMPDate] : 3/9/2021 [PGHxTotal] : 3 [[facility] CenterxFulerm] : 1 [[facility] Centeriving] : 1 [PGHxABSpont] : 1 [FreeTextEntry1] : 3/9/2021

## 2021-05-05 ENCOUNTER — NON-APPOINTMENT (OUTPATIENT)
Age: 31
End: 2021-05-05

## 2021-05-05 ENCOUNTER — APPOINTMENT (OUTPATIENT)
Dept: OBGYN | Facility: CLINIC | Age: 31
End: 2021-05-05
Payer: COMMERCIAL

## 2021-05-05 VITALS
DIASTOLIC BLOOD PRESSURE: 70 MMHG | WEIGHT: 122 LBS | BODY MASS INDEX: 23.03 KG/M2 | SYSTOLIC BLOOD PRESSURE: 110 MMHG | HEIGHT: 61 IN

## 2021-05-05 DIAGNOSIS — E06.3 AUTOIMMUNE THYROIDITIS: ICD-10-CM

## 2021-05-05 LAB
BILIRUB UR QL STRIP: NORMAL
GLUCOSE UR-MCNC: NORMAL
HCG UR QL: 0.2 EU/DL
HGB UR QL STRIP.AUTO: NORMAL
KETONES UR-MCNC: NORMAL
LEUKOCYTE ESTERASE UR QL STRIP: NORMAL
NITRITE UR QL STRIP: NORMAL
PH UR STRIP: 5.5
PROT UR STRIP-MCNC: NORMAL
SP GR UR STRIP: 1.02

## 2021-05-05 PROCEDURE — 36415 COLL VENOUS BLD VENIPUNCTURE: CPT

## 2021-05-05 PROCEDURE — 76817 TRANSVAGINAL US OBSTETRIC: CPT

## 2021-05-05 PROCEDURE — 99072 ADDL SUPL MATRL&STAF TM PHE: CPT

## 2021-05-05 PROCEDURE — 0500F INITIAL PRENATAL CARE VISIT: CPT

## 2021-05-05 RX ORDER — FLUCONAZOLE 150 MG/1
150 TABLET ORAL
Qty: 2 | Refills: 1 | Status: DISCONTINUED | COMMUNITY
Start: 2021-03-13 | End: 2021-05-05

## 2021-05-05 RX ORDER — METRONIDAZOLE 7.5 MG/G
0.75 GEL VAGINAL
Qty: 1 | Refills: 1 | Status: DISCONTINUED | COMMUNITY
Start: 2021-03-13 | End: 2021-05-05

## 2021-05-05 RX ORDER — CLOTRIMAZOLE AND BETAMETHASONE DIPROPIONATE 10; .5 MG/G; MG/G
1-0.05 CREAM TOPICAL TWICE DAILY
Qty: 1 | Refills: 0 | Status: DISCONTINUED | COMMUNITY
Start: 2021-03-13 | End: 2021-05-05

## 2021-05-06 ENCOUNTER — NON-APPOINTMENT (OUTPATIENT)
Age: 31
End: 2021-05-06

## 2021-05-06 LAB
ABO + RH PNL BLD: NORMAL
BASOPHILS # BLD AUTO: 0.03 K/UL
BASOPHILS NFR BLD AUTO: 0.5 %
BLD GP AB SCN SERPL QL: NORMAL
EOSINOPHIL # BLD AUTO: 0.03 K/UL
EOSINOPHIL NFR BLD AUTO: 0.5 %
ESTIMATED AVERAGE GLUCOSE: 91 MG/DL
HBA1C MFR BLD HPLC: 4.8 %
HBV SURFACE AG SER QL: NONREACTIVE
HCT VFR BLD CALC: 41.4 %
HCV AB SER QL: NONREACTIVE
HCV S/CO RATIO: 0.1 S/CO
HGB A MFR BLD: 97.8 %
HGB A2 MFR BLD: 2.2 %
HGB BLD-MCNC: 13.6 G/DL
HGB FRACT BLD-IMP: NORMAL
HIV1+2 AB SPEC QL IA.RAPID: NONREACTIVE
IMM GRANULOCYTES NFR BLD AUTO: 0.2 %
LYMPHOCYTES # BLD AUTO: 1.6 K/UL
LYMPHOCYTES NFR BLD AUTO: 26.4 %
MAN DIFF?: NORMAL
MCHC RBC-ENTMCNC: 30.6 PG
MCHC RBC-ENTMCNC: 32.9 GM/DL
MCV RBC AUTO: 93 FL
MONOCYTES # BLD AUTO: 0.46 K/UL
MONOCYTES NFR BLD AUTO: 7.6 %
NEUTROPHILS # BLD AUTO: 3.94 K/UL
NEUTROPHILS NFR BLD AUTO: 64.8 %
PLATELET # BLD AUTO: 199 K/UL
RBC # BLD: 4.45 M/UL
RBC # FLD: 12.2 %
T PALLIDUM AB SER QL IA: NEGATIVE
T3FREE SERPL-MCNC: 2.66 PG/ML
T4 FREE SERPL-MCNC: 1.6 NG/DL
TSH SERPL-ACNC: 0.82 UIU/ML
WBC # FLD AUTO: 6.07 K/UL

## 2021-05-07 LAB
BACTERIA UR CULT: NORMAL
C TRACH RRNA SPEC QL NAA+PROBE: NOT DETECTED
CMV IGG SERPL QL: <0.2 U/ML
CMV IGG SERPL-IMP: NEGATIVE
CMV IGM SERPL QL: <8 AU/ML
CMV IGM SERPL QL: NEGATIVE
M TB IFN-G BLD-IMP: NEGATIVE
MEV IGG FLD QL IA: 116 AU/ML
MEV IGG+IGM SER-IMP: POSITIVE
N GONORRHOEA RRNA SPEC QL NAA+PROBE: NOT DETECTED
QUANTIFERON TB PLUS MITOGEN MINUS NIL: 4.03 IU/ML
QUANTIFERON TB PLUS NIL: 0.01 IU/ML
QUANTIFERON TB PLUS TB1 MINUS NIL: 0 IU/ML
QUANTIFERON TB PLUS TB2 MINUS NIL: 0.01 IU/ML
RUBV IGG FLD-ACNC: 5 INDEX
RUBV IGG SER-IMP: POSITIVE
SOURCE AMPLIFICATION: NORMAL
T GONDII AB SER-IMP: NEGATIVE
T GONDII AB SER-IMP: NEGATIVE
T GONDII IGG SER QL: <3 IU/ML
T GONDII IGM SER QL: <3 AU/ML
VZV AB TITR SER: POSITIVE
VZV IGG SER IF-ACNC: 2292 INDEX

## 2021-05-11 LAB — FMR1 GENE MUT ANL BLD/T: NORMAL

## 2021-05-12 ENCOUNTER — APPOINTMENT (OUTPATIENT)
Dept: HEMATOLOGY ONCOLOGY | Facility: CLINIC | Age: 31
End: 2021-05-12
Payer: COMMERCIAL

## 2021-05-12 VITALS
DIASTOLIC BLOOD PRESSURE: 76 MMHG | HEIGHT: 61 IN | HEART RATE: 84 BPM | BODY MASS INDEX: 22.84 KG/M2 | SYSTOLIC BLOOD PRESSURE: 114 MMHG | OXYGEN SATURATION: 97 % | WEIGHT: 121 LBS

## 2021-05-12 PROCEDURE — 99072 ADDL SUPL MATRL&STAF TM PHE: CPT

## 2021-05-12 PROCEDURE — 99214 OFFICE O/P EST MOD 30 MIN: CPT

## 2021-05-12 NOTE — HISTORY OF PRESENT ILLNESS
[Disease: _____________________] : Disease: [unfilled] [T: ___] : T[unfilled] [N: ___] : N[unfilled] [AJCC Stage: ____] : AJCC Stage: [unfilled] [de-identified] : Ms. Kennedy was diagnosed with left triple negative breast cancer at age 28. \par \par She delivered her first child on 4/25/18 following which she self palpated a left breast mass.  It was  thought to be a blocked milk duct / mastitis and was treated with antibiotics and then antifungals.  The mass did not resolve and she sought a 2nd opinion.  \par \par She then had a breast ultrasound on 5/30/18 which showed a 2.3 cm left breast mass at 1-2:00, 9 cm from the nipple, and a single axillary lymph node which does not contain a prominent benign appearing fatty hilum.  \par \par 5/31/18 left breast core biopsy - invasive poorly differentiated ductal carcinoma, Bond score 9/9, measures at least 17 cm w/ extensive necrosis. ER 0%, ME 0%, HER 2 (0/1+) -negative. \par \par On 6/7/18 she had a diagnostic mammogram + ultrasound - 2.1 cm rounded mass of left breast with overall coarsening of parenchymal pattern and increased parenchymal density in upper outer left breast. US - 2.4 cm left breast mass at 1-2:00 and left axilla 0.8 cm rounded hypoechoic mass consistent with abnormal left axillary node.  \par \par On 6/7/18 she also had a breast MRI - 2.9 x 3.2 x 3.1 cm mass in left upper inner breast at 1:00.  Suspicious left axillary nodes with ultrasound correlate for which US guided biopsy is recommended.   \par \par On 6/8/17 she had biopsy of the left axillary node - pathology : reactive lymph node. \par \par 6/7/18 - CT chest/abd/pelvis -  Known left upper outer breast malignancy. 2 small, round left axillary lymph nodes for which patient will undergo percutaneous \par sampling, as per report of breast MRI from 6/7/2018. A 3 mm subpleural nodular density in the right middle lobe probably represents a focus of atelectasis. No evidence of metastatic disease in the chest, abdomen, and pelvis.\par \par 6/8/18 bone scan - Normal bone scan. No radionuclide evidence of osseous metastasis.\par \par 6/29/18 -s/p bilateral mastectomy \par Pathology: left breast IDC 3.5 cm, joo score 9/9, margins negative, 2 SNL nodes negative.  pT2 pN0\par Right mastectomy - negative for malignancy. \par \par Family History: 2 maternal great aunts with breast cancer.\par paternal cousin with breast cancer\par paternal GM - pancreatic cancer\par paternal GF - pancreatic cancer\par \par [facility]  [employer] panel - no clinically significant mutations\par \par PMH: asthma, DPD deficiency (dihydropyrimidine dehydrogenase deficiency).\par \par Sx hx: tonsillectomy\par \par Social: non-smoker, social ETOH  [de-identified] : poorly differentiated invasive ductal carcinoma [de-identified] : ER 0% DE 0% HER2 negative [de-identified] : Returns for follow up.\par She has post mastectomy pain L side, and [place] [facility] [place] management.\par Currently 10 weeks pregnant, OB is Dr. Oneil. This was a naturally conceived pregnancy. \par She has lost 10 lbs in first trimester. \par \par

## 2021-05-12 NOTE — PHYSICAL EXAM
[Ambulatory and capable of all self care but unable to carry out any work activities] : Status 2- Ambulatory and capable of all self care but unable to carry out any work activities. Up and about more than 50% of waking hours [Normal] : grossly intact [de-identified] : clear to auscultation b/l [de-identified] : no edema [de-identified] : b/l reconstructed breasts, no significant palpable masses

## 2021-05-12 NOTE — HISTORY OF PRESENT ILLNESS
[Disease: _____________________] : Disease: [unfilled] [T: ___] : T[unfilled] [N: ___] : N[unfilled] [AJCC Stage: ____] : AJCC Stage: [unfilled] [de-identified] : Ms. Kennedy was diagnosed with left triple negative breast cancer at age 28. \par \par She delivered her first child on 4/25/18 following which she self palpated a left breast mass.  It was  thought to be a blocked milk duct / mastitis and was treated with antibiotics and then antifungals.  The mass did not resolve and she sought a 2nd opinion.  \par \par She then had a breast ultrasound on 5/30/18 which showed a 2.3 cm left breast mass at 1-2:00, 9 cm from the nipple, and a single axillary lymph node which does not contain a prominent benign appearing fatty hilum.  \par \par 5/31/18 left breast core biopsy - invasive poorly differentiated ductal carcinoma, Alpine score 9/9, measures at least 17 cm w/ extensive necrosis. ER 0%, ME 0%, HER 2 (0/1+) -negative. \par \par On 6/7/18 she had a diagnostic mammogram + ultrasound - 2.1 cm rounded mass of left breast with overall coarsening of parenchymal pattern and increased parenchymal density in upper outer left breast. US - 2.4 cm left breast mass at 1-2:00 and left axilla 0.8 cm rounded hypoechoic mass consistent with abnormal left axillary node.  \par \par On 6/7/18 she also had a breast MRI - 2.9 x 3.2 x 3.1 cm mass in left upper inner breast at 1:00.  Suspicious left axillary nodes with ultrasound correlate for which US guided biopsy is recommended.   \par \par On 6/8/17 she had biopsy of the left axillary node - pathology : reactive lymph node. \par \par 6/7/18 - CT chest/abd/pelvis -  Known left upper outer breast malignancy. 2 small, round left axillary lymph nodes for which patient will undergo percutaneous \par sampling, as per report of breast MRI from 6/7/2018. A 3 mm subpleural nodular density in the right middle lobe probably represents a focus of atelectasis. No evidence of metastatic disease in the chest, abdomen, and pelvis.\par \par 6/8/18 bone scan - Normal bone scan. No radionuclide evidence of osseous metastasis.\par \par 6/29/18 -s/p bilateral mastectomy \par Pathology: left breast IDC 3.5 cm, joo score 9/9, margins negative, 2 SNL nodes negative.  pT2 pN0\par Right mastectomy - negative for malignancy. \par \par Family History: 2 maternal great aunts with breast cancer.\par paternal cousin with breast cancer\par paternal GM - pancreatic cancer\par paternal GF - pancreatic cancer\par \par [facility]  [employer] panel - no clinically significant mutations\par \par PMH: asthma, DPD deficiency (dihydropyrimidine dehydrogenase deficiency).\par \par Sx hx: tonsillectomy\par \par Social: non-smoker, social ETOH  [de-identified] : ER 0% WA 0% HER2 negative [de-identified] : poorly differentiated invasive ductal carcinoma [de-identified] : Returns for follow up.\par She has post mastectomy pain L side, and [place] [facility] [place].\par Currently 10 weeks pregnant, OB is Dr. Oneil. This was a naturally conceived pregnancy. \par She has lost 10 lbs in first trimester. \par \par

## 2021-05-12 NOTE — ASSESSMENT
[FreeTextEntry1] : 31 year old female with stage IIA left breast TNBC (T2N0) S/P bilateral mastectomy on 6/29/18. She has DPD deficiency.  S/p 4 cycles of dose dense AC, followed by weekly taxol completed 12/26/18.\par Post chemotherapy she was incidentally found to have anterior mediastinal mass c/w thymic rebound. \par \par 5/15/20 MRI breast - RORY\par 8/27/20 Xray R hip / 10/27/20 Rt Hip CT - negative. \par \par Currently 10 weeks pregnant, naturally conceived. Breast exam - RORY\par She is nervous about not being able to do imaging while pregnant and worries about recurrence. Discussed that if she should have any symptoms, ultrasounds are safe and can be the first line study for work up. 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

## 2021-05-12 NOTE — PHYSICAL EXAM
[Ambulatory and capable of all self care but unable to carry out any work activities] : Status 2- Ambulatory and capable of all self care but unable to carry out any work activities. Up and about more than 50% of waking hours [Normal] : grossly intact [de-identified] : clear to auscultation b/l [de-identified] : no edema [de-identified] : b/l reconstructed breasts, no significant palpable masses

## 2021-05-13 ENCOUNTER — NON-APPOINTMENT (OUTPATIENT)
Age: 31
End: 2021-05-13

## 2021-05-13 ENCOUNTER — ASOB RESULT (OUTPATIENT)
Age: 31
End: 2021-05-13

## 2021-05-13 ENCOUNTER — APPOINTMENT (OUTPATIENT)
Dept: MATERNAL FETAL MEDICINE | Facility: CLINIC | Age: 31
End: 2021-05-13
Payer: COMMERCIAL

## 2021-05-13 PROCEDURE — 99202 OFFICE O/P NEW SF 15 MIN: CPT | Mod: 95

## 2021-05-14 ENCOUNTER — APPOINTMENT (OUTPATIENT)
Dept: ENDOCRINOLOGY | Facility: CLINIC | Age: 31
End: 2021-05-14
Payer: COMMERCIAL

## 2021-05-14 ENCOUNTER — APPOINTMENT (OUTPATIENT)
Dept: ANTEPARTUM | Facility: CLINIC | Age: 31
End: 2021-05-14
Payer: COMMERCIAL

## 2021-05-14 ENCOUNTER — NON-APPOINTMENT (OUTPATIENT)
Age: 31
End: 2021-05-14

## 2021-05-14 VITALS — BODY MASS INDEX: 23.03 KG/M2 | WEIGHT: 122 LBS | HEIGHT: 61 IN

## 2021-05-14 DIAGNOSIS — L68.0 HIRSUTISM: ICD-10-CM

## 2021-05-14 DIAGNOSIS — M25.511 PAIN IN RIGHT SHOULDER: ICD-10-CM

## 2021-05-14 DIAGNOSIS — Z86.018 PERSONAL HISTORY OF OTHER BENIGN NEOPLASM: ICD-10-CM

## 2021-05-14 DIAGNOSIS — R14.0 ABDOMINAL DISTENSION (GASEOUS): ICD-10-CM

## 2021-05-14 DIAGNOSIS — Z87.898 PERSONAL HISTORY OF OTHER SPECIFIED CONDITIONS: ICD-10-CM

## 2021-05-14 DIAGNOSIS — K43.9 VENTRAL HERNIA W/OUT OBSTRUCTION OR GANGRENE: ICD-10-CM

## 2021-05-14 DIAGNOSIS — O26.859 SPOTTING COMPLICATING PREGNANCY, UNSPECIFIED TRIMESTER: ICD-10-CM

## 2021-05-14 DIAGNOSIS — M85.852 OTHER SPECIFIED DISORDERS OF BONE DENSITY AND STRUCTURE, RIGHT THIGH: ICD-10-CM

## 2021-05-14 DIAGNOSIS — O35.1XX0 MATERNAL CARE FOR (SUSPECTED) CHROMOSOMAL ABNORMALITY IN FETUS, NOT APPLICABLE OR UNSPECIFIED: ICD-10-CM

## 2021-05-14 DIAGNOSIS — R79.1 ABNORMAL COAGULATION PROFILE: ICD-10-CM

## 2021-05-14 DIAGNOSIS — Z34.91 ENCOUNTER FOR SUPERVISION OF NORMAL PREGNANCY, UNSPECIFIED, FIRST TRIMESTER: ICD-10-CM

## 2021-05-14 DIAGNOSIS — N76.2 ACUTE VULVITIS: ICD-10-CM

## 2021-05-14 DIAGNOSIS — Z01.810 ENCOUNTER FOR PREPROCEDURAL CARDIOVASCULAR EXAMINATION: ICD-10-CM

## 2021-05-14 DIAGNOSIS — Z87.39 PERSONAL HISTORY OF OTHER DISEASES OF THE MUSCULOSKELETAL SYSTEM AND CONNECTIVE TISSUE: ICD-10-CM

## 2021-05-14 DIAGNOSIS — Z86.19 PERSONAL HISTORY OF OTHER INFECTIOUS AND PARASITIC DISEASES: ICD-10-CM

## 2021-05-14 DIAGNOSIS — Z11.3 ENCOUNTER FOR SCREENING FOR INFECTIONS WITH A PREDOMINANTLY SEXUAL MODE OF TRANSMISSION: ICD-10-CM

## 2021-05-14 DIAGNOSIS — B96.89 ACUTE VAGINITIS: ICD-10-CM

## 2021-05-14 DIAGNOSIS — G89.29 RIGHT UPPER QUADRANT PAIN: ICD-10-CM

## 2021-05-14 DIAGNOSIS — Z87.19 PERSONAL HISTORY OF OTHER DISEASES OF THE DIGESTIVE SYSTEM: ICD-10-CM

## 2021-05-14 DIAGNOSIS — Z87.2 PERSONAL HISTORY OF DISEASES OF THE SKIN AND SUBCUTANEOUS TISSUE: ICD-10-CM

## 2021-05-14 DIAGNOSIS — Z87.42 PERSONAL HISTORY OF OTHER DISEASES OF THE FEMALE GENITAL TRACT: ICD-10-CM

## 2021-05-14 DIAGNOSIS — K62.9 DISEASE OF ANUS AND RECTUM, UNSPECIFIED: ICD-10-CM

## 2021-05-14 DIAGNOSIS — N83.201 UNSPECIFIED OVARIAN CYST, RIGHT SIDE: ICD-10-CM

## 2021-05-14 DIAGNOSIS — N83.202 UNSPECIFIED OVARIAN CYST, LEFT SIDE: ICD-10-CM

## 2021-05-14 DIAGNOSIS — R79.89 OTHER SPECIFIED ABNORMAL FINDINGS OF BLOOD CHEMISTRY: ICD-10-CM

## 2021-05-14 DIAGNOSIS — Z32.01 ENCOUNTER FOR PREGNANCY TEST, RESULT POSITIVE: ICD-10-CM

## 2021-05-14 DIAGNOSIS — N76.0 ACUTE VAGINITIS: ICD-10-CM

## 2021-05-14 DIAGNOSIS — R07.89 OTHER CHEST PAIN: ICD-10-CM

## 2021-05-14 DIAGNOSIS — K58.0 IRRITABLE BOWEL SYNDROME WITH DIARRHEA: ICD-10-CM

## 2021-05-14 DIAGNOSIS — Z01.818 ENCOUNTER FOR OTHER PREPROCEDURAL EXAMINATION: ICD-10-CM

## 2021-05-14 DIAGNOSIS — Z3A.28 28 WEEKS GESTATION OF PREGNANCY: ICD-10-CM

## 2021-05-14 DIAGNOSIS — Z87.09 PERSONAL HISTORY OF OTHER DISEASES OF THE RESPIRATORY SYSTEM: ICD-10-CM

## 2021-05-14 DIAGNOSIS — K12.31 ORAL MUCOSITIS (ULCERATIVE) DUE TO ANTINEOPLASTIC THERAPY: ICD-10-CM

## 2021-05-14 DIAGNOSIS — Z90.10 ACQUIRED ABSENCE OF UNSPECIFIED BREAST AND NIPPLE: ICD-10-CM

## 2021-05-14 DIAGNOSIS — Z92.21 PERSONAL HISTORY OF ANTINEOPLASTIC CHEMOTHERAPY: ICD-10-CM

## 2021-05-14 DIAGNOSIS — N97.9 FEMALE INFERTILITY, UNSPECIFIED: ICD-10-CM

## 2021-05-14 DIAGNOSIS — G58.8 OTHER SPECIFIED MONONEUROPATHIES: ICD-10-CM

## 2021-05-14 DIAGNOSIS — R10.2 PELVIC AND PERINEAL PAIN: ICD-10-CM

## 2021-05-14 DIAGNOSIS — M85.851 OTHER SPECIFIED DISORDERS OF BONE DENSITY AND STRUCTURE, RIGHT THIGH: ICD-10-CM

## 2021-05-14 DIAGNOSIS — R10.11 RIGHT UPPER QUADRANT PAIN: ICD-10-CM

## 2021-05-14 DIAGNOSIS — Z11.59 ENCOUNTER FOR SCREENING FOR OTHER VIRAL DISEASES: ICD-10-CM

## 2021-05-14 DIAGNOSIS — O35.1XX1 MATERNAL CARE FOR (SUSPECTED) CHROMOSOMAL ABNORMALITY IN FETUS, FETUS 1: ICD-10-CM

## 2021-05-14 DIAGNOSIS — O26.873 CERVICAL SHORTENING, THIRD TRIMESTER: ICD-10-CM

## 2021-05-14 DIAGNOSIS — J01.90 ACUTE SINUSITIS, UNSPECIFIED: ICD-10-CM

## 2021-05-14 DIAGNOSIS — R42 DIZZINESS AND GIDDINESS: ICD-10-CM

## 2021-05-14 DIAGNOSIS — E55.9 VITAMIN D DEFICIENCY, UNSPECIFIED: ICD-10-CM

## 2021-05-14 PROCEDURE — 36415 COLL VENOUS BLD VENIPUNCTURE: CPT

## 2021-05-14 PROCEDURE — 99213 OFFICE O/P EST LOW 20 MIN: CPT | Mod: 95

## 2021-05-14 RX ORDER — CYCLOBENZAPRINE HYDROCHLORIDE 5 MG/1
5 TABLET, FILM COATED ORAL
Qty: 90 | Refills: 0 | Status: DISCONTINUED | COMMUNITY
Start: 2021-01-13 | End: 2021-05-14

## 2021-05-14 RX ORDER — VALACYCLOVIR 500 MG/1
500 TABLET, FILM COATED ORAL DAILY
Qty: 30 | Refills: 2 | Status: DISCONTINUED | COMMUNITY
Start: 2020-08-01 | End: 2021-05-14

## 2021-05-14 RX ORDER — DIAZEPAM 10 MG/1
10 TABLET ORAL
Refills: 0 | Status: DISCONTINUED | COMMUNITY
End: 2021-05-14

## 2021-05-14 RX ORDER — ALPRAZOLAM 0.5 MG/1
0.5 TABLET ORAL
Qty: 30 | Refills: 0 | Status: DISCONTINUED | COMMUNITY
Start: 2019-07-23 | End: 2021-05-14

## 2021-05-14 RX ORDER — PREGABALIN 50 MG/1
50 CAPSULE ORAL
Qty: 55 | Refills: 0 | Status: DISCONTINUED | COMMUNITY
Start: 2020-12-16 | End: 2021-05-14

## 2021-05-14 NOTE — ASSESSMENT
[FreeTextEntry1] : Imp/Plan \par \par Hypothryoidsm- stbale   TF from [facility] - cont RX  check labs 466 weeks\par \par \par COVId vaccine - pt wants to hold off on getting vaccine \par  pt knows 3 women who lost babies  after gettign COvid vaccine \par \par dyspnea- preceedign pregnancy - see pMD \par  \par \par \par

## 2021-05-14 NOTE — HISTORY OF PRESENT ILLNESS
[Home] : at home, [unfilled] , at the time of the visit. [Medical Office: ([facility])___] : at the medical office located in  [Verbal consent obtained from patient] : the patient, [unfilled] [FreeTextEntry1] : \par  Telehealth  start time 840 AM\par  end time 850  AM \par follow up \par Hypothryoidsm \par \par  feels well\par  now 10 weeks pregnant \par happened naturally \par ROS No Neck pain No voice changes  No Chest pain  No Pressure  occ dyspnea   No n/v abd pain diarrhea or constipation  No LE edema \par \par \par \par \par

## 2021-05-15 LAB — AR GENE MUT ANL BLD/T: NORMAL

## 2021-05-17 ENCOUNTER — NON-APPOINTMENT (OUTPATIENT)
Age: 31
End: 2021-05-17

## 2021-05-20 ENCOUNTER — APPOINTMENT (OUTPATIENT)
Dept: MATERNAL FETAL MEDICINE | Facility: CLINIC | Age: 31
End: 2021-05-20
Payer: COMMERCIAL

## 2021-05-20 PROCEDURE — 99214 OFFICE O/P EST MOD 30 MIN: CPT

## 2021-05-20 PROCEDURE — 99204 OFFICE O/P NEW MOD 45 MIN: CPT | Mod: 95

## 2021-05-22 LAB — CFTR MUT TESTED BLD/T: NEGATIVE

## 2021-05-24 NOTE — SURGICAL HISTORY
[Fibroids] : no fibroids [Abn Paps] : no abnormal pap smears [Breast Disease] : no breast disease [STI's] : STI's [Infertility] : no infertility [Cysts] : cysts [OC Use] : no OC use [Last Pap: ___] : Last Pap: [unfilled] [Last Mammo: ___] : Last Mammo: [unfilled]

## 2021-05-24 NOTE — CHIEF COMPLAINT
[G ___] : G [unfilled] [P ___] : P [unfilled] [de-identified] : personal history of breast cancer, hypothyroidism and anxiety

## 2021-05-24 NOTE — ACTIVE PROBLEMS
[FreeTextEntry1] : Pt was referred to our office for a personal history of breast cancer, she was diagnosed ion 2018, she underwent a bilateral mastectomy and chemotherapy. She has not had any chemotherapy since 2018. She is currently pregnant @ 12 weeks gestation. Pt was also diagnosed with Hashimoto's and hypothyroidism, she states a complication of chemotherapy. She is currently taking Synthroid 50 mcg daily. She is followed regularly by a breast surgeon, heme oncology, and an endocrinologist. She recently had genetic counseling, all consultations are attached for review. She states she has a lung nodule and a mediastinal mass, will be observed during pregnancy. [Diabetes Mellitus] : no diabetes mellitus [Hypertension] : no hypertension [Heart Disease] : no heart disease [Autoimmune Disease] : no autoimmune disease [Renal Disease] : no kidney disease, no UTI [Neurologic Disorder] : no neurologic disorder, no epilepsy [Psychiatric Disorders] : no psychiatric disorders [Depression] : no depression, no post partum depression [Hepatic Disorder] : no hepatitis, no liver disease [Thrombophlebitis] : no varicosities, no phlebitis [Thyroid Disorder] : no thyroid dysfunction [Trauma] : no trauma/violence [Blood Transfusion (___ Ml)] : no history of blood transfusion

## 2021-05-24 NOTE — OB HISTORY
[Pregnancy History] : patient received anesthesia [___] : pregnancy complications occured [Spontaneous] : Spontaneous conception [Sonogram] : sonogram [at ___ wks] : at [unfilled] weeks

## 2021-05-24 NOTE — FAMILY HISTORY
[Age 35+ During Pregnancy] : not 35 or over during pregnancy [Reported Family History Of Birth Defects] : no congenital heart defects [Wesley-Sachs Carrier] : no Wesley-Sachs [Reported Family History Of Genetic Disease] : no maternal metabolic disorder [FreeTextEntry1] : +BRCA gene (FOB family)  sister has Cerebral Palsy and mental retardation

## 2021-05-24 NOTE — DISCUSSION/SUMMARY
[FreeTextEntry1] : We had the pleasure of meeting with your patient, Brianna Red, for a maternal-fetal medicine consultation. The patient is a 31-year-old  at 12 0/7 weeks of gestation. Her pregnancy is complicated by anxiety, hypothyroidism, and history of breast cancer s/p bilateral mastectomy, chemotherapy, and breast reconstruction. Her last dose of chemotherapy was in 2018.\par \par Today, she has no acute complaints. She denies cramping/pressure, leaking and vaginal bleeding. She reports episodes of breast pain for which she is seeing a pain medicine specialist.\par \par She is currently taking prenatal vitamins and levothyroxine 50 mcg.\par \par She was counseled regarding the following issues:\par \par •	Hypothyroidism\par \par The patient was counseled that hypothyroidism is pregnancy is associated with an increased risk of  birth, gestational hypertension, preeclampsia, low birth weight, placental abruption and fetal neurocognitive impairment. Thus, maintaining optimal thyroid function is essential. She is currently taking levothyroxine at an effective dose and is followed by endocrinology (last appointment 21). Recent thyroid studies have been normal. TSH should be tested every 4-6 weeks to confirm that it is within trimester-specific goal ranges: 0.1-2.5 uIU/mL in the first trimester, 0.2-3 uIU/mL in the second trimester and 0.3-3 uIU/mL in the third trimester.\par \par •	Anxiety\par \par Anxiety and other mood disorders are commonly encountered in reproductive age women. Severe untreated psychiatric conditions increase the risk for problems in pregnancy including noncompliance with prenatal care, substance abuse, poor appetite/weight gain, insomnia, worsening of mood disorders, suicidal ideation, lack of breastfeeding and mother-infant bonding. Treatment during pregnancy may be necessary for the health of the patient and the baby. For women who benefit from medication, the most commonly used class of drugs is SSRIs. Brianna reports that she feels well and does not require an anxiolytic medication at this time. Her mood should be closely monitored throughout pregnancy. \par \par •	History of left-sided breast cancer (poorly differentiated invasive ductal carcinoma, triple negative ER/[place]/HER2) \par •	Status post bilateral mastectomy, chemotherapy, and breast reconstruction\par •	Post-mastectomy pain syndrome\par \par Brianna continues to be followed closely by her oncologist and breast surgeon. Currently, she is receiving cortisone injections for postmastectomy pain syndrome every 6 weeks [place] [facility] [place] She previously took gabapentin for this condition, but it was not effective. I discussed that local injections are not likely to affect the pregnancy and should continue if needed. \par \par She previously spoke with our genetic counselor to discuss options for prenatal screening and diagnosis.\par \par \par Thank you for requesting a consultation on this patient for anxiety, hypothyroidism, and history of breast cancer s/p bilateral mastectomy, chemotherapy, and breast reconstruction. The majority of time (>50%) was spent on counseling and coordination of care with this patient. The physician telehealth time was 46 minutes.\par \par At the end of our discussion, the patient indicated that her questions were answered and she seemed satisfied with our discussion. Please do not hesitate to contact us with any questions.\par \par \par Sincerely,\par \par \par Tate Laguna MD, DANIELLE\par Attending Physician, Maternal-Fetal Medicine\par

## 2021-05-24 NOTE — VITALS
[US Date: ___] : ultrasound performed on [unfilled]. [GA= ___ Weeks] : Results were GA of [unfilled] weeks [GA= ___ Days] : and [unfilled] day(s) [WOOD by US (date): ___] : The calculated WOOD by US is [unfilled] [By US] : this is the final WOOD

## 2021-05-25 ENCOUNTER — OUTPATIENT (OUTPATIENT)
Dept: OUTPATIENT SERVICES | Facility: HOSPITAL | Age: 31
LOS: 1 days | Discharge: HOME | End: 2021-05-25

## 2021-05-25 DIAGNOSIS — Z98.89 OTHER SPECIFIED POSTPROCEDURAL STATES: Chronic | ICD-10-CM

## 2021-05-25 DIAGNOSIS — Z98.890 OTHER SPECIFIED POSTPROCEDURAL STATES: Chronic | ICD-10-CM

## 2021-05-25 DIAGNOSIS — C50.412 MALIGNANT NEOPLASM OF UPPER-OUTER QUADRANT OF LEFT FEMALE BREAST: ICD-10-CM

## 2021-05-27 ENCOUNTER — NON-APPOINTMENT (OUTPATIENT)
Age: 31
End: 2021-05-27

## 2021-05-27 ENCOUNTER — APPOINTMENT (OUTPATIENT)
Age: 31
End: 2021-05-27

## 2021-05-28 ENCOUNTER — APPOINTMENT (OUTPATIENT)
Dept: FAMILY MEDICINE | Facility: CLINIC | Age: 31
End: 2021-05-28
Payer: COMMERCIAL

## 2021-05-28 VITALS
WEIGHT: 122 LBS | DIASTOLIC BLOOD PRESSURE: 68 MMHG | TEMPERATURE: 98 F | BODY MASS INDEX: 23.03 KG/M2 | SYSTOLIC BLOOD PRESSURE: 100 MMHG | HEIGHT: 61 IN | HEART RATE: 96 BPM | OXYGEN SATURATION: 98 %

## 2021-05-28 PROCEDURE — 99214 OFFICE O/P EST MOD 30 MIN: CPT

## 2021-05-28 NOTE — PHYSICAL EXAM
[No Acute Distress] : no acute distress [Well Nourished] : well nourished [Well Developed] : well developed [Well-Appearing] : well-appearing [EOMI] : extraocular movements intact [Normal Outer Ear/Nose] : the outer ears and nose were normal in appearance [No JVD] : no jugular venous distention [No Respiratory Distress] : no respiratory distress  [No Accessory Muscle Use] : no accessory muscle use [Clear to Auscultation] : lungs were clear to auscultation bilaterally [Normal Rate] : normal rate  [Regular Rhythm] : with a regular rhythm [Normal S1, S2] : normal S1 and S2 [No Carotid Bruits] : no carotid bruits [No Edema] : there was no peripheral edema [Non-distended] : non-distended [Normal Posterior Cervical Nodes] : no posterior cervical lymphadenopathy [Normal Anterior Cervical Nodes] : no anterior cervical lymphadenopathy [No CVA Tenderness] : no CVA  tenderness [Grossly Normal Strength/Tone] : grossly normal strength/tone [No Rash] : no rash [Coordination Grossly Intact] : coordination grossly intact [No Focal Deficits] : no focal deficits [Normal Gait] : normal gait [Normal Affect] : the affect was normal [Normal Mood] : the mood was normal [Normal Insight/Judgement] : insight and judgment were intact

## 2021-05-28 NOTE — PLAN
[FreeTextEntry1] : see med orders\par \par aggressive hydration!!\par \par OTC Zyrtec 10 mg qd, prn \par \par in remission x 2 yrs,  cont'd Oncology f/u

## 2021-05-28 NOTE — HISTORY OF PRESENT ILLNESS
[FreeTextEntry8] : Pt. c/o headaches/nasal congestion/ forehead pain x 1 week \par \par 30 yo female c 1 week hx of persistent sinus congestion despite OTC Neti pot, saline nasal wash, steamed bathroom.   +ve clear nasal d/c   \par no f/c/s\par +ve frontal pressure \par improved nausea c aid of Benadryl OTC \par \par s/p urgent care eval on 5/22/21   COVID-19/Strep/Flu tests ALL NEGATIVE \par \par no ear pain B/L \par minimal dry cough, improving

## 2021-06-03 ENCOUNTER — NON-APPOINTMENT (OUTPATIENT)
Age: 31
End: 2021-06-03

## 2021-06-03 ENCOUNTER — APPOINTMENT (OUTPATIENT)
Dept: OBGYN | Facility: CLINIC | Age: 31
End: 2021-06-03
Payer: COMMERCIAL

## 2021-06-03 ENCOUNTER — ASOB RESULT (OUTPATIENT)
Age: 31
End: 2021-06-03

## 2021-06-03 VITALS
TEMPERATURE: 97.5 F | SYSTOLIC BLOOD PRESSURE: 110 MMHG | HEIGHT: 61 IN | BODY MASS INDEX: 23.6 KG/M2 | DIASTOLIC BLOOD PRESSURE: 68 MMHG | WEIGHT: 125 LBS

## 2021-06-03 LAB
BILIRUB UR QL STRIP: NORMAL
CLARITY UR: CLEAR
COLLECTION METHOD: NORMAL
GLUCOSE UR-MCNC: NORMAL
HCG UR QL: 0.2 EU/DL
HGB UR QL STRIP.AUTO: NORMAL
KETONES UR-MCNC: NORMAL
LEUKOCYTE ESTERASE UR QL STRIP: NORMAL
NITRITE UR QL STRIP: NORMAL
PH UR STRIP: 6
PROT UR STRIP-MCNC: NORMAL
SP GR UR STRIP: 1.02

## 2021-06-03 PROCEDURE — 0502F SUBSEQUENT PRENATAL CARE: CPT

## 2021-06-03 PROCEDURE — 76813 OB US NUCHAL MEAS 1 GEST: CPT

## 2021-06-24 ENCOUNTER — APPOINTMENT (OUTPATIENT)
Dept: SURGERY | Facility: CLINIC | Age: 31
End: 2021-06-24

## 2021-07-06 ENCOUNTER — APPOINTMENT (OUTPATIENT)
Dept: SURGERY | Facility: CLINIC | Age: 31
End: 2021-07-06
Payer: COMMERCIAL

## 2021-07-06 VITALS
TEMPERATURE: 97.6 F | HEIGHT: 61 IN | BODY MASS INDEX: 23.79 KG/M2 | HEART RATE: 66 BPM | WEIGHT: 126 LBS | DIASTOLIC BLOOD PRESSURE: 70 MMHG | SYSTOLIC BLOOD PRESSURE: 105 MMHG | OXYGEN SATURATION: 99 %

## 2021-07-06 PROCEDURE — 99213 OFFICE O/P EST LOW 20 MIN: CPT

## 2021-07-07 NOTE — HISTORY OF PRESENT ILLNESS
[FreeTextEntry1] : I had the pleasure of seeing JEANE GUTIERREZ in the office for a follow up visit secondary to history of triple negative left breast cancer.\par \par History: Jeane is a sg 32 yo female who originally presented after feeling a left breast mass after delivery of her son on 2018. She states she had first felt the lump after delivery of her child. She sought medical attention and was told it was a blocked milk duct which caused the mastitis and put her on antibiotics. When the symptoms did not resolve she saw another physician for a second opinion. She was then sent for ultrasound and ultrasound demonstrated a 2.3 cm lobulated hypoechoic nodule at in the left breast 2:00 and an ultrasound guided core biopsy was recommended. 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 Chemotherapy began on 8/3/2018. She completed chemotherapy and underwent implant exchange on 2019 by Dr. Falcon.\par \par She has been followed by thoracic surgery to monitor for any changes in her chest wall tissue specifically some rebound hypertrophy noted in the thymic tissue. Recent CT on 2020 showed stable very small nodule in the left upper lobe.\par \par She denies skin changes, nipple dimpling or nipple discharge. She denies new headaches, blurry vision, SOB, chest pain, abdominal pain, difficulty walking, back or leg pain. \par \par  with 1st delivery at 28. Menses began at age 8.\par She denies personal history of malignancy.\par Family history is significant for two maunts diagnosed with breast cancer, Pcousin diagnosed with breast cancer, PGM and MGM both diagnosed with pancreatic cancer at unknown age, MGM diagnose with skin cancer.\par \par 2018 [facility]: Genetic results: Negative- No clinically significant mutation identified\par \par 2018 SURGICAL PATHOLOGY:\par 1. Breast, right simple mastectomy: Negative for malignancy.\par 2. [place] lymph node, left axilla, excision: One lymph node, negative for metastatic carcinoma.\par 3. [place] lymph node, left axilla, excision: One lymph node, negative for metastatic carcinoma.\par 4. Breast, left, simple mastectomy: Infiltrating ductal carcinoma, measuring 3.5 cm, with necrosis. Infiltrating carcinoma extends to the anterior margin of the mastectomy specimen, please see final margin status in the synoptic summary below and part 5. Negative nipple. Negative skin. Focal secretory change. \par 5. Left superior flap margin, over tumor: negative margin.\par 6. Skin and adipose tissue, right and left breast, excision: Negative for malignancy.\par \par 2018 US NONVASC EXT LTD RT: Benign appearing right groin lymph nodes the smaller of which correlates with area of patients tenderness. Findings likely represent benign reactive lymph nodes. Recommend continued clinical follow up.\par \par 2018 US breast limited left\par Impression: No sonographic suspicious findings left axilla and upper outer quadrant left breast at area of concern. Note is made of two small benign appearing axillary lymph nodes and a small amount of fluid adjacent to the axillary segment of the axillary segment of the left breast expander. BIRADS 2 benign findings.\par \par US breast 19: no evidence of malignancy, several areas of at necrosis upper inner quad and oval shaped mixed echotexture nodule left breast at 5 oclock 4cm from nipple 1.2cm compatible with probable fat necrosis BIRads 3 prob benign findings, recommend follow US in 6 months. 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 Follow up recommended to asses for stability/resolution.\par \par CT chest 2019 : Impression: 3mm pulmonary nodule left upper lobe slightly increased in size since May 23, 2019 and indeterminate finding, thymic tissue within anterior mediastinum appears ot have minimally decreased since May 23, 2019 given differences in technique.\par \par 2019 MRI of the abdomen \par Impression: No cholelithiasis, choledocholithiasis or biliary ductal dilatation. \par \par 2020  LT breast US\par IMPRESSION: Left breast 5:00 4 cm from the nipple 1.1 cm benign-appearing nodule decreased in size from 2019 compatible with benign finding likely representing fat necrosis. Left breast 7:00 8 cm from the nipple at the area of patient's palpable concern demonstrates no sonographic suspicious finding. \par RECOMMENDATION: Clinical follow up. BI-RADS 2 - Benign Finding(s)\par \par 2020  US breast limited left:\par Impression:  Left breast \par \par 2020  CT Chest:  Stable very small soild nodule in the left upper lobe when compared to previous exam.\par \par 9/15/2020  MR breast\par IMPRESSION: No MRI evidence of malignancy. Intact bilateral implants. \par RECOMMENDATION: Clinical follow up. BI-RADS 2 - Benign Finding(s)\par \par We reviewed clinical breast exam and clinical breast exam is benign.  She is currently 17 weeks pregnant and the baby is due 2021.  Jeane has expressed extreme anxiety regarding her current pregnancy and gyn follow up.  She reports having been referred to high risk and had a virtual visit. We have reassured her and offered to clinically exam her every 2 months (as she reports this recommendation from Gyn).  She understands that she underwent bilateral mastectomy for maximum risk reduction, however we will continue to support her throughout the pregnancy and perform clinical examination every 2 months. \par \par She continues to have left lateral breast pain and will undergo left breast US at this time.  If benign then follow up in 2 months for clinical breast exam.  \par \par All questions answered.\par \par

## 2021-07-07 NOTE — PHYSICAL EXAM
[Normocephalic] : normocephalic [Atraumatic] : atraumatic [EOMI] : extra ocular movement intact [PERRL] : pupils equal, round and reactive to light [Sclera nonicteric] : sclera nonicteric [Supple] : supple [No Supraclavicular Adenopathy] : no supraclavicular adenopathy [Examined in the supine and seated position] : examined in the supine and seated position [No dominant masses] : no dominant masses in right breast  [No dominant masses] : no dominant masses left breast [No Axillary Lymphadenopathy] : no left axillary lymphadenopathy [No Edema] : no edema [No Rashes] : no rashes [No Ulceration] : no ulceration [de-identified] : s/p mastectomy with implant reconstruction, well healed. No evidence of skin changes.  [de-identified] : s/p mastectomy with implant reconstruction, well healed.  No evidence of skin changes.

## 2021-07-07 NOTE — ASSESSMENT
[FreeTextEntry1] : 30 yo premenopausal female with a history of triple negative left breast cancer treated with bilateral mastectomy and left SLNB.  Final pathology demonstrating 3.5cm IDC grade 3 triple negative with 0/2 lymph nodes.  Clinical exam is benign today.  There is no evidence of local or distant recurrence. She is currently 17 weeks pregnant and the baby is due December 2021.  She continues to have left lateral breast pain and will undergo left breast US at this time.  If benign then follow up in 2 months for clinical breast exam.  \par 1. Clinical examination in 2 months\par 2. Left breast US\par 3.  We will discuss her case with Dr. Jina Friedman to see if there is support that can be provided through the center for cancer in pregnancy due to her recent history

## 2021-07-08 ENCOUNTER — NON-APPOINTMENT (OUTPATIENT)
Age: 31
End: 2021-07-08

## 2021-07-08 ENCOUNTER — APPOINTMENT (OUTPATIENT)
Dept: OBGYN | Facility: CLINIC | Age: 31
End: 2021-07-08

## 2021-07-08 VITALS
HEIGHT: 61 IN | DIASTOLIC BLOOD PRESSURE: 70 MMHG | WEIGHT: 127 LBS | SYSTOLIC BLOOD PRESSURE: 110 MMHG | TEMPERATURE: 97.7 F | BODY MASS INDEX: 23.98 KG/M2

## 2021-07-08 LAB
BILIRUB UR QL STRIP: NORMAL
GLUCOSE UR-MCNC: NORMAL
HCG UR QL: 0.2 EU/DL
HGB UR QL STRIP.AUTO: NORMAL
KETONES UR-MCNC: NORMAL
LEUKOCYTE ESTERASE UR QL STRIP: NORMAL
NITRITE UR QL STRIP: NORMAL
PH UR STRIP: 7
PROT UR STRIP-MCNC: NORMAL
SP GR UR STRIP: 1.02

## 2021-07-09 ENCOUNTER — RESULT REVIEW (OUTPATIENT)
Age: 31
End: 2021-07-09

## 2021-07-09 ENCOUNTER — APPOINTMENT (OUTPATIENT)
Dept: ULTRASOUND IMAGING | Facility: CLINIC | Age: 31
End: 2021-07-09
Payer: COMMERCIAL

## 2021-07-09 ENCOUNTER — OUTPATIENT (OUTPATIENT)
Dept: OUTPATIENT SERVICES | Facility: HOSPITAL | Age: 31
LOS: 1 days | End: 2021-07-09
Payer: COMMERCIAL

## 2021-07-09 DIAGNOSIS — Z00.8 ENCOUNTER FOR OTHER GENERAL EXAMINATION: ICD-10-CM

## 2021-07-09 DIAGNOSIS — Z98.890 OTHER SPECIFIED POSTPROCEDURAL STATES: Chronic | ICD-10-CM

## 2021-07-09 DIAGNOSIS — Z98.89 OTHER SPECIFIED POSTPROCEDURAL STATES: Chronic | ICD-10-CM

## 2021-07-09 PROCEDURE — 76641 ULTRASOUND BREAST COMPLETE: CPT

## 2021-07-09 PROCEDURE — 76641 ULTRASOUND BREAST COMPLETE: CPT | Mod: 26,LT

## 2021-07-12 ENCOUNTER — RESULT REVIEW (OUTPATIENT)
Age: 31
End: 2021-07-12

## 2021-07-13 ENCOUNTER — NON-APPOINTMENT (OUTPATIENT)
Age: 31
End: 2021-07-13

## 2021-07-16 ENCOUNTER — NON-APPOINTMENT (OUTPATIENT)
Age: 31
End: 2021-07-16

## 2021-07-23 LAB
2ND TRIMESTER DATA: NORMAL
AFP PNL SERPL: NORMAL
AFP SERPL-ACNC: NORMAL
B-HCG FREE SERPL-MCNC: NORMAL
CLINICAL BIOCHEMIST REVIEW: NORMAL
INHIBIN A SERPL-MCNC: NORMAL
NOTES NTD: NORMAL
U ESTRIOL SERPL-SCNC: NORMAL

## 2021-07-28 ENCOUNTER — LABORATORY RESULT (OUTPATIENT)
Age: 31
End: 2021-07-28

## 2021-07-29 ENCOUNTER — ASOB RESULT (OUTPATIENT)
Age: 31
End: 2021-07-29

## 2021-07-29 ENCOUNTER — APPOINTMENT (OUTPATIENT)
Dept: ANTEPARTUM | Facility: CLINIC | Age: 31
End: 2021-07-29
Payer: COMMERCIAL

## 2021-07-29 PROCEDURE — 76817 TRANSVAGINAL US OBSTETRIC: CPT

## 2021-07-29 PROCEDURE — 76811 OB US DETAILED SNGL FETUS: CPT

## 2021-08-05 ENCOUNTER — APPOINTMENT (OUTPATIENT)
Dept: OBGYN | Facility: CLINIC | Age: 31
End: 2021-08-05
Payer: COMMERCIAL

## 2021-08-05 VITALS
SYSTOLIC BLOOD PRESSURE: 110 MMHG | WEIGHT: 137 LBS | BODY MASS INDEX: 25.86 KG/M2 | DIASTOLIC BLOOD PRESSURE: 72 MMHG | HEIGHT: 61 IN

## 2021-08-05 DIAGNOSIS — Z34.91 ENCOUNTER FOR SUPERVISION OF NORMAL PREGNANCY, UNSPECIFIED, FIRST TRIMESTER: ICD-10-CM

## 2021-08-05 PROCEDURE — 0502F SUBSEQUENT PRENATAL CARE: CPT

## 2021-08-18 ENCOUNTER — NON-APPOINTMENT (OUTPATIENT)
Age: 31
End: 2021-08-18

## 2021-08-20 ENCOUNTER — RESULT REVIEW (OUTPATIENT)
Age: 31
End: 2021-08-20

## 2021-08-20 ENCOUNTER — OUTPATIENT (OUTPATIENT)
Dept: INPATIENT UNIT | Facility: HOSPITAL | Age: 31
LOS: 1 days | End: 2021-08-20
Payer: COMMERCIAL

## 2021-08-20 VITALS
SYSTOLIC BLOOD PRESSURE: 120 MMHG | HEART RATE: 85 BPM | RESPIRATION RATE: 16 BRPM | TEMPERATURE: 99 F | DIASTOLIC BLOOD PRESSURE: 74 MMHG

## 2021-08-20 VITALS — SYSTOLIC BLOOD PRESSURE: 116 MMHG | DIASTOLIC BLOOD PRESSURE: 66 MMHG | HEART RATE: 81 BPM

## 2021-08-20 DIAGNOSIS — Z98.89 OTHER SPECIFIED POSTPROCEDURAL STATES: Chronic | ICD-10-CM

## 2021-08-20 DIAGNOSIS — Z98.890 OTHER SPECIFIED POSTPROCEDURAL STATES: Chronic | ICD-10-CM

## 2021-08-20 DIAGNOSIS — O47.02 FALSE LABOR BEFORE 37 COMPLETED WEEKS OF GESTATION, SECOND TRIMESTER: ICD-10-CM

## 2021-08-20 LAB
ALBUMIN SERPL ELPH-MCNC: 3.7 G/DL — SIGNIFICANT CHANGE UP (ref 3.3–5.2)
ALP SERPL-CCNC: 54 U/L — SIGNIFICANT CHANGE UP (ref 40–120)
ALT FLD-CCNC: 25 U/L — SIGNIFICANT CHANGE UP
ANION GAP SERPL CALC-SCNC: 11 MMOL/L — SIGNIFICANT CHANGE UP (ref 5–17)
APPEARANCE UR: CLEAR — SIGNIFICANT CHANGE UP
AST SERPL-CCNC: 24 U/L — SIGNIFICANT CHANGE UP
BACTERIA # UR AUTO: ABNORMAL
BILIRUB SERPL-MCNC: <0.2 MG/DL — LOW (ref 0.4–2)
BILIRUB UR-MCNC: ABNORMAL
BUN SERPL-MCNC: 11.2 MG/DL — SIGNIFICANT CHANGE UP (ref 8–20)
CALCIUM SERPL-MCNC: 9 MG/DL — SIGNIFICANT CHANGE UP (ref 8.6–10.2)
CHLORIDE SERPL-SCNC: 105 MMOL/L — SIGNIFICANT CHANGE UP (ref 98–107)
CO2 SERPL-SCNC: 22 MMOL/L — SIGNIFICANT CHANGE UP (ref 22–29)
COLOR SPEC: YELLOW — SIGNIFICANT CHANGE UP
CREAT SERPL-MCNC: 0.52 MG/DL — SIGNIFICANT CHANGE UP (ref 0.5–1.3)
DIFF PNL FLD: ABNORMAL
EPI CELLS # UR: SIGNIFICANT CHANGE UP
GLUCOSE SERPL-MCNC: 76 MG/DL — SIGNIFICANT CHANGE UP (ref 70–99)
GLUCOSE UR QL: NEGATIVE — SIGNIFICANT CHANGE UP
HCT VFR BLD CALC: 34.2 % — LOW (ref 34.5–45)
HGB BLD-MCNC: 11.8 G/DL — SIGNIFICANT CHANGE UP (ref 11.5–15.5)
KETONES UR-MCNC: ABNORMAL
LEUKOCYTE ESTERASE UR-ACNC: ABNORMAL
MCHC RBC-ENTMCNC: 32 PG — SIGNIFICANT CHANGE UP (ref 27–34)
MCHC RBC-ENTMCNC: 34.5 GM/DL — SIGNIFICANT CHANGE UP (ref 32–36)
MCV RBC AUTO: 92.7 FL — SIGNIFICANT CHANGE UP (ref 80–100)
NITRITE UR-MCNC: POSITIVE
PH UR: 7 — SIGNIFICANT CHANGE UP (ref 5–8)
PLATELET # BLD AUTO: 190 K/UL — SIGNIFICANT CHANGE UP (ref 150–400)
POTASSIUM SERPL-MCNC: 3.9 MMOL/L — SIGNIFICANT CHANGE UP (ref 3.5–5.3)
POTASSIUM SERPL-SCNC: 3.9 MMOL/L — SIGNIFICANT CHANGE UP (ref 3.5–5.3)
PROT SERPL-MCNC: 6.3 G/DL — LOW (ref 6.6–8.7)
PROT UR-MCNC: 500 MG/DL
RBC # BLD: 3.69 M/UL — LOW (ref 3.8–5.2)
RBC # FLD: 12.5 % — SIGNIFICANT CHANGE UP (ref 10.3–14.5)
RBC CASTS # UR COMP ASSIST: SIGNIFICANT CHANGE UP /HPF (ref 0–4)
SODIUM SERPL-SCNC: 138 MMOL/L — SIGNIFICANT CHANGE UP (ref 135–145)
SP GR SPEC: 1.02 — SIGNIFICANT CHANGE UP (ref 1.01–1.02)
UROBILINOGEN FLD QL: 1
WBC # BLD: 9.25 K/UL — SIGNIFICANT CHANGE UP (ref 3.8–10.5)
WBC # FLD AUTO: 9.25 K/UL — SIGNIFICANT CHANGE UP (ref 3.8–10.5)
WBC UR QL: ABNORMAL

## 2021-08-20 PROCEDURE — 76700 US EXAM ABDOM COMPLETE: CPT

## 2021-08-20 PROCEDURE — 59025 FETAL NON-STRESS TEST: CPT | Mod: 26

## 2021-08-20 PROCEDURE — 36415 COLL VENOUS BLD VENIPUNCTURE: CPT

## 2021-08-20 PROCEDURE — 76700 US EXAM ABDOM COMPLETE: CPT | Mod: 26

## 2021-08-20 PROCEDURE — 87086 URINE CULTURE/COLONY COUNT: CPT

## 2021-08-20 PROCEDURE — 81001 URINALYSIS AUTO W/SCOPE: CPT

## 2021-08-20 PROCEDURE — 85027 COMPLETE CBC AUTOMATED: CPT

## 2021-08-20 PROCEDURE — 80053 COMPREHEN METABOLIC PANEL: CPT

## 2021-08-20 PROCEDURE — 99243 OFF/OP CNSLTJ NEW/EST LOW 30: CPT | Mod: GC,25

## 2021-08-20 PROCEDURE — G0463: CPT

## 2021-08-20 PROCEDURE — 59025 FETAL NON-STRESS TEST: CPT

## 2021-08-20 RX ORDER — CEPHALEXIN 500 MG
1 CAPSULE ORAL
Qty: 28 | Refills: 0
Start: 2021-08-20 | End: 2021-08-26

## 2021-08-20 NOTE — OB PROVIDER TRIAGE NOTE - NSOBPROVIDERNOTE_OBGYN_ALL_OB_FT
A/P  32 yo  F at 23w5d here for intermittent RUQ pain.     - A/P  30 yo  F at 23w5d here for intermittent RUQ pain R/O cholelithiasis.     -Liver / gallbladder US   -CMP   -PO hydration A/P  32 yo  F at 23w5d here for intermittent RUQ pain R/O cholelithiasis.     -Liver / gallbladder US, results pending   -CMP   -PO hydration  - monitor vitals    Discussed with Dr. Ingram A/P  32 yo  F at 23w5d here for intermittent RUQ pain R/O cholelithiasis.     -Liver / gallbladder US, results pending   -CMP WNL  -PO hydration  -no s/s pre-eclampsia or pre-term labor    Discussed with Dr. Ingram A/P  30 yo  F at 23w5d here for intermittent RUQ pain R/O cholelithiasis.     -Liver / gallbladder US, results pending   -CMP WNL  -PO hydration  -no s/s pre-eclampsia or pre-term labor    Discussed with Dr. Ingram    Addendum:  - U/S results negative fo rliver, gallbladder, kidney issue. Did not demonstrate flow into bladder from R ureteral jet. Likely not clinically significant.   - As patient has back pain, will send UA, Ucx - pt can be dc home and will be called with positive results    D/w Dr. Hernandez A/P  30 yo  F at 23w5d here for intermittent RUQ pain R/O cholelithiasis.     -Liver / gallbladder US, results pending   -CMP WNL  -PO hydration  -no s/s pre-eclampsia or pre-term labor    Discussed with Dr. Ingram    Addendum:  - U/S results negative fo rliver, gallbladder, kidney issue. Did not demonstrate flow into bladder from R ureteral jet. Likely not clinically significant.   - As patient has back pain, will send UA, Ucx - pt can be dc home and will be called with positive results    D/w Dr. Hernandez    - UA with +nitrates, +leuk esterase, +blood. Suspect UTI. Sent Cephalexin 500 QID x7  - WIll f/u with cx results

## 2021-08-20 NOTE — OB PROVIDER TRIAGE NOTE - NSICDXFAMILYHX_GEN_ALL_CORE_FT
FAMILY HISTORY:  No pertinent family history in first degree relatives     FAMILY HISTORY:  Father  Still living? Unknown  Family history of cholelithiasis, Age at diagnosis: Age Unknown    Mother  Still living? Yes, Estimated age: Age Unknown  Family history of cholelithiasis, Age at diagnosis: Age Unknown

## 2021-08-20 NOTE — OB RN TRIAGE NOTE - NS_TRIAGEADDITIONAL COMMENTS_OBGYN_ALL_OB_FT
Pt evaluated by Dr Venegas. CBC and Comprehensive metabolic sent. Pt to have upper abdominal scan . No contractions noted

## 2021-08-20 NOTE — OB PROVIDER TRIAGE NOTE - NSHPLABSRESULTS_GEN_ALL_CORE
11.8   9.25  )-----------( 190      ( 20 Aug 2021 16:41 )             34.2       08-20    138  |  105  |  11.2  ----------------------------<  76  3.9   |  22.0  |  0.52    Ca    9.0      20 Aug 2021 16:41    TPro  6.3<L>  /  Alb  3.7  /  TBili  <0.2<L>  /  DBili  x   /  AST  24  /  ALT  25  /  AlkPhos  54  08-20

## 2021-08-20 NOTE — OB PROVIDER TRIAGE NOTE - NSICDXPASTMEDICALHX_GEN_ALL_CORE_FT
Alert-The patient is alert, awake and responds to voice. The patient is oriented to time, place, and person. The triage nurse is able to obtain subjective information.
PAST MEDICAL HISTORY:  Anxiety     Asthma     Back pain     Breast cancer in female left breast    Breast pain, left     H/O thyroid disease post chemo Hoshimotos    Hemorrhoids     IBS (irritable bowel syndrome)     Lumbar disc herniation     Migraine     Sciatic nerve disease, right

## 2021-08-20 NOTE — OB PROVIDER TRIAGE NOTE - HISTORY OF PRESENT ILLNESS
30yo  @ 23w5d F here for intermittent RUQ pain. She says the pain is stabbing in nature and lasts a few minutes. She denies nausea/vomiting/SOB/headaches/dizziness. Pt also complains of worsening back pain that is constant and worst in the morning. Medical history is significant for Triple Negative Breast Cancer (2018) in the left breast s/p double mastectomy and AC-T therapy. This pregnancy has been uncomplicated thusfar. Patient endorses good fetal movement. She denies ROM, vaginal bleeding, contractions.     OBHx:   G1: 2017 spontaneous miscarriage @ 10w  G2:  @ 41w 2018  G3: current   Gyn Hx: +stable ovarian cysts. +abnormal pap smears s/p colnoscopies x2. Denies endometriosis, uterine fibroids, STIs.   PMH: Hashiomoto's, Asthma, Breast Cancer s/p double mastectomy & chemotherapy.   PSH: Appendectomy (), Tonsilectomy (2018), Double mastectomy, axillary lymph node dissection, removal of fat necrosis, breast reconstruction w/ silicone implants (2018). Pt. has mediport in.   Meds: Synthroid 50mcg qd, Albuterol inhaler prn   Allergies: Anaphylaxis to shellfish and peanuts.   FHx: denies  Social: denies      32yo  @ 23w5d F here for intermittent RUQ pain since Wednesday. She says the pain is stabbing in nature and lasts a few minutes, rated at 4/10. She denies nausea/vomiting/SOB/headaches/dizziness. Pt also complains of worsening back pain that is constant and worst in the morning. Medical history is significant for Triple Negative Breast Cancer (2018) in the left breast s/p double mastectomy and AC-T therapy. This pregnancy has been uncomplicated thusfar. Patient endorses good fetal movement. She denies ROM, vaginal bleeding, contractions.     OBHx:   G1: 2017 spontaneous miscarriage @ 10w  G2:  @ 41w 2018  G3: current   Gyn Hx: +stable ovarian cysts. +abnormal pap smears s/p colnoscopies x2. Denies endometriosis, uterine fibroids, STIs.   PMH: Hashiomoto's, Asthma, Breast Cancer s/p double mastectomy & chemotherapy. BRCA negative.   PSH: Appendectomy (), Tonsilectomy (2018), Double mastectomy, axillary lymph node dissection, removal of fat necrosis, breast reconstruction w/ silicone implants (2018). Pt. has mediport in.   Meds: Synthroid 50mcg qd, Albuterol inhaler prn   Allergies: Anaphylaxis to shellfish and peanuts.   FHx: denies  Social: denies      32yo  @ 23w5d F here for intermittent RUQ pain since Wednesday. She says the pain is stabbing in nature and lasts a few minutes, rated at 4/10. She denies nausea/vomiting/SOB/headaches/dizziness. Pt also complains of worsening back pain that is constant and worst in the morning. Medical history is significant for Triple Negative Breast Cancer (2018) in the left breast s/p double mastectomy and AC-T therapy. This pregnancy has been uncomplicated thus far. Patient endorses good fetal movement. She denies ROM, vaginal bleeding, contractions.     OBHx:   G1: 2017 spontaneous miscarriage @ 10w  G2:  @ 41w 2018  G3: current   Gyn Hx: +stable ovarian cysts. +abnormal pap smears s/p colnoscopies x2. Denies endometriosis, uterine fibroids, STIs.   PMH: Hashiomoto's, Asthma, Breast Cancer s/p double mastectomy & chemotherapy. BRCA negative.   PSH: Appendectomy (), Tonsilectomy (2018), Double mastectomy, axillary lymph node dissection, removal of fat necrosis, breast reconstruction w/ silicone implants (2018). Pt. has mediport in.   Meds: Synthroid 50mcg qd, Albuterol inhaler prn   Allergies: Anaphylaxis to shellfish and peanuts.   FHx: denies  Social: denies

## 2021-08-22 LAB
CULTURE RESULTS: SIGNIFICANT CHANGE UP
SPECIMEN SOURCE: SIGNIFICANT CHANGE UP

## 2021-08-23 ENCOUNTER — OUTPATIENT (OUTPATIENT)
Dept: OUTPATIENT SERVICES | Facility: HOSPITAL | Age: 31
LOS: 1 days | End: 2021-08-23
Payer: COMMERCIAL

## 2021-08-23 ENCOUNTER — APPOINTMENT (OUTPATIENT)
Dept: ULTRASOUND IMAGING | Facility: CLINIC | Age: 31
End: 2021-08-23
Payer: COMMERCIAL

## 2021-08-23 ENCOUNTER — RESULT REVIEW (OUTPATIENT)
Age: 31
End: 2021-08-23

## 2021-08-23 DIAGNOSIS — Z98.890 OTHER SPECIFIED POSTPROCEDURAL STATES: Chronic | ICD-10-CM

## 2021-08-23 DIAGNOSIS — M54.9 DORSALGIA, UNSPECIFIED: ICD-10-CM

## 2021-08-23 DIAGNOSIS — Z98.89 OTHER SPECIFIED POSTPROCEDURAL STATES: Chronic | ICD-10-CM

## 2021-08-23 PROCEDURE — 76770 US EXAM ABDO BACK WALL COMP: CPT | Mod: 26

## 2021-08-23 PROCEDURE — 76770 US EXAM ABDO BACK WALL COMP: CPT

## 2021-08-24 ENCOUNTER — APPOINTMENT (OUTPATIENT)
Dept: OBGYN | Facility: CLINIC | Age: 31
End: 2021-08-24
Payer: COMMERCIAL

## 2021-08-24 ENCOUNTER — ASOB RESULT (OUTPATIENT)
Age: 31
End: 2021-08-24

## 2021-08-24 VITALS
WEIGHT: 142 LBS | HEIGHT: 61 IN | SYSTOLIC BLOOD PRESSURE: 110 MMHG | BODY MASS INDEX: 26.81 KG/M2 | DIASTOLIC BLOOD PRESSURE: 64 MMHG

## 2021-08-24 PROCEDURE — 0502F SUBSEQUENT PRENATAL CARE: CPT

## 2021-08-24 PROCEDURE — 76816 OB US FOLLOW-UP PER FETUS: CPT

## 2021-08-28 LAB
BILIRUB UR QL STRIP: NORMAL
COLLECTION METHOD: NORMAL
GLUCOSE UR-MCNC: NORMAL
HCG UR QL: 0.2 EU/DL
HGB UR QL STRIP.AUTO: NORMAL
KETONES UR-MCNC: 15
LEUKOCYTE ESTERASE UR QL STRIP: NORMAL
NITRITE UR QL STRIP: NORMAL
PH UR STRIP: 5
PROT UR STRIP-MCNC: NORMAL
SP GR UR STRIP: 1.02

## 2021-09-01 ENCOUNTER — NON-APPOINTMENT (OUTPATIENT)
Age: 31
End: 2021-09-01

## 2021-09-01 NOTE — ASU PATIENT PROFILE, ADULT - NS PRO AD PATIENT TYPE
"SUBJECTIVE:   Love Kaiser is a 69 year old female who presents for Preventive Visit.      Patient has been advised of split billing requirements and indicates understanding: Yes   Are you in the first 12 months of your Medicare coverage?  No    Healthy Habits:    In general, how would you rate your overall health?  Good    Frequency of exercise:  6-7 days/week    Duration of exercise:  30-45 minutes    Do you usually eat at least 4 servings of fruit and vegetables a day, include whole grains    & fiber and avoid regularly eating high fat or \"junk\" foods?  No    Taking medications regularly:  Yes    Barriers to taking medications:  None    Medication side effects:  None    Ability to successfully perform activities of daily living:  Transportation requires assistance    Home Safety:  No safety concerns identified    Hearing Impairment:  No hearing concerns    In the past 6 months, have you been bothered by leaking of urine?  No    In general, how would you rate your overall mental or emotional health?  Good      PHQ-2 Total Score:    Additional concerns today:  No    Do you feel safe in your environment? Yes    Have you ever done Advance Care Planning? (For example, a Health Directive, POLST, or a discussion with a medical provider or your loved ones about your wishes): Yes, advance care planning is on file.       Fall risk  Fallen 2 or more times in the past year?: (P) No  Any fall with injury in the past year?: (P) No    Cognitive Screening   1) Repeat 3 items (Leader, Season, Table)    2) Clock draw: NORMAL  3) 3 item recall: Recalls 3 objects  Results: 3 items recalled: COGNITIVE IMPAIRMENT LESS LIKELY    Mini-CogTM Copyright MARLENY Goodson. Licensed by the author for use in [facility]; reprinted with permission (lance@.[facility] [place]). All rights reserved.      Do you have sleep apnea, excessive snoring or daytime drowsiness?: no    Reviewed and updated as needed this visit by clinical staff  Tobacco  " Allergies  Meds  Problems  Med Hx  Surg Hx  Fam Hx  Soc Hx          Reviewed and updated as needed this visit by Provider   Allergies  Meds  Problems            Social History     Tobacco Use     Smoking status: Current Every Day Smoker     Packs/day: 0.50     Years: 29.00     Pack years: 14.50     Types: Cigarettes     Smokeless tobacco: Never Used   Substance Use Topics     Alcohol use: No     Alcohol/week: 23.3 standard drinks     Comment: quit drinking     If you drink alcohol do you typically have >3 drinks per day or >7 drinks per week? No    Alcohol Use 9/1/2021   Prescreen: >3 drinks/day or >7 drinks/week? No         Current providers sharing in care for this patient include:  Patient Care Team:  Irma Saez PA-C as PCP - General (Family Medicine)  Irma Saez PA-C as Assigned PCP  Jean, MD Kia as Assigned Surgical Provider    The following health maintenance items are reviewed in Epic and correct as of today:  Health Maintenance Due   Topic Date Due     ZOSTER IMMUNIZATION (1 of 2) Never done     DTAP/TDAP/TD IMMUNIZATION (2 - Td or Tdap) 03/30/2019     FALL RISK ASSESSMENT  07/15/2021     INFLUENZA VACCINE (1) Never done     BP Readings from Last 3 Encounters:   09/01/21 128/82   07/13/21 126/74   07/07/21 120/80    Wt Readings from Last 3 Encounters:   09/01/21 64.2 kg (141 lb 8 oz)   07/13/21 65.2 kg (143 lb 12.8 oz)   07/07/21 64.4 kg (142 lb)                  Patient Active Problem List   Diagnosis     Esophageal reflux     Acute gastritis     Slow transit constipation     Osteoporosis     COPD (chronic obstructive pulmonary disease) (H)     Pneumonia     Atopic rhinitis     Hyperlipidemia LDL goal <160     Health Care Home     Hx of colonic polyp     Adenomatous polyp     Advanced directives, counseling/discussion     S/P shoulder replacement     Essential hypertension, benign     Prinzmetal angina (H)     Hyperlipidemia LDL goal <70     Intermediate stage nonexudative age-related  macular degeneration of both eyes     Closed Colles' fracture of left radius, initial encounter     Legally blind     Past Surgical History:   Procedure Laterality Date     ARTHROPLASTY SHOULDER Right 5/26/2015    Procedure: ARTHROPLASTY SHOULDER;  Surgeon: Ashutosh Andrews DO;  Location: [facility] OR     C REMOVAL OF OVARY(S)       [facility] AFF SURGICAL PATHOLOGY  1974    parotid tumor with malignancy     COLONOSCOPY  09/21/09     COLONOSCOPY N/A 7/6/2016    Procedure: COMBINED COLONOSCOPY, SINGLE OR MULTIPLE BIOPSY/POLYPECTOMY BY BIOPSY;  Surgeon: John Bellamy MD;  Location: [facility] GI     COLONOSCOPY N/A 7/24/2019    Procedure: COLONOSCOPY;  Surgeon: Kia Jean MD;  Location: [facility]     EXCISE MASS FOOT  1/17/2012    Procedure:EXCISE MASS FOOT; Excision of Mass Right Foot; Surgeon:CARRIE PAGAN; Location:[facility] OR     FOREIGN BODY REMOVAL  07/08/10    Soft tissue mass w/peripheral metal fragments     [facility] UGI ENDOSCOPY DIAG W BIOPSY  11/22/06     HYSTERECTOMY, PAP NO LONGER INDICATED       HYSTERECTOMY, FIDE       OPEN REDUCTION INTERNAL FIXATION WRIST Left 2/1/2019    Procedure: Left distal radius open reduction and internal fixation;  Surgeon: Ashutosh Andrews DO;  Location: [facility] OR     ZZHC COLONOSCOPY W BIOPSY  11/22/06     ZZHC COLONOSCOPY W BIOPSY  10/31/07       Social History     Tobacco Use     Smoking status: Current Every Day Smoker     Packs/day: 0.50     Years: 29.00     Pack years: 14.50     Types: Cigarettes     Smokeless tobacco: Never Used   Substance Use Topics     Alcohol use: No     Alcohol/week: 23.3 standard drinks     Comment: quit drinking     Family History   Problem Relation Age of Onset     Arthritis Mother         RA     Cancer Mother         female organs     Cancer Father         colon         Current Outpatient Medications   Medication Sig Dispense Refill     albuterol (PROAIR HFA/PROVENTIL HFA/VENTOLIN HFA) 108 (90 Base) MCG/ACT inhaler INHALE TWO PUFFS BY MOUTH EVERY  "FOUR HOURS AS NEEDED 8.5 g 0     aspirin 81 MG tablet Take 1 tablet (81 mg) by mouth daily 90 tablet 3     Atorvastatin Calcium (LIPITOR PO) Take 40 mg by mouth daily       CARTIA  MG 24 hr capsule Take 180 mg by mouth daily  8     gabapentin (NEURONTIN) 100 MG capsule Take 1 capsule (100 mg) by mouth 3 times daily Take with 300 mg dose for total of 400 mg three times daily 90 capsule 1     gabapentin (NEURONTIN) 300 MG capsule Take 1 capsule (300 mg) by mouth 3 times daily With 100 mg capsule for total dose of 400 mg three times daily 90 capsule 1     POTASSIUM 75 MG OR TABS 1 TABLET  DAILY       VITAMIN B-12 500 MCG OR TABS daily       VITAMIN C 500 MG OR TABS ONE TABLET DAILY 90 Tab 3     VITAMIN D-3 SUPER STRENGTH 2000 UNIT OR TABS daily       docusate sodium (COLACE) 100 MG capsule Take 1 capsule (100 mg) by mouth daily (Patient not taking: Reported on 9/1/2021) 90 capsule 1     NITROGLYCERIN SL Place 0.4 mg under the tongue Reported on 5/17/2017 (Patient not taking: Reported on 7/13/2021)       Allergies   Allergen Reactions     Nickel Itching     No Known Drug Allergies      Mammogram Screening: She has continued annual screening    FHS-7: No flowsheet data found.      Pertinent mammograms are reviewed under the imaging tab.    Review of Systems  Constitutional, HEENT, cardiovascular, pulmonary, GI, , musculoskeletal, neuro, skin, endocrine and psych systems are negative, except as otherwise noted.    OBJECTIVE:   /82   Pulse 84   Temp 98.4  F (36.9  C) (Temporal)   Resp 14   Ht 1.608 m (5' 3.3\")   Wt 64.2 kg (141 lb 8 oz)   SpO2 98%   BMI 24.83 kg/m   Estimated body mass index is 24.83 kg/m  as calculated from the following:    Height as of this encounter: 1.608 m (5' 3.3\").    Weight as of this encounter: 64.2 kg (141 lb 8 oz).  Physical Exam  GENERAL: healthy, alert and no distress  EYES: Eyes grossly normal to inspection, PERRL and conjunctivae and sclerae normal  HENT: ear canals and " TM's normal, nose and mouth without ulcers or lesions  NECK: no adenopathy, no asymmetry, masses, or scars and thyroid normal to palpation  RESP: lungs clear to auscultation - no rales, rhonchi or wheezes  CV: regular rate and rhythm, normal S1 S2, no S3 or S4, no murmur, click or rub, no peripheral edema and peripheral pulses strong  MS: no gross musculoskeletal defects noted, no edema  SKIN: no suspicious lesions or rashes  NEURO: Normal strength and tone, mentation intact and speech normal  PSYCH: mentation appears normal, affect normal/bright    Diagnostic Test Results:  Labs reviewed in Epic    ASSESSMENT / PLAN:       ICD-10-CM    1. Encounter for Medicare annual wellness exam  Z00.00    2. Breast pain, right  N64.4 gabapentin (NEURONTIN) 100 MG capsule     gabapentin (NEURONTIN) 300 MG capsule   3. Simple chronic bronchitis (H)  J41.0 albuterol (PROAIR HFA/PROVENTIL HFA/VENTOLIN HFA) 108 (90 Base) MCG/ACT inhaler   4. COPD exacerbation (H)  J44.1 albuterol (PROAIR HFA/PROVENTIL HFA/VENTOLIN HFA) 108 (90 Base) MCG/ACT inhaler   5. Prinzmetal angina (H)  I20.1    6. Personal history of tobacco use  Z87.891 Prof fee: Shared Decisionmaking for Lung Cancer Screening     CT Chest Lung Cancer Scrn Low Dose wo     COPD:  - Doing well on just albuterol.     Heart disease:  - Follows with Cardiology regularly     Tobacco use:  - not ready to quit.  Discussed low dose CT, she will think on it and schedule if she wants to go forward. She did decrease use to about 1/2 pdd.     Breast pain:  - Discussed possibility of MRI thoracic spine given that this appears to be neurological, they will think about it.   - But her pain has nearly resolved with gabapentin therefore increased dose since she is tolerating.  Increased to 400 mg TID.  They are working to set up pill box for her to help make sure she takes the dose and doesn't take extra as sometimes she forgets if she took one or not.    - Occasional pain especially after  "she had missed a dose.     Due for vaccines, discussed, considering doing at Pharmacy.     Patient has been advised of split billing requirements and indicates understanding: Yes  COUNSELING:  Reviewed preventive health counseling, as reflected in patient instructions       Healthy diet/nutrition       Vision screening       Hearing screening       Fall risk prevention       Immunizations       Consider lung cancer screening for ages 55-80 years and 30 pack-year smoking history     Estimated body mass index is 24.83 kg/m  as calculated from the following:    Height as of this encounter: 1.608 m (5' 3.3\").    Weight as of this encounter: 64.2 kg (141 lb 8 oz).        She reports that she has been smoking cigarettes. She has a 14.50 pack-year smoking history. She has never used smokeless tobacco.  Tobacco Cessation Action Plan:   Information offered: Patient not interested at this time      Appropriate preventive services were discussed with this patient, including applicable screening as appropriate for cardiovascular disease, diabetes, osteopenia/osteoporosis, and glaucoma.  As appropriate for age/gender, discussed screening for colorectal cancer, prostate cancer, breast cancer, and cervical cancer. Checklist reviewing preventive services available has been given to the patient.    Reviewed patients plan of care and provided an AVS. The Intermediate Care Plan ( asthma action plan, low back pain action plan, and migraine action plan) for Love meets the Care Plan requirement. This Care Plan has been established and reviewed with the Patient and daughter.    Counseling Resources:  ATP IV Guidelines  Pooled Cohorts Equation Calculator  Breast Cancer Risk Calculator  Breast Cancer: Medication to Reduce Risk  FRAX Risk Assessment  ICSI Preventive Guidelines  Dietary Guidelines for Americans, 2010  USDA's MyPlate  ASA Prophylaxis  Lung CA Screening    Irma Saez PA-C  [facility] " [facility]IVER    Identified Health Risks:  Lung Cancer Screening Shared Decision Making Visit     Love Kaiser is eligible for lung cancer screening on the basis of the information provided in my signed lung cancer screening order.     I have discussed with patient the risks and benefits of screening for lung cancer with low-dose CT.     The risks include:  radiation exposure: one low dose chest CT has as much ionizing radiation as about 15 chest x-rays or 6 months of background radiation living in [place]    false positives: 96% of positive findings/nodules are NOT cancer, but some might still require additional diagnostic evaluation, including biopsy  over-diagnosis: some slow growing cancers that might never have been clinically significant will be detected and treated unnecessarily     The benefit of early detection of lung cancer is contingent upon adherence to annual screening or more frequent follow up if indicated.     Furthermore, reaping the benefits of screening requires Love Kaiser to be willing and physically able to undergo diagnostic procedures, if indicated. Although no specific guide is available for determining severity of comorbidities, it is reasonable to withhold screening in patients who have greater mortality risk from other diseases.     We did discuss that the only way to prevent lung cancer is to not smoke. Smoking cessation counseling was given, duration < 3 minutes.      I did not offer risk estimation using a calculator such as this one:       Health Care Proxy (HCP)

## 2021-09-02 ENCOUNTER — NON-APPOINTMENT (OUTPATIENT)
Age: 31
End: 2021-09-02

## 2021-09-02 ENCOUNTER — APPOINTMENT (OUTPATIENT)
Dept: OBGYN | Facility: CLINIC | Age: 31
End: 2021-09-02
Payer: COMMERCIAL

## 2021-09-02 VITALS
BODY MASS INDEX: 27 KG/M2 | SYSTOLIC BLOOD PRESSURE: 112 MMHG | WEIGHT: 143 LBS | DIASTOLIC BLOOD PRESSURE: 72 MMHG | HEIGHT: 61 IN

## 2021-09-02 PROCEDURE — 0502F SUBSEQUENT PRENATAL CARE: CPT

## 2021-09-06 ENCOUNTER — OUTPATIENT (OUTPATIENT)
Dept: OUTPATIENT SERVICES | Facility: HOSPITAL | Age: 31
LOS: 1 days | Discharge: HOME | End: 2021-09-06

## 2021-09-06 DIAGNOSIS — Z98.890 OTHER SPECIFIED POSTPROCEDURAL STATES: Chronic | ICD-10-CM

## 2021-09-06 DIAGNOSIS — C50.412 MALIGNANT NEOPLASM OF UPPER-OUTER QUADRANT OF LEFT FEMALE BREAST: ICD-10-CM

## 2021-09-06 DIAGNOSIS — Z98.89 OTHER SPECIFIED POSTPROCEDURAL STATES: Chronic | ICD-10-CM

## 2021-09-08 ENCOUNTER — NON-APPOINTMENT (OUTPATIENT)
Age: 31
End: 2021-09-08

## 2021-09-09 ENCOUNTER — TRANSCRIPTION ENCOUNTER (OUTPATIENT)
Age: 31
End: 2021-09-09

## 2021-09-09 ENCOUNTER — APPOINTMENT (OUTPATIENT)
Age: 31
End: 2021-09-09

## 2021-09-09 ENCOUNTER — APPOINTMENT (OUTPATIENT)
Dept: SURGERY | Facility: CLINIC | Age: 31
End: 2021-09-09
Payer: COMMERCIAL

## 2021-09-09 ENCOUNTER — RESULT REVIEW (OUTPATIENT)
Age: 31
End: 2021-09-09

## 2021-09-09 VITALS
WEIGHT: 144 LBS | HEART RATE: 85 BPM | TEMPERATURE: 97.8 F | HEIGHT: 61 IN | SYSTOLIC BLOOD PRESSURE: 122 MMHG | BODY MASS INDEX: 27.19 KG/M2 | DIASTOLIC BLOOD PRESSURE: 85 MMHG | OXYGEN SATURATION: 100 %

## 2021-09-09 PROCEDURE — 99215 OFFICE O/P EST HI 40 MIN: CPT

## 2021-09-09 NOTE — HISTORY OF PRESENT ILLNESS
[FreeTextEntry1] : I had the pleasure of seeing JEANE GUTIERREZ in the office for a follow up visit.\par \par History: Jeane is a sg 32 yo female who originally presented after feeling a left breast mass after delivery of her son on 2018. She states she had first felt the lump after delivery of her child. She sought medical attention and was told it was a blocked milk duct which caused the mastitis and put her on antibiotics. When the symptoms did not resolve she saw another physician for a second opinion. She was then sent for ultrasound and ultrasound demonstrated a 2.3 cm lobulated hypoechoic nodule at in the left breast 2:00 and an ultrasound guided core biopsy was recommended. 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 Chemotherapy began on 8/3/2018. She completed chemotherapy and underwent implant exchange on 2019 by Dr. Falcon.\par \par She has been followed by thoracic surgery to monitor for any changes in her chest wall tissue specifically some rebound hypertrophy noted in the thymic tissue. Recent CT on 2020 showed stable very small nodule in the left upper lobe.\par \par She denies skin changes, nipple dimpling or nipple discharge. She denies new headaches, blurry vision, SOB, chest pain, abdominal pain, difficulty walking, back or leg pain. \par \par  with 1st delivery at 28. Menses began at age 8.\par She denies personal history of malignancy.\par Family history is significant for two maunts diagnosed with breast cancer, Pcousin diagnosed with breast cancer, PGM and MGM both diagnosed with pancreatic cancer at unknown age, MGM diagnose with skin cancer.\par \par 2018 Inocente [email or web address]: Genetic results: Negative- No clinically significant mutation identified\par \par 2018 SURGICAL PATHOLOGY:\par 1. Breast, right simple mastectomy: Negative for malignancy.\par 2. [place] lymph node, left axilla, excision: One lymph node, negative for metastatic carcinoma.\par 3. [place] lymph node, left axilla, excision: One lymph node, negative for metastatic carcinoma.\par 4. Breast, left, simple mastectomy: Infiltrating ductal carcinoma, measuring 3.5 cm, with necrosis. Infiltrating carcinoma extends to the anterior margin of the mastectomy specimen, please see final margin status in the synoptic summary below and part 5. Negative nipple. Negative skin. Focal secretory change. \par 5. Left superior flap margin, over tumor: negative margin.\par 6. Skin and adipose tissue, right and left breast, excision: Negative for malignancy.\par \par 2018 US NONVASC EXT LTD RT: Benign appearing right groin lymph nodes the smaller of which correlates with area of patients tenderness. Findings likely represent benign reactive lymph nodes. Recommend continued clinical follow up.\par \par 2018 US breast limited left\par Impression: No sonographic suspicious findings left axilla and upper outer quadrant left breast at area of concern. Note is made of two small benign appearing axillary lymph nodes and a small amount of fluid adjacent to the axillary segment of the axillary segment of the left breast expander. BIRADS 2 benign findings.\par \par US breast 19: no evidence of malignancy, several areas of at necrosis upper inner quad and oval shaped mixed echotexture nodule left breast at 5 oclock 4cm from nipple 1.2cm compatible with probable fat necrosis BIRads 3 prob benign findings, recommend follow US in 6 months. 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 Follow up recommended to asses for stability/resolution.\par \par CT chest 2019 : Impression: 3mm pulmonary nodule left upper lobe slightly increased in size since May 23, 2019 and indeterminate finding, thymic tissue within anterior mediastinum appears ot have minimally decreased since May 23, 2019 given differences in technique.\par \par 2019 MRI of the abdomen \par Impression: No cholelithiasis, choledocholithiasis or biliary ductal dilatation. \par \par 2020  LT breast US\par IMPRESSION: Left breast 5:00 4 cm from the nipple 1.1 cm benign-appearing nodule decreased in size from 2019 compatible with benign finding likely representing fat necrosis. Left breast 7:00 8 cm from the nipple at the area of patient's palpable concern demonstrates no sonographic suspicious finding. \par RECOMMENDATION: Clinical follow up. BI-RADS 2 - Benign Finding(s)\par \par 2020  US breast limited left:\par Impression:  Left breast \par \par 2020  CT Chest:  Stable very small soild nodule in the left upper lobe when compared to previous exam.\par \par 9/15/2020  MR breast\par IMPRESSION: No MRI evidence of malignancy. Intact bilateral implants. \par RECOMMENDATION: Clinical follow up. BI-RADS 2 - Benign Finding(s)\par \par We reviewed clinical breast exam and clinical breast exam is benign.  She is reports she has pitting in her left breast 7:00 area after wearing a bathing suit but will resolve on its own.  She also reports the left breast 9:00 area where fat necrosis was seen on US, feels larger and will have her undergo left breast US at this time.  No dominant masses on clinical exam today.  Recommendation for left breast US and follow up in 2 months.\par \par All questions answered.\par \par

## 2021-09-09 NOTE — PHYSICAL EXAM
[Normocephalic] : normocephalic [Atraumatic] : atraumatic [EOMI] : extra ocular movement intact [PERRL] : pupils equal, round and reactive to light [Sclera nonicteric] : sclera nonicteric [Supple] : supple [No Supraclavicular Adenopathy] : no supraclavicular adenopathy [Examined in the supine and seated position] : examined in the supine and seated position [No dominant masses] : no dominant masses left breast [No dominant masses] : no dominant masses in right breast  [No Axillary Lymphadenopathy] : no left axillary lymphadenopathy [No Edema] : no edema [No Rashes] : no rashes [No Ulceration] : no ulceration [de-identified] : s/p mastectomy with implant reconstruction, well healed. No evidence of skin changes.  [de-identified] : s/p mastectomy with implant reconstruction, well healed.  No evidence of skin changes.

## 2021-09-09 NOTE — ASSESSMENT
[FreeTextEntry1] : 30 yo premenopausal female with a history of triple negative left breast cancer treated with bilateral mastectomy and left SLNB.  Final pathology demonstrating 3.5cm IDC grade 3 triple negative with 0/2 lymph nodes.  Clinical exam is benign today.  There is no evidence of local or distant recurrence. She is currently 27 weeks pregnant and the baby is due December 2021.  She also reports the left breast 9:00 area where fat necrosis was seen on US, feels larger and will have her undergo left breast US at this time.  No dominant masses on clinical exam today.    \par 1. Clinical examination in 2 months\par 2. Left breast US\par

## 2021-09-14 ENCOUNTER — NON-APPOINTMENT (OUTPATIENT)
Age: 31
End: 2021-09-14

## 2021-09-14 ENCOUNTER — APPOINTMENT (OUTPATIENT)
Dept: UROLOGY | Facility: CLINIC | Age: 31
End: 2021-09-14
Payer: COMMERCIAL

## 2021-09-14 ENCOUNTER — APPOINTMENT (OUTPATIENT)
Dept: HEMATOLOGY ONCOLOGY | Facility: CLINIC | Age: 31
End: 2021-09-14
Payer: COMMERCIAL

## 2021-09-14 VITALS
DIASTOLIC BLOOD PRESSURE: 76 MMHG | WEIGHT: 144 LBS | SYSTOLIC BLOOD PRESSURE: 118 MMHG | HEIGHT: 61 IN | BODY MASS INDEX: 27.19 KG/M2 | HEART RATE: 91 BPM

## 2021-09-14 DIAGNOSIS — N13.30 UNSPECIFIED HYDRONEPHROSIS: ICD-10-CM

## 2021-09-14 PROCEDURE — 99214 OFFICE O/P EST MOD 30 MIN: CPT

## 2021-09-14 PROCEDURE — 99204 OFFICE O/P NEW MOD 45 MIN: CPT

## 2021-09-14 NOTE — ASSESSMENT
[FreeTextEntry1] : 31 year old female with stage IIA left breast TNBC (T2N0) S/P bilateral mastectomy on 6/29/18. She has DPD deficiency.  S/p 4 cycles of dose dense AC, followed by weekly taxol completed 12/26/18.\par Post chemotherapy she was incidentally found to have anterior mediastinal mass c/w thymic rebound. \par \par Currently 26 weeks pregnant.  L breast pain, intermittent, throbbing. \par \par Reviewed 7/9/21 L breast US - 0.5 cm hypoechoic nodule, likely representing fat necrosis\par 8/20/21 US abd - mild R hydronephrosis\par 8/23/21 US kidneys/bladder - persistent R mild hydro with patent ureteral jets. 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

## 2021-09-14 NOTE — PHYSICAL EXAM
[Ambulatory and capable of all self care but unable to carry out any work activities] : Status 2- Ambulatory and capable of all self care but unable to carry out any work activities. Up and about more than 50% of waking hours [Normal] : affect appropriate [de-identified] : b/l reconstructed breasts, no  palpable mass in L breast

## 2021-09-14 NOTE — HISTORY OF PRESENT ILLNESS
[Disease: _____________________] : Disease: [unfilled] [T: ___] : T[unfilled] [N: ___] : N[unfilled] [AJCC Stage: ____] : AJCC Stage: [unfilled] [de-identified] : Ms. Kennedy was diagnosed with left triple negative breast cancer at age 28. \par \par She delivered her first child on 4/25/18 following which she self palpated a left breast mass.  It was  thought to be a blocked milk duct / mastitis and was treated with antibiotics and then antifungals.  The mass did not resolve and she sought a 2nd opinion.  \par \par She then had a breast ultrasound on 5/30/18 which showed a 2.3 cm left breast mass at 1-2:00, 9 cm from the nipple, and a single axillary lymph node which does not contain a prominent benign appearing fatty hilum.  \par \par 5/31/18 left breast core biopsy - invasive poorly differentiated ductal carcinoma, [place] score 9/9, measures at least 17 cm w/ extensive necrosis. ER 0%, IA 0%, HER 2 (0/1+) -negative. \par \par On 6/7/18 she had a diagnostic mammogram + ultrasound - 2.1 cm rounded mass of left breast with overall coarsening of parenchymal pattern and increased parenchymal density in upper outer left breast. US - 2.4 cm left breast mass at 1-2:00 and left axilla 0.8 cm rounded hypoechoic mass consistent with abnormal left axillary node.  \par \par On 6/7/18 she also had a breast MRI - 2.9 x 3.2 x 3.1 cm mass in left upper inner breast at 1:00.  Suspicious left axillary nodes with ultrasound correlate for which US guided biopsy is recommended.   \par \par On 6/8/17 she had biopsy of the left axillary node - pathology : reactive lymph node. \par \par 6/7/18 - CT chest/abd/pelvis -  Known left upper outer breast malignancy. 2 small, round left axillary lymph nodes for which patient will undergo percutaneous \par sampling, as per report of breast MRI from 6/7/2018. A 3 mm subpleural nodular density in the right middle lobe probably represents a focus of atelectasis. No evidence of metastatic disease in the chest, abdomen, and pelvis.\par \par 6/8/18 bone scan - Normal bone scan. No radionuclide evidence of osseous metastasis.\par \par 6/29/18 -s/p bilateral mastectomy \par Pathology: left breast IDC 3.5 cm, joo score 9/9, margins negative, 2 SNL nodes negative.  pT2 pN0\par Right mastectomy - negative for malignancy. \par \par Family History: 2 maternal great aunts with breast cancer.\par paternal cousin with breast cancer\par paternal GM - pancreatic cancer\par paternal GF - pancreatic cancer\par \par [facility]  WISHCL[facility] panel - no clinically significant mutations\par \par PMH: asthma, DPD deficiency (dihydropyrimidine dehydrogenase deficiency).\par \par Sx hx: tonsillectomy\par \par Social: non-smoker, social ETOH  [de-identified] : ER 0% GA 0% HER2 negative [de-identified] : poorly differentiated invasive ductal carcinoma [de-identified] : Returns for follow up.\par She is currently 26 weeks pregnant.\par S/p ED visit a few days ago, diagnosed with R hydronephrosis. \par Reports L breast pain, shooting pain in inner lower quadrant which is really bothering her.  Of note, she has h/o  post mastectomy pain L side, and gets cortisone injections at [facility] by pain management previously, last injection in February 2021. \par Notes that pain is intermittent and "throbbing". \par OB is Dr. Oneil. \par \par \par

## 2021-09-14 NOTE — PHYSICAL EXAM
[General Appearance - Well Developed] : well developed [General Appearance - Well Nourished] : well nourished [Normal Appearance] : normal appearance [Well Groomed] : well groomed [General Appearance - In No Acute Distress] : no acute distress [Edema] : no peripheral edema [] : no respiratory distress [Respiration, Rhythm And Depth] : normal respiratory rhythm and effort [Exaggerated Use Of Accessory Muscles For Inspiration] : no accessory muscle use [Normal Station and Gait] : the gait and station were normal for the patient's age [Skin Color & Pigmentation] : normal skin color and pigmentation [Oriented To Time, Place, And Person] : oriented to person, place, and time [Affect] : the affect was normal [Mood] : the mood was normal [Not Anxious] : not anxious

## 2021-09-14 NOTE — ASSESSMENT
[FreeTextEntry1] : \par plan:\par - repeat labs and ultrasound in 2 weeks\par - next visit with results\par \par

## 2021-09-14 NOTE — HISTORY OF PRESENT ILLNESS
[FreeTextEntry1] : 32 yo F for initial consultation\par PMH and PSH: breast cancer with double mastectomy and chemotherapy, still in follow up\par NKDA\par \par no history of urinary complains\par had some back pain on the week 23\par ultrasound showed mild right hydronephrosis, no JET sign\par labs with normal WBC and creatinine \par still feeling some right flank pain, no fever\par repeat ultrasound showed persistent mild-moderate hydro, but did show Jet signs this time\par \par patient is feeling well, with some flank pain\par here for consultation\par \par discussed nephrolithiasis during pregnancy, and hydronephrosis\par discussed reasons for intervention including severe pain, fever, worsening in kidney function or hydronephrosis\par discussed options for nephrostomy tube and ureteral stent\par will monitor for now, no urgent need for kidney drainage\par \par plan:\par - repeat labs and ultrasound in 2 weeks\par - next visit with results\par \par

## 2021-09-16 ENCOUNTER — APPOINTMENT (OUTPATIENT)
Dept: ANTEPARTUM | Facility: CLINIC | Age: 31
End: 2021-09-16
Payer: COMMERCIAL

## 2021-09-16 ENCOUNTER — ASOB RESULT (OUTPATIENT)
Age: 31
End: 2021-09-16

## 2021-09-16 PROCEDURE — 76816 OB US FOLLOW-UP PER FETUS: CPT

## 2021-09-20 ENCOUNTER — OUTPATIENT (OUTPATIENT)
Dept: OUTPATIENT SERVICES | Facility: HOSPITAL | Age: 31
LOS: 1 days | End: 2021-09-20
Payer: COMMERCIAL

## 2021-09-20 ENCOUNTER — RESULT REVIEW (OUTPATIENT)
Age: 31
End: 2021-09-20

## 2021-09-20 ENCOUNTER — APPOINTMENT (OUTPATIENT)
Dept: OBGYN | Facility: CLINIC | Age: 31
End: 2021-09-20
Payer: COMMERCIAL

## 2021-09-20 ENCOUNTER — APPOINTMENT (OUTPATIENT)
Dept: ULTRASOUND IMAGING | Facility: CLINIC | Age: 31
End: 2021-09-20
Payer: COMMERCIAL

## 2021-09-20 ENCOUNTER — APPOINTMENT (OUTPATIENT)
Dept: ANTEPARTUM | Facility: CLINIC | Age: 31
End: 2021-09-20

## 2021-09-20 VITALS
BODY MASS INDEX: 28.13 KG/M2 | DIASTOLIC BLOOD PRESSURE: 70 MMHG | WEIGHT: 149 LBS | SYSTOLIC BLOOD PRESSURE: 118 MMHG | HEIGHT: 61 IN

## 2021-09-20 DIAGNOSIS — Z98.890 OTHER SPECIFIED POSTPROCEDURAL STATES: Chronic | ICD-10-CM

## 2021-09-20 DIAGNOSIS — N63.20 UNSPECIFIED LUMP IN THE LEFT BREAST, UNSPECIFIED QUADRANT: ICD-10-CM

## 2021-09-20 DIAGNOSIS — Z98.89 OTHER SPECIFIED POSTPROCEDURAL STATES: Chronic | ICD-10-CM

## 2021-09-20 DIAGNOSIS — Z00.8 ENCOUNTER FOR OTHER GENERAL EXAMINATION: ICD-10-CM

## 2021-09-20 PROCEDURE — 76642 ULTRASOUND BREAST LIMITED: CPT | Mod: 26,LT

## 2021-09-20 PROCEDURE — 36415 COLL VENOUS BLD VENIPUNCTURE: CPT

## 2021-09-20 PROCEDURE — 0502F SUBSEQUENT PRENATAL CARE: CPT

## 2021-09-20 PROCEDURE — 76642 ULTRASOUND BREAST LIMITED: CPT

## 2021-09-23 LAB
BASOPHILS # BLD AUTO: 0.03 K/UL
BASOPHILS NFR BLD AUTO: 0.3 %
BILIRUB UR QL STRIP: NORMAL
COLLECTION METHOD: NORMAL
EOSINOPHIL # BLD AUTO: 0.1 K/UL
EOSINOPHIL NFR BLD AUTO: 1.1 %
GLUCOSE 1H P 50 G GLC PO SERPL-MCNC: 72 MG/DL
GLUCOSE UR-MCNC: NORMAL
HCG UR QL: 0.2 EU/DL
HCT VFR BLD CALC: 37.8 %
HGB BLD-MCNC: 12.5 G/DL
HGB UR QL STRIP.AUTO: NORMAL
IMM GRANULOCYTES NFR BLD AUTO: 0.4 %
KETONES UR-MCNC: NORMAL
LEUKOCYTE ESTERASE UR QL STRIP: NORMAL
LYMPHOCYTES # BLD AUTO: 1.45 K/UL
LYMPHOCYTES NFR BLD AUTO: 16.3 %
MAN DIFF?: NORMAL
MCHC RBC-ENTMCNC: 32 PG
MCHC RBC-ENTMCNC: 33.1 GM/DL
MCV RBC AUTO: 96.7 FL
MONOCYTES # BLD AUTO: 0.52 K/UL
MONOCYTES NFR BLD AUTO: 5.8 %
NEUTROPHILS # BLD AUTO: 6.75 K/UL
NEUTROPHILS NFR BLD AUTO: 76.1 %
NITRITE UR QL STRIP: NORMAL
PH UR STRIP: 7
PLATELET # BLD AUTO: 176 K/UL
PROT UR STRIP-MCNC: NORMAL
RBC # BLD: 3.91 M/UL
RBC # FLD: 12.6 %
SP GR UR STRIP: 1.02
TSH SERPL-ACNC: 1.61 UIU/ML
WBC # FLD AUTO: 8.89 K/UL

## 2021-10-06 ENCOUNTER — APPOINTMENT (OUTPATIENT)
Dept: FAMILY MEDICINE | Facility: CLINIC | Age: 31
End: 2021-10-06
Payer: COMMERCIAL

## 2021-10-06 ENCOUNTER — RESULT CHARGE (OUTPATIENT)
Age: 31
End: 2021-10-06

## 2021-10-06 VITALS
OXYGEN SATURATION: 99 % | TEMPERATURE: 98.2 F | HEIGHT: 61 IN | SYSTOLIC BLOOD PRESSURE: 118 MMHG | BODY MASS INDEX: 28.32 KG/M2 | WEIGHT: 150 LBS | DIASTOLIC BLOOD PRESSURE: 70 MMHG | HEART RATE: 104 BPM

## 2021-10-06 PROCEDURE — 87880 STREP A ASSAY W/OPTIC: CPT | Mod: QW

## 2021-10-06 PROCEDURE — 99214 OFFICE O/P EST MOD 30 MIN: CPT

## 2021-10-06 PROCEDURE — 87804 INFLUENZA ASSAY W/OPTIC: CPT | Mod: QW

## 2021-10-06 NOTE — END OF VISIT
[FreeTextEntry3] : Medical record entries made by the scribe today today, were at my direction and personally dictated to them by me, Dr. Chelsea Del Rio on Oct 06, 2021. I have reviewed the chart and agree that the record accurately reflects my personal performance of the history, physical exam, assessment, and plan.\par

## 2021-10-06 NOTE — HISTORY OF PRESENT ILLNESS
[FreeTextEntry8] : Patient presents today with c/o sinus congestion. Started four days ago. Notes she has a sore throat, PND with cough, and head pressure. State she has been taking Tylenol and using nasal sprays. No fever/chills, SOB, nausea, vomiting, diarrhea. Notes her son has been sneezing for the past week. \par \par Patient is 31 weeks pregnant. \par  \par \par

## 2021-10-06 NOTE — PHYSICAL EXAM
[No Acute Distress] : no acute distress [Well Nourished] : well nourished [Well Developed] : well developed [Well-Appearing] : well-appearing [Normal Sclera/Conjunctiva] : normal sclera/conjunctiva [PERRL] : pupils equal round and reactive to light [EOMI] : extraocular movements intact [Normal Outer Ear/Nose] : the outer ears and nose were normal in appearance [Normal Oropharynx] : the oropharynx was normal [Normal TMs] : both tympanic membranes were normal [Normal Nasal Mucosa] : the nasal mucosa was normal [No JVD] : no jugular venous distention [No Lymphadenopathy] : no lymphadenopathy [Supple] : supple [Thyroid Normal, No Nodules] : the thyroid was normal and there were no nodules present [No Respiratory Distress] : no respiratory distress  [No Accessory Muscle Use] : no accessory muscle use [Clear to Auscultation] : lungs were clear to auscultation bilaterally [Normal Rate] : normal rate  [Regular Rhythm] : with a regular rhythm [Normal S1, S2] : normal S1 and S2 [No Murmur] : no murmur heard [No Carotid Bruits] : no carotid bruits [No Abdominal Bruit] : a ~M bruit was not heard ~T in the abdomen [No Varicosities] : no varicosities [Pedal Pulses Present] : the pedal pulses are present [No Edema] : there was no peripheral edema [No Palpable Aorta] : no palpable aorta [No Extremity Clubbing/Cyanosis] : no extremity clubbing/cyanosis [Soft] : abdomen soft [Non Tender] : non-tender [Non-distended] : non-distended [No Masses] : no abdominal mass palpated [No HSM] : no HSM [Normal Bowel Sounds] : normal bowel sounds [Normal Posterior Cervical Nodes] : no posterior cervical lymphadenopathy [Normal Anterior Cervical Nodes] : no anterior cervical lymphadenopathy [No CVA Tenderness] : no CVA  tenderness [No Spinal Tenderness] : no spinal tenderness [No Joint Swelling] : no joint swelling [Grossly Normal Strength/Tone] : grossly normal strength/tone [No Rash] : no rash [Coordination Grossly Intact] : coordination grossly intact [No Focal Deficits] : no focal deficits [Normal Gait] : normal gait [Deep Tendon Reflexes (DTR)] : deep tendon reflexes were 2+ and symmetric [Normal Affect] : the affect was normal [Normal Insight/Judgement] : insight and judgment were intact

## 2021-10-06 NOTE — REVIEW OF SYSTEMS
[Sore Throat] : sore throat [Postnasal Drip] : postnasal drip [Cough] : cough [Negative] : Heme/Lymph [FreeTextEntry4] : congestion

## 2021-10-06 NOTE — ADDENDUM
[FreeTextEntry1] : I, Yumiko Garcia acting as a scribe for Dr. Chelsea Del Rio on Oct 06, 2021  at 9:26 AM\par

## 2021-10-07 ENCOUNTER — ASOB RESULT (OUTPATIENT)
Age: 31
End: 2021-10-07

## 2021-10-07 ENCOUNTER — APPOINTMENT (OUTPATIENT)
Dept: ANTEPARTUM | Facility: CLINIC | Age: 31
End: 2021-10-07
Payer: COMMERCIAL

## 2021-10-07 ENCOUNTER — APPOINTMENT (OUTPATIENT)
Dept: OBGYN | Facility: CLINIC | Age: 31
End: 2021-10-07
Payer: COMMERCIAL

## 2021-10-07 VITALS
HEIGHT: 61 IN | DIASTOLIC BLOOD PRESSURE: 70 MMHG | SYSTOLIC BLOOD PRESSURE: 110 MMHG | WEIGHT: 149 LBS | BODY MASS INDEX: 28.13 KG/M2

## 2021-10-07 DIAGNOSIS — Z34.92 ENCOUNTER FOR SUPERVISION OF NORMAL PREGNANCY, UNSPECIFIED, SECOND TRIMESTER: ICD-10-CM

## 2021-10-07 PROCEDURE — 99213 OFFICE O/P EST LOW 20 MIN: CPT

## 2021-10-07 PROCEDURE — 0502F SUBSEQUENT PRENATAL CARE: CPT

## 2021-10-07 PROCEDURE — 76816 OB US FOLLOW-UP PER FETUS: CPT

## 2021-10-08 LAB
BILIRUB UR QL STRIP: NORMAL
COLLECTION METHOD: NORMAL
GLUCOSE UR-MCNC: NORMAL
HCG UR QL: 0.2 EU/DL
HGB UR QL STRIP.AUTO: NORMAL
KETONES UR-MCNC: NORMAL
LEUKOCYTE ESTERASE UR QL STRIP: ABNORMAL
NITRITE UR QL STRIP: NORMAL
PH UR STRIP: 6
PROT UR STRIP-MCNC: NORMAL
SARS-COV-2 N GENE NPH QL NAA+PROBE: NOT DETECTED
SP GR UR STRIP: 1.01

## 2021-10-11 ENCOUNTER — APPOINTMENT (OUTPATIENT)
Dept: OBGYN | Facility: CLINIC | Age: 31
End: 2021-10-11
Payer: COMMERCIAL

## 2021-10-11 VITALS
HEIGHT: 61 IN | BODY MASS INDEX: 28.32 KG/M2 | WEIGHT: 150 LBS | SYSTOLIC BLOOD PRESSURE: 122 MMHG | DIASTOLIC BLOOD PRESSURE: 80 MMHG

## 2021-10-11 LAB
BILIRUB UR QL STRIP: NORMAL
GLUCOSE UR-MCNC: NORMAL
HCG UR QL: 0.2 EU/DL
HGB UR QL STRIP.AUTO: NORMAL
KETONES UR-MCNC: NORMAL
LEUKOCYTE ESTERASE UR QL STRIP: ABNORMAL
NITRITE UR QL STRIP: NORMAL
PH UR STRIP: 6
PROT UR STRIP-MCNC: NORMAL
SP GR UR STRIP: 1.02

## 2021-10-11 PROCEDURE — 81003 URINALYSIS AUTO W/O SCOPE: CPT | Mod: QW

## 2021-10-11 PROCEDURE — 59025 FETAL NON-STRESS TEST: CPT

## 2021-10-11 PROCEDURE — 99214 OFFICE O/P EST MOD 30 MIN: CPT | Mod: 25

## 2021-10-12 ENCOUNTER — NON-APPOINTMENT (OUTPATIENT)
Age: 31
End: 2021-10-12

## 2021-10-12 ENCOUNTER — TRANSCRIPTION ENCOUNTER (OUTPATIENT)
Age: 31
End: 2021-10-12

## 2021-10-14 ENCOUNTER — APPOINTMENT (OUTPATIENT)
Dept: ANTEPARTUM | Facility: CLINIC | Age: 31
End: 2021-10-14
Payer: COMMERCIAL

## 2021-10-14 ENCOUNTER — ASOB RESULT (OUTPATIENT)
Age: 31
End: 2021-10-14

## 2021-10-14 PROCEDURE — 76820 UMBILICAL ARTERY ECHO: CPT

## 2021-10-14 PROCEDURE — 76818 FETAL BIOPHYS PROFILE W/NST: CPT

## 2021-10-21 ENCOUNTER — APPOINTMENT (OUTPATIENT)
Dept: ANTEPARTUM | Facility: CLINIC | Age: 31
End: 2021-10-21
Payer: COMMERCIAL

## 2021-10-21 ENCOUNTER — ASOB RESULT (OUTPATIENT)
Age: 31
End: 2021-10-21

## 2021-10-21 PROCEDURE — 76820 UMBILICAL ARTERY ECHO: CPT

## 2021-10-21 PROCEDURE — 76818 FETAL BIOPHYS PROFILE W/NST: CPT

## 2021-10-22 ENCOUNTER — NON-APPOINTMENT (OUTPATIENT)
Age: 31
End: 2021-10-22

## 2021-10-23 ENCOUNTER — NON-APPOINTMENT (OUTPATIENT)
Age: 31
End: 2021-10-23

## 2021-10-23 LAB — BACTERIA UR CULT: NORMAL

## 2021-10-25 ENCOUNTER — APPOINTMENT (OUTPATIENT)
Dept: OBGYN | Facility: CLINIC | Age: 31
End: 2021-10-25
Payer: COMMERCIAL

## 2021-10-25 VITALS
HEIGHT: 61 IN | WEIGHT: 152.38 LBS | SYSTOLIC BLOOD PRESSURE: 110 MMHG | BODY MASS INDEX: 28.77 KG/M2 | DIASTOLIC BLOOD PRESSURE: 68 MMHG

## 2021-10-25 PROCEDURE — 0502F SUBSEQUENT PRENATAL CARE: CPT

## 2021-10-25 PROCEDURE — 90471 IMMUNIZATION ADMIN: CPT

## 2021-10-25 PROCEDURE — 90715 TDAP VACCINE 7 YRS/> IM: CPT

## 2021-10-26 ENCOUNTER — NON-APPOINTMENT (OUTPATIENT)
Age: 31
End: 2021-10-26

## 2021-10-26 LAB
BILIRUB UR QL STRIP: NORMAL
GLUCOSE UR-MCNC: NORMAL
HCG UR QL: 0.2 EU/DL
HGB UR QL STRIP.AUTO: NORMAL
KETONES UR-MCNC: NORMAL
LEUKOCYTE ESTERASE UR QL STRIP: NORMAL
NITRITE UR QL STRIP: NORMAL
PH UR STRIP: 6
PROT UR STRIP-MCNC: NORMAL
SP GR UR STRIP: 1.01

## 2021-10-28 ENCOUNTER — APPOINTMENT (OUTPATIENT)
Dept: ANTEPARTUM | Facility: CLINIC | Age: 31
End: 2021-10-28
Payer: COMMERCIAL

## 2021-10-28 ENCOUNTER — ASOB RESULT (OUTPATIENT)
Age: 31
End: 2021-10-28

## 2021-10-28 PROCEDURE — 76818 FETAL BIOPHYS PROFILE W/NST: CPT

## 2021-10-28 PROCEDURE — 76820 UMBILICAL ARTERY ECHO: CPT

## 2021-10-28 PROCEDURE — 76816 OB US FOLLOW-UP PER FETUS: CPT

## 2021-11-01 ENCOUNTER — APPOINTMENT (OUTPATIENT)
Dept: OBGYN | Facility: CLINIC | Age: 31
End: 2021-11-01
Payer: COMMERCIAL

## 2021-11-01 VITALS
BODY MASS INDEX: 29.27 KG/M2 | HEIGHT: 61 IN | WEIGHT: 155 LBS | DIASTOLIC BLOOD PRESSURE: 70 MMHG | SYSTOLIC BLOOD PRESSURE: 110 MMHG

## 2021-11-01 PROCEDURE — 90471 IMMUNIZATION ADMIN: CPT

## 2021-11-01 PROCEDURE — 90686 IIV4 VACC NO PRSV 0.5 ML IM: CPT

## 2021-11-01 PROCEDURE — 0502F SUBSEQUENT PRENATAL CARE: CPT

## 2021-11-04 ENCOUNTER — APPOINTMENT (OUTPATIENT)
Dept: ANTEPARTUM | Facility: CLINIC | Age: 31
End: 2021-11-04
Payer: COMMERCIAL

## 2021-11-04 ENCOUNTER — ASOB RESULT (OUTPATIENT)
Age: 31
End: 2021-11-04

## 2021-11-04 ENCOUNTER — INPATIENT (INPATIENT)
Facility: HOSPITAL | Age: 31
LOS: 4 days | Discharge: HOME | End: 2021-11-09
Attending: SPECIALIST | Admitting: SPECIALIST
Payer: COMMERCIAL

## 2021-11-04 VITALS — SYSTOLIC BLOOD PRESSURE: 131 MMHG | DIASTOLIC BLOOD PRESSURE: 77 MMHG | HEART RATE: 85 BPM

## 2021-11-04 DIAGNOSIS — Z98.89 OTHER SPECIFIED POSTPROCEDURAL STATES: Chronic | ICD-10-CM

## 2021-11-04 DIAGNOSIS — Z98.890 OTHER SPECIFIED POSTPROCEDURAL STATES: Chronic | ICD-10-CM

## 2021-11-04 DIAGNOSIS — O36.5990 MATERNAL CARE FOR OTHER KNOWN OR SUSPECTED POOR FETAL GROWTH, UNSPECIFIED TRIMESTER, NOT APPLICABLE OR UNSPECIFIED: ICD-10-CM

## 2021-11-04 DIAGNOSIS — A60.00 HERPESVIRAL INFECTION OF UROGENITAL SYSTEM, UNSPECIFIED: ICD-10-CM

## 2021-11-04 DIAGNOSIS — Z78.9 OTHER SPECIFIED HEALTH STATUS: ICD-10-CM

## 2021-11-04 DIAGNOSIS — O36.8190 DECREASED FETAL MOVEMENTS, UNSPECIFIED TRIMESTER, NOT APPLICABLE OR UNSPECIFIED: ICD-10-CM

## 2021-11-04 DIAGNOSIS — Z3A.34 34 WEEKS GESTATION OF PREGNANCY: ICD-10-CM

## 2021-11-04 DIAGNOSIS — Z85.3 PERSONAL HISTORY OF MALIGNANT NEOPLASM OF BREAST: ICD-10-CM

## 2021-11-04 DIAGNOSIS — E06.3 AUTOIMMUNE THYROIDITIS: ICD-10-CM

## 2021-11-04 DIAGNOSIS — O26.893 OTHER SPECIFIED PREGNANCY RELATED CONDITIONS, THIRD TRIMESTER: ICD-10-CM

## 2021-11-04 LAB
ALBUMIN SERPL ELPH-MCNC: 4.1 G/DL — SIGNIFICANT CHANGE UP (ref 3.3–5.2)
ALP SERPL-CCNC: 118 U/L — SIGNIFICANT CHANGE UP (ref 40–120)
ALT FLD-CCNC: 15 U/L — SIGNIFICANT CHANGE UP
ANION GAP SERPL CALC-SCNC: 15 MMOL/L — SIGNIFICANT CHANGE UP (ref 5–17)
APPEARANCE UR: CLEAR — SIGNIFICANT CHANGE UP
APTT BLD: 32.4 SEC — SIGNIFICANT CHANGE UP (ref 27.5–35.5)
AST SERPL-CCNC: 22 U/L — SIGNIFICANT CHANGE UP
BASOPHILS # BLD AUTO: 0.03 K/UL — SIGNIFICANT CHANGE UP (ref 0–0.2)
BASOPHILS NFR BLD AUTO: 0.3 % — SIGNIFICANT CHANGE UP (ref 0–2)
BILIRUB SERPL-MCNC: <0.2 MG/DL — LOW (ref 0.4–2)
BILIRUB UR-MCNC: NEGATIVE — SIGNIFICANT CHANGE UP
BLD GP AB SCN SERPL QL: SIGNIFICANT CHANGE UP
BUN SERPL-MCNC: 6 MG/DL — LOW (ref 8–20)
CALCIUM SERPL-MCNC: 8.9 MG/DL — SIGNIFICANT CHANGE UP (ref 8.6–10.2)
CHLORIDE SERPL-SCNC: 101 MMOL/L — SIGNIFICANT CHANGE UP (ref 98–107)
CO2 SERPL-SCNC: 21 MMOL/L — LOW (ref 22–29)
COLOR SPEC: SIGNIFICANT CHANGE UP
CREAT SERPL-MCNC: 0.45 MG/DL — LOW (ref 0.5–1.3)
DIFF PNL FLD: NEGATIVE — SIGNIFICANT CHANGE UP
EOSINOPHIL # BLD AUTO: 0.05 K/UL — SIGNIFICANT CHANGE UP (ref 0–0.5)
EOSINOPHIL NFR BLD AUTO: 0.5 % — SIGNIFICANT CHANGE UP (ref 0–6)
FIBRINOGEN PPP-MCNC: 693 MG/DL — HIGH (ref 290–520)
GLUCOSE SERPL-MCNC: 111 MG/DL — HIGH (ref 70–99)
GLUCOSE UR QL: NEGATIVE MG/DL — SIGNIFICANT CHANGE UP
HCT VFR BLD CALC: 35.3 % — SIGNIFICANT CHANGE UP (ref 34.5–45)
HGB BLD-MCNC: 12.5 G/DL — SIGNIFICANT CHANGE UP (ref 11.5–15.5)
HIV 1 & 2 AB SERPL IA.RAPID: SIGNIFICANT CHANGE UP
IMM GRANULOCYTES NFR BLD AUTO: 0.7 % — SIGNIFICANT CHANGE UP (ref 0–1.5)
KETONES UR-MCNC: ABNORMAL
LEUKOCYTE ESTERASE UR-ACNC: NEGATIVE — SIGNIFICANT CHANGE UP
LYMPHOCYTES # BLD AUTO: 1.73 K/UL — SIGNIFICANT CHANGE UP (ref 1–3.3)
LYMPHOCYTES # BLD AUTO: 17.3 % — SIGNIFICANT CHANGE UP (ref 13–44)
MCHC RBC-ENTMCNC: 31.6 PG — SIGNIFICANT CHANGE UP (ref 27–34)
MCHC RBC-ENTMCNC: 35.4 GM/DL — SIGNIFICANT CHANGE UP (ref 32–36)
MCV RBC AUTO: 89.4 FL — SIGNIFICANT CHANGE UP (ref 80–100)
MONOCYTES # BLD AUTO: 0.57 K/UL — SIGNIFICANT CHANGE UP (ref 0–0.9)
MONOCYTES NFR BLD AUTO: 5.7 % — SIGNIFICANT CHANGE UP (ref 2–14)
NEUTROPHILS # BLD AUTO: 7.56 K/UL — HIGH (ref 1.8–7.4)
NEUTROPHILS NFR BLD AUTO: 75.5 % — SIGNIFICANT CHANGE UP (ref 43–77)
NITRITE UR-MCNC: NEGATIVE — SIGNIFICANT CHANGE UP
PH UR: 6.5 — SIGNIFICANT CHANGE UP (ref 5–8)
PLATELET # BLD AUTO: 207 K/UL — SIGNIFICANT CHANGE UP (ref 150–400)
POTASSIUM SERPL-MCNC: 3.4 MMOL/L — LOW (ref 3.5–5.3)
POTASSIUM SERPL-SCNC: 3.4 MMOL/L — LOW (ref 3.5–5.3)
PROT SERPL-MCNC: 7.2 G/DL — SIGNIFICANT CHANGE UP (ref 6.6–8.7)
PROT UR-MCNC: NEGATIVE MG/DL — SIGNIFICANT CHANGE UP
RBC # BLD: 3.95 M/UL — SIGNIFICANT CHANGE UP (ref 3.8–5.2)
RBC # FLD: 11.8 % — SIGNIFICANT CHANGE UP (ref 10.3–14.5)
SARS-COV-2 RNA SPEC QL NAA+PROBE: SIGNIFICANT CHANGE UP
SODIUM SERPL-SCNC: 137 MMOL/L — SIGNIFICANT CHANGE UP (ref 135–145)
SP GR SPEC: 1 — LOW (ref 1.01–1.02)
URATE SERPL-MCNC: 3.2 MG/DL — SIGNIFICANT CHANGE UP (ref 2.4–5.7)
UROBILINOGEN FLD QL: NEGATIVE MG/DL — SIGNIFICANT CHANGE UP
WBC # BLD: 10.01 K/UL — SIGNIFICANT CHANGE UP (ref 3.8–10.5)
WBC # FLD AUTO: 10.01 K/UL — SIGNIFICANT CHANGE UP (ref 3.8–10.5)

## 2021-11-04 PROCEDURE — 76820 UMBILICAL ARTERY ECHO: CPT

## 2021-11-04 PROCEDURE — 76818 FETAL BIOPHYS PROFILE W/NST: CPT

## 2021-11-04 RX ORDER — FOLIC ACID 0.8 MG
1 TABLET ORAL DAILY
Refills: 0 | Status: DISCONTINUED | OUTPATIENT
Start: 2021-11-04 | End: 2021-11-04

## 2021-11-04 RX ORDER — FERROUS SULFATE 325(65) MG
325 TABLET ORAL DAILY
Refills: 0 | Status: DISCONTINUED | OUTPATIENT
Start: 2021-11-04 | End: 2021-11-04

## 2021-11-04 RX ORDER — LEVOTHYROXINE SODIUM 125 MCG
50 TABLET ORAL EVERY 24 HOURS
Refills: 0 | Status: DISCONTINUED | OUTPATIENT
Start: 2021-11-05 | End: 2021-11-09

## 2021-11-04 RX ORDER — VALACYCLOVIR 500 MG/1
1000 TABLET, FILM COATED ORAL EVERY 24 HOURS
Refills: 0 | Status: DISCONTINUED | OUTPATIENT
Start: 2021-11-04 | End: 2021-11-06

## 2021-11-04 RX ORDER — DIPHENHYDRAMINE HCL 50 MG
25 CAPSULE ORAL ONCE
Refills: 0 | Status: COMPLETED | OUTPATIENT
Start: 2021-11-04 | End: 2021-11-04

## 2021-11-04 RX ADMIN — Medication 25 MILLIGRAM(S): at 21:36

## 2021-11-04 RX ADMIN — Medication 25 MILLIGRAM(S): at 22:21

## 2021-11-04 RX ADMIN — Medication 1 MILLIGRAM(S): at 16:02

## 2021-11-04 RX ADMIN — Medication 12 MILLIGRAM(S): at 14:15

## 2021-11-04 RX ADMIN — VALACYCLOVIR 1000 MILLIGRAM(S): 500 TABLET, FILM COATED ORAL at 21:43

## 2021-11-04 RX ADMIN — Medication 1 TABLET(S): at 16:02

## 2021-11-04 NOTE — OB PROVIDER H&P - ASSESSMENT
32yo  at 34w2d by LMP admitted for prolonged monitoring in setting of IUGR and intermittenly absent end diastolic velocity (AEDV) in the umbilical artery velocity waveform as seen in MFM on ultrasound earlier today.     -admission labs  -prolonged monitoring  -betamethasone for fetal lung maturity  -reevaluate for possible induction of labor    discussed with attending Dr Ingram and Dr. Mccrary

## 2021-11-04 NOTE — CONSULT NOTE ADULT - PROBLEM SELECTOR RECOMMENDATION 4
BPP 8/8 on ultrasound earlier today  subjectively, patient currently feels subjectively, patient currently feels infant move but over the last 48 hours has been less   movement than the prior few weeks  BPP 8/8 on ultrasound earlier today

## 2021-11-04 NOTE — OB PROVIDER H&P - NSHPPHYSICALEXAM_GEN_ALL_CORE
Gen: no acute distress  CV: regular rate and ryhthmn   Pulm: clear bilaterally to auscultation  Abd: nontender; gravid  Ext: no calf tenderness; +1 swelling     Tracing: baseline 140, moderate variability, +accels, no decels  [place]: no ctx  SVE: deferred

## 2021-11-04 NOTE — CONSULT NOTE ADULT - SUBJECTIVE AND OBJECTIVE BOX
30yo  at 34w2d by LMP admitted for prolonged monitoring in setting of IUGR and intermittenly absent end diastolic velocity (AEDV) in the umbilical artery velocity waveform as seen in MFM on ultrasound earlier today.   Patient denies vaginal bleeding, loss of fluid, or contractions. Reports decreased fetal movement since yesterday.    LMP: 3/9/2021  WOOD: 2021    Pregnancy Course:  FGR - 3rd percentile on ultrasound 10/28/21;   hx genital HSV - no outbreaks this pregnancy  hx triple negative breast CA of left breast 2018, s/p bilateral mastectomy with chemotherapy  asthma  Hashimoto's thyroiditis    Past OB Hx:  2018 FT [id number]    SABx1    Past Gyn Hx: denies cysts, fibroids, STIs  PMH: anxiety, IBS, migraines; otherwise as above  PSH: appendectomy (), tonsillectomy ()     30yo  at 34w2d by LMP admitted for prolonged monitoring in setting of IUGR and intermittently absent end diastolic velocity (AEDV) in the umbilical artery velocity waveform as seen in MFM on ultrasound earlier today.   Patient denies vaginal bleeding, loss of fluid, or contractions. Reports decreased fetal movement since yesterday.    LMP: 3/9/2021  WOOD: 2021    Pregnancy Course:  FGR - 3rd percentile on ultrasound 10/28/21;   hx genital HSV - no outbreaks this pregnancy  hx triple negative breast CA of left breast 2018, s/p bilateral mastectomy with chemotherapy  asthma - has not required a   Hashimoto's thyroiditis    Past OB Hx:  2018 FT [id number]    SABx1    Past Gyn Hx: denies cysts, fibroids, STIs  PMH: anxiety, IBS, migraines; otherwise as above  PSH: appendectomy (), tonsillectomy ()      Vital Signs Last 24 Hrs  T(C): 36.7 (2021 13:41), Max: 36.7 (2021 13:41)  T(F): 98.1 (2021 13:41), Max: 98.1 (2021 13:41)  HR: 85 (2021 13:41) (85 - 85)  BP: 131/77 (2021 13:41) (131/77 - 131/77)  RR: 18 (2021 13:41) (18 - 18)    Gen: no acute distress  CV: regular rate and ryhthm   Pulm: clear bilaterally to auscultation  Abd: nontender; gravid  Ext: no calf tenderness; +1 swelling     Tracing: baseline 140, moderate variability, +accels, no decels  [place]: no ctx  SVE: deferred     32yo  at 34w2d by LMP admitted for prolonged monitoring in setting of IUGR and intermittently absent end diastolic velocity (AEDV) in the umbilical artery velocity waveform as seen in MFM on ultrasound earlier today.   Patient denies vaginal bleeding, loss of fluid, or contractions. Reports decreased fetal movement since yesterday.    LMP: 3/9/2021  WOOD: 2021    Pregnancy Course:  FGR - 3rd percentile on ultrasound 10/28/21;   hx genital HSV - no outbreaks this pregnancy  hx triple negative breast CA of left breast 2018, s/p bilateral mastectomy with chemotherapy  asthma - has not required a   Hashimoto's thyroiditis    Past OB Hx:  2018 FT [id number]    SABx1    Past Gyn Hx: denies cysts, fibroids, STIs  PMH: anxiety, IBS, migraines; otherwise as above  PSH: appendectomy (), tonsillectomy ()  Rx: PNV, levothyroxine 50mcg, albuterol PRN      Vital Signs Last 24 Hrs  T(C): 36.7 (2021 13:41), Max: 36.7 (2021 13:41)  T(F): 98.1 (2021 13:41), Max: 98.1 (2021 13:41)  HR: 85 (2021 13:41) (85 - 85)  BP: 131/77 (2021 13:41) (131/77 - 131/77)  RR: 18 (2021 13:41) (18 - 18)    Gen: no acute distress  CV: regular rate and ryhthm   Pulm: clear bilaterally to auscultation  Abd: nontender; gravid  Ext: no calf tenderness; +1 swelling     Tracing: baseline 140, moderate variability, +accels, no decels  [place]: no ctx  SVE: deferred     30yo  at 34w2d by LMP admitted for prolonged monitoring in setting of IUGR and intermittently absent end diastolic velocity (AEDV) in the umbilical artery velocity waveform as seen in MFM on ultrasound earlier today.   Patient denies vaginal bleeding, loss of fluid, or contractions. Reports decreased fetal movement since yesterday.    LMP: 3/9/2021  WOOD: 2021    Pregnancy Course:  FGR - 3rd percentile on ultrasound 10/28/21;   hx genital HSV - no outbreaks this pregnancy  hx triple negative breast CA of left breast 2018, s/p bilateral mastectomy with chemotherapy  asthma - no exacerbations this pregnancy  Hashimoto's thyroiditis    Past OB Hx:  2018 FT [id number]    SABx1    Past Gyn Hx: denies cysts, fibroids, STIs  PMH: anxiety, IBS, migraines; otherwise as above  PSH: appendectomy (), tonsillectomy ()  Rx: PNV, levothyroxine 50mcg, albuterol PRN      Vital Signs Last 24 Hrs  T(C): 36.7 (2021 13:41), Max: 36.7 (2021 13:41)  T(F): 98.1 (2021 13:41), Max: 98.1 (2021 13:41)  HR: 85 (2021 13:41) (85 - 85)  BP: 131/77 (2021 13:41) (131/77 - 131/77)  RR: 18 (2021 13:41) (18 - 18)    Gen: no acute distress  CV: regular rate and ryhthm   Pulm: clear bilaterally to auscultation  Abd: nontender; gravid  Ext: no calf tenderness; +1 swelling     Tracing: baseline 140, moderate variability, +accels, no decels  [place]: no ctx  SVE: deferred     30yo  at 34w2d by LMP admitted for prolonged monitoring in setting of FGR and intermittently absent end diastolic velocity (AEDV) in the umbilical artery velocity waveform as seen in MFM on ultrasound earlier today.   Patient denies vaginal bleeding, loss of fluid, or contractions. Reports decreased fetal movement since yesterday.    LMP: 3/9/2021  WOOD: 2021    Pregnancy Course:  FGR - 3rd percentile on ultrasound 10/28/21;   hx genital HSV - no outbreaks this pregnancy  hx triple negative breast CA of left breast 2018, s/p bilateral mastectomy with chemotherapy  asthma - no exacerbations this pregnancy  Hashimoto's thyroiditis    Past OB Hx:  2018 FT [id number]    SABx1    Past Gyn Hx: denies cysts, fibroids, STIs  PMH: anxiety, IBS, migraines; otherwise as above  PSH: appendectomy (), tonsillectomy ()  Rx: PNV, levothyroxine 50mcg, albuterol PRN      Vital Signs Last 24 Hrs  T(C): 36.7 (2021 13:41), Max: 36.7 (2021 13:41)  T(F): 98.1 (2021 13:41), Max: 98.1 (2021 13:41)  HR: 85 (2021 13:41) (85 - 85)  BP: 131/77 (2021 13:41) (131/77 - 131/77)  RR: 18 (2021 13:41) (18 - 18)    Gen: no acute distress  CV: regular rate and ryhthm   Pulm: clear bilaterally to auscultation  Abd: nontender; gravid  Ext: no calf tenderness; +1 swelling     Tracing: baseline 140, moderate variability, +accels, no decels  [place]: no ctx  SVE: deferred

## 2021-11-04 NOTE — OB PROVIDER H&P - NSICDXFAMILYHX_GEN_ALL_CORE_FT
FAMILY HISTORY:  Father  Still living? Unknown  Family history of cholelithiasis, Age at diagnosis: Age Unknown    Mother  Still living? Yes, Estimated age: Age Unknown  Family history of cholelithiasis, Age at diagnosis: Age Unknown

## 2021-11-04 NOTE — OB PROVIDER H&P - ATTENDING COMMENTS
30yo  at 34w2d by LMP admitted for prolonged monitoring in setting of FGR and intermittent absent end diastolic flow, reassuring FHT. Consent for observation signed after questions answered.

## 2021-11-04 NOTE — OB PROVIDER H&P - NSGENETICTESTING_OBGYN_ALL_OB
[address]         Progress and Procedure Note      Glenys Lamb  MEDICAL RECORD NUMBER:  [id number]  AGE: 59 y.o. GENDER: male  : 1956  EPISODE DATE:  2021    Subjective:     Chief Complaint   Patient presents with    Ankle Pain     rech right ankle post oorif 21         HISTORY of PRESENT ILLNESS HPI    Patient is a pleasant 71-year-old male following up from surgical reconstruction of the left ankle consisting of an ORIF of the fibular nonunion with concomitant syndesmotic ligament reconstruction dated 2021. Patient overall is doing well rates his pain a 3 out of 10 at its worst.  Presents ambulating in a pneumatic fracture boot. Patient had updated plain film x-rays obtained reviewed in detail discussed at length. Patient will transition over to a lace up ankle brace with incorporation of graded compression stockings knee-high 20 to 30 mmHg. Patient was given updated prescription for physical therapy. Patient will follow-up with us x6 weeks. All further questions concerns or medical management otherwise addressed.       PAST MEDICAL HISTORY        Diagnosis Date    Alpha-1-antitrypsin deficiency ([address]     Backache, unspecified     Bladder cancer ([address]     Chronic pain of left knee     had supartz x 2 with no relief from [address] COPD (chronic obstructive pulmonary disease) ([address]     Depression     Eye problem 2019    bleeding and swelling behind Rt eye    Hyperlipidemia     Hypothyroidism     Other pulmonary embolism and infarction     Tobacco use disorder     WPW syndrome        PAST SURGICAL HISTORY    Past Surgical History:   Procedure Laterality Date    BLADDER SURGERY  october    CARDIAC CATHETERIZATION      FINGER SURGERY Right 2006    index finger flexor tendon repair    FINGER SURGERY Left     KNEE ARTHROSCOPY Left     LUNG BIOPSY Left 2015    benign results    OTHER SURGICAL HISTORY  2014 L4/L5    SINUS SURGERY  2017    Dr. Alec Young TISSUE BIOPSY  2010    THROAT SURGERY  5/3/16    Abdominal fat used to repair vocal cords    TONSILLECTOMY         FAMILY HISTORY    Family History   Problem Relation Age of Onset    Cancer Father         lung    Heart Disease Mother        SOCIAL HISTORY    Social History     Tobacco Use    Smoking status: Former Smoker     Packs/day: 0.50     Years: 46.00     Pack years: 23.00     Types: Cigarettes     Start date:      Quit date: 2020     Years since quittin.3    Smokeless tobacco: Never Used    Tobacco comment: Pt down to 1/2 ppd from 3 ppd, attempting to quit. Vaping Use    Vaping Use: Never used   Substance Use Topics    Alcohol use: No    Drug use: No       ALLERGIES    Allergies   Allergen Reactions    Penicillins Shortness Of Breath    Mucinex [Guaifenesin Er] Diarrhea and Nausea And Vomiting    Cephalexin Hives    Chantix [Varenicline Tartrate] Other (See Comments)     Worsening depression    Gabapentin Other (See Comments)     Severe depression    Adhesive Tape Rash       MEDICATIONS    Current Outpatient Medications on File Prior to Visit   Medication Sig Dispense Refill    HYDROcodone-acetaminophen (NORCO)  MG per tablet Take 1 tablet by mouth every 6 hours as needed for Pain for up to 30 days. 120 tablet 0    oxyCODONE (OXYCONTIN) 30 MG T12A extended release tablet Take 30 mg by mouth every 12 hours for 30 days.  60 each 0    ascorbic acid (VITAMIN C) 500 MG tablet Take 500 mg by mouth daily      ferrous sulfate (IRON 325) 325 (65 Fe) MG tablet Take 325 mg by mouth 3 times daily (with meals)      loratadine (CLARITIN) 10 MG tablet Take 10 mg by mouth daily      neomycin-polymyxin-hydrocortisone (CORTISPORIN) 3.5-60718-4 otic suspension Place 4 drops in ear(s) 3 times daily      pantoprazole (PROTONIX) 40 MG tablet Take 40 mg by mouth every morning (before breakfast)      rivaroxaban (XARELTO) 10 MG TABS tablet Take 10 mg by mouth daily      fluticasone (FLONASE) 50 MCG/ACT nasal spray 1 spray by Nasal route 2 times daily      ketorolac 0.4 % SOLN ophthalmic solution instill 1 drop into right eye three times a day      tamsulosin (FLOMAX) 0.4 MG capsule take 1 capsule by mouth at bedtime      traZODone (DESYREL) 50 MG tablet TAKE 1/2 TO 2  TABLETS BY MOUTH AT BEDTIME AS NEEDED FOR SLEEP      diclofenac sodium (VOLTAREN) 1 % GEL Apply 2 g topically 4 times daily as needed for Pain 5 Tube 11    budesonide-formoterol (SYMBICORT) 160-4.5 MCG/ACT AERO Inhale 2 puffs into the lungs 2 times daily      benzonatate (TESSALON) 100 MG capsule as needed       NIFEdipine (PROCARDIA XL) 30 MG extended release tablet Take 30 mg by mouth daily      levothyroxine (SYNTHROID) 100 MCG tablet Take 100 mcg by mouth daily      QUEtiapine (SEROQUEL XR) 300 MG extended release tablet Take 300 mg by mouth daily      TRINTELLIX 20 MG TABS tablet Take 20 mg by mouth daily      aspirin 81 MG chewable tablet Take 81 mg by mouth      pravastatin (PRAVACHOL) 40 MG tablet Take 40 mg by mouth daily.  albuterol (PROVENTIL HFA;VENTOLIN HFA) 108 (90 BASE) MCG/ACT inhaler Inhale 2 puffs into the lungs every 6 hours as needed for Wheezing        No current facility-administered medications on file prior to visit.        Review of Systems  General: (-) weight change, fatigue, weakness, fever, chills, night sweats  Head: (-) trauma, heacache location, frequency, nausea, vomiting, visual changes  Eyes: (-) glasses, contact lenses, blurriness, tearing, itching, acute visual changes  Ears: (-) hearing loss, tinnitus, vertigo, discharge, earache  Skin: (-) rash, subcutaneous lesion, open ulceration      Objective:      /76   Pulse 87   Ht 6' (1.829 m)   Wt 260 lb (117.9 kg)   BMI 35.26 kg/m²     Wt Readings from Last 3 Encounters:   06/08/21 260 lb (117.9 kg)   05/18/21 262 lb (118.8 kg)   04/19/21 262 lb (118.8 kg) PHYSICAL EXAMINATION  CONSTITUTIONAL:  Awake, alert, cooperative, no apparent distress, and appears stated age  Vascular: DP and PT pulses are palpable to the right lower extremity. Cap refill less than 5 seconds. Skin was warm and within normal limits. Mild amount of edema still noted within that right lower extremity. Dermatologic: Previous incision sites appear well coapted and healed. No further acute soft tissue manifestations of note. Neurovascular: Epicritic and protopathic sensations are grossly intact to the right lower extremity. Musculoskeletal: 5 of 5 muscle strength to all extrinsic muscle groups tested. Continues to have some mild stiffness with range of motion of the tibiotalar articulation. Overall alignment appears maintained. No further acute bony normalities of note. Imaging:  EXAMINATION:   THREE XRAY VIEWS OF THE RIGHT ANKLE       6/8/2021 2:10 pm       COMPARISON:   18 May 2021       HISTORY:   ORDERING SYSTEM PROVIDED HISTORY: Closed fracture of shaft of right fibula   with nonunion, unspecified fracture morphology, subsequent encounter   TECHNOLOGIST PROVIDED HISTORY:   fx   Reason for Exam: S/p orif right ankle 4 weeks ago; pain   Acuity: Acute   Type of Exam: Subsequent/Follow-up       FINDINGS:   Prior skin clips have been removed.  Compression plate and screws are noted   in situ in the distal fibula.  Tunnel band fixator is present between the   distal fibula and tibia attached by a button on the medial aspect of the   distal tibia.  Persistent soft tissue swelling is noted.  Ankle mortise is   uniform.  Talar dome and talar walls are intact.           Impression   Interval removal of skin clips.  Status post ORIF of the distal fibula. Hardware is intact without complication.  Mild arthritic change in the ankle.           CBC: No results found for: WBC, HGB, HCT, MCV, PLT  BMP: No results found for: NA, K, CL, CO2, PHOS, BUN, CREATININE  PT/INR:   Lab Results Component Value Date    PROTIME 21.4 03/31/2015    INR 2.0 03/31/2015     Prealbumin: No results found for: PREALBUMIN  Albumin:No results found for: LABALBU  Sed Rate:No results found for: SEDRATE  CRP: No results found for: CRP  Micro: No results found for: BC   Hemoglobin A1C: No results found for: LABA1C    Assessment:      Diagnosis Orders   1. 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 Syndesmotic disruption of right ankle, subsequent encounter  DME Order for (Specify) as OP    Afo ankle gauntlet pre ots    Ambulatory referral to Physical Therapy         Patient Active Problem List   Diagnosis    Chronic left-sided low back pain with left-sided sciatica    Chronic cervical pain    Neuropathy    Chronic myofascial pain    Osteoarthritis of left knee    Opiate analgesic use agreement exists    Encounter for drug screening    Chronic pain of left knee    Encounter for long-term opiate analgesic use    Bladder cancer ([facility]         Procedure Note  N/A    Plan:     Patient examined and evaluated today. Updated plain film x-rays were obtained reviewed in detail discussed at length  Patient was given updated prescription for graded compression stockings knee-high 20 to 30 mmHg  Patient fit dispensed ASO lace up ankle brace  Patient to start physical therapy  Follow-up with  x6 weeks      Reyna Tijerina [facility]     Electronically signed by Tri Barrientos DPM on 6/14/2021 at 8:10 PM         Procedures    Afo ankle gauntlet pre ots     Patient was prescribed a DJO Stabilizing Pro Ankle Brace. The right ankle will require stabilization / immobilization from this semi-rigid / rigid orthosis to improve their function.   The orthosis will assist in protecting the affected area, provide functional support and facilitate healing. The patient was educated and fit by a healthcare professional with expert knowledge and specialization in brace application while under the direct supervision of the treating physician. Verbal and written instructions for the use of and application of this item were provided. They were instructed to contact the office immediately should the brace result in increased pain, decreased sensation, increased swelling or worsening of the condition.     DME Order for [facility]) as OP     DME: compression stockings (20-30 mm Hg)  Routine, External, Qty-1, Supply Name: Compression Stockings Prognosis: good Estimated length of need: 99 Not applicable

## 2021-11-04 NOTE — CONSULT NOTE ADULT - PROBLEM SELECTOR RECOMMENDATION 9
-admission labs  -prolonged monitoring  -betamethasone x2 for fetal lung maturity  -reevaluate for possible induction of labor -admission labs  -prolonged monitoring; NST reactive at this time  -betamethasone x2 for fetal lung maturity  -reevaluate for possible induction of labor -admission labs  -prolonged monitoring; NST reactive at this time  -betamethasone x2 for fetal lung maturity  -reevaluate for possible induction of labor  -GBS swab if induced for labor; will undergo prophylaxis -admission labs  -prolonged monitoring; NST reactive at this time  -betamethasone x2 for fetal lung maturity  -reevaluate for possible induction of labor  -GBS pending, prophylaxis is induction / labor before resulted

## 2021-11-04 NOTE — OB RN PATIENT PROFILE - CURRENT PREGNANCY COMPLICATIONS, OB PROFILE
as above/Maternal Medical Condition/Abnormal Fetal Surveillance/Intrauterine Growth Restriction as above/Maternal Medical Condition/Abnormal Fetal Surveillance/Intrauterine Growth Restriction/Maternal Unknown GBS

## 2021-11-04 NOTE — OB PROVIDER H&P - HISTORY OF PRESENT ILLNESS
32yo  at 34w2d by LMP admitted for prolonged monitoring in setting of IUGR and intermittenly absent end diastolic velocity (AEDV) in the umbilical artery velocity waveform as seen in MFM on ultrasound earlier today.   Patient denies vaginal bleeding, loss of fluid, or contractions. Reports decreased fetal movement since yesterday.    LMP: 3/9/2021  WOOD: 2021    Pregnancy Course:  FGR - 3rd percentile on ultrasound 10/28/21;   hx genital HSV - no outbreaks this pregnancy  hx triple negative breast CA of left breast 2018, s/p bilateral mastectomy with chemotherapy  asthma  Hashimoto's thyroiditis    Past OB Hx:  2018 FT [id number]    SABx1    Past Gyn Hx: denies cysts, fibroids, STIs  PMH: anxiety, IBS, migraines; otherwise as above  PSH: appendectomy (), tonsillectomy ()

## 2021-11-04 NOTE — CONSULT NOTE ADULT - PROBLEM SELECTOR RECOMMENDATION 5
in remission  no active disease  s/p surgical and chemotherapy management  not requiring anticoagulation at this time  IV in right arm

## 2021-11-04 NOTE — CONSULT NOTE ADULT - PROBLEM SELECTOR RECOMMENDATION 2
<3rd percentile 10/28/21  intermittently absent end diastolic velicty in umbilical artery velocity waveform on ultrasound today

## 2021-11-04 NOTE — CONSULT NOTE ADULT - ASSESSMENT
30yo  at 34w2d by LMP admitted for prolonged monitoring in setting of IUGR and intermittently absent end diastolic velocity (AEDV) in the umbilical artery velocity waveform as seen in MFM on ultrasound earlier today.         discussed with attending Dr Ingram and Dr. Mccrary 32yo  at 34w2d by LMP admitted for prolonged monitoring in setting of IUGR and intermittently absent end diastolic velocity (AEDV) in the umbilical artery velocity waveform as seen in MFM on ultrasound earlier today.     plan as below        discussed with attending Dr Ingram and Dr. Mccrary 32yo  at 34w2d by LMP admitted for prolonged monitoring in setting of FGR and intermittently absent end diastolic velocity (AEDV) in the umbilical artery velocity waveform as seen in MFM on ultrasound earlier today.     plan as below        discussed with attending Dr Ingram and Dr. Mccrary

## 2021-11-05 ENCOUNTER — NON-APPOINTMENT (OUTPATIENT)
Age: 31
End: 2021-11-05

## 2021-11-05 LAB
COVID-19 NUCLEOCAPSID GAM AB INTERP: NEGATIVE — SIGNIFICANT CHANGE UP
COVID-19 NUCLEOCAPSID TOTAL GAM ANTIBODY RESULT: 0.08 INDEX — SIGNIFICANT CHANGE UP
COVID-19 SPIKE DOMAIN AB INTERP: NEGATIVE — SIGNIFICANT CHANGE UP
COVID-19 SPIKE DOMAIN ANTIBODY RESULT: 0.4 U/ML — SIGNIFICANT CHANGE UP
HIV 1+2 AB+HIV1 P24 AG SERPL QL IA: SIGNIFICANT CHANGE UP
SARS-COV-2 IGG+IGM SERPL QL IA: 0.08 INDEX — SIGNIFICANT CHANGE UP
SARS-COV-2 IGG+IGM SERPL QL IA: 0.4 U/ML — SIGNIFICANT CHANGE UP
SARS-COV-2 IGG+IGM SERPL QL IA: NEGATIVE — SIGNIFICANT CHANGE UP
SARS-COV-2 IGG+IGM SERPL QL IA: NEGATIVE — SIGNIFICANT CHANGE UP
T PALLIDUM AB TITR SER: NEGATIVE — SIGNIFICANT CHANGE UP

## 2021-11-05 PROCEDURE — 99255 IP/OBS CONSLTJ NEW/EST HI 80: CPT | Mod: GC

## 2021-11-05 PROCEDURE — 99251: CPT

## 2021-11-05 RX ORDER — FOLIC ACID 0.8 MG
1 TABLET ORAL DAILY
Refills: 0 | Status: DISCONTINUED | OUTPATIENT
Start: 2021-11-05 | End: 2021-11-07

## 2021-11-05 RX ORDER — OXYTOCIN 10 UNIT/ML
2 VIAL (ML) INJECTION
Qty: 30 | Refills: 0 | Status: DISCONTINUED | OUTPATIENT
Start: 2021-11-05 | End: 2021-11-05

## 2021-11-05 RX ORDER — FERROUS SULFATE 325(65) MG
325 TABLET ORAL DAILY
Refills: 0 | Status: DISCONTINUED | OUTPATIENT
Start: 2021-11-05 | End: 2021-11-07

## 2021-11-05 RX ORDER — AMPICILLIN TRIHYDRATE 250 MG
2 CAPSULE ORAL ONCE
Refills: 0 | Status: COMPLETED | OUTPATIENT
Start: 2021-11-05 | End: 2021-11-05

## 2021-11-05 RX ORDER — SODIUM CHLORIDE 9 MG/ML
1000 INJECTION, SOLUTION INTRAVENOUS
Refills: 0 | Status: DISCONTINUED | OUTPATIENT
Start: 2021-11-05 | End: 2021-11-09

## 2021-11-05 RX ORDER — DIPHENHYDRAMINE HCL 50 MG
25 CAPSULE ORAL ONCE
Refills: 0 | Status: COMPLETED | OUTPATIENT
Start: 2021-11-05 | End: 2021-11-05

## 2021-11-05 RX ORDER — AMPICILLIN TRIHYDRATE 250 MG
1 CAPSULE ORAL EVERY 4 HOURS
Refills: 0 | Status: DISCONTINUED | OUTPATIENT
Start: 2021-11-05 | End: 2021-11-07

## 2021-11-05 RX ORDER — SENNA PLUS 8.6 MG/1
1 TABLET ORAL ONCE
Refills: 0 | Status: COMPLETED | OUTPATIENT
Start: 2021-11-05 | End: 2021-11-05

## 2021-11-05 RX ADMIN — Medication 12 MILLIGRAM(S): at 13:45

## 2021-11-05 RX ADMIN — Medication 1 TABLET(S): at 13:47

## 2021-11-05 RX ADMIN — Medication 2 MILLIUNIT(S)/MIN: at 16:31

## 2021-11-05 RX ADMIN — SODIUM CHLORIDE 125 MILLILITER(S): 9 INJECTION, SOLUTION INTRAVENOUS at 16:20

## 2021-11-05 RX ADMIN — Medication 216 GRAM(S): at 20:02

## 2021-11-05 RX ADMIN — Medication 25 MILLIGRAM(S): at 20:03

## 2021-11-05 RX ADMIN — SENNA PLUS 1 TABLET(S): 8.6 TABLET ORAL at 13:45

## 2021-11-05 RX ADMIN — VALACYCLOVIR 1000 MILLIGRAM(S): 500 TABLET, FILM COATED ORAL at 20:03

## 2021-11-05 RX ADMIN — Medication 50 MICROGRAM(S): at 05:30

## 2021-11-05 RX ADMIN — Medication 1 MILLIGRAM(S): at 13:45

## 2021-11-05 NOTE — PROGRESS NOTE ADULT - PROBLEM SELECTOR PLAN 2
-intermittently absent end diastolic velocity in umbilical artery velocity waveform on ultrasound today  -patient has undergone counseling on diagnosis of IUGR and elevated risks to infant -intermittently absent end diastolic velocity in umbilical artery velocity waveform on ultrasound yesterday  -today on ultrasound, elevated umbilical artery velocity  -patient has undergone counseling on diagnosis of IUGR and elevated risks to infant  -recommendation is for induction of labor and delivery of infant after second dose of betamethasone this afternoon  -patient counseled on induction of labor, endorses understanding

## 2021-11-05 NOTE — CONSULT NOTE PEDS - SUBJECTIVE AND OBJECTIVE BOX
prog  32yo  at 34w3d by LMP  HD#2  admitted in setting of IUGR and intermittently absent end diastolic velocity (AEDV) in the umbilical artery velocity waveform    Pregnancy course:   1. FGR - 3rd percentile on ultrasound 10/28/21;   2. hx genital HSV - no outbreaks this pregnancy  3. hx triple negative breast CA of left breast 2018, s/p bilateral mastectomy with chemotherapy  4. asthma - no exacerbations this pregnancy  5. Hashimoto's thyroiditis on Synthroid   NICU called to consult on this 30yo  mother now 34.3 weeks with admitted secondary to IUGR and intermittently absent end diastolic velocity (AEDV) in the umbilical artery velocity waveform. No evidence of ROM or pre-term labor. She received Betamethasone on  &.and receiving Amp/Vancyclovir. Most recent EFW on 10/28 was on the 3rd percentil. She is expecting a male infant. She has a hx genital HSV - no outbreaks this pregnancy,. hx triple negative breast CA of left breast 2018, s/p bilateral mastectomy with chemotherapy, hx of asthma - no exacerbations this pregnancy,  Hashimoto's thyroiditis on Synthroid    I met with Mrs Manley and  on the L&D unit to discuss what to expect with delivery of a 34 week infant.    1) The NICU team will be present at the delivery to assess and care from her infant.   2) Overall survival at 34 weeks is good.  3) The infant may require respiratory support, either in the form of nasal CPAP or intubation and mechanical ventilation. Use of surfactant discussed.    4)  The infant may be screened for infection and started on antibiotics at any time. If the infant's clinical status changes during NICU course, infant will be re-screened and treated for infection.   5. Feeds will be started when infant is deemed clinically ready. The infant will receive IV fluids and IV nutrition as necessary through peripheral IVs and central lines. The infant will be at risk for hypoglycemia.    6. When feeds are initiated, it will start slowly with milk orally or through an orogastric tube based on feeding cues.  The infant will be monitored for feeding tolerance.   7. The infant will be at risk for jaundice which can be treated with phototherapy.   8. The infant is at risk for thermoregulation issues.   9. The infant may require blood transfusions due to anemia of prematurity.  10. All premature infants are at risk for developmental delays. The infant will be monitored for IVH. Will also be monitored for first two years by developmental pediatricians.       Estimated length of stay is about 1-2 weeks.       Mr & Mrs Manley had the opportunity to ask questions and may contact the NICU at any time if further questions arise.       Thank you for the opportunity to participate in the care of this patient and please inform us of any changes in her status.                 prog  Pregnancy course:   1. FGR - 3rd percentile on ultrasound 10/28/21;   2. hx genital HSV - no outbreaks this pregnancy  3. hx triple negative breast CA of left breast 2018, s/p bilateral mastectomy with chemotherapy  4. asthma - no exacerbations this pregnancy  5. Hashimoto's thyroiditis on Synthroid

## 2021-11-05 NOTE — PROGRESS NOTE ADULT - PROBLEM SELECTOR PLAN 4
-subjectively, patient currently feels infant move but over the last 48 hours has been less   movement than the prior few weeks  -BPP 8/8 on ultrasound at [facility] office HD#1

## 2021-11-05 NOTE — PROGRESS NOTE ADULT - PROBLEM SELECTOR PLAN 1
-prolonged monitoring on HD#1  - pending NST this AM   -s/p first dose of betamethasone for fetal lung maturity; pending second dose this afternoon HD#2  -reevaluate for possible induction of labor  -GBS swab if induced for labor; will undergo prophylaxis. -prolonged monitoring   -s/p first dose of betamethasone for fetal lung maturity; pending second dose this afternoon HD#2  -GBS swab when induced for labor; will undergo prophylaxis.

## 2021-11-05 NOTE — PROGRESS NOTE ADULT - SUBJECTIVE AND OBJECTIVE BOX
32yo  at 34w3d by LMP  HD#2  admitted in setting of IUGR and intermittently absent end diastolic velocity (AEDV) in the umbilical artery velocity waveform    Pregnancy course:   1. FGR - 3rd percentile on ultrasound 10/28/21;   2. hx genital HSV - no outbreaks this pregnancy  3. hx triple negative breast CA of left breast 2018, s/p bilateral mastectomy with chemotherapy  4. asthma - no exacerbations this pregnancy  5. Hashimoto's thyroiditis      Vital Signs Last 24 Hrs  T(C): 36.8 (2021 05:33), Max: 36.8 (2021 05:33)  T(F): 98.2 (2021 05:33), Max: 98.2 (2021 05:33)  HR: 81 (2021 05:33) (79 - 85)  BP: 107/60 (2021 05:33) (107/60 - 131/77)  RR: 18 (2021 05:33) (18 - 18)  SpO2: 96% (2021 05:33) (95% - 96%)    Gen: no acute distress  CV: regular rate and ryhthmn   Pulm: clear bilaterally to auscultation  Abd: nontender; gravid  Ext: no calf tenderness; +1 swelling     Tracing: baseline  [place]: Ctx q  SVE: deferred                          12.5   10.01 )-----------( 207      ( 2021 14:38 )             35.3    30yo  at 34w3d by LMP  HD#2  admitted in setting of IUGR and intermittently absent end diastolic velocity (AEDV) in the umbilical artery velocity waveform    Pregnancy course:   1. FGR - 3rd percentile on ultrasound 10/28/21;   2. hx genital HSV - no outbreaks this pregnancy  3. hx triple negative breast CA of left breast 2018, s/p bilateral mastectomy with chemotherapy  4. asthma - no exacerbations this pregnancy  5. Hashimoto's thyroiditis      Patient reports feeling anxious about the plan  Has been feeling fetal movement back to baseline overnight  no obstetrical complaints  ambulating to bathroom and back as needed, otherwise in bed    Vital Signs Last 24 Hrs  T(C): 36.8 (2021 05:33), Max: 36.8 (2021 05:33)  T(F): 98.2 (2021 05:33), Max: 98.2 (2021 05:33)  HR: 81 (2021 05:33) (79 - 85)  BP: 107/60 (2021 05:33) (107/60 - 131/77)  RR: 18 (2021 05:33) (18 - 18)  SpO2: 96% (2021 05:33) (95% - 96%)    Gen: no acute distress  CV: regular rate and ryhthm  Pulm: clear bilaterally to auscultation  Abd: nontender; gravid  Ext: no calf tenderness; +1 swelling     Tracing: pending NST  [place]: no ctx  SVE: deferred                          12.5   10.01 )-----------( 207      ( 2021 14:38 )             35.3    30yo  at 34w3d by LMP  HD#2  admitted in setting of IUGR and intermittently absent end diastolic velocity (AEDV) in the umbilical artery velocity waveform    Pregnancy course:   1. FGR - 3rd percentile on ultrasound 10/28/21;   2. hx genital HSV - no outbreaks this pregnancy  3. hx triple negative breast CA of left breast 2018, s/p bilateral mastectomy with chemotherapy  4. asthma - no exacerbations this pregnancy  5. Hashimoto's thyroiditis      Patient reports feeling anxious about the plan  Has been feeling fetal movement back to baseline overnight  no obstetrical complaints  ambulating to bathroom and back as needed, otherwise in bed    Vital Signs Last 24 Hrs  T(C): 36.8 (2021 05:33), Max: 36.8 (2021 05:33)  T(F): 98.2 (2021 05:33), Max: 98.2 (2021 05:33)  HR: 81 (2021 05:33) (79 - 85)  BP: 107/60 (2021 05:33) (107/60 - 131/77)  RR: 18 (2021 05:33) (18 - 18)  SpO2: 96% (2021 05:33) (95% - 96%)    Gen: no acute distress  CV: regular rate and ryhthm  Pulm: clear bilaterally to auscultation  Abd: nontender; gravid  Ext: no calf tenderness; +1 swelling     Tracing: baseline 145, moderate variability, +accels, no decels  [place]: no ctx  SVE: deferred at this time      Bedside Ultrasound: vertex; elevated umbilical artery dopplers                          12.5   10.01 )-----------( 207      ( 2021 14:38 )             35.3

## 2021-11-05 NOTE — PROGRESS NOTE ADULT - PROBLEM SELECTOR PLAN 5
in remission  no active disease  s/p surgical and chemotherapy management  not requiring anticoagulation at this time  IV in right arm.

## 2021-11-05 NOTE — CONSULT NOTE PEDS - ASSESSMENT
32yo  at 34w3d by LMP admitted in setting of IUGR and intermittently absent end diastolic velocity (AEDV) in the umbilical artery velocity waveform

## 2021-11-05 NOTE — PROGRESS NOTE ADULT - ASSESSMENT
32yo  at 34w3d by LMP  HD#2  admitted in setting of IUGR and intermittently absent end diastolic velocity (AEDV) in the umbilical artery velocity waveform      discussed with attending Dr Jacobs 30yo  at 34w3d by LMP  HD#2  admitted in setting of IUGR and intermittently absent end diastolic velocity (AEDV) in the umbilical artery velocity waveform      discussed with attending Dr Laguna Fall with [facility]

## 2021-11-05 NOTE — PROGRESS NOTE ADULT - PROBLEM SELECTOR PLAN 7
valtrex 1g qd for suppressive therapy in anticipation of possible induction of labor valtrex 1g qd for suppressive therapy in anticipation of possible induction of labor  will perform speculum exam to ensure no active herpetic lesions

## 2021-11-06 ENCOUNTER — NON-APPOINTMENT (OUTPATIENT)
Age: 31
End: 2021-11-06

## 2021-11-06 LAB
GROUP B BETA STREP DNA (PCR): SIGNIFICANT CHANGE UP
GROUP B BETA STREP INTERPRETATION: SIGNIFICANT CHANGE UP
SOURCE GROUP B STREP: SIGNIFICANT CHANGE UP

## 2021-11-06 RX ORDER — OXYTOCIN 10 UNIT/ML
1 VIAL (ML) INJECTION
Qty: 30 | Refills: 0 | Status: DISCONTINUED | OUTPATIENT
Start: 2021-11-06 | End: 2021-11-09

## 2021-11-06 RX ORDER — SODIUM CHLORIDE 9 MG/ML
1000 INJECTION, SOLUTION INTRAVENOUS ONCE
Refills: 0 | Status: COMPLETED | OUTPATIENT
Start: 2021-11-06 | End: 2021-11-06

## 2021-11-06 RX ORDER — OXYTOCIN 10 UNIT/ML
2 VIAL (ML) INJECTION
Qty: 30 | Refills: 0 | Status: DISCONTINUED | OUTPATIENT
Start: 2021-11-06 | End: 2021-11-06

## 2021-11-06 RX ADMIN — Medication 2 MILLIUNIT(S)/MIN: at 16:55

## 2021-11-06 RX ADMIN — Medication 50 MICROGRAM(S): at 05:57

## 2021-11-06 RX ADMIN — Medication 325 MILLIGRAM(S): at 14:17

## 2021-11-06 RX ADMIN — Medication 108 GRAM(S): at 16:56

## 2021-11-06 RX ADMIN — SODIUM CHLORIDE 1000 MILLILITER(S): 9 INJECTION, SOLUTION INTRAVENOUS at 20:24

## 2021-11-06 RX ADMIN — Medication 1 TABLET(S): at 14:16

## 2021-11-06 RX ADMIN — Medication 108 GRAM(S): at 08:54

## 2021-11-06 RX ADMIN — Medication 1 MILLIUNIT(S)/MIN: at 22:11

## 2021-11-06 RX ADMIN — SODIUM CHLORIDE 125 MILLILITER(S): 9 INJECTION, SOLUTION INTRAVENOUS at 22:37

## 2021-11-06 RX ADMIN — Medication 108 GRAM(S): at 00:02

## 2021-11-06 RX ADMIN — Medication 108 GRAM(S): at 21:32

## 2021-11-06 RX ADMIN — Medication 108 GRAM(S): at 13:22

## 2021-11-06 RX ADMIN — Medication 108 GRAM(S): at 04:16

## 2021-11-06 RX ADMIN — SODIUM CHLORIDE 125 MILLILITER(S): 9 INJECTION, SOLUTION INTRAVENOUS at 00:10

## 2021-11-06 RX ADMIN — Medication 1 MILLIGRAM(S): at 14:16

## 2021-11-06 NOTE — OB PROVIDER IHI INDUCTION/AUGMENTATION NOTE - NS_OBIHIFHRDETAILS_OBGYN_ALL_OB_FT
FHT: baseline 135bpm, moderate variability, +accels, -deccels
CAT I
FHT: baseline 150bpm, moderate variability, +accels, -deccels

## 2021-11-07 ENCOUNTER — TRANSCRIPTION ENCOUNTER (OUTPATIENT)
Age: 31
End: 2021-11-07

## 2021-11-07 PROCEDURE — 59400 OBSTETRICAL CARE: CPT

## 2021-11-07 RX ORDER — DIPHENHYDRAMINE HCL 50 MG
25 CAPSULE ORAL EVERY 6 HOURS
Refills: 0 | Status: DISCONTINUED | OUTPATIENT
Start: 2021-11-07 | End: 2021-11-09

## 2021-11-07 RX ORDER — PRAMOXINE HYDROCHLORIDE 150 MG/15G
1 AEROSOL, FOAM RECTAL EVERY 4 HOURS
Refills: 0 | Status: DISCONTINUED | OUTPATIENT
Start: 2021-11-07 | End: 2021-11-09

## 2021-11-07 RX ORDER — DIBUCAINE 1 %
1 OINTMENT (GRAM) RECTAL EVERY 6 HOURS
Refills: 0 | Status: DISCONTINUED | OUTPATIENT
Start: 2021-11-07 | End: 2021-11-09

## 2021-11-07 RX ORDER — OXYCODONE HYDROCHLORIDE 5 MG/1
5 TABLET ORAL ONCE
Refills: 0 | Status: DISCONTINUED | OUTPATIENT
Start: 2021-11-07 | End: 2021-11-09

## 2021-11-07 RX ORDER — ACETAMINOPHEN 500 MG
975 TABLET ORAL ONCE
Refills: 0 | Status: COMPLETED | OUTPATIENT
Start: 2021-11-07 | End: 2021-11-07

## 2021-11-07 RX ORDER — HYDROCORTISONE 1 %
1 OINTMENT (GRAM) TOPICAL EVERY 6 HOURS
Refills: 0 | Status: DISCONTINUED | OUTPATIENT
Start: 2021-11-07 | End: 2021-11-09

## 2021-11-07 RX ORDER — KETOROLAC TROMETHAMINE 30 MG/ML
30 SYRINGE (ML) INJECTION ONCE
Refills: 0 | Status: DISCONTINUED | OUTPATIENT
Start: 2021-11-07 | End: 2021-11-07

## 2021-11-07 RX ORDER — AER TRAVELER 0.5 G/1
1 SOLUTION RECTAL; TOPICAL EVERY 4 HOURS
Refills: 0 | Status: DISCONTINUED | OUTPATIENT
Start: 2021-11-07 | End: 2021-11-09

## 2021-11-07 RX ORDER — SODIUM CHLORIDE 9 MG/ML
3 INJECTION INTRAMUSCULAR; INTRAVENOUS; SUBCUTANEOUS EVERY 8 HOURS
Refills: 0 | Status: DISCONTINUED | OUTPATIENT
Start: 2021-11-07 | End: 2021-11-09

## 2021-11-07 RX ORDER — IBUPROFEN 200 MG
600 TABLET ORAL EVERY 6 HOURS
Refills: 0 | Status: COMPLETED | OUTPATIENT
Start: 2021-11-07 | End: 2022-10-06

## 2021-11-07 RX ORDER — LANOLIN
1 OINTMENT (GRAM) TOPICAL EVERY 6 HOURS
Refills: 0 | Status: DISCONTINUED | OUTPATIENT
Start: 2021-11-07 | End: 2021-11-09

## 2021-11-07 RX ORDER — SIMETHICONE 80 MG/1
80 TABLET, CHEWABLE ORAL EVERY 4 HOURS
Refills: 0 | Status: DISCONTINUED | OUTPATIENT
Start: 2021-11-07 | End: 2021-11-09

## 2021-11-07 RX ORDER — MAGNESIUM HYDROXIDE 400 MG/1
30 TABLET, CHEWABLE ORAL
Refills: 0 | Status: DISCONTINUED | OUTPATIENT
Start: 2021-11-07 | End: 2021-11-09

## 2021-11-07 RX ORDER — OXYTOCIN 10 UNIT/ML
333.33 VIAL (ML) INJECTION
Qty: 20 | Refills: 0 | Status: DISCONTINUED | OUTPATIENT
Start: 2021-11-07 | End: 2021-11-09

## 2021-11-07 RX ORDER — BENZOCAINE 10 %
1 GEL (GRAM) MUCOUS MEMBRANE EVERY 6 HOURS
Refills: 0 | Status: DISCONTINUED | OUTPATIENT
Start: 2021-11-07 | End: 2021-11-09

## 2021-11-07 RX ORDER — ACETAMINOPHEN 500 MG
975 TABLET ORAL
Refills: 0 | Status: DISCONTINUED | OUTPATIENT
Start: 2021-11-07 | End: 2021-11-09

## 2021-11-07 RX ORDER — TETANUS TOXOID, REDUCED DIPHTHERIA TOXOID AND ACELLULAR PERTUSSIS VACCINE, ADSORBED 5; 2.5; 8; 8; 2.5 [IU]/.5ML; [IU]/.5ML; UG/.5ML; UG/.5ML; UG/.5ML
0.5 SUSPENSION INTRAMUSCULAR ONCE
Refills: 0 | Status: DISCONTINUED | OUTPATIENT
Start: 2021-11-07 | End: 2021-11-09

## 2021-11-07 RX ORDER — IBUPROFEN 200 MG
600 TABLET ORAL EVERY 6 HOURS
Refills: 0 | Status: DISCONTINUED | OUTPATIENT
Start: 2021-11-07 | End: 2021-11-09

## 2021-11-07 RX ORDER — OXYCODONE HYDROCHLORIDE 5 MG/1
5 TABLET ORAL
Refills: 0 | Status: DISCONTINUED | OUTPATIENT
Start: 2021-11-07 | End: 2021-11-09

## 2021-11-07 RX ORDER — ONDANSETRON 8 MG/1
4 TABLET, FILM COATED ORAL ONCE
Refills: 0 | Status: COMPLETED | OUTPATIENT
Start: 2021-11-07 | End: 2021-11-07

## 2021-11-07 RX ADMIN — Medication 975 MILLIGRAM(S): at 20:55

## 2021-11-07 RX ADMIN — Medication 600 MILLIGRAM(S): at 13:50

## 2021-11-07 RX ADMIN — Medication 600 MILLIGRAM(S): at 12:49

## 2021-11-07 RX ADMIN — Medication 50 MICROGRAM(S): at 05:37

## 2021-11-07 RX ADMIN — Medication 108 GRAM(S): at 05:37

## 2021-11-07 RX ADMIN — Medication 600 MILLIGRAM(S): at 18:06

## 2021-11-07 RX ADMIN — Medication 975 MILLIGRAM(S): at 16:30

## 2021-11-07 RX ADMIN — Medication 30 MILLIGRAM(S): at 08:45

## 2021-11-07 RX ADMIN — Medication 975 MILLIGRAM(S): at 21:50

## 2021-11-07 RX ADMIN — Medication 108 GRAM(S): at 01:36

## 2021-11-07 RX ADMIN — Medication 975 MILLIGRAM(S): at 03:55

## 2021-11-07 RX ADMIN — Medication 1 TABLET(S): at 12:49

## 2021-11-07 RX ADMIN — Medication 975 MILLIGRAM(S): at 15:30

## 2021-11-07 RX ADMIN — Medication 30 MILLIGRAM(S): at 08:30

## 2021-11-07 RX ADMIN — ONDANSETRON 4 MILLIGRAM(S): 8 TABLET, FILM COATED ORAL at 02:03

## 2021-11-07 RX ADMIN — SODIUM CHLORIDE 125 MILLILITER(S): 9 INJECTION, SOLUTION INTRAVENOUS at 04:14

## 2021-11-07 RX ADMIN — Medication 975 MILLIGRAM(S): at 02:04

## 2021-11-07 NOTE — OB PROVIDER DELIVERY SUMMARY - NSPROVIDERDELIVERYNOTE_OBGYN_ALL_OB_FT
On exam, patient noted to be fully dilated at +1 station with some pelvic pressure. With contractions, patient had urge to push. Attempted practice pushes, which brought the head to +2 station. Decision was made to proceed with vaginal delivery. NICU notified and presented at bedside. Once set up for delivery, the perineum was cleansed with betadine. With excellent maternal effort, the head was delivered. The shoulders and body followed. No nuchal or body cord noted. A vigorous, crying male infant was delivered. Delayed cord clamping practiced under the direction of the neonatologist. After adequate time had passed, the cord was clamped x2 and cut. The mother was allowed to hold the infant and then he was transferred to the care of the nurses and brought to the warmer. APGARS 9/9.     Placenta delivered spontaneously, and intact. Pitocin was started. Bimanual massage of uterus revealed a firm, uterus. Minimal bleeding from the uterus. On examination of the perineum, a second degree laceration was noted along the patient's prior episiotomy scar. Minimal bleeding noted. Bilateral superficial paralabial lacerations also identified. No bleeding noted at those sites. Per the patient and , left labial laceration was along the previous labial laceration scar, which often bleeds after intercourse. The second degree was closed using a 2-0 chromic in a running locked fashion, then with overlying interrupted stiches. The paralabial lacerations were closed with a mix of running and interrupted sutures using a 3-0 chromic. Re-examination after repair showed all sites were hemostatic. Minimal lochia from uterus. The patient reported epidural provided adequate pain control throughout delivery and repair. All counts were correct at the end of the procedure. The patient was cleansed and returned to supine position for recovery.

## 2021-11-07 NOTE — DISCHARGE NOTE OB - CARE PROVIDERS DIRECT ADDRESSES
[email or web address]

## 2021-11-07 NOTE — DISCHARGE NOTE OB - CARE PLAN
Principal Discharge DX:	Normal delivery  Assessment and plan of treatment:	1) Please take ibuprofen and/or Tylenol as needed for pain as prescribed.  2) Nothing in the vagina for 6 weeks (including no sex, no tampons, and no douching).  3) Please call your doctor for a follow up your postpartum appointment in 4 weeks.  4) Please continue taking vitamins postpartum. Take iron and colace for acute blood loss anemia.  5) Please call the office sooner if you have heavy vaginal bleeding, severe abdominal pain, or fever > 100.4F.  6) You may resume regular daily activity as tolerated   1 Principal Discharge DX:	Normal delivery  Assessment and plan of treatment:	1) Please take ibuprofen and/or Tylenol as needed for pain as prescribed.  2) Nothing in the vagina for 6 weeks (including no sex, no tampons, and no douching).  3) Please call your doctor for a follow up your postpartum appointment in 2 weeks.  4) Please continue taking vitamins postpartum. Take iron and colace for acute blood loss anemia.  5) Please call the office sooner if you have heavy vaginal bleeding, severe abdominal pain, or fever > 100.4F.  6) You may resume regular daily activity as tolerated

## 2021-11-07 NOTE — OB PROVIDER LABOR PROGRESS NOTE - NS_OBIHIFHRDETAILS_OBGYN_ALL_OB_FT
125 and reactive, moderate variability, accelerations present no decelerations
baseline 140s, moderate variability, +accels, -decels
baseline 140s, moderate variability, +accels, -decels
baseline 150s, moderate variability, +accels, -decels
baseline 130s, moderate variability, +accels, -decels

## 2021-11-07 NOTE — OB PROVIDER LABOR PROGRESS NOTE - NS_SUBJECTIVE/OBJECTIVE_OBGYN_ALL_OB_FT
Patient is having iol for FGR and AEDV  FHR: baseline 150 bpm, mod variability, +accels, -deccels  [place]: contractions q5-6 minutes    Plan  - will continue cytotec
Reviewed with patient and her  their indication for induction as per MFM and the option of cervical ripening before oxytocin. Reviewed reassuring aspects of the fetal tracing, will start with OCT and proceed to misoprostol if reassuring
Patient seen and examined at bedside. She is doing well. No complaints at this time.   Vital Signs Last 24 Hrs  T(C): 36.8 (07 Nov 2021 01:30), Max: 36.9 (06 Nov 2021 16:34)  T(F): 98.24 (07 Nov 2021 01:30), Max: 98.42 (06 Nov 2021 16:34)  HR: 63 (07 Nov 2021 02:03) (53 - 101)  BP: 104/52 (07 Nov 2021 02:03) (89/52 - 129/78)  RR: 18 (06 Nov 2021 18:30) (16 - 18)  SpO2: 98% (07 Nov 2021 01:58) (96% - 100%)
Patient seen and examined at bedside. She is doing well. No complaints at this time.   Vital Signs Last 24 Hrs  T(C): 36.4 (06 Nov 2021 19:27), Max: 36.9 (06 Nov 2021 00:11)  T(F): 97.52 (06 Nov 2021 19:27), Max: 98.42 (06 Nov 2021 00:11)  HR: 78 (07 Nov 2021 00:08) (53 - 101)  BP: 114/- (07 Nov 2021 00:03) (89/52 - 129/78)  RR: 18 (06 Nov 2021 18:30) (16 - 18)  SpO2: 97% (06 Nov 2021 23:53) (97% - 100%)
Patient seen and examined at bedside. She is doing well. No complaints at this time.   Vital Signs Last 24 Hrs  T(C): 36.9 (06 Nov 2021 00:11), Max: 36.9 (06 Nov 2021 00:11)  T(F): 98.42 (06 Nov 2021 00:11), Max: 98.42 (06 Nov 2021 00:11)  HR: 80 (06 Nov 2021 12:14) (66 - 95)  BP: 117/68 (06 Nov 2021 12:14) (107/57 - 121/62)  RR: 18 (06 Nov 2021 00:11) (17 - 18)
Patient seen and examined at bedside. She is doing well. No complaints at this time.   Vital Signs Last 24 Hrs  T(C): 36.4 (06 Nov 2021 19:27), Max: 36.9 (06 Nov 2021 00:11)  T(F): 97.52 (06 Nov 2021 19:27), Max: 98.42 (06 Nov 2021 00:11)  HR: 83 (06 Nov 2021 19:31) (66 - 95)  BP: 124/70 (06 Nov 2021 19:31) (107/57 - 129/64)  RR: 18 (06 Nov 2021 18:30) (16 - 18)

## 2021-11-07 NOTE — OB NEONATOLOGY/PEDIATRICIAN DELIVERY SUMMARY - NSPEDSNEONOTESA_OBGYN_ALL_OB_FT
OB/ Dr. Grace requested me to attend  at 34.5 weeks, due to IUGR with absent end diastolic flow to umbilical artery. The mother is 32 y/o, , B+, HBsAg NR, HIV NR, RPR NR, . M. Hx: Asthma, hypothyroid ( Hashimoto), HSV ( no lesion), Raynaud Disease, Covid19 Vaccinated. S. Hx: Breast Cancer s/p Mastectomy, Appendectomy, Tonsillectomy, breast reconstruction  L & D: [facility]/ Dr. Grace requested me to attend  at 34.5 weeks, due to IUGR with absent end diastolic flow to umbilical artery. The mother is 30 y/o, , B+, HBsAg NR, HIV NR, RPR NR. She was induced with Cytotec, she also rec' d Betamethasone x 2, Synthroid, Voltrex, Benadryl, and Ampicillin x 5 doses.  M. Hx: Asthma, hypothyroid ( Hashimoto), HSV ( no lesion), Raynaud Disease, Covid19 Vaccinated.   S. Hx: Breast Cancer s/p Mastectomy, Appendectomy, Tonsillectomy, breast reconstruction  L & D: Induced with Cytotec, AROM @ 1:34am with clear fluid, vigorous with strong cry, suctioned and dried. At 4min O2 sat in 70s, CPAP +5 given x 30 sec, APGAR 9 & 9, BW: 2115gm  Asst: Late  34.5 weeks, IUGR, Observation for sepsis ( [facility])  Plan: Admit to [facility] for further management.

## 2021-11-07 NOTE — OB PROVIDER DELIVERY SUMMARY - NSOBVTERISKASSESS_OBGYN_ALL_OB_FT
OBPostPartum Assessment Completed on: 07-Nov-2021 07:35 OBPostPartum Assessment Completed on: 07-Nov-2021 08:17

## 2021-11-07 NOTE — DISCHARGE NOTE OB - MEDICATION SUMMARY - MEDICATIONS TO TAKE
I will START or STAY ON the medications listed below when I get home from the hospital:    acetaminophen 325 mg oral tablet  -- 3 tab(s) by mouth   -- Indication: For Pain    ibuprofen 600 mg oral tablet  -- 1 tab(s) by mouth every 6 hours  -- Indication: For Pain    Ambien 5 mg oral tablet  -- 1 tab(s) by mouth once a day (at bedtime), As Needed  -- Indication: For Home med

## 2021-11-07 NOTE — OB PROVIDER LABOR PROGRESS NOTE - ASSESSMENT
Patient admitted for induction of labor 2/2 FGR.   -VSS  -Intermittent cat 2 tracing  -Carson regularly   -Cook balloon placed (80/20)  -Will discontinue pitocin and re-asses in 20 minutes     D/W Dr. Grace 
Patient admitted for induction of labor 2/2 FGR. Cat 1 tracing, karo regularly. Cook balloon still in place. Continue pitocin. Will re-assess in 2 hours. 
Patient admitted for an induction of labor 2/2 FGR.   -VSS  -Cat 1 tracing   -PO cytotec discontinued   -Will start pitocin 
32 yo  at 34.3 weeks with FGR and intermittent absent end-diastolic flow  -reassuring FHT  -OCT  - delivery if OCT is positive or if evidence of placental insufficiency, patient and her partner are aware.
Patient admitted for induction of labor 2/2 FGR.   -VSS  -Cat 1 tracing   -AROM, clear   -Cook balloon spontaneously expelled   -Continue pitocin     D/W Dr. Grace

## 2021-11-07 NOTE — DISCHARGE NOTE OB - PLAN OF CARE
1) Please take ibuprofen and/or Tylenol as needed for pain as prescribed.  2) Nothing in the vagina for 6 weeks (including no sex, no tampons, and no douching).  3) Please call your doctor for a follow up your postpartum appointment in 4 weeks.  4) Please continue taking vitamins postpartum. Take iron and colace for acute blood loss anemia.  5) Please call the office sooner if you have heavy vaginal bleeding, severe abdominal pain, or fever > 100.4F.  6) You may resume regular daily activity as tolerated 1) Please take ibuprofen and/or Tylenol as needed for pain as prescribed.  2) Nothing in the vagina for 6 weeks (including no sex, no tampons, and no douching).  3) Please call your doctor for a follow up your postpartum appointment in 2 weeks.  4) Please continue taking vitamins postpartum. Take iron and colace for acute blood loss anemia.  5) Please call the office sooner if you have heavy vaginal bleeding, severe abdominal pain, or fever > 100.4F.  6) You may resume regular daily activity as tolerated

## 2021-11-07 NOTE — DISCHARGE NOTE OB - CARE PROVIDER_API CALL
Jose Be)  Obstetrics and Gynecology  [address], [facility] [address] [place] [address]  Phone: [phone]  Fax: [phone]  Follow Up Time:    Lenin Oneil (MD)  Obstetrics and Gynecology  [address] [place] [address]  Phone: [phone]  Fax: [phone]  Follow Up Time:

## 2021-11-07 NOTE — OB PROVIDER LABOR PROGRESS NOTE - NS_OBIHICONTRACTIONPATTERNDETAILS_OBGYN_ALL_OB_FT
karo regularly q 4 minutes
None
karo regularly q 3 minutes
karo irregularly
karo regularly q 3 minutes

## 2021-11-07 NOTE — DISCHARGE NOTE OB - PATIENT PORTAL LINK FT
You can access the FollowMyHealth Patient Portal offered by [facility] by registering at the following website: [email or web address]. By joining [facility]’s FollowMyHealth portal, you will also be able to view your health information using other applications (apps) compatible with our system.

## 2021-11-08 ENCOUNTER — RESULT REVIEW (OUTPATIENT)
Age: 31
End: 2021-11-08

## 2021-11-08 ENCOUNTER — NON-APPOINTMENT (OUTPATIENT)
Age: 31
End: 2021-11-08

## 2021-11-08 ENCOUNTER — APPOINTMENT (OUTPATIENT)
Dept: OBGYN | Facility: CLINIC | Age: 31
End: 2021-11-08

## 2021-11-08 LAB
HCT VFR BLD CALC: 32.3 % — LOW (ref 34.5–45)
HGB BLD-MCNC: 10.9 G/DL — LOW (ref 11.5–15.5)

## 2021-11-08 PROCEDURE — 88307 TISSUE EXAM BY PATHOLOGIST: CPT | Mod: 26

## 2021-11-08 RX ORDER — POLYETHYLENE GLYCOL 3350 17 G/17G
17 POWDER, FOR SOLUTION ORAL ONCE
Refills: 0 | Status: COMPLETED | OUTPATIENT
Start: 2021-11-08 | End: 2021-11-08

## 2021-11-08 RX ADMIN — Medication 600 MILLIGRAM(S): at 21:00

## 2021-11-08 RX ADMIN — POLYETHYLENE GLYCOL 3350 17 GRAM(S): 17 POWDER, FOR SOLUTION ORAL at 09:34

## 2021-11-08 RX ADMIN — Medication 600 MILLIGRAM(S): at 13:05

## 2021-11-08 RX ADMIN — Medication 600 MILLIGRAM(S): at 20:22

## 2021-11-08 RX ADMIN — Medication 975 MILLIGRAM(S): at 17:30

## 2021-11-08 RX ADMIN — Medication 600 MILLIGRAM(S): at 00:12

## 2021-11-08 RX ADMIN — Medication 50 MICROGRAM(S): at 08:51

## 2021-11-08 RX ADMIN — Medication 975 MILLIGRAM(S): at 09:50

## 2021-11-08 RX ADMIN — Medication 600 MILLIGRAM(S): at 05:26

## 2021-11-08 RX ADMIN — Medication 975 MILLIGRAM(S): at 03:59

## 2021-11-08 RX ADMIN — Medication 600 MILLIGRAM(S): at 06:15

## 2021-11-08 RX ADMIN — Medication 975 MILLIGRAM(S): at 16:30

## 2021-11-08 RX ADMIN — Medication 600 MILLIGRAM(S): at 12:06

## 2021-11-08 RX ADMIN — Medication 1 ENEMA: at 18:15

## 2021-11-08 RX ADMIN — Medication 975 MILLIGRAM(S): at 08:51

## 2021-11-08 RX ADMIN — Medication 600 MILLIGRAM(S): at 01:00

## 2021-11-08 RX ADMIN — Medication 975 MILLIGRAM(S): at 04:45

## 2021-11-08 RX ADMIN — Medication 1 TABLET(S): at 12:06

## 2021-11-08 NOTE — PROGRESS NOTE ADULT - SUBJECTIVE AND OBJECTIVE BOX
JEANE GUTIERREZ is a 31y  now PPD#1 s/p spontaneous vaginal delivery at 34.5 weeks gestation, FGR/AEVD. Second degree laceration.    S:    No acute events overnight.   The patient has no complaints.  Pain controlled with current treatment regimen.   She is ambulating without difficulty and tolerating PO.   + flatus/-BM/+ voiding   She endorses appropriate lochia, which is decreasing.   She denies fevers, chills, nausea and vomiting.   She denies lightheadedness, dizziness, palpitations, chest pain and SOB.     O:    T(C): 36.6 (21 @ 04:01), Max: 36.9 (21 @ 08:18)  HR: 74 (21 @ 04:01) (53 - 108)  BP: 123/81 (21 @ 04:01) (107/69 - 128/83)  RR: 18 (21 @ 04:01) (17 - 18)  SpO2: 97% (21 @ 04:01) (90% - 100%)    Gen: NAD, AOx3  CV: RRR, S1/S2 present  Pulm: CTAB  Abdomen:  Soft, non-tender, non-distended, +bowel sounds  Uterus:  Fundus firm below umbilicus  VE:  Expected lochia  Ext:  Bilateral lower extremities non-tender and non-edematous                 JEANE UGTIERREZ is a 31y  now PPD#1 s/p spontaneous vaginal delivery at 34.5 weeks gestation, IOL secondary to FGR/AEVD.     S:    No acute events overnight.   The patient has no complaints.  Pain controlled with current treatment regimen.   She is ambulating without difficulty and tolerating PO.   + flatus/-BM/+ voiding   She endorses appropriate lochia, which is decreasing.   She denies fevers, chills, nausea and vomiting.   She denies lightheadedness, dizziness, palpitations, chest pain and SOB.     O:    T(C): 36.6 (21 @ 04:01), Max: 36.9 (21 @ 08:18)  HR: 74 (21 @ 04:01) (53 - 108)  BP: 123/81 (21 @ 04:01) (107/69 - 128/83)  RR: 18 (21 @ 04:01) (17 - 18)  SpO2: 97% (21 @ 04:01) (90% - 100%)    Gen: NAD, AOx3  CV: RRR, S1/S2 present  Pulm: CTAB  Abdomen:  Soft, non-tender, non-distended, +bowel sounds  Uterus:  Fundus firm below umbilicus  VE:  Expected lochia  Ext:  Bilateral lower extremities non-tender and non-edematous

## 2021-11-08 NOTE — PROGRESS NOTE ADULT - ASSESSMENT
JEANE GUTIERREZ is a 31y  now PPD#1 s/p spontaneous vaginal delivery at 34.5 weeks gestation, FGR/AEVD. Second degree laceration.    -Vital signs stable  -Hgb: 12.5 ->10.9  -Voiding, tolerating PO, bowel function nml   -Advance care as tolerated   -Continue routine postpartum care and education  -Male infant in NICU, desires circumcision   -DVT ppx: Ambulation encouraged. SCDs while in bed.   -Dispo: Anticipate discharge to home tomorrow () pending attending approval. JEANE GUTIERREZ is a 31y  now PPD#1 s/p spontaneous vaginal delivery at 34.5 weeks gestation, IOL secondary to FGR/AEVD.     -Vital signs stable  -Hgb: 12.5 ->10.9  -Voiding, tolerating PO, bowel function nml   -Advance care as tolerated   -Continue routine postpartum care and education  -Male infant in NICU, desires circumcision   -DVT ppx: Ambulation encouraged. SCDs while in bed.   -Dispo: Anticipate discharge to home tomorrow () pending attending approval.

## 2021-11-09 VITALS
OXYGEN SATURATION: 97 % | SYSTOLIC BLOOD PRESSURE: 108 MMHG | TEMPERATURE: 98 F | DIASTOLIC BLOOD PRESSURE: 70 MMHG | RESPIRATION RATE: 18 BRPM | HEART RATE: 72 BPM

## 2021-11-09 PROCEDURE — 86769 SARS-COV-2 COVID-19 ANTIBODY: CPT

## 2021-11-09 PROCEDURE — 80053 COMPREHEN METABOLIC PANEL: CPT

## 2021-11-09 PROCEDURE — 86703 HIV-1/HIV-2 1 RESULT ANTBDY: CPT

## 2021-11-09 PROCEDURE — 59025 FETAL NON-STRESS TEST: CPT

## 2021-11-09 PROCEDURE — 85730 THROMBOPLASTIN TIME PARTIAL: CPT

## 2021-11-09 PROCEDURE — 84550 ASSAY OF BLOOD/URIC ACID: CPT

## 2021-11-09 PROCEDURE — 86780 TREPONEMA PALLIDUM: CPT

## 2021-11-09 PROCEDURE — 85384 FIBRINOGEN ACTIVITY: CPT

## 2021-11-09 PROCEDURE — 85018 HEMOGLOBIN: CPT

## 2021-11-09 PROCEDURE — 86900 BLOOD TYPING SEROLOGIC ABO: CPT

## 2021-11-09 PROCEDURE — 85025 COMPLETE CBC W/AUTO DIFF WBC: CPT

## 2021-11-09 PROCEDURE — 88307 TISSUE EXAM BY PATHOLOGIST: CPT

## 2021-11-09 PROCEDURE — 81003 URINALYSIS AUTO W/O SCOPE: CPT

## 2021-11-09 PROCEDURE — U0005: CPT

## 2021-11-09 PROCEDURE — 85014 HEMATOCRIT: CPT

## 2021-11-09 PROCEDURE — 59050 FETAL MONITOR W/REPORT: CPT

## 2021-11-09 PROCEDURE — 86850 RBC ANTIBODY SCREEN: CPT

## 2021-11-09 PROCEDURE — 86901 BLOOD TYPING SEROLOGIC RH(D): CPT

## 2021-11-09 PROCEDURE — U0003: CPT

## 2021-11-09 PROCEDURE — 87653 STREP B DNA AMP PROBE: CPT

## 2021-11-09 PROCEDURE — G0463: CPT

## 2021-11-09 PROCEDURE — 87389 HIV-1 AG W/HIV-1&-2 AB AG IA: CPT

## 2021-11-09 PROCEDURE — 36415 COLL VENOUS BLD VENIPUNCTURE: CPT

## 2021-11-09 RX ORDER — DULOXETINE HYDROCHLORIDE 30 MG/1
1 CAPSULE, DELAYED RELEASE ORAL
Qty: 0 | Refills: 0 | DISCHARGE

## 2021-11-09 RX ORDER — LEVOTHYROXINE SODIUM 125 MCG
1 TABLET ORAL
Qty: 0 | Refills: 0 | DISCHARGE

## 2021-11-09 RX ORDER — ACETAMINOPHEN 500 MG
3 TABLET ORAL
Qty: 0 | Refills: 0 | DISCHARGE
Start: 2021-11-09

## 2021-11-09 RX ORDER — IBUPROFEN 200 MG
1 TABLET ORAL
Qty: 0 | Refills: 0 | DISCHARGE
Start: 2021-11-09

## 2021-11-09 RX ADMIN — Medication 975 MILLIGRAM(S): at 09:55

## 2021-11-09 RX ADMIN — Medication 1 TABLET(S): at 11:55

## 2021-11-09 RX ADMIN — Medication 600 MILLIGRAM(S): at 17:51

## 2021-11-09 RX ADMIN — Medication 50 MICROGRAM(S): at 05:44

## 2021-11-09 RX ADMIN — Medication 975 MILLIGRAM(S): at 08:57

## 2021-11-09 RX ADMIN — Medication 600 MILLIGRAM(S): at 05:43

## 2021-11-09 RX ADMIN — Medication 975 MILLIGRAM(S): at 15:43

## 2021-11-09 RX ADMIN — Medication 600 MILLIGRAM(S): at 12:50

## 2021-11-09 RX ADMIN — Medication 975 MILLIGRAM(S): at 14:46

## 2021-11-09 RX ADMIN — Medication 600 MILLIGRAM(S): at 11:54

## 2021-11-09 NOTE — PROGRESS NOTE ADULT - SUBJECTIVE AND OBJECTIVE BOX
S: minimal lochia, tolerating diet, pain controlled. Baby in NICU due to prematurity, doing well per patient.    VSS afeb  NAD  Fundus firm, NT, minimal lochia  Ext: NT

## 2021-11-09 NOTE — PROGRESS NOTE ADULT - ASSESSMENT
JEANE GUTIERREZ is a 31y  now PPD#2 s/p IOL and subsequent spontaneous vaginal delivery at 34.5 weeks gestation, IOL secondary to IUGR/AEVD.     -Vital signs stable  -Hgb: 12.5 ->10.9  -Voiding, tolerating PO, bowel function normal; minimal lochia   -Continue routine postpartum care and education  -Male infant in NICU, desires circumcision   -DVT ppx: Ambulation encouraged. SCDs while in bed.   -Dispo: discharge home today; discharge instructions provided    I had a lengthy discussion with the patient regarding her care at the hospital. She is clear that she did NOT have poor outcome. She also confirms she had a good delivery.  She simply expresses she was unaware of the changes after COVID at [facility]  or [facility] call coverage.  She confirms she is doing well and  is doing well in the NICU and expected to be discharged in 1 week.  She would like a [facility] attending to do her 's circ; I have expressed that I will try to do so today if I am able.  She is reassured now that I have seen her and her questions have been answered    DR. Vega  [facility]

## 2021-11-10 ENCOUNTER — NON-APPOINTMENT (OUTPATIENT)
Age: 31
End: 2021-11-10

## 2021-11-10 DIAGNOSIS — K58.9 IRRITABLE BOWEL SYNDROME W/OUT DIARRHEA: ICD-10-CM

## 2021-11-10 RX ORDER — AZITHROMYCIN 250 MG/1
250 TABLET, FILM COATED ORAL
Qty: 1 | Refills: 0 | Status: DISCONTINUED | COMMUNITY
Start: 2021-10-11 | End: 2021-11-10

## 2021-11-10 RX ORDER — EPINEPHRINE 0.3 MG/.3ML
0.3 INJECTION INTRAMUSCULAR
Qty: 1 | Refills: 0 | Status: DISCONTINUED | COMMUNITY
Start: 2019-04-02 | End: 2021-11-10

## 2021-11-10 RX ORDER — BUDESONIDE 32 UG/1
32 SPRAY, METERED NASAL DAILY
Qty: 1 | Refills: 3 | Status: DISCONTINUED | COMMUNITY
Start: 2021-05-28 | End: 2021-11-10

## 2021-11-11 ENCOUNTER — APPOINTMENT (OUTPATIENT)
Dept: ANTEPARTUM | Facility: CLINIC | Age: 31
End: 2021-11-11

## 2021-11-15 ENCOUNTER — APPOINTMENT (OUTPATIENT)
Dept: ENDOCRINOLOGY | Facility: CLINIC | Age: 31
End: 2021-11-15
Payer: COMMERCIAL

## 2021-11-15 PROCEDURE — 99213 OFFICE O/P EST LOW 20 MIN: CPT | Mod: 95

## 2021-11-15 NOTE — REVIEW OF SYSTEMS
[All other systems negative] : All other systems negative [FreeTextEntry2] : feels a bit foggy headed  [FreeTextEntry6] : occasioanl dyspnea

## 2021-11-15 NOTE — ASSESSMENT
[FreeTextEntry1] : Imp/Plan \par \par Hypothryoidsm- TFT last done in Sept- wnl \par  cehck repeat labs\par  cont currentRX \par \par \par COVId vaccine pt wants to get vaccien now - ok with  me - will ask gyn \par \par \par  \par \par \par

## 2021-11-15 NOTE — HISTORY OF PRESENT ILLNESS
[Home] : at home, [unfilled] , at the time of the visit. [Medical Office: ([facility])___] : at the medical office located in  [Verbal consent obtained from patient] : the patient, [unfilled] [FreeTextEntry1] : \par  Telehealth  start time 915AM\par  end time 927  AM \par follow up \par Hypothryoidsm \par \par  feels well\par  nowpost partum\par  delivered babay at 34 weeks\par  pt home\par  baby remains in  NICU \par   RR droppe last night and HR dropped lastenight \par ROS No Neck pain No voice changes  No Chest pain  No Pressure  occ dyspnea   No n/v abd pain diarrhea or constipation  No LE edema \par \par \par \par \par

## 2021-11-17 ENCOUNTER — APPOINTMENT (OUTPATIENT)
Dept: FAMILY MEDICINE | Facility: CLINIC | Age: 31
End: 2021-11-17
Payer: COMMERCIAL

## 2021-11-17 LAB — SURGICAL PATHOLOGY STUDY: SIGNIFICANT CHANGE UP

## 2021-11-17 PROCEDURE — 0001A: CPT

## 2021-11-18 ENCOUNTER — APPOINTMENT (OUTPATIENT)
Dept: ANTEPARTUM | Facility: CLINIC | Age: 31
End: 2021-11-18

## 2021-11-22 ENCOUNTER — APPOINTMENT (OUTPATIENT)
Dept: OBGYN | Facility: CLINIC | Age: 31
End: 2021-11-22
Payer: COMMERCIAL

## 2021-11-22 ENCOUNTER — RESULT CHARGE (OUTPATIENT)
Age: 31
End: 2021-11-22

## 2021-11-22 ENCOUNTER — NON-APPOINTMENT (OUTPATIENT)
Age: 31
End: 2021-11-22

## 2021-11-22 VITALS
BODY MASS INDEX: 27.19 KG/M2 | SYSTOLIC BLOOD PRESSURE: 120 MMHG | DIASTOLIC BLOOD PRESSURE: 70 MMHG | WEIGHT: 144 LBS | HEIGHT: 61 IN

## 2021-11-22 DIAGNOSIS — Z91.018 ALLERGY TO OTHER FOODS: ICD-10-CM

## 2021-11-22 DIAGNOSIS — R10.9 UNSPECIFIED ABDOMINAL PAIN: ICD-10-CM

## 2021-11-22 DIAGNOSIS — O36.5990 MATERNAL CARE FOR OTHER KNOWN OR SUSPECTED POOR FETAL GROWTH, UNSPECIFIED TRIMESTER, NOT APPLICABLE OR UNSPECIFIED: ICD-10-CM

## 2021-11-22 DIAGNOSIS — Z34.93 ENCOUNTER FOR SUPERVISION OF NORMAL PREGNANCY, UNSPECIFIED, THIRD TRIMESTER: ICD-10-CM

## 2021-11-22 LAB
BILIRUB UR QL STRIP: NORMAL
GLUCOSE UR-MCNC: NORMAL
HCG UR QL: 0.2 EU/DL
HGB UR QL STRIP.AUTO: ABNORMAL
KETONES UR-MCNC: NORMAL
LEUKOCYTE ESTERASE UR QL STRIP: NORMAL
NITRITE UR QL STRIP: NORMAL
PH UR STRIP: 5
PROT UR STRIP-MCNC: NORMAL
SP GR UR STRIP: >=1.03

## 2021-11-22 PROCEDURE — 81003 URINALYSIS AUTO W/O SCOPE: CPT | Mod: QW

## 2021-11-22 PROCEDURE — 99212 OFFICE O/P EST SF 10 MIN: CPT | Mod: 24

## 2021-11-22 NOTE — END OF VISIT
[FreeTextEntry3] : I, Yannisergo Paganador solely acted as a scribe for Dr. Erin Lassiter on 11/22/2021 . All medical entries made by the scribe were at my, Dr. Lassiter's, direction and personally dictated by me on 11/22/2021 . I have reviewed the chart and agree that the record accurately reflects my personal performance of the history, physical exam, assessment and plan. I have also personally directed, reviewed, and agreed with the chart.

## 2021-11-22 NOTE — DISCUSSION/SUMMARY
[FreeTextEntry1] : -Affirm collected to r/o vaginitis. \par \par -U/A revealed microscopic hematuria. Urine culture ordered to r/o UTI. \par \par -She will follow up in 4 weeks or as needed.

## 2021-11-22 NOTE — HISTORY OF PRESENT ILLNESS
[N] : Patient does not use contraception [Y] : Positive pregnancy history [Menarche Age: ____] : age at menarche was [unfilled] [No] : Patient does not have concerns regarding sex [FreeTextEntry1] : JEANE presents for her post partum visit. She delivered baby boy via  on 21. \par \par She is doing well. Denies any depressive symptoms. \par \par She complaints of a foul vaginal odor and as if she "is trying to push a tampon out". She currently has some mild lochia.\par  [PapSmeardate] : 9/17/20 [TextBox_31] : NEG [HPVDate] : 9/17/20 [TextBox_78] : NEG [PGHxTotal] : 3 [[facility]FullTerm] : 2 [[facility]xLiving] : 2 [PGHxABSpont] : 1

## 2021-11-22 NOTE — PHYSICAL EXAM
[Chaperone Present] : A chaperone was present in the examining room during all aspects of the physical examination [Appropriately responsive] : appropriately responsive [Alert] : alert [No Acute Distress] : no acute distress [Soft] : soft [Non-tender] : non-tender [Non-distended] : non-distended [Oriented x3] : oriented x3 [Labia Majora] : normal [Labia Minora] : normal [Discharge] : a  ~M vaginal discharge was present [Scant] : scant [Brown] : brown [Blood-Tinged] : blood-tinged [Normal] : normal [FreeTextEntry1] : ROSCOE Ashraf

## 2021-11-23 ENCOUNTER — APPOINTMENT (OUTPATIENT)
Dept: OBGYN | Facility: CLINIC | Age: 31
End: 2021-11-23

## 2021-11-24 LAB
BACTERIA UR CULT: NORMAL
CANDIDA VAG CYTO: NOT DETECTED
G VAGINALIS+PREV SP MTYP VAG QL MICRO: NOT DETECTED
T VAGINALIS VAG QL WET PREP: NOT DETECTED

## 2021-11-29 ENCOUNTER — RESULT CHARGE (OUTPATIENT)
Age: 31
End: 2021-11-29

## 2021-11-30 ENCOUNTER — APPOINTMENT (OUTPATIENT)
Dept: FAMILY MEDICINE | Facility: CLINIC | Age: 31
End: 2021-11-30
Payer: COMMERCIAL

## 2021-11-30 ENCOUNTER — RESULT CHARGE (OUTPATIENT)
Age: 31
End: 2021-11-30

## 2021-11-30 VITALS — HEART RATE: 82 BPM

## 2021-11-30 VITALS
SYSTOLIC BLOOD PRESSURE: 124 MMHG | BODY MASS INDEX: 27.56 KG/M2 | HEIGHT: 61 IN | OXYGEN SATURATION: 97 % | TEMPERATURE: 97.5 F | DIASTOLIC BLOOD PRESSURE: 74 MMHG | WEIGHT: 146 LBS | HEART RATE: 97 BPM

## 2021-11-30 PROCEDURE — 87880 STREP A ASSAY W/OPTIC: CPT | Mod: QW

## 2021-11-30 PROCEDURE — 87804 INFLUENZA ASSAY W/OPTIC: CPT | Mod: QW

## 2021-11-30 PROCEDURE — 99213 OFFICE O/P EST LOW 20 MIN: CPT

## 2021-11-30 RX ORDER — HYDROCODONE BITARTRATE AND HOMATROPINE METHYLBROMIDE 5; 1.5 MG/5ML; MG/5ML
5-1.5 SYRUP ORAL EVERY 6 HOURS
Qty: 140 | Refills: 0 | Status: DISCONTINUED | COMMUNITY
Start: 2021-11-30 | End: 2021-11-30

## 2021-11-30 RX ORDER — IBUPROFEN 600 MG/1
600 TABLET, FILM COATED ORAL EVERY 6 HOURS
Refills: 0 | Status: DISCONTINUED | COMMUNITY
Start: 2021-11-10 | End: 2021-11-30

## 2021-11-30 NOTE — PHYSICAL EXAM
[Normal] : affect was normal and insight and judgment were intact [de-identified] : mod maxillary tenderness, boggy nasal mucosa

## 2021-11-30 NOTE — REVIEW OF SYSTEMS
[Earache] : no earache [Hearing Loss] : no hearing loss [Nosebleed] : no nosebleeds [Hoarseness] : no hoarseness [Nasal Discharge] : nasal discharge [Sore Throat] : sore throat [Shortness Of Breath] : no shortness of breath [Wheezing] : no wheezing [Cough] : cough [Dyspnea on Exertion] : no dyspnea on exertion [Negative] : Psychiatric

## 2021-11-30 NOTE — HISTORY OF PRESENT ILLNESS
[FreeTextEntry8] : 32 yo female presents with complaint of sore throat, sinus pressure and nasal congestion. She says nasal discharge is yellow. Symptoms have been going on since last Tuesday. Symptoms have been worsening. Denies fever, chills, cp, palpitations, sob, nv, heat/cold intolerance, dizziness, melena, hematochezia, muscle weakness, loss of sensation, bowel/bladder incontinence or calf pain.\par

## 2021-12-01 ENCOUNTER — APPOINTMENT (OUTPATIENT)
Dept: OBGYN | Facility: CLINIC | Age: 31
End: 2021-12-01

## 2021-12-01 LAB — SARS-COV-2 N GENE NPH QL NAA+PROBE: NOT DETECTED

## 2021-12-02 ENCOUNTER — APPOINTMENT (OUTPATIENT)
Dept: ANTEPARTUM | Facility: CLINIC | Age: 31
End: 2021-12-02

## 2021-12-04 LAB — BACTERIA THROAT CULT: NORMAL

## 2021-12-07 ENCOUNTER — APPOINTMENT (OUTPATIENT)
Dept: SURGERY | Facility: CLINIC | Age: 31
End: 2021-12-07
Payer: COMMERCIAL

## 2021-12-07 VITALS
HEART RATE: 99 BPM | HEIGHT: 61 IN | TEMPERATURE: 98 F | OXYGEN SATURATION: 98 % | SYSTOLIC BLOOD PRESSURE: 116 MMHG | BODY MASS INDEX: 26.81 KG/M2 | WEIGHT: 142 LBS | DIASTOLIC BLOOD PRESSURE: 75 MMHG

## 2021-12-07 PROCEDURE — 99214 OFFICE O/P EST MOD 30 MIN: CPT

## 2021-12-09 ENCOUNTER — APPOINTMENT (OUTPATIENT)
Dept: ANTEPARTUM | Facility: CLINIC | Age: 31
End: 2021-12-09

## 2021-12-10 ENCOUNTER — APPOINTMENT (OUTPATIENT)
Dept: FAMILY MEDICINE | Facility: CLINIC | Age: 31
End: 2021-12-10
Payer: COMMERCIAL

## 2021-12-10 PROCEDURE — 0002A: CPT

## 2021-12-13 NOTE — ASSESSMENT
[FreeTextEntry1] : 30 yo premenopausal female with a history of triple negative left breast cancer treated with bilateral mastectomy and left SLNB.  Final pathology demonstrating 3.5cm IDC grade 3 triple negative with 0/2 lymph nodes.  Clinical exam is benign today.  There is no evidence of local or distant recurrence. She reports her left breast is still uncomfortable and is considering of removing the implant.  We discussed the RAMON procedure and will send her to plastics to further discuss.  She will follow up in February for a follow up clinical breast exam.     \par 1. Clinical examination in 2 months\par 2. Consult with plastics- RAMON procedure\par

## 2021-12-13 NOTE — PHYSICAL EXAM
[Normocephalic] : normocephalic [Atraumatic] : atraumatic [EOMI] : extra ocular movement intact [PERRL] : pupils equal, round and reactive to light [Sclera nonicteric] : sclera nonicteric [Supple] : supple [No Supraclavicular Adenopathy] : no supraclavicular adenopathy [Examined in the supine and seated position] : examined in the supine and seated position [No dominant masses] : no dominant masses in right breast  [No dominant masses] : no dominant masses left breast [No Axillary Lymphadenopathy] : no left axillary lymphadenopathy [No Edema] : no edema [No Rashes] : no rashes [No Ulceration] : no ulceration [de-identified] : s/p mastectomy with implant reconstruction, well healed. No evidence of skin changes.  [de-identified] : s/p mastectomy with implant reconstruction, well healed.  No evidence of skin changes.

## 2021-12-13 NOTE — HISTORY OF PRESENT ILLNESS
[FreeTextEntry1] : I had the pleasure of seeing JEANE GUTIERREZ in the office for a follow up visit.\par \par History: Jeane is a sg 30 yo female who originally presented after feeling a left breast mass after delivery of her son on 2018. She states she had first felt the lump after delivery of her child. She sought medical attention and was told it was a blocked milk duct which caused the mastitis and put her on antibiotics. When the symptoms did not resolve she saw another physician for a second opinion. She was then sent for ultrasound and ultrasound demonstrated a 2.3 cm lobulated hypoechoic nodule at in the left breast 2:00 and an ultrasound guided core biopsy was recommended. 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 Chemotherapy began on 8/3/2018. She completed chemotherapy and underwent implant exchange on 2019 by Dr. Falcon.\par \par She has been followed by thoracic surgery to monitor for any changes in her chest wall tissue specifically some rebound hypertrophy noted in the thymic tissue. Recent CT on 2020 showed stable very small nodule in the left upper lobe.\par \par She denies skin changes, nipple dimpling or nipple discharge. She denies new headaches, blurry vision, SOB, chest pain, abdominal pain, difficulty walking, back or leg pain. \par \par  with 1st delivery at 28. Menses began at age 8.\par She denies personal history of malignancy.\par Family history is significant for two maunts diagnosed with breast cancer, Pcousin diagnosed with breast cancer, PGM and MGM both diagnosed with pancreatic cancer at unknown age, MGM diagnose with skin cancer.\par \par 2018 [employer]Gen: Genetic results: Negative- No clinically significant mutation identified\par \par 2018 SURGICAL PATHOLOGY:\par 1. Breast, right simple mastectomy: Negative for malignancy.\par 2. Conception lymph node, left axilla, excision: One lymph node, negative for metastatic carcinoma.\par 3. Conception lymph node, left axilla, excision: One lymph node, negative for metastatic carcinoma.\par 4. Breast, left, simple mastectomy: Infiltrating ductal carcinoma, measuring 3.5 cm, with necrosis. Infiltrating carcinoma extends to the anterior margin of the mastectomy specimen, please see final margin status in the synoptic summary below and part 5. Negative nipple. Negative skin. Focal secretory change. \par 5. Left superior flap margin, over tumor: negative margin.\par 6. Skin and adipose tissue, right and left breast, excision: Negative for malignancy.\par \par 2018 US NONVASC EXT LTD RT: Benign appearing right groin lymph nodes the smaller of which correlates with area of patients tenderness. Findings likely represent benign reactive lymph nodes. Recommend continued clinical follow up.\par \par 2018 US breast limited left\par Impression: No sonographic suspicious findings left axilla and upper outer quadrant left breast at area of concern. Note is made of two small benign appearing axillary lymph nodes and a small amount of fluid adjacent to the axillary segment of the axillary segment of the left breast expander. BIRADS 2 benign findings.\par \par US breast 19: no evidence of malignancy, several areas of at necrosis upper inner quad and oval shaped mixed echotexture nodule left breast at 5 oclock 4cm from nipple 1.2cm compatible with probable fat necrosis BIRads 3 prob benign findings, recommend follow US in 6 months. 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 Follow up recommended to asses for stability/resolution.\par \par CT chest 2019 : Impression: 3mm pulmonary nodule left upper lobe slightly increased in size since May 23, 2019 and indeterminate finding, thymic tissue within anterior mediastinum appears ot have minimally decreased since May 23, 2019 given differences in technique.\par \par 2019 MRI of the abdomen \par Impression: No cholelithiasis, choledocholithiasis or biliary ductal dilatation. \par \par 2020  LT breast US\par IMPRESSION: Left breast 5:00 4 cm from the nipple 1.1 cm benign-appearing nodule decreased in size from 2019 compatible with benign finding likely representing fat necrosis. Left breast 7:00 8 cm from the nipple at the area of patient's palpable concern demonstrates no sonographic suspicious finding. \par RECOMMENDATION: Clinical follow up. BI-RADS 2 - Benign Finding(s)\par \par 2020  US breast limited left:\par Impression:  Left breast \par \par 2020  CT Chest:  Stable very small soild nodule in the left upper lobe when compared to previous exam.\par \par 9/15/2020  MR breast\par IMPRESSION: No MRI evidence of malignancy. Intact bilateral implants. \par RECOMMENDATION: Clinical follow up. BI-RADS 2 - Benign Finding(s)\par \par We reviewed clinical breast exam and clinical breast exam is benign.  She reports her left breast is still uncomfortable and is considering of removing the implant.  We discussed the RAMON procedure and will send her to plastics to further discuss.  She will follow up in February for a follow up clinical breast exam. \par \par All questions answered.\par \par

## 2021-12-21 ENCOUNTER — NON-APPOINTMENT (OUTPATIENT)
Age: 31
End: 2021-12-21

## 2021-12-21 ENCOUNTER — APPOINTMENT (OUTPATIENT)
Dept: OBGYN | Facility: CLINIC | Age: 31
End: 2021-12-21
Payer: COMMERCIAL

## 2021-12-21 VITALS
SYSTOLIC BLOOD PRESSURE: 112 MMHG | DIASTOLIC BLOOD PRESSURE: 70 MMHG | HEIGHT: 61 IN | BODY MASS INDEX: 26.71 KG/M2 | TEMPERATURE: 97.2 F | WEIGHT: 141.5 LBS

## 2021-12-21 PROCEDURE — 99395 PREV VISIT EST AGE 18-39: CPT

## 2021-12-24 LAB — HPV HIGH+LOW RISK DNA PNL CVX: NOT DETECTED

## 2021-12-24 NOTE — PHYSICAL EXAM
[Chaperone Present] : A chaperone was present in the examining room during all aspects of the physical examination [Appropriately responsive] : appropriately responsive [Alert] : alert [No Acute Distress] : no acute distress [Soft] : soft [Non-tender] : non-tender [Non-distended] : non-distended [Oriented x3] : oriented x3 [No Masses] : no breast masses were palpable [Labia Minora] : normal [Normal] : normal [Uterine Adnexae] : normal [FreeTextEntry2] : she has labial attenuation and s/s of L/S

## 2021-12-24 NOTE — HISTORY OF PRESENT ILLNESS
[Gonorrhea test offered] : Gonorrhea test offered [Chlamydia test offered] : Chlamydia test offered [HPV test offered] : HPV test offered [N] : Patient is not sexually active [Y] : Positive pregnancy history [Menarche Age: ____] : age at menarche was [unfilled] [Previously active] : previously active [Men] : men [Vaginal] : vaginal [No] : No [Patient refuses STI testing] : Patient refuses STI testing [BreastSonogramDate] : 09/20/2021 [TextBox_25] : Left Breast BR3 [PapSmeardate] : 09/17/2020 [TextBox_31] : Negative [GonorrheaDate] : 09/17/2020 [TextBox_63] : Negative [ChlamydiaDate] : 09/17/2020 [TextBox_68] : Negative [HPVDate] : 09/17/2020 [TextBox_78] : Negative [LMPDate] : 03/09/2021 [PGHxTotal] : 3 [[facility]] : 2 [[facility]Living] : 2 [PGHxABSpont] : 1 [FreeTextEntry1] : 03/09/2021

## 2021-12-24 NOTE — PLAN
[FreeTextEntry1] : During this visit comprehensive counseling was given regarding the following concerns:\par \par 1 We discussed the need for proper nutrition and exercise.  This includes cardiovascular and pelvic floor exercise.\par \par 2 We discussed the importance of maintaining a proper vaccination schedule and we discussed the utility of the flu vaccine and TDaP and Covid 19 when available.\par \par 3 We discussed the impact of chronic sun exposure and the risk for skin disease as a result. The benefits of a total body scan by a Dermatologist was reviewed..\par \par 4 We discussed the need for certain supplements for most people which include vitamin D3, calcium rich foods with limitation on calcium supplementation by tabular form to 600        mg by mouth daily.  As part of a bone health program we recommend weightbearing exercises, and some limited sun exposure ( 10-15 minutes daily) to help convert vitamin D to its active form.\par We discussed the slow return to normal exercise and activity. All questions were answered. \par She will resume the use of clobetasol and we discussed the nature of LS and the tendency for the s/s to wax and wane. She will remain vigilant. The use of lubricants and perineal massage as well as extended foreplay will help with discomfort. \par

## 2021-12-28 LAB — CYTOLOGY CVX/VAG DOC THIN PREP: NORMAL

## 2022-01-10 ENCOUNTER — OUTPATIENT (OUTPATIENT)
Dept: OUTPATIENT SERVICES | Facility: HOSPITAL | Age: 32
LOS: 1 days | Discharge: HOME | End: 2022-01-10

## 2022-01-10 DIAGNOSIS — Z98.890 OTHER SPECIFIED POSTPROCEDURAL STATES: Chronic | ICD-10-CM

## 2022-01-10 DIAGNOSIS — C50.412 MALIGNANT NEOPLASM OF UPPER-OUTER QUADRANT OF LEFT FEMALE BREAST: ICD-10-CM

## 2022-01-10 DIAGNOSIS — Z98.89 OTHER SPECIFIED POSTPROCEDURAL STATES: Chronic | ICD-10-CM

## 2022-01-11 ENCOUNTER — APPOINTMENT (OUTPATIENT)
Dept: HEMATOLOGY ONCOLOGY | Facility: CLINIC | Age: 32
End: 2022-01-11
Payer: COMMERCIAL

## 2022-01-11 PROCEDURE — 99446 NTRPROF PH1/NTRNET/EHR 5-10: CPT

## 2022-01-11 NOTE — HISTORY OF PRESENT ILLNESS
[Disease: _____________________] : Disease: [unfilled] [T: ___] : T[unfilled] [N: ___] : N[unfilled] [AJCC Stage: ____] : AJCC Stage: [unfilled] [Home] : at home, [unfilled] , at the time of the visit. [Medical Office: ([facility])___] : at the medical office located in  [Verbal consent obtained from patient] : the patient, [unfilled] [de-identified] : Ms. Kennedy was diagnosed with left triple negative breast cancer at age 28. \par \par She delivered her first child on 4/25/18 following which she self palpated a left breast mass.  It was  thought to be a blocked milk duct / mastitis and was treated with antibiotics and then antifungals.  The mass did not resolve and she sought a 2nd opinion.  \par \par She then had a breast ultrasound on 5/30/18 which showed a 2.3 cm left breast mass at 1-2:00, 9 cm from the nipple, and a single axillary lymph node which does not contain a prominent benign appearing fatty hilum.  \par \par 5/31/18 left breast core biopsy - invasive poorly differentiated ductal carcinoma, [place] score 9/9, measures at least 17 cm w/ extensive necrosis. ER 0%, TN 0%, HER 2 (0/1+) -negative. \par \par On 6/7/18 she had a diagnostic mammogram + ultrasound - 2.1 cm rounded mass of left breast with overall coarsening of parenchymal pattern and increased parenchymal density in upper outer left breast. US - 2.4 cm left breast mass at 1-2:00 and left axilla 0.8 cm rounded hypoechoic mass consistent with abnormal left axillary node.  \par \par On 6/7/18 she also had a breast MRI - 2.9 x 3.2 x 3.1 cm mass in left upper inner breast at 1:00.  Suspicious left axillary nodes with ultrasound correlate for which US guided biopsy is recommended.   \par \par On 6/8/17 she had biopsy of the left axillary node - pathology : reactive lymph node. \par \par 6/7/18 - CT chest/abd/pelvis -  Known left upper outer breast malignancy. 2 small, round left axillary lymph nodes for which patient will undergo percutaneous \par sampling, as per report of breast MRI from 6/7/2018. A 3 mm subpleural nodular density in the right middle lobe probably represents a focus of atelectasis. No evidence of metastatic disease in the chest, abdomen, and pelvis.\par \par 6/8/18 bone scan - Normal bone scan. No radionuclide evidence of osseous metastasis.\par \par 6/29/18 -s/p bilateral mastectomy \par Pathology: left breast IDC 3.5 cm, joo score 9/9, margins negative, 2 SNL nodes negative.  pT2 pN0\par Right mastectomy - negative for malignancy. \par \par Family History: 2 maternal great aunts with breast cancer.\par paternal cousin with breast cancer\par paternal GM - pancreatic cancer\par paternal GF - pancreatic cancer\par \par [facility]  SendTaskSK panel - no clinically significant mutations\par \par PMH: asthma, DPD deficiency (dihydropyrimidine dehydrogenase deficiency).\par \par Sx hx: tonsillectomy\par \par Social: non-smoker, social ETOH  [de-identified] : poorly differentiated invasive ductal carcinoma [de-identified] : ER 0% AR 0% HER2 negative [de-identified] : Returns for follow up.\par Patient went into labor at [facility] 11/07/2022. Notes lumps in breast postpartum. She was seen by Dr. Farr.\par Continued L breast pain,  she has h/o post mastectomy pain L side, and gets cortisone injections at [facility] by pain management previously, last injection 2 weeks ago\par \par \par \par \par

## 2022-01-11 NOTE — ADDENDUM
[FreeTextEntry1] : Documented by Zachary Mills acting as scribe for Dr. Mcclain on 01/11/2022. \par \par All Medical record entries made by the Scribe were at my, Dr. Mcclain's, direction and personally dictated by me on 01/11/2022. I have reviewed the chart and agree that the record accurately reflects my personal performance of the history, physical exam, assessment and plan. I have also personally directed, reviewed, and agreed with the discharge instructions.

## 2022-01-11 NOTE — ASSESSMENT
[FreeTextEntry1] : 31 year old female with stage IIA left breast TNBC (T2N0) S/P bilateral mastectomy on 6/29/18. She has DPD deficiency.  S/p 4 cycles of dose dense AC, followed by weekly taxol completed 12/26/18.\par Post chemotherapy she was incidentally found to have anterior mediastinal mass c/w thymic rebound. \par \par  7/9/21 L breast US - 0.5 cm hypoechoic nodule, likely representing fat necrosis\par 8/20/21 US abd - mild R hydronephrosis\par 8/23/21 US kidneys/bladder - persistent R mild hydro with patent ureteral jets. \par \par Seen by Dr. Farr 1 mo nth post partum\par No new symptoms. 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 Farr\par -Follow up in 3 months\par \par time on phone: 5 mins

## 2022-01-31 ENCOUNTER — APPOINTMENT (OUTPATIENT)
Dept: ULTRASOUND IMAGING | Facility: CLINIC | Age: 32
End: 2022-01-31
Payer: COMMERCIAL

## 2022-01-31 ENCOUNTER — RESULT REVIEW (OUTPATIENT)
Age: 32
End: 2022-01-31

## 2022-01-31 ENCOUNTER — APPOINTMENT (OUTPATIENT)
Dept: MRI IMAGING | Facility: CLINIC | Age: 32
End: 2022-01-31
Payer: COMMERCIAL

## 2022-01-31 ENCOUNTER — OUTPATIENT (OUTPATIENT)
Dept: OUTPATIENT SERVICES | Facility: HOSPITAL | Age: 32
LOS: 1 days | End: 2022-01-31
Payer: COMMERCIAL

## 2022-01-31 DIAGNOSIS — Z98.890 OTHER SPECIFIED POSTPROCEDURAL STATES: Chronic | ICD-10-CM

## 2022-01-31 DIAGNOSIS — Z98.89 OTHER SPECIFIED POSTPROCEDURAL STATES: Chronic | ICD-10-CM

## 2022-01-31 DIAGNOSIS — C50.919 MALIGNANT NEOPLASM OF UNSPECIFIED SITE OF UNSPECIFIED FEMALE BREAST: ICD-10-CM

## 2022-01-31 DIAGNOSIS — N63.20 UNSPECIFIED LUMP IN THE LEFT BREAST, UNSPECIFIED QUADRANT: ICD-10-CM

## 2022-01-31 PROCEDURE — 77049 MRI BREAST C-+ W/CAD BI: CPT | Mod: 26

## 2022-01-31 PROCEDURE — C8937: CPT

## 2022-01-31 PROCEDURE — 76642 ULTRASOUND BREAST LIMITED: CPT | Mod: 26,LT

## 2022-01-31 PROCEDURE — 76642 ULTRASOUND BREAST LIMITED: CPT

## 2022-01-31 PROCEDURE — A9585: CPT

## 2022-01-31 PROCEDURE — C8908: CPT

## 2022-02-23 ENCOUNTER — NON-APPOINTMENT (OUTPATIENT)
Age: 32
End: 2022-02-23

## 2022-02-24 ENCOUNTER — APPOINTMENT (OUTPATIENT)
Dept: OBGYN | Facility: CLINIC | Age: 32
End: 2022-02-24
Payer: COMMERCIAL

## 2022-02-24 VITALS
WEIGHT: 141 LBS | SYSTOLIC BLOOD PRESSURE: 118 MMHG | DIASTOLIC BLOOD PRESSURE: 78 MMHG | HEIGHT: 61 IN | BODY MASS INDEX: 26.62 KG/M2

## 2022-02-24 PROCEDURE — 81003 URINALYSIS AUTO W/O SCOPE: CPT | Mod: QW

## 2022-02-24 PROCEDURE — 99213 OFFICE O/P EST LOW 20 MIN: CPT

## 2022-02-25 LAB
BILIRUB UR QL STRIP: NORMAL
GLUCOSE UR-MCNC: NORMAL
HCG UR QL: 0.2 EU/DL
HGB UR QL STRIP.AUTO: ABNORMAL
KETONES UR-MCNC: NORMAL
LEUKOCYTE ESTERASE UR QL STRIP: ABNORMAL
NITRITE UR QL STRIP: NORMAL
PH UR STRIP: 6
PROT UR STRIP-MCNC: NORMAL
SP GR UR STRIP: 1.02

## 2022-02-27 NOTE — HISTORY OF PRESENT ILLNESS
[N] : Patient does not use contraception [Y] : Positive pregnancy history [Menarche Age: ____] : age at menarche was [unfilled] [No] : Patient does not have concerns regarding sex [Currently Active] : currently active [Mammogramdate] : 06/08/18 [BreastSonogramDate] : 01/31/22 [TextBox_25] : BR3 [PapSmeardate] : 01/21/21 [TextBox_31] : NEG [BoneDensityDate] : 05/15/19 [TextBox_37] : OSTEOPENIC [HPVDate] : 01/21/21 [TextBox_78] : NEG [LMPDate] : 02/18/22 [PGHxTotal] : 3 [[facility] : 2 [[facility]] : 2 [PGHxABSpont] : 1 [FreeTextEntry1] : 02/18/22

## 2022-02-27 NOTE — DISCUSSION/SUMMARY
[FreeTextEntry1] : Affirm\par \par Tx started with Metrogel, Lotrisone\par \par Vulvar hygiene reviewed

## 2022-03-12 ENCOUNTER — OUTPATIENT (OUTPATIENT)
Dept: OUTPATIENT SERVICES | Facility: HOSPITAL | Age: 32
LOS: 1 days | Discharge: HOME | End: 2022-03-12

## 2022-03-12 DIAGNOSIS — Z98.89 OTHER SPECIFIED POSTPROCEDURAL STATES: Chronic | ICD-10-CM

## 2022-03-12 DIAGNOSIS — Z98.890 OTHER SPECIFIED POSTPROCEDURAL STATES: Chronic | ICD-10-CM

## 2022-03-12 DIAGNOSIS — C50.412 MALIGNANT NEOPLASM OF UPPER-OUTER QUADRANT OF LEFT FEMALE BREAST: ICD-10-CM

## 2022-03-14 ENCOUNTER — APPOINTMENT (OUTPATIENT)
Dept: ENDOCRINOLOGY | Facility: CLINIC | Age: 32
End: 2022-03-14
Payer: COMMERCIAL

## 2022-03-14 PROCEDURE — 99213 OFFICE O/P EST LOW 20 MIN: CPT | Mod: 95

## 2022-03-14 NOTE — HISTORY OF PRESENT ILLNESS
[Home] : at home, [unfilled] , at the time of the visit. [Medical Office: ([facility])___] : at the medical office located in  [Verbal consent obtained from patient] : the patient, [unfilled] [FreeTextEntry1] : \par  Telehealth  start time 915AM\par  end time 927  AM \par follow up \par Hypothryoidsm \par \par  feels well\par  nowpost partum\par  delivered babay at 34 weeks\par  pt home\par  baby remains in  NICU \par   RR droppe last night and HR dropped lastenight \par ROS No Neck pain No voice changes  No Chest pain  No Pressure  occ dyspnea   No n/v abd pain diarrhea or constipation  No LE edema \par \par \par \par \par

## 2022-03-15 ENCOUNTER — APPOINTMENT (OUTPATIENT)
Dept: HEMATOLOGY ONCOLOGY | Facility: CLINIC | Age: 32
End: 2022-03-15

## 2022-03-26 NOTE — H&P PST ADULT - HISTORY OF PRESENT ILLNESS
27 yo female presents to PST. Pt is 8 weeks postpartum.  Reports being diagnosed with left breast cancer June 6th 2018 after feeling a lump in her breast while breastfeeding. c/o left breast pain. coronary disease 27 yo female presents to PST. Pt is 8 weeks postpartum.  Reports being diagnosed with left breast cancer June 6th 2018 after feeling a lump in her breast while breastfeeding. c/o left breast pain. States she is going to be having b/l mastectomy with tissue expanders placed. Plans on having mediport placed in the future for chemotherapy. States she is currently on fertility injections for egg retrieval due to possibility of chemo putting her into menopause.

## 2022-03-27 NOTE — HISTORY OF PRESENT ILLNESS
[FreeTextEntry1] : \par telehealth  folollow up \par start 904 AM\par  end time 915 AM Pt here for follow up Hypothyroidsm \par   on Synthroid 0.05 mg tolerating it well\par \par \par feels well overall  but some gatigue in middle of day \par  NSEWA says to remov it\par  [facility] says to observe\par  to ge repeat  Ct in NOv - if growin will get removed \par \par \par

## 2022-03-27 NOTE — ASSESSMENT
[FreeTextEntry1] : Hypothyroid Cotn RX with 0.05 mg Syntrhoid \par toelratign well \par cehck US \par \par Mediastinal masds ? 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

## 2022-04-11 PROBLEM — Z11.59 SCREENING FOR VIRAL DISEASE: Status: RESOLVED | Noted: 2020-12-10 | Resolved: 2021-05-14

## 2022-04-25 ENCOUNTER — OUTPATIENT (OUTPATIENT)
Dept: OUTPATIENT SERVICES | Facility: HOSPITAL | Age: 32
LOS: 1 days | Discharge: HOME | End: 2022-04-25

## 2022-04-25 DIAGNOSIS — Z98.89 OTHER SPECIFIED POSTPROCEDURAL STATES: Chronic | ICD-10-CM

## 2022-04-25 DIAGNOSIS — Z98.890 OTHER SPECIFIED POSTPROCEDURAL STATES: Chronic | ICD-10-CM

## 2022-04-25 DIAGNOSIS — C50.412 MALIGNANT NEOPLASM OF UPPER-OUTER QUADRANT OF LEFT FEMALE BREAST: ICD-10-CM

## 2022-04-27 ENCOUNTER — APPOINTMENT (OUTPATIENT)
Age: 32
End: 2022-04-27

## 2022-04-27 ENCOUNTER — APPOINTMENT (OUTPATIENT)
Dept: HEMATOLOGY ONCOLOGY | Facility: CLINIC | Age: 32
End: 2022-04-27
Payer: COMMERCIAL

## 2022-04-27 ENCOUNTER — RESULT REVIEW (OUTPATIENT)
Age: 32
End: 2022-04-27

## 2022-04-27 VITALS
HEIGHT: 61 IN | HEART RATE: 101 BPM | OXYGEN SATURATION: 97 % | WEIGHT: 136.44 LBS | BODY MASS INDEX: 25.76 KG/M2 | DIASTOLIC BLOOD PRESSURE: 78 MMHG | SYSTOLIC BLOOD PRESSURE: 122 MMHG

## 2022-04-27 LAB
BASOPHILS # BLD AUTO: 0.1 K/UL — SIGNIFICANT CHANGE UP (ref 0–0.2)
BASOPHILS NFR BLD AUTO: 0.9 % — SIGNIFICANT CHANGE UP (ref 0–2)
EOSINOPHIL # BLD AUTO: 0.1 K/UL — SIGNIFICANT CHANGE UP (ref 0–0.5)
EOSINOPHIL NFR BLD AUTO: 1.7 % — SIGNIFICANT CHANGE UP (ref 0–6)
HCT VFR BLD CALC: 39.7 % — SIGNIFICANT CHANGE UP (ref 34.5–45)
HGB BLD-MCNC: 12.8 G/DL — SIGNIFICANT CHANGE UP (ref 11.5–15.5)
LYMPHOCYTES # BLD AUTO: 1.5 K/UL — SIGNIFICANT CHANGE UP (ref 1–3.3)
LYMPHOCYTES # BLD AUTO: 23.2 % — SIGNIFICANT CHANGE UP (ref 13–44)
MCHC RBC-ENTMCNC: 31 PG — SIGNIFICANT CHANGE UP (ref 27–34)
MCHC RBC-ENTMCNC: 32.3 G/DL — SIGNIFICANT CHANGE UP (ref 32–36)
MCV RBC AUTO: 95.9 FL — SIGNIFICANT CHANGE UP (ref 80–100)
MONOCYTES # BLD AUTO: 0.5 K/UL — SIGNIFICANT CHANGE UP (ref 0–0.9)
MONOCYTES NFR BLD AUTO: 7.9 % — SIGNIFICANT CHANGE UP (ref 2–14)
NEUTROPHILS # BLD AUTO: 4.2 K/UL — SIGNIFICANT CHANGE UP (ref 1.8–7.4)
NEUTROPHILS NFR BLD AUTO: 66.3 % — SIGNIFICANT CHANGE UP (ref 43–77)
PLATELET # BLD AUTO: 188 K/UL — SIGNIFICANT CHANGE UP (ref 150–400)
RBC # BLD: 4.14 M/UL — SIGNIFICANT CHANGE UP (ref 3.8–5.2)
RBC # FLD: 11.4 % — SIGNIFICANT CHANGE UP (ref 10.3–14.5)
WBC # BLD: 6.3 K/UL — SIGNIFICANT CHANGE UP (ref 3.8–10.5)
WBC # FLD AUTO: 6.3 K/UL — SIGNIFICANT CHANGE UP (ref 3.8–10.5)

## 2022-04-27 PROCEDURE — 99214 OFFICE O/P EST MOD 30 MIN: CPT

## 2022-04-27 RX ORDER — ACETAMINOPHEN 500 MG/1
500 TABLET ORAL
Refills: 0 | Status: DISCONTINUED | COMMUNITY
Start: 2021-11-10 | End: 2022-04-27

## 2022-04-27 RX ORDER — ASCORBIC ACID, CHOLECALCIFEROL, .ALPHA.-TOCOPHEROL ACETATE, DL-, PYRIDOXINE, FOLIC ACID, CYANOCOBALAMIN, CALCIUM, FERROUS FUMARATE, MAGNESIUM, DOCONEXENT 85; 200; 10; 25; 1; 12; 140; 27; 45; 300 [IU]/1; [IU]/1; [IU]/1; [IU]/1; MG/1; UG/1; MG/1; MG/1; MG/1; MG/1
27-0.6-0.4-3 CAPSULE, GELATIN COATED ORAL
Qty: 90 | Refills: 3 | Status: DISCONTINUED | COMMUNITY
Start: 2021-02-19 | End: 2022-04-27

## 2022-04-27 RX ORDER — CLOBETASOL PROPIONATE 0.5 MG/G
0.05 OINTMENT TOPICAL
Qty: 1 | Refills: 2 | Status: DISCONTINUED | COMMUNITY
Start: 2021-12-21 | End: 2022-04-27

## 2022-04-27 RX ORDER — DULOXETINE HYDROCHLORIDE 60 MG/1
60 CAPSULE, DELAYED RELEASE ORAL
Refills: 0 | Status: ACTIVE | COMMUNITY

## 2022-04-27 RX ORDER — PROMETHAZINE HYDROCHLORIDE AND CODEINE PHOSPHATE 6.25; 1 MG/5ML; MG/5ML
6.25-1 SOLUTION ORAL EVERY 6 HOURS
Qty: 100 | Refills: 0 | Status: DISCONTINUED | COMMUNITY
Start: 2021-11-30 | End: 2022-04-27

## 2022-04-27 RX ORDER — FLUCONAZOLE 150 MG/1
150 TABLET ORAL
Qty: 2 | Refills: 0 | Status: DISCONTINUED | COMMUNITY
Start: 2022-02-24 | End: 2022-04-27

## 2022-04-27 RX ORDER — METRONIDAZOLE 7.5 MG/G
0.75 GEL VAGINAL
Qty: 1 | Refills: 0 | Status: DISCONTINUED | COMMUNITY
Start: 2022-02-24 | End: 2022-04-27

## 2022-04-27 RX ORDER — CLOTRIMAZOLE AND BETAMETHASONE DIPROPIONATE 10; .5 MG/G; MG/G
1-0.05 CREAM TOPICAL 3 TIMES DAILY
Qty: 1 | Refills: 1 | Status: DISCONTINUED | COMMUNITY
Start: 2022-02-24 | End: 2022-04-27

## 2022-04-27 RX ORDER — AMOXICILLIN AND CLAVULANATE POTASSIUM 875; 125 MG/1; MG/1
875-125 TABLET, COATED ORAL
Qty: 14 | Refills: 0 | Status: DISCONTINUED | COMMUNITY
Start: 2021-11-30 | End: 2022-04-27

## 2022-04-27 RX ORDER — FLUTICASONE PROPIONATE 50 UG/1
50 SPRAY, METERED NASAL TWICE DAILY
Qty: 1 | Refills: 1 | Status: DISCONTINUED | COMMUNITY
Start: 2021-11-30 | End: 2022-04-27

## 2022-04-27 NOTE — HISTORY OF PRESENT ILLNESS
[Disease: _____________________] : Disease: [unfilled] [T: ___] : T[unfilled] [N: ___] : N[unfilled] [AJCC Stage: ____] : AJCC Stage: [unfilled] [de-identified] : Ms. Kennedy was diagnosed with left triple negative breast cancer at age 28. \par \par She delivered her first child on 4/25/18 following which she self palpated a left breast mass.  It was  thought to be a blocked milk duct / mastitis and was treated with antibiotics and then antifungals.  The mass did not resolve and she sought a 2nd opinion.  \par \par She then had a breast ultrasound on 5/30/18 which showed a 2.3 cm left breast mass at 1-2:00, 9 cm from the nipple, and a single axillary lymph node which does not contain a prominent benign appearing fatty hilum.  \par \par 5/31/18 left breast core biopsy - invasive poorly differentiated ductal carcinoma, [place] score 9/9, measures at least 17 cm w/ extensive necrosis. ER 0%, MO 0%, HER 2 (0/1+) -negative. \par \par On 6/7/18 she had a diagnostic mammogram + ultrasound - 2.1 cm rounded mass of left breast with overall coarsening of parenchymal pattern and increased parenchymal density in upper outer left breast. US - 2.4 cm left breast mass at 1-2:00 and left axilla 0.8 cm rounded hypoechoic mass consistent with abnormal left axillary node.  \par \par On 6/7/18 she also had a breast MRI - 2.9 x 3.2 x 3.1 cm mass in left upper inner breast at 1:00.  Suspicious left axillary nodes with ultrasound correlate for which US guided biopsy is recommended.   \par \par On 6/8/17 she had biopsy of the left axillary node - pathology : reactive lymph node. \par \par 6/7/18 - CT chest/abd/pelvis -  Known left upper outer breast malignancy. 2 small, round left axillary lymph nodes for which patient will undergo percutaneous \par sampling, as per report of breast MRI from 6/7/2018. A 3 mm subpleural nodular density in the right middle lobe probably represents a focus of atelectasis. No evidence of metastatic disease in the chest, abdomen, and pelvis.\par \par 6/8/18 bone scan - Normal bone scan. No radionuclide evidence of osseous metastasis.\par \par 6/29/18 -s/p bilateral mastectomy \par Pathology: left breast IDC 3.5 cm, joo score 9/9, margins negative, 2 SNL nodes negative.  pT2 pN0\par Right mastectomy - negative for malignancy. \par \par Family History: 2 maternal great aunts with breast cancer.\par paternal cousin with breast cancer\par paternal GM - pancreatic cancer\par paternal GF - pancreatic cancer\par \par [facility]  BerylliumSK panel - no clinically significant mutations\par \par PMH: asthma, DPD deficiency (dihydropyrimidine dehydrogenase deficiency).\par \par Sx hx: tonsillectomy\par \par Social: non-smoker, social ETOH  [de-identified] : poorly differentiated invasive ductal carcinoma [de-identified] : ER 0% OK 0% HER2 negative [de-identified] : Returns for follow up.\par Notes return of "random" bruising on legs. Denies trauma.\par Denies gum bleeding \par Menstrual cycle is not heavy \par Restarted Cymbalta for nerve pain 3 weeks ago, notes decrease in "shock" pain around reconstructed breasts since starting \par Considering possible removal of breast implants \par Has continued left stabbing/poking breast pain, receiving injections \par Stopped prenatal vitamin \par Followed up with surgeon regarding thymus neoplasm, plan for monitoring \par No abdominal pain

## 2022-04-27 NOTE — PHYSICAL EXAM
[Ambulatory and capable of all self care but unable to carry out any work activities] : Status 2- Ambulatory and capable of all self care but unable to carry out any work activities. Up and about more than 50% of waking hours [Normal] : affect appropriate [de-identified] : bilateral reconstructed breasts, no nodularity

## 2022-04-27 NOTE — ASSESSMENT
[FreeTextEntry1] : 31 year old female with stage IIA left breast TNBC (T2N0) S/P bilateral mastectomy on 6/29/18. She has DPD deficiency.  S/p 4 cycles of dose dense AC, followed by weekly taxol completed 12/26/18.\par Post chemotherapy she was incidentally found to have anterior mediastinal mass c/w thymic rebound. \par \par  7/9/21 L breast US - 0.5 cm hypoechoic nodule, likely representing fat necrosis\par 8/20/21 US abd - mild R hydronephrosis\par 8/23/21 US kidneys/bladder - persistent R mild hydro with patent ureteral jets. \par \par Reviewed 1/31/22 MR Breast- No MRI evidence of malignancy. 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 Farr\par -Follow up in 4 months

## 2022-04-29 LAB
ALBUMIN SERPL ELPH-MCNC: 4.2 G/DL
ALP BLD-CCNC: 61 U/L
ALT SERPL-CCNC: 10 U/L
ANION GAP SERPL CALC-SCNC: 11 MMOL/L
AST SERPL-CCNC: 18 U/L
BILIRUB SERPL-MCNC: 0.2 MG/DL
BUN SERPL-MCNC: 14 MG/DL
CALCIUM SERPL-MCNC: 8.6 MG/DL
CHLORIDE SERPL-SCNC: 105 MMOL/L
CO2 SERPL-SCNC: 23 MMOL/L
CREAT SERPL-MCNC: 0.69 MG/DL
EGFR: 118 ML/MIN/1.73M2
FERRITIN SERPL-MCNC: 67 NG/ML
FOLATE SERPL-MCNC: 11.4 NG/ML
GLUCOSE SERPL-MCNC: 103 MG/DL
IRON SATN MFR SERPL: 38 %
IRON SERPL-MCNC: 92 UG/DL
POTASSIUM SERPL-SCNC: 4.2 MMOL/L
PROT SERPL-MCNC: 6.5 G/DL
SODIUM SERPL-SCNC: 140 MMOL/L
TIBC SERPL-MCNC: 241 UG/DL
UIBC SERPL-MCNC: 149 UG/DL
VIT B12 SERPL-MCNC: 362 PG/ML

## 2022-05-17 ENCOUNTER — APPOINTMENT (OUTPATIENT)
Dept: ORTHOPEDIC SURGERY | Facility: CLINIC | Age: 32
End: 2022-05-17
Payer: COMMERCIAL

## 2022-05-17 VITALS
HEART RATE: 111 BPM | SYSTOLIC BLOOD PRESSURE: 126 MMHG | WEIGHT: 132 LBS | HEIGHT: 61 IN | OXYGEN SATURATION: 98 % | BODY MASS INDEX: 24.92 KG/M2 | DIASTOLIC BLOOD PRESSURE: 89 MMHG

## 2022-05-17 PROCEDURE — 99214 OFFICE O/P EST MOD 30 MIN: CPT

## 2022-05-17 PROCEDURE — 73502 X-RAY EXAM HIP UNI 2-3 VIEWS: CPT | Mod: RT

## 2022-05-17 NOTE — HISTORY OF PRESENT ILLNESS
[de-identified] : Ms. JEANE GUTIERREZ is a 32 year old female presenting for evaluation of years or intermittent right hip pain, localized to the right groin. She notes pain is worse with deep flexion of the hip, as well as with sitting cross legged. She denies any specific fall or trauma. Patient denies stiffness of the hip. In the past she has had steroid injections, which have not provided much relief. Patients most recent injection was one month ago. She has also tried PT and home exercise without improvement.

## 2022-05-17 NOTE — PHYSICAL EXAM
[de-identified] : The patient appears well nourished  and in no apparent distress.  The patient is alert and oriented to person, place, and time.   Affect and mood appear normal. The head is normocephalic and atraumatic.  The eyes reveal normal sclera and extra ocular muscles are intact. The tongue is midline with no apparent lesions.  Skin shows normal turgor with no evidence of eczema or psoriasis.  No respiratory distress noted.  Sensation grossly intact.		  [de-identified] : Exam of the right hip shows hip flexion of 130 degrees(with anterolateral pain), hip external rotation of 60 degrees, hip internal rotation of 40 degrees. There is no tenderness over the GT bursa. \par Patient can perform a straight leg raise with pain.		 \par  5/5 motor strength bilaterally distally. Sensation intact distally.		  [de-identified] : X-ray: AP view of the pelvis and 2 views of the left hip demonstrate a well preserved joint space.

## 2022-05-17 NOTE — ADDENDUM
[FreeTextEntry1] : This note was authored by Kyle Tse working as a medical scribe for Dr. Daryl Ribeiro. The note was reviewed, edited, and revised by Dr. Daryl Ribeiro whom is in agreement with the exam findings, imaging findings, and treatment plan. 05/17/2022

## 2022-05-24 NOTE — ED ADULT NURSE NOTE - TEMPLATE
CC: Diagnoses of Raynaud's phenomenon without gangrene, Chronic neutropenia (HCC), Current moderate episode of major depressive disorder without prior episode (HCC), and Thrombocytopenia (HCC) were pertinent to this visit.                                                                                                                                         HPI:   Yajaira presents today with the following concerns:    1. Raynaud's phenomenon without gangrene  This is a new problem that is chronic and intermittently uncontrolled.  Patient doesn't want treatment at this time.    2. Chronic neutropenia (HCC)  Chronic health problem that is now resolved.    3. Current moderate episode of major depressive disorder without prior episode (HCC)  Chronic health problem, well controlled, continue with current meds and lifestyle.      4. Thrombocytopenia (HCC)  Chronic health problem that is stable and persistent.Continues to follow with Hematology annually.       Patient Active Problem List    Diagnosis Date Noted   • Raynaud's phenomenon without gangrene 05/20/2022   • Bladder prolapse, female, acquired 10/20/2021   • Degenerative disc disease, cervical 05/20/2019   • Current moderate episode of major depressive disorder without prior episode (HCC) 05/20/2019   • Multinodular goiter (nontoxic) 05/20/2019   • Thrombocytopenia (HCC) 10/18/2018   • Right elbow pain 07/16/2018   • BPPV (benign paroxysmal positional vertigo) 08/13/2017   • Seasonal allergies 05/08/2014   • Postmenopausal atrophic vaginitis 04/26/2010   • Vitamin D deficiency disease 04/26/2010   • Diarrhea following gastrointestinal surgery 04/26/2010       Current Outpatient Medications   Medication Sig Dispense Refill   • Probiotic Product (PROBIOTIC PO) Take  by mouth.     • estradiol (ESTRACE) 0.1 MG/GM vaginal cream Insert 0.5gram per vagina at bedtime nightly x 7d then dec to 2-3xweekly 3 Each 3   • Calcium Carb-Cholecalciferol 600-400 MG-UNIT Tab Take 1  "Tablet by mouth.     • DULoxetine (CYMBALTA) 30 MG Cap DR Particles Take 30 mg by mouth every day.     • Glucosamine-Chondroit-Vit C-Mn (GLUCOSAMINE 1500 COMPLEX) Cap Take  by mouth.     • Cholestyramine Light 4 GM Powder Take 4 g by mouth every day. 3 Can 3   • Cyanocobalamin (B-12) 1000 MCG SL Tab Place  under tongue.     • Multiple Vitamins-Minerals (CENTRUM SILVER ADULT 50+ PO) Take  by mouth.     • Flaxseed, Linseed, (FLAXSEED OIL) 1200 MG CAPS Take  by mouth.     • ascorbic acid (ASCORBIC ACID) 500 MG TABS Take 500 mg by mouth every day.     • vitamin D (CHOLECALCIFEROL) 1000 UNIT TABS Take 6,000 Units by mouth every day.       No current facility-administered medications for this visit.         Allergies as of 05/20/2022 - Reviewed 05/20/2022   Allergen Reaction Noted   • Hmg-coa-r inhibitors Myalgia 05/20/2022   • Other environmental Runny Nose 08/22/2013            /88 (BP Location: Right arm, Patient Position: Sitting, BP Cuff Size: Adult)   Pulse 77   Temp 36.8 °C (98.2 °F) (Temporal)   Resp 16   Ht 1.651 m (5' 5\")   Wt 69.9 kg (154 lb)   LMP  (LMP Unknown)   SpO2 97%   Breastfeeding No   BMI 25.63 kg/m²     Physical Exam:  Gen:         Alert and oriented, No apparent distress.  Neck:        No Lymphadenopathy or Bruits.  Lungs:     Clear to auscultation bilaterally  CV:          Regular rate and rhythm. No murmurs, rubs or gallops.               Ext:          No clubbing, cyanosis, edema.    LABS: 5/16/22: Results reviewed and discussed with the patient, questions answered.    Assessment and Plan.   75 y.o. female with the following issues:    Raynaud's phenomenon without gangrene  This is a new problem that has been coming more and more and so far she can tolerate this.  We discussed medications that might be helpful and what she can do to try and mitigate the severity and number of occurrences.     Chronic neutropenia (HCC)  This is a chronic health problem that appears well controlled " and back to normal.  Will resolve.    Current moderate episode of major depressive disorder without prior episode (HCC)  This is a chronic health problem that is well controlled with current medications and she is doing well. She denies Homicidal or Suicidal ideation.  Her  continues to comment that her mood is good and stable with current dose of duloxetine 30 mg daily.    Thrombocytopenia (HCC)  This is a chronic health problem that was found when the patient was seen way back in 2018 her platelet count was 159,000 its been fairly stable.  She has lab work with her from [place] in January of this year was 150,000 this time it was 151,000.  Were going to just keep an eye on that she is not showing any progression.        OB/GYN

## 2022-06-09 ENCOUNTER — APPOINTMENT (OUTPATIENT)
Dept: ORTHOPEDIC SURGERY | Facility: CLINIC | Age: 32
End: 2022-06-09

## 2022-06-09 ENCOUNTER — APPOINTMENT (OUTPATIENT)
Dept: SURGERY | Facility: CLINIC | Age: 32
End: 2022-06-09
Payer: COMMERCIAL

## 2022-06-09 VITALS
TEMPERATURE: 97.8 F | HEART RATE: 105 BPM | DIASTOLIC BLOOD PRESSURE: 94 MMHG | OXYGEN SATURATION: 95 % | SYSTOLIC BLOOD PRESSURE: 134 MMHG | BODY MASS INDEX: 25.3 KG/M2 | WEIGHT: 134 LBS | HEIGHT: 61 IN

## 2022-06-09 DIAGNOSIS — Z85.3 PERSONAL HISTORY OF MALIGNANT NEOPLASM OF BREAST: ICD-10-CM

## 2022-06-09 PROCEDURE — 99215 OFFICE O/P EST HI 40 MIN: CPT

## 2022-06-21 ENCOUNTER — APPOINTMENT (OUTPATIENT)
Dept: OBGYN | Facility: CLINIC | Age: 32
End: 2022-06-21
Payer: COMMERCIAL

## 2022-06-21 VITALS
DIASTOLIC BLOOD PRESSURE: 70 MMHG | BODY MASS INDEX: 25.11 KG/M2 | SYSTOLIC BLOOD PRESSURE: 110 MMHG | WEIGHT: 133 LBS | HEIGHT: 61 IN

## 2022-06-21 DIAGNOSIS — L90.0 LICHEN SCLEROSUS ET ATROPHICUS: ICD-10-CM

## 2022-06-21 DIAGNOSIS — B00.9 HERPESVIRAL INFECTION, UNSPECIFIED: ICD-10-CM

## 2022-06-21 DIAGNOSIS — N92.6 IRREGULAR MENSTRUATION, UNSPECIFIED: ICD-10-CM

## 2022-06-21 PROBLEM — Z85.3 HISTORY OF LEFT BREAST CANCER: Status: ACTIVE | Noted: 2019-11-20

## 2022-06-21 PROCEDURE — 99213 OFFICE O/P EST LOW 20 MIN: CPT

## 2022-06-21 RX ORDER — VALACYCLOVIR 500 MG/1
500 TABLET, FILM COATED ORAL
Qty: 90 | Refills: 3 | Status: ACTIVE | COMMUNITY
Start: 2022-06-21 | End: 1900-01-01

## 2022-06-21 NOTE — HISTORY OF PRESENT ILLNESS
[FreeTextEntry1] : I had the pleasure of seeing JEANE GUTIERREZ in the office for a follow up visit.\par \par History: Jeane is a sg 31 yo premenopausal female who originally presented after feeling a left breast mass after delivery of her son on 4/25/2018. She states she had first felt the lump after delivery of her child. She sought medical attention and was told it was a blocked milk duct which caused the mastitis and put her on antibiotics. When the symptoms did not resolve she saw another physician for a second opinion. She was then sent for ultrasound and ultrasound demonstrated a 2.3 cm lobulated hypoechoic nodule at in the left breast 2:00 and an ultrasound guided core biopsy was recommended. 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 Jan 2022 area of palpable concern left breast upper outer 12:00 axis, benign on imaging.\par \par She underwent bilateral mastectomy with SLNB and expander's (6/29/2018). Chemotherapy began on 8/3/2018. She completed chemotherapy and underwent implant exchange on 6/7/2019 by Dr. Falcon.\par \par She has been followed by thoracic surgery to monitor for any changes in her chest wall tissue specifically some rebound hypertrophy noted in the thymic tissue. Recent CT on 7/14/2020 showed stable very small nodule in the left upper lobe.\par \par She denies skin changes, nipple dimpling or nipple discharge. She denies new headaches, blurry vision, SOB, chest pain, abdominal pain, difficulty walking, back or leg pain. She reports discomfort in the medial lower inner left reconstructed breast and reports feeling "tissue".  She was seen at [facility] [place] [facility] by Dr. Lin who ordered a mammogram and reported residual area of breast tissue at this site. She reportedly told Jeane that the area could be "revised" or thinned out however the flap may be thin or compromised.  We discussed possible options since Jeane is concerned and wants to undergo revision. I will discuss with Dr. Falcon and offer a plan for revision. She understands and agrees. \par \par \par 6/11/2018 [facility]: Genetic results: Negative- No clinically significant mutation identified\par \par [facility]\par 1/31/22 MR breast impression reconstructed breast parenchyma is almost entirely fatty. there is minimal enhancement\par \par 1/31/22 left breast US impression- no suspicious findings at the area of palpable concern left breast 12:00 upper outer quadrant.\par \par 6/29/2018 SURGICAL PATHOLOGY:\par 1. Breast, right simple mastectomy: Negative for malignancy.l background enhancement. \par 2. [place] lymph node, left axilla, excision: One lymph node, negative for metastatic carcinoma.\par 3. [place] lymph node, left axilla, excision: One lymph node, negative for metastatic carcinoma.\par 4. Breast, left, simple mastectomy: Infiltrating ductal carcinoma, measuring 3.5 cm, with necrosis. Infiltrating carcinoma extends to the anterior margin of the mastectomy specimen, please see final margin status in the synoptic summary below and part 5. Negative nipple. Negative skin. Focal secretory change. \par 5. Left superior flap margin, over tumor: negative margin.\par 6. Skin and adipose tissue, right and left breast, excision: Negative for malignancy.\par \par 9/7/2018 US NONVASC EXT LTD RT: Benign appearing right groin lymph nodes the smaller of which correlates with area of patients tenderness. Findings likely represent benign reactive lymph nodes. Recommend continued clinical follow up.\par \par 9/24/2018 US breast limited left\par Impression: No sonographic suspicious findings left axilla and upper outer quadrant left breast at area of concern. Note is made of two small benign appearing axillary lymph nodes and a small amount of fluid adjacent to the axillary segment of the axillary segment of the left breast expander. BIRADS 2 benign findings.\par \par US breast 8/20/19: no evidence of malignancy, several areas of at necrosis upper inner quad and oval shaped mixed echotexture nodule left breast at 5 oclock 4cm from nipple 1.2cm compatible with probable fat necrosis BIRads 3 prob benign findings, recommend follow US in 6 months. 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 Follow up recommended to asses for stability/resolution.\par \par CT chest 11/14/2019 : Impression: 3mm pulmonary nodule left upper lobe slightly increased in size since May 23, 2019 and indeterminate finding, thymic tissue within anterior mediastinum appears ot have minimally decreased since May 23, 2019 given differences in technique.\par \par 11/26/2019 MRI of the abdomen \par Impression: No cholelithiasis, choledocholithiasis or biliary ductal dilatation. \par \par 5/1/2020  LT breast US\par IMPRESSION: Left breast 5:00 4 cm from the nipple 1.1 cm benign-appearing nodule decreased in size from 8/20/2019 compatible with benign finding likely representing fat necrosis. Left breast 7:00 8 cm from the nipple at the area of patient's palpable concern demonstrates no sonographic suspicious finding. \par RECOMMENDATION: Clinical follow up. BI-RADS 2 - Benign Finding(s)\par \par 5/20/2020  US breast limited left:\par Impression:  Left breast \par \par 7/14/2020  CT Chest:  Stable very small solid nodule in the left upper lobe when compared to previous exam.\par \par 9/15/2020  MR breast\par IMPRESSION: No MRI evidence of malignancy. Intact bilateral implants. \par RECOMMENDATION: Clinical follow up. BI-RADS 2 - Benign Finding(s)\par \par We reviewed clinical breast exam and clinical breast exam is benign.  She reports her left breast is still uncomfortable and is considering revision which we will follow up with a plan coordinated with Dr. Falcon.\par \par All questions answered.\par \par

## 2022-06-21 NOTE — HISTORY OF PRESENT ILLNESS
[Menarche Age: ____] : age at menarche was [unfilled] [TextBox_4] : Brianna is here for 6 mo follow up s/p diagnosis of lichen sclerosus.  She has not been using the clobetasol. She reports chronic vaginal discomfort during sex- often having some tearing.  \par \par she has also had some irregular bleeding since the birth of her son.  She had been having irregular bleeding prior to conceiving and had begun a workup for the irregular bleeding.\par \par  [FreeTextEntry1] : 6/19/22

## 2022-06-21 NOTE — PHYSICAL EXAM
[Appropriately responsive] : appropriately responsive [Alert] : alert [No Acute Distress] : no acute distress [Oriented x3] : oriented x3 [Vulvar Atrophy] : vulvar atrophy [Normal] : normal [FreeTextEntry1] : scarring noted left labia majora

## 2022-06-21 NOTE — ASSESSMENT
[FreeTextEntry1] : 31 yo premenopausal female with a history of triple negative left breast cancer treated with bilateral mastectomy and left SLNB.  Final pathology demonstrating 3.5cm IDC grade 3 triple negative with 0/2 lymph nodes.  She received chemotherapy. Clinical exam is benign today.  There is no evidence of local or distant recurrence. She reports her left reconstructed breast is still uncomfortable and is considering of removing the implant or having revision of flap.  We discussed the potential for flap revision/thinning and she understands risks v benefits. I will discuss plan with Dr. Falcon from a coordination standpoint. \par 1. Plan for surgery\par \par

## 2022-06-21 NOTE — PLAN
[FreeTextEntry1] : - recommended she try the clobetasol.  she is not a candidate for vaginal estrogen due to hx of breast cancer. LS discussed, 6 mo follow up at the time of her annual recommended. discussed potential need for vulvar bx in the future if any changes occur\par - pelvic US for evaluation of irregular bleeding- will see MD for consult, possible EMB\par - hormonal labs obtained\par - valtrex renewed for prn use\par

## 2022-06-21 NOTE — PHYSICAL EXAM
[Normocephalic] : normocephalic [Atraumatic] : atraumatic [EOMI] : extra ocular movement intact [PERRL] : pupils equal, round and reactive to light [Sclera nonicteric] : sclera nonicteric [Supple] : supple [No Supraclavicular Adenopathy] : no supraclavicular adenopathy [Examined in the supine and seated position] : examined in the supine and seated position [No dominant masses] : no dominant masses in right breast  [No dominant masses] : no dominant masses left breast [No Nipple Retraction] : no left nipple retraction [No Nipple Discharge] : no left nipple discharge [No Axillary Lymphadenopathy] : no left axillary lymphadenopathy [No Edema] : no edema [No Rashes] : no rashes [No Ulceration] : no ulceration [de-identified] :  mastectomy with implant reconstruction, well healed. No evidence of skin changes.  [de-identified] :  mastectomy with implant reconstruction, well healed.  No evidence of skin changes.

## 2022-06-22 LAB
BASOPHILS # BLD AUTO: 0.06 K/UL
BASOPHILS NFR BLD AUTO: 1.3 %
EOSINOPHIL # BLD AUTO: 0.13 K/UL
EOSINOPHIL NFR BLD AUTO: 2.9 %
ESTRADIOL SERPL-MCNC: 163 PG/ML
FSH SERPL-MCNC: 7.4 IU/L
HCT VFR BLD CALC: 44 %
HGB BLD-MCNC: 14.1 G/DL
IMM GRANULOCYTES NFR BLD AUTO: 0.2 %
LH SERPL-ACNC: 7.9 IU/L
LYMPHOCYTES # BLD AUTO: 1.49 K/UL
LYMPHOCYTES NFR BLD AUTO: 32.9 %
MAN DIFF?: NORMAL
MCHC RBC-ENTMCNC: 31 PG
MCHC RBC-ENTMCNC: 32 GM/DL
MCV RBC AUTO: 96.7 FL
MONOCYTES # BLD AUTO: 0.46 K/UL
MONOCYTES NFR BLD AUTO: 10.2 %
NEUTROPHILS # BLD AUTO: 2.38 K/UL
NEUTROPHILS NFR BLD AUTO: 52.5 %
PLATELET # BLD AUTO: 234 K/UL
PROLACTIN SERPL-MCNC: 7.1 NG/ML
RBC # BLD: 4.55 M/UL
RBC # FLD: 12.9 %
TSH SERPL-ACNC: 1.77 UIU/ML
WBC # FLD AUTO: 4.53 K/UL

## 2022-06-28 ENCOUNTER — RESULT REVIEW (OUTPATIENT)
Age: 32
End: 2022-06-28

## 2022-06-28 ENCOUNTER — APPOINTMENT (OUTPATIENT)
Dept: RADIOLOGY | Facility: CLINIC | Age: 32
End: 2022-06-28

## 2022-06-28 ENCOUNTER — OUTPATIENT (OUTPATIENT)
Dept: OUTPATIENT SERVICES | Facility: HOSPITAL | Age: 32
LOS: 1 days | End: 2022-06-28
Payer: COMMERCIAL

## 2022-06-28 ENCOUNTER — APPOINTMENT (OUTPATIENT)
Dept: ULTRASOUND IMAGING | Facility: CLINIC | Age: 32
End: 2022-06-28

## 2022-06-28 ENCOUNTER — APPOINTMENT (OUTPATIENT)
Dept: MRI IMAGING | Facility: CLINIC | Age: 32
End: 2022-06-28

## 2022-06-28 DIAGNOSIS — E03.9 HYPOTHYROIDISM, UNSPECIFIED: ICD-10-CM

## 2022-06-28 DIAGNOSIS — Z98.89 OTHER SPECIFIED POSTPROCEDURAL STATES: Chronic | ICD-10-CM

## 2022-06-28 DIAGNOSIS — Z98.890 OTHER SPECIFIED POSTPROCEDURAL STATES: Chronic | ICD-10-CM

## 2022-06-28 DIAGNOSIS — E61.1 IRON DEFICIENCY: ICD-10-CM

## 2022-06-28 DIAGNOSIS — Z00.8 ENCOUNTER FOR OTHER GENERAL EXAMINATION: ICD-10-CM

## 2022-06-28 PROCEDURE — 73721 MRI JNT OF LWR EXTRE W/O DYE: CPT | Mod: 26,RT

## 2022-06-28 PROCEDURE — 73721 MRI JNT OF LWR EXTRE W/O DYE: CPT

## 2022-06-28 PROCEDURE — 77080 DXA BONE DENSITY AXIAL: CPT | Mod: 26

## 2022-06-28 PROCEDURE — 76536 US EXAM OF HEAD AND NECK: CPT | Mod: 26

## 2022-06-28 PROCEDURE — 77080 DXA BONE DENSITY AXIAL: CPT

## 2022-06-28 PROCEDURE — 76536 US EXAM OF HEAD AND NECK: CPT

## 2022-07-13 ENCOUNTER — NON-APPOINTMENT (OUTPATIENT)
Age: 32
End: 2022-07-13

## 2022-07-13 ENCOUNTER — APPOINTMENT (OUTPATIENT)
Dept: ANTEPARTUM | Facility: CLINIC | Age: 32
End: 2022-07-13

## 2022-07-13 ENCOUNTER — ASOB RESULT (OUTPATIENT)
Age: 32
End: 2022-07-13

## 2022-07-13 ENCOUNTER — APPOINTMENT (OUTPATIENT)
Dept: OBGYN | Facility: CLINIC | Age: 32
End: 2022-07-13

## 2022-07-13 LAB
25(OH)D3 SERPL-MCNC: 29.1 NG/ML
ALBUMIN SERPL ELPH-MCNC: 4.7 G/DL
ALP BLD-CCNC: 69 U/L
ALT SERPL-CCNC: 15 U/L
ANION GAP SERPL CALC-SCNC: 13 MMOL/L
AST SERPL-CCNC: 25 U/L
BASOPHILS # BLD AUTO: 0.04 K/UL
BASOPHILS NFR BLD AUTO: 0.7 %
BILIRUB SERPL-MCNC: 0.3 MG/DL
BUN SERPL-MCNC: 10 MG/DL
CALCIUM SERPL-MCNC: 9.2 MG/DL
CHLORIDE SERPL-SCNC: 103 MMOL/L
CHOLEST SERPL-MCNC: 207 MG/DL
CO2 SERPL-SCNC: 23 MMOL/L
CREAT SERPL-MCNC: 0.68 MG/DL
EGFR: 119 ML/MIN/1.73M2
EOSINOPHIL # BLD AUTO: 0.13 K/UL
EOSINOPHIL NFR BLD AUTO: 2.3 %
ESTIMATED AVERAGE GLUCOSE: 91 MG/DL
FERRITIN SERPL-MCNC: 50 NG/ML
GLUCOSE SERPL-MCNC: 73 MG/DL
HBA1C MFR BLD HPLC: 4.8 %
HCT VFR BLD CALC: 41.3 %
HDLC SERPL-MCNC: 96 MG/DL
HGB BLD-MCNC: 13.1 G/DL
IMM GRANULOCYTES NFR BLD AUTO: 0.4 %
IRON SATN MFR SERPL: 20 %
IRON SERPL-MCNC: 68 UG/DL
LDLC SERPL CALC-MCNC: 99 MG/DL
LYMPHOCYTES # BLD AUTO: 1.41 K/UL
LYMPHOCYTES NFR BLD AUTO: 25.5 %
MAGNESIUM SERPL-MCNC: 2 MG/DL
MAN DIFF?: NORMAL
MCHC RBC-ENTMCNC: 30.9 PG
MCHC RBC-ENTMCNC: 31.7 GM/DL
MCV RBC AUTO: 97.4 FL
MONOCYTES # BLD AUTO: 0.38 K/UL
MONOCYTES NFR BLD AUTO: 6.9 %
NEUTROPHILS # BLD AUTO: 3.56 K/UL
NEUTROPHILS NFR BLD AUTO: 64.2 %
NONHDLC SERPL-MCNC: 110 MG/DL
PLATELET # BLD AUTO: 175 K/UL
POTASSIUM SERPL-SCNC: 3.9 MMOL/L
PROT SERPL-MCNC: 7.2 G/DL
RBC # BLD: 4.24 M/UL
RBC # FLD: 12.7 %
SODIUM SERPL-SCNC: 139 MMOL/L
T3FREE SERPL-MCNC: 2.78 PG/ML
T4 FREE SERPL-MCNC: 1.3 NG/DL
TIBC SERPL-MCNC: 331 UG/DL
TRIGL SERPL-MCNC: 58 MG/DL
TSH SERPL-ACNC: 2.87 UIU/ML
UIBC SERPL-MCNC: 263 UG/DL
VIT B12 SERPL-MCNC: 321 PG/ML
WBC # FLD AUTO: 5.54 K/UL

## 2022-07-13 PROCEDURE — 76830 TRANSVAGINAL US NON-OB: CPT

## 2022-07-14 ENCOUNTER — NON-APPOINTMENT (OUTPATIENT)
Age: 32
End: 2022-07-14

## 2022-07-14 ENCOUNTER — RESULT CHARGE (OUTPATIENT)
Age: 32
End: 2022-07-14

## 2022-07-14 ENCOUNTER — APPOINTMENT (OUTPATIENT)
Dept: FAMILY MEDICINE | Facility: CLINIC | Age: 32
End: 2022-07-14

## 2022-07-14 VITALS
BODY MASS INDEX: 25.11 KG/M2 | WEIGHT: 133 LBS | SYSTOLIC BLOOD PRESSURE: 120 MMHG | HEIGHT: 61 IN | OXYGEN SATURATION: 98 % | DIASTOLIC BLOOD PRESSURE: 80 MMHG | HEART RATE: 82 BPM | TEMPERATURE: 97.5 F

## 2022-07-14 LAB — S PYO AG SPEC QL IA: NEGATIVE

## 2022-07-14 PROCEDURE — 87880 STREP A ASSAY W/OPTIC: CPT | Mod: QW

## 2022-07-14 PROCEDURE — 99213 OFFICE O/P EST LOW 20 MIN: CPT

## 2022-07-14 NOTE — REVIEW OF SYSTEMS
[Nasal Discharge] : nasal discharge [Sore Throat] : sore throat [Shortness Of Breath] : no shortness of breath [Wheezing] : no wheezing [Cough] : cough [Dyspnea on Exertion] : no dyspnea on exertion [Negative] : Neurological

## 2022-07-14 NOTE — HISTORY OF PRESENT ILLNESS
[FreeTextEntry8] : 33 yo female presents with complaint of dry nonproductive cough, sore throat that’s been going on for the past 5 -6  days. She says she is not feeling better. Denies fever, chills, cp, palpitations, sob, nv, heat/cold intolerance, dizziness, melena, hematochezia, muscle weakness, loss of sensation, bowel/bladder incontinence or calf pain.\par

## 2022-07-15 ENCOUNTER — NON-APPOINTMENT (OUTPATIENT)
Age: 32
End: 2022-07-15

## 2022-07-15 LAB
RAPID RVP RESULT: NOT DETECTED
SARS-COV-2 RNA PNL RESP NAA+PROBE: NOT DETECTED

## 2022-07-18 ENCOUNTER — NON-APPOINTMENT (OUTPATIENT)
Age: 32
End: 2022-07-18

## 2022-07-19 ENCOUNTER — TRANSCRIPTION ENCOUNTER (OUTPATIENT)
Age: 32
End: 2022-07-19

## 2022-07-22 ENCOUNTER — NON-APPOINTMENT (OUTPATIENT)
Age: 32
End: 2022-07-22

## 2022-08-02 ENCOUNTER — RESULT CHARGE (OUTPATIENT)
Age: 32
End: 2022-08-02

## 2022-08-02 ENCOUNTER — APPOINTMENT (OUTPATIENT)
Dept: FAMILY MEDICINE | Facility: CLINIC | Age: 32
End: 2022-08-02

## 2022-08-02 VITALS
DIASTOLIC BLOOD PRESSURE: 68 MMHG | SYSTOLIC BLOOD PRESSURE: 110 MMHG | HEIGHT: 61 IN | BODY MASS INDEX: 25.3 KG/M2 | WEIGHT: 134 LBS | HEART RATE: 82 BPM | OXYGEN SATURATION: 99 % | TEMPERATURE: 98 F

## 2022-08-02 LAB — S PYO AG SPEC QL IA: NEGATIVE

## 2022-08-02 PROCEDURE — 99213 OFFICE O/P EST LOW 20 MIN: CPT

## 2022-08-02 PROCEDURE — 87880 STREP A ASSAY W/OPTIC: CPT | Mod: QW

## 2022-08-02 RX ORDER — BENZONATATE 100 MG/1
100 CAPSULE ORAL 3 TIMES DAILY
Qty: 30 | Refills: 0 | Status: DISCONTINUED | COMMUNITY
Start: 2022-07-14 | End: 2022-08-02

## 2022-08-02 RX ORDER — AZITHROMYCIN 250 MG/1
250 TABLET, FILM COATED ORAL
Qty: 1 | Refills: 0 | Status: DISCONTINUED | COMMUNITY
Start: 2022-07-14 | End: 2022-08-02

## 2022-08-02 NOTE — HISTORY OF PRESENT ILLNESS
[FreeTextEntry8] : 33 yo female presents with complaint of sore throat. She says it initially resolved but then returned 4 days ago. Denies fever, chills, cp, palpitations, sob, nv, heat/cold intolerance, dizziness, melena, hematochezia, muscle weakness, loss of sensation, bowel/bladder incontinence or calf pain.\par

## 2022-08-02 NOTE — PHYSICAL EXAM
[Normal] : affect was normal and insight and judgment were intact [de-identified] : mod pharyngeal erythema, no exudates

## 2022-08-02 NOTE — ASSESSMENT
[FreeTextEntry1] : Acute pharyngitis: poct rapid strep negative, may be superimposed bacterial infection, start amox/clav bid x 10 days, recommend supportive care, start tylenol prn for fever/pain, recommend increased hydration, call EMS if symptoms worsen or develop sob. Recommend hand hygiene, cover mouth when coughing, wash hands frequently, start medrol silke, f/u covid19/viral panel\par RTC 2 wks

## 2022-08-03 LAB
RAPID RVP RESULT: NOT DETECTED
SARS-COV-2 RNA PNL RESP NAA+PROBE: NOT DETECTED

## 2022-08-06 ENCOUNTER — NON-APPOINTMENT (OUTPATIENT)
Age: 32
End: 2022-08-06

## 2022-08-15 ENCOUNTER — NON-APPOINTMENT (OUTPATIENT)
Age: 32
End: 2022-08-15

## 2022-08-15 ENCOUNTER — APPOINTMENT (OUTPATIENT)
Dept: ORTHOPEDIC SURGERY | Facility: CLINIC | Age: 32
End: 2022-08-15

## 2022-08-15 ENCOUNTER — OUTPATIENT (OUTPATIENT)
Dept: OUTPATIENT SERVICES | Facility: HOSPITAL | Age: 32
LOS: 1 days | Discharge: HOME | End: 2022-08-15

## 2022-08-15 VITALS
OXYGEN SATURATION: 99 % | DIASTOLIC BLOOD PRESSURE: 74 MMHG | BODY MASS INDEX: 25.3 KG/M2 | WEIGHT: 134 LBS | HEIGHT: 61 IN | SYSTOLIC BLOOD PRESSURE: 114 MMHG | HEART RATE: 87 BPM

## 2022-08-15 DIAGNOSIS — D50.9 IRON DEFICIENCY ANEMIA, UNSPECIFIED: ICD-10-CM

## 2022-08-15 DIAGNOSIS — Z98.89 OTHER SPECIFIED POSTPROCEDURAL STATES: Chronic | ICD-10-CM

## 2022-08-15 DIAGNOSIS — Z98.890 OTHER SPECIFIED POSTPROCEDURAL STATES: Chronic | ICD-10-CM

## 2022-08-15 DIAGNOSIS — C50.412 MALIGNANT NEOPLASM OF UPPER-OUTER QUADRANT OF LEFT FEMALE BREAST: ICD-10-CM

## 2022-08-15 PROCEDURE — 99214 OFFICE O/P EST MOD 30 MIN: CPT

## 2022-08-15 NOTE — HISTORY OF PRESENT ILLNESS
[Worsening] : worsening [___ mths] : [unfilled] month(s) ago [3] : a current pain level of 3/10 [4] : an average pain level of 4/10 [2] : a minimum pain level of 2/10 [5] : a maximum pain level of 5/10 [Hip Movement] : worsened by hip movement [de-identified] : JEANE GUTIERREZ is a 32 year female being seen for initial visit R hip pain. She reports she has been experiencing hip pain since going throuhg chemo ********* months/weeks ago. Patient works at [employer]. 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 She reports increase in hip pain with deep flexion and rotation. She has had past cortisone injections with minimal relief. She also reports no relief with formal PT and HEP> \par  [de-identified] : rotation

## 2022-08-15 NOTE — PHYSICAL EXAM
[de-identified] : Physical Exam:\par General: Well appearing, no acute distress, A&O\par Neurologic: A&Ox3, No focal deficits\par Head: NCAT without abrasions, lacerations, or ecchymosis to head, face, or scalp\par Eyes: No scleral icterus, no gross abnormalities\par Respiratory: Equal chest wall expansion bilaterally, no accessory muscle use\par Lymphatic: No lymphadenopathy palpated\par Skin: Warm and dry\par Psychiatric: Normal mood and affect\par \par Right Hip Exam\par \par On inspection of the right hip shows no gross abnormalities. No loss muscle volume. Skin appears normal. When asked to point at the most painful part of their hip, they make a C sign\par Negative Heel strike, negative log roll. \par At 90° of flexion internal rotation is limited to 5°. Extremely painful in the groin. External rotation is limited to 30°. No pain. Hip flexor strength is 4-5 because of pain.  Anterior hip impingement signs are positive. No evidence of posterior impingement. \par Resisted straight leg raise does not produce groin pain. No signs of the iliopsoas tendinitis\par No audible or palpable clicking. \par On lateral decubitus examination there is no focal area of her greater trochanteric tenderness. Abductor strength is 5 out of 5.\par \par Motor strength is intact distally, 5/5 over quads,hamstring, EHL, FHL, GSC, TA.\par Sensation intact over L1-S1 dermatomes\par DP 2+\par \par Left hip Exam\par \par ·	Skin: Clean/dry and intact\par ·	Inspection: No obvious deformity, no swelling.\par ·	Pulses: 2+ DP/PT pulses\par ·	ROM: 110 flexion without pain. Internal rotation to 15. External rotation to 45.\par ·	Painful ROM: None\par ·	Tenderness: No tenderness over greater trochanter/glut medius insertion. No groin pain. No ttp over the ASIS/[place] crest.\par ·	Strength: 5/5 ADD/ABD/Q/H/TA/GS/EHL\par ·	Neuro: Sensation in tact to light touch throughout, DTR normal\par ·	Additional tests: Negative impingement test [de-identified] : XRAYS FROM DR. ROSSI\par \par X-rays demonstrate minimal hip impingement with a small cam lesion of 52 degrees alpha angle.  Lateral center edge angle and anterior center edge angle were normal under 35 degrees\par EXAM: [id number] - MR HIP RT  - ORDERED BY: ARTIE GRAYSON\par \par \par PROCEDURE DATE:  06/28/2022\par \par IMPRESSION:\par 1.  Nondisplaced tear of the superior labrum with worsening globular intrasubstance component.\par 2.  Mild insertional tendinosis of the gluteus minimus tendon.

## 2022-08-15 NOTE — DISCUSSION/SUMMARY
[de-identified] : Brianna is a 32-year-old female with right hip pain secondary to labral tear and some small femoral acetabular impingement.  We discussed operative and nonoperative management.  She has failed nonoperative care and have not received injections as well as physical therapy.  She is currently having difficulty with all activities especially sitting for long periods of time.  We are thorough discussion of continued nonoperative care versus physical therapy versus surgical invention.  After thorough discussion with patient she elects for surgical intervention.  All risks, benefits and alternatives to the proposed surgical procedure, right hip arthroscopy with labral repair, capsular repair, femoroplasty, acetabuloplasty, possible microfracture, were discussed in great detail with the patient. Risks include but are not limited to pain, bleeding, infection, stiffness, [default value], medical complications (including DVT, PE, MI), and risks of anesthesia. The patient expressed understanding and all questions were answered. The patient is electing to proceed, and will have the patient scheduled accordingly.

## 2022-08-18 ENCOUNTER — TRANSCRIPTION ENCOUNTER (OUTPATIENT)
Age: 32
End: 2022-08-18

## 2022-08-18 DIAGNOSIS — B37.3 CANDIDIASIS OF VULVA AND VAGINA: ICD-10-CM

## 2022-08-24 ENCOUNTER — RESULT REVIEW (OUTPATIENT)
Age: 32
End: 2022-08-24

## 2022-08-24 ENCOUNTER — APPOINTMENT (OUTPATIENT)
Age: 32
End: 2022-08-24

## 2022-08-24 ENCOUNTER — APPOINTMENT (OUTPATIENT)
Dept: HEMATOLOGY ONCOLOGY | Facility: CLINIC | Age: 32
End: 2022-08-24

## 2022-08-24 VITALS
HEIGHT: 61 IN | SYSTOLIC BLOOD PRESSURE: 121 MMHG | BODY MASS INDEX: 25.86 KG/M2 | WEIGHT: 137 LBS | OXYGEN SATURATION: 99 % | DIASTOLIC BLOOD PRESSURE: 82 MMHG | HEART RATE: 99 BPM

## 2022-08-24 DIAGNOSIS — E61.1 IRON DEFICIENCY: ICD-10-CM

## 2022-08-24 LAB
ALBUMIN SERPL ELPH-MCNC: 4 G/DL — SIGNIFICANT CHANGE UP (ref 3.3–5)
ALP SERPL-CCNC: 61 U/L — SIGNIFICANT CHANGE UP (ref 40–120)
ALT FLD-CCNC: 11 U/L — SIGNIFICANT CHANGE UP (ref 10–45)
ANION GAP SERPL CALC-SCNC: 12 MMOL/L — SIGNIFICANT CHANGE UP (ref 5–17)
AST SERPL-CCNC: 19 U/L — SIGNIFICANT CHANGE UP (ref 10–40)
BASOPHILS # BLD AUTO: 0 K/UL — SIGNIFICANT CHANGE UP (ref 0–0.2)
BASOPHILS NFR BLD AUTO: 1.1 % — SIGNIFICANT CHANGE UP (ref 0–2)
BILIRUB SERPL-MCNC: 0.2 MG/DL — SIGNIFICANT CHANGE UP (ref 0.2–1.2)
BUN SERPL-MCNC: 15 MG/DL — SIGNIFICANT CHANGE UP (ref 7–23)
CALCIUM SERPL-MCNC: 9.2 MG/DL — SIGNIFICANT CHANGE UP (ref 8.4–10.5)
CHLORIDE SERPL-SCNC: 103 MMOL/L — SIGNIFICANT CHANGE UP (ref 96–108)
CO2 SERPL-SCNC: 23 MMOL/L — SIGNIFICANT CHANGE UP (ref 22–31)
CREAT SERPL-MCNC: 0.58 MG/DL — SIGNIFICANT CHANGE UP (ref 0.5–1.3)
EGFR: 123 ML/MIN/1.73M2 — SIGNIFICANT CHANGE UP
EOSINOPHIL # BLD AUTO: 0.2 K/UL — SIGNIFICANT CHANGE UP (ref 0–0.5)
EOSINOPHIL NFR BLD AUTO: 4.7 % — SIGNIFICANT CHANGE UP (ref 0–6)
GLUCOSE SERPL-MCNC: 123 MG/DL — HIGH (ref 70–99)
HCT VFR BLD CALC: 37.6 % — SIGNIFICANT CHANGE UP (ref 34.5–45)
HGB BLD-MCNC: 12.2 G/DL — SIGNIFICANT CHANGE UP (ref 11.5–15.5)
LYMPHOCYTES # BLD AUTO: 1.5 K/UL — SIGNIFICANT CHANGE UP (ref 1–3.3)
LYMPHOCYTES # BLD AUTO: 32.3 % — SIGNIFICANT CHANGE UP (ref 13–44)
MCHC RBC-ENTMCNC: 30.5 PG — SIGNIFICANT CHANGE UP (ref 27–34)
MCHC RBC-ENTMCNC: 32.4 G/DL — SIGNIFICANT CHANGE UP (ref 32–36)
MCV RBC AUTO: 94.2 FL — SIGNIFICANT CHANGE UP (ref 80–100)
MONOCYTES # BLD AUTO: 0.3 K/UL — SIGNIFICANT CHANGE UP (ref 0–0.9)
MONOCYTES NFR BLD AUTO: 7.5 % — SIGNIFICANT CHANGE UP (ref 2–14)
NEUTROPHILS # BLD AUTO: 2.5 K/UL — SIGNIFICANT CHANGE UP (ref 1.8–7.4)
NEUTROPHILS NFR BLD AUTO: 54.5 % — SIGNIFICANT CHANGE UP (ref 43–77)
PLATELET # BLD AUTO: 161 K/UL — SIGNIFICANT CHANGE UP (ref 150–400)
POTASSIUM SERPL-MCNC: 3.7 MMOL/L — SIGNIFICANT CHANGE UP (ref 3.5–5.3)
POTASSIUM SERPL-SCNC: 3.7 MMOL/L — SIGNIFICANT CHANGE UP (ref 3.5–5.3)
PROT SERPL-MCNC: 6.4 G/DL — SIGNIFICANT CHANGE UP (ref 6–8.3)
RBC # BLD: 4 M/UL — SIGNIFICANT CHANGE UP (ref 3.8–5.2)
RBC # FLD: 11.5 % — SIGNIFICANT CHANGE UP (ref 10.3–14.5)
SODIUM SERPL-SCNC: 137 MMOL/L — SIGNIFICANT CHANGE UP (ref 135–145)
WBC # BLD: 4.6 K/UL — SIGNIFICANT CHANGE UP (ref 3.8–10.5)
WBC # FLD AUTO: 4.6 K/UL — SIGNIFICANT CHANGE UP (ref 3.8–10.5)

## 2022-08-24 PROCEDURE — 99214 OFFICE O/P EST MOD 30 MIN: CPT

## 2022-08-24 RX ORDER — FLUCONAZOLE 150 MG/1
150 TABLET ORAL
Qty: 1 | Refills: 1 | Status: DISCONTINUED | COMMUNITY
Start: 2022-08-18 | End: 2022-08-24

## 2022-08-24 RX ORDER — METHYLPREDNISOLONE 4 MG/1
4 TABLET ORAL
Qty: 1 | Refills: 0 | Status: DISCONTINUED | COMMUNITY
Start: 2022-08-02 | End: 2022-08-24

## 2022-08-24 RX ORDER — AMOXICILLIN AND CLAVULANATE POTASSIUM 875; 125 MG/1; MG/1
875-125 TABLET, COATED ORAL
Qty: 20 | Refills: 0 | Status: DISCONTINUED | COMMUNITY
Start: 2022-08-02 | End: 2022-08-24

## 2022-08-24 RX ORDER — PROMETHAZINE HYDROCHLORIDE AND CODEINE PHOSPHATE 6.25; 1 MG/5ML; MG/5ML
6.25-1 SOLUTION ORAL
Qty: 120 | Refills: 0 | Status: DISCONTINUED | COMMUNITY
Start: 2022-08-02 | End: 2022-08-24

## 2022-08-24 NOTE — ASSESSMENT
[FreeTextEntry1] : 31 year old female with stage IIA left breast TNBC (T2N0) S/P bilateral mastectomy on 6/29/18. She has DPD deficiency.  S/p 4 cycles of dose dense AC, followed by weekly taxol completed 12/26/18.\par Post chemotherapy she was incidentally found to have anterior mediastinal mass c/w thymic rebound. \par \par  7/9/21 L breast US - 0.5 cm hypoechoic nodule, likely representing fat necrosis\par 8/20/21 US abd - mild R hydronephrosis\par 8/23/21 US kidneys/bladder - persistent R mild hydro with patent ureteral jets. \par 1/31/22 MR Breast- No MRI evidence of malignancy. No change from prior breast MRI exam.\par Intact bilateral implants.\par \par 06/28/2022 MR Hip Right- Nondisplaced tear of the superior labrum with worsening globular intrasubstance component.  Mild insertional tendinosis of the gluteus minimus tendon.\par Reviewed CBC w dif from today WBC 4.6 K HGB 12.2 g HCT 37.6 %  K \par \par Plan:\par -Easy bruising - unclear etiology at this time, iron panel ordered, if low iron stores, will benefit from IV iron especially as she has DUB\par -Thymus neoplasm - continue follow up with CT surgery for next imaging\par -Osteopenia + low vitamin D- start Vitamin D3 2,000iu daily \par -POF today, continue every 4-6 weeks, pending port removal with breast reconstruction \par -R hip labrum tear- pending repair 11/2022 with ortho Dr. Seals \par -Continue follow up with Dr. Farr\par -Follow up in 4 months

## 2022-08-24 NOTE — PHYSICAL EXAM
[Ambulatory and capable of all self care but unable to carry out any work activities] : Status 2- Ambulatory and capable of all self care but unable to carry out any work activities. Up and about more than 50% of waking hours [Normal] : affect appropriate [de-identified] : bilateral reconstructed breasts, no nodularity

## 2022-08-24 NOTE — HISTORY OF PRESENT ILLNESS
[Disease: _____________________] : Disease: [unfilled] [T: ___] : T[unfilled] [N: ___] : N[unfilled] [AJCC Stage: ____] : AJCC Stage: [unfilled] [de-identified] : Ms. Kennedy was diagnosed with left triple negative breast cancer at age 28. \par \par She delivered her first child on 4/25/18 following which she self palpated a left breast mass.  It was  thought to be a blocked milk duct / mastitis and was treated with antibiotics and then antifungals.  The mass did not resolve and she sought a 2nd opinion.  \par \par She then had a breast ultrasound on 5/30/18 which showed a 2.3 cm left breast mass at 1-2:00, 9 cm from the nipple, and a single axillary lymph node which does not contain a prominent benign appearing fatty hilum.  \par \par 5/31/18 left breast core biopsy - invasive poorly differentiated ductal carcinoma, Agra score 9/9, measures at least 17 cm w/ extensive necrosis. ER 0%, OK 0%, HER 2 (0/1+) -negative. \par \par On 6/7/18 she had a diagnostic mammogram + ultrasound - 2.1 cm rounded mass of left breast with overall coarsening of parenchymal pattern and increased parenchymal density in upper outer left breast. US - 2.4 cm left breast mass at 1-2:00 and left axilla 0.8 cm rounded hypoechoic mass consistent with abnormal left axillary node.  \par \par On 6/7/18 she also had a breast MRI - 2.9 x 3.2 x 3.1 cm mass in left upper inner breast at 1:00.  Suspicious left axillary nodes with ultrasound correlate for which US guided biopsy is recommended.   \par \par On 6/8/17 she had biopsy of the left axillary node - pathology : reactive lymph node. \par \par 6/7/18 - CT chest/abd/pelvis -  Known left upper outer breast malignancy. 2 small, round left axillary lymph nodes for which patient will undergo percutaneous \par sampling, as per report of breast MRI from 6/7/2018. A 3 mm subpleural nodular density in the right middle lobe probably represents a focus of atelectasis. No evidence of metastatic disease in the chest, abdomen, and pelvis.\par \par 6/8/18 bone scan - Normal bone scan. No radionuclide evidence of osseous metastasis.\par \par 6/29/18 -s/p bilateral mastectomy \par Pathology: left breast IDC 3.5 cm, joo score 9/9, margins negative, 2 SNL nodes negative.  pT2 pN0\par Right mastectomy - negative for malignancy. \par \par Family History: 2 maternal great aunts with breast cancer.\par paternal cousin with breast cancer\par paternal GM - pancreatic cancer\par paternal GF - pancreatic cancer\par \par MYRIAD  AskerSK panel - no clinically significant mutations\par \par PMH: asthma, DPD deficiency (dihydropyrimidine dehydrogenase deficiency).\par \par Sx hx: tonsillectomy\par \par Social: non-smoker, social ETOH  [de-identified] : poorly differentiated invasive ductal carcinoma [de-identified] : ER 0% AL 0% HER2 negative [de-identified] : Returns for follow up.\par Followed up with ortho due to right hip pain, pending torn labrum repair 11/2022 \par Menstrual cycle remains irregular \par Continues to have intermittent bruising of extremities without trauma. 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

## 2022-08-25 DIAGNOSIS — Z95.828 PRESENCE OF OTHER VASCULAR IMPLANTS AND GRAFTS: ICD-10-CM

## 2022-08-25 LAB
FERRITIN SERPL-MCNC: 46 NG/ML — SIGNIFICANT CHANGE UP (ref 15–150)
FOLATE SERPL-MCNC: 10.2 NG/ML — SIGNIFICANT CHANGE UP
IRON SATN MFR SERPL: 26 % — SIGNIFICANT CHANGE UP (ref 14–50)
IRON SATN MFR SERPL: 65 UG/DL — SIGNIFICANT CHANGE UP (ref 30–160)
TIBC SERPL-MCNC: 251 UG/DL — SIGNIFICANT CHANGE UP (ref 220–430)
UIBC SERPL-MCNC: 186 UG/DL — SIGNIFICANT CHANGE UP (ref 110–370)
VIT B12 SERPL-MCNC: 347 PG/ML — SIGNIFICANT CHANGE UP (ref 232–1245)

## 2022-08-29 ENCOUNTER — NON-APPOINTMENT (OUTPATIENT)
Age: 32
End: 2022-08-29

## 2022-09-27 ENCOUNTER — APPOINTMENT (OUTPATIENT)
Dept: OBGYN | Facility: CLINIC | Age: 32
End: 2022-09-27

## 2022-09-27 VITALS
BODY MASS INDEX: 25.3 KG/M2 | SYSTOLIC BLOOD PRESSURE: 116 MMHG | WEIGHT: 134 LBS | HEIGHT: 61 IN | DIASTOLIC BLOOD PRESSURE: 74 MMHG

## 2022-09-27 LAB
HCG UR QL: NEGATIVE
QUALITY CONTROL: YES

## 2022-09-27 PROCEDURE — 99213 OFFICE O/P EST LOW 20 MIN: CPT

## 2022-09-27 PROCEDURE — 81025 URINE PREGNANCY TEST: CPT

## 2022-09-27 PROCEDURE — 36415 COLL VENOUS BLD VENIPUNCTURE: CPT

## 2022-09-29 ENCOUNTER — TRANSCRIPTION ENCOUNTER (OUTPATIENT)
Age: 32
End: 2022-09-29

## 2022-09-29 LAB
BASOPHILS # BLD AUTO: 0.05 K/UL
BASOPHILS NFR BLD AUTO: 0.9 %
C TRACH RRNA SPEC QL NAA+PROBE: NOT DETECTED
EOSINOPHIL # BLD AUTO: 0.19 K/UL
EOSINOPHIL NFR BLD AUTO: 3.6 %
ESTRADIOL SERPL-MCNC: 140 PG/ML
FSH SERPL-MCNC: 11.9 IU/L
HCG SERPL-MCNC: <1 MIU/ML
HCT VFR BLD CALC: 42.6 %
HGB BLD-MCNC: 13.9 G/DL
IMM GRANULOCYTES NFR BLD AUTO: 0.2 %
LH SERPL-ACNC: 9.6 IU/L
LYMPHOCYTES # BLD AUTO: 1.54 K/UL
LYMPHOCYTES NFR BLD AUTO: 29.1 %
MAN DIFF?: NORMAL
MCHC RBC-ENTMCNC: 31.3 PG
MCHC RBC-ENTMCNC: 32.6 GM/DL
MCV RBC AUTO: 95.9 FL
MONOCYTES # BLD AUTO: 0.42 K/UL
MONOCYTES NFR BLD AUTO: 7.9 %
N GONORRHOEA RRNA SPEC QL NAA+PROBE: NOT DETECTED
NEUTROPHILS # BLD AUTO: 3.08 K/UL
NEUTROPHILS NFR BLD AUTO: 58.3 %
PLATELET # BLD AUTO: 253 K/UL
PROGEST SERPL-MCNC: 0.2 NG/ML
RBC # BLD: 4.44 M/UL
RBC # FLD: 12.4 %
SOURCE AMPLIFICATION: NORMAL
TSH SERPL-ACNC: 2.09 UIU/ML
WBC # FLD AUTO: 5.29 K/UL

## 2022-09-29 NOTE — PHYSICAL EXAM
[Chaperone Present] : A chaperone was present in the examining room during all aspects of the physical examination [Appropriately responsive] : appropriately responsive [Alert] : alert [No Acute Distress] : no acute distress [Soft] : soft [Non-tender] : non-tender [Non-distended] : non-distended [Oriented x3] : oriented x3 [Labia Majora] : normal [Labia Minora] : normal [Scant] : There was scant vaginal bleeding [Normal] : normal [Uterine Adnexae] : normal [FreeTextEntry1] : holly

## 2022-09-29 NOTE — DISCUSSION/SUMMARY
[FreeTextEntry1] : Her history is consistent with possible early spontaneous miscarriage. We will send labs to evaluate for residual hcg to confirm. Bloodwork drawn today in office. \par She will RTO for sonogram to rule out any structural causes of abnormal bleeding.\par \par Options to regulate cycles and bleeding are limited due to recent history of breast cancer. She will follow up in the office for further management.

## 2022-09-29 NOTE — HISTORY OF PRESENT ILLNESS
[FreeTextEntry1] : Brianna presents for abnormal uterine bleeding. \par She was several weeks late for her most recent period. She took one pregnancy test at home that was negative. 2 weeks after she was due for her period, she started spotting. After a few days she had an extremely heavy menses with pain and passage of blood clots. She required aleve for pain control. This heavy bleeding lasted for about 1 week and now she is having spotting. \par This felt similar to a miscarriage she has had in the past. However UPT also negative here and at home. \par She is not using contraception and would welcome another pregnancy if it happened. \par She is currently taking synthroid and cymbalta.

## 2022-10-04 ENCOUNTER — TRANSCRIPTION ENCOUNTER (OUTPATIENT)
Age: 32
End: 2022-10-04

## 2022-10-09 NOTE — OB PROVIDER TRIAGE NOTE - NSHPPHYSICALEXAM_GEN_ALL_CORE
Vital Signs Last 24 Hrs  T(C): 37.1 (20 Aug 2021 14:33), Max: 37.1 (20 Aug 2021 14:33)  T(F): 98.7 (20 Aug 2021 14:33), Max: 98.7 (20 Aug 2021 14:33)  HR: 85 (20 Aug 2021 14:38) (85 - 85)  BP: 120/74 (20 Aug 2021 14:38) (120/74 - 120/74)  RR: 16 (20 Aug 2021 14:33) (16 - 16)      General: No acute distress  Respiratory: CTAB   CV: RRR, normal S1, S2   Abdomen: gravid, soft, non-tender   Ext: no edema, good cap refill

## 2022-10-12 ENCOUNTER — TRANSCRIPTION ENCOUNTER (OUTPATIENT)
Age: 32
End: 2022-10-12

## 2022-10-19 ENCOUNTER — ASOB RESULT (OUTPATIENT)
Age: 32
End: 2022-10-19

## 2022-10-19 ENCOUNTER — APPOINTMENT (OUTPATIENT)
Dept: OBGYN | Facility: CLINIC | Age: 32
End: 2022-10-19

## 2022-10-19 ENCOUNTER — APPOINTMENT (OUTPATIENT)
Dept: ANTEPARTUM | Facility: CLINIC | Age: 32
End: 2022-10-19

## 2022-10-19 VITALS
HEIGHT: 61 IN | SYSTOLIC BLOOD PRESSURE: 118 MMHG | DIASTOLIC BLOOD PRESSURE: 78 MMHG | BODY MASS INDEX: 25.11 KG/M2 | WEIGHT: 133 LBS

## 2022-10-19 PROCEDURE — 99213 OFFICE O/P EST LOW 20 MIN: CPT | Mod: 25

## 2022-10-19 PROCEDURE — 81025 URINE PREGNANCY TEST: CPT

## 2022-10-19 PROCEDURE — 76830 TRANSVAGINAL US NON-OB: CPT

## 2022-10-19 PROCEDURE — 58100 BIOPSY OF UTERUS LINING: CPT

## 2022-10-19 PROCEDURE — 56605 BIOPSY OF VULVA/PERINEUM: CPT

## 2022-10-20 LAB
HCG UR QL: NEGATIVE
QUALITY CONTROL: YES

## 2022-10-20 NOTE — PROCEDURE
[Endometrial Biopsy] : Endometrial biopsy [Irregular Bleeding] : irregular uterine bleeding [Risks] : risks [Benefits] : benefits [Alternatives] : alternatives [Negative] : negative pregnancy test [No Premedication] : No premedication [None] : none [Easy Passage] : Easy passage [Sounded to ___ cm] : sounded to [unfilled] ~Ucm [Mid Position] : mid position [Moderate] : moderate [Vulvar Biopsy] : Vulvar Biopsy [Consent Obtained] : Consent obtained [] : on the left labia majora [Size of Biopsy Taken: ___ (mm)] : [unfilled]Umm [Local Anesthesia] : local anesthesia [____ Lidocaine w/o Epi] : ~VmL lidocaine without epinephrine [Betadine] : Betadine [Sent to Pathology] : placed in buffered formalin and sent for pathology [Punch] : punch biopsy [Sutures #___] : [unfilled] sutures [Pressure] : the application of direct pressure [Tolerated Well] : the patient tolerated the procedure well [No Complications] : there were no complications [LMPDate] : 9/13/22 [de-identified] : not required

## 2022-10-20 NOTE — PHYSICAL EXAM
[Chaperone Present] : A chaperone was present in the examining room during all aspects of the physical examination [FreeTextEntry1] : holly [Appropriately responsive] : appropriately responsive [Alert] : alert [No Acute Distress] : no acute distress [No Bleeding] : There was no active vaginal bleeding [Normal] : normal [FreeTextEntry2] : mild labial flattening and agglutination to vulva

## 2022-10-20 NOTE — HISTORY OF PRESENT ILLNESS
[FreeTextEntry1] : Brianna presents for evaluation of amenorrhea/AUB. She was 2-3 weeks late for her menses and then had bleeding for 1 month. She just recently stopped bleeding. HCG from serum was negative. Bloodwork from last visit was unremarkable. \par She has a history of breast cancer and received chemo. She was told that she would not get menses back but she did have resumption of menses and conceived a pregnancy after chemotherapy. \par Sonogram today is showing 5cm uterus with 6mm homogenous endometrium. Right ovary with normal appearance, left ovary with 2.7 complex cyst likely CL. Trace ff \par She has been told she has lichen sclerosus and needs vulvar biopsy.

## 2022-10-20 NOTE — DISCUSSION/SUMMARY
[FreeTextEntry1] : Endometrial biopsy performed today to evaluate abnormal bleeding that she experienced. I will call the patient with the results. \par She is considering another pregnancy. I explained that the abnormal menses may be a random event that will not recur, or could be a sign of possible POI due to history of chemotherapy. Due to her breast cancer history I did not advise use of CHC or other hormonal medications for control of bleeding at this time. \par Vulvar biopsy performed to evaluate for possible LS which she has been told she has in the past. I will clal the patient with results.

## 2022-10-25 ENCOUNTER — NON-APPOINTMENT (OUTPATIENT)
Age: 32
End: 2022-10-25

## 2022-10-25 LAB — CORE LAB BIOPSY: NORMAL

## 2022-10-26 NOTE — PROGRESS NOTE ADULT - ATTENDING COMMENTS
resident note reviewed  doing well, normal lochia  VS and labs reviewed                        10.9   x     )-----------( x        ( 2021 05:48 )             32.3   PPD#1 s/p  - stable, afebrile  pregnancy complicated by FGR - may be at risk for preeclampsia - BPs reviewed, and normal  routine postpartum care
4 = No assist / stand by assistance
MFM Attending    32yo  at 34 3/7 weeks with FGR and abnormal UA Dopplers with intermittent AEDV. Received betamethasone for FLM. Recommend delivery.    KIM Laguna

## 2022-11-02 ENCOUNTER — TRANSCRIPTION ENCOUNTER (OUTPATIENT)
Age: 32
End: 2022-11-02

## 2022-11-02 ENCOUNTER — APPOINTMENT (OUTPATIENT)
Dept: OBGYN | Facility: CLINIC | Age: 32
End: 2022-11-02

## 2022-11-02 VITALS
BODY MASS INDEX: 25.3 KG/M2 | HEIGHT: 61 IN | SYSTOLIC BLOOD PRESSURE: 100 MMHG | WEIGHT: 134 LBS | DIASTOLIC BLOOD PRESSURE: 70 MMHG

## 2022-11-02 PROCEDURE — 99212 OFFICE O/P EST SF 10 MIN: CPT

## 2022-11-02 NOTE — HISTORY OF PRESENT ILLNESS
[FreeTextEntry1] : Brianna had a vulvar biopsy 2 weeks ago to rule out LS, biopsy was negative for any abnormalities. The suture placed at the site is bothering her and causing discomfort at work, she is requesting removal.

## 2022-11-02 NOTE — PHYSICAL EXAM
[Chaperone Present] : A chaperone was present in the examining room during all aspects of the physical examination [Appropriately responsive] : appropriately responsive [Alert] : alert [No Acute Distress] : no acute distress [Oriented x3] : oriented x3 [Normal] : normal external genitalia [FreeTextEntry1] : vulvar biopsy site well-healed. vicryl stitch removed without an issue.

## 2022-11-10 NOTE — ASU PATIENT PROFILE, ADULT - AS SC BRADEN FRICTION
Hospitalist Progress Note  Admit Date: 7/11/2020       Overnight Events: None    CC: F/U for sepsis, pyelonephritis, orchitis    Interval History: spiked a fever again last night, less confused, has not been eating, no torsion evident on US, enterococci on urine culture    Review of Systems - he complains of feeling lousy and still having pain in his back and scrotum    Scheduled Medications:    oxyCODONE  10 mg Oral 2 times per day    piperacillin-tazobactam  3.375 g Intravenous Q8H    midazolam  2 mg Intramuscular Once    vancomycin (VANCOCIN) intermittent dosing (placeholder)   Other RX Placeholder    vancomycin  1,500 mg Intravenous Q12H    lithium  600 mg Oral BID WC    tamsulosin  0.4 mg Oral Nightly    enoxaparin  40 mg Subcutaneous Daily    insulin glargine  0.25 Units/kg Subcutaneous Nightly    insulin lispro  0.08 Units/kg Subcutaneous TID WC    insulin lispro  0-12 Units Subcutaneous TID WC    insulin lispro  0-6 Units Subcutaneous Nightly      PRN Medications: oxyCODONE-acetaminophen **OR** oxyCODONE-acetaminophen, acetaminophen, acetaminophen, acetaminophen, ondansetron, ondansetron, promethazine, promethazine, promethazine, sodium chloride flush, potassium chloride **OR** potassium alternative oral replacement **OR** potassium chloride, magnesium sulfate, ketorolac, glucose, dextrose, glucagon (rDNA), dextrose  Diet: DIET CARB CONTROL;     Continuous Infusions:   sodium chloride 175 mL/hr at 07/13/20 0858    dextrose         PHYSICAL EXAM:  BP (!) 142/84   Pulse 82   Temp 99.3 °F (37.4 °C) (Oral)   Resp 18   Ht 6' 1\" (1.854 m)   Wt 261 lb 11 oz (118.7 kg)   SpO2 95%   BMI 34.53 kg/m²   Recent Labs     07/12/20  0758 07/12/20  1130 07/12/20  1626 07/12/20  1946 07/13/20  0756   POCGLU 217* 191* 175* 266* 184*       Intake/Output Summary (Last 24 hours) at 7/13/2020 0958  Last data filed at 7/13/2020 0911  Gross per 24 hour   Intake 5253 ml   Output 2710 ml   Net 2543 ml       BP hours.    Invalid input(s): LDLCALCU  Recent Labs     07/12/20  0416 07/13/20  0447   INR 1.28* 1.41*       Assessment & Plan:    Patient Active Problem List:     Principal Problem:    Sepsis ([facility]  Active Problems:    Metabolic encephalopathy    Acute epididymo-orchitis    Pyelonephritis    S/P TURP    Type 2 diabetes mellitus ([facility]    Essential hypertension    Prostate hyperplasia with urinary obstruction    Bipolar 1 disorder ([facility]    Asthma  Resolved Problems:    * No resolved hospital problems. *            The patient and / or the family were informed of the results of any tests, a time was given to answer questions, a plan was proposed and they agreed with plan. Disposition: Home in 2 to 3 days  Full Code    1. Continue iv fluids and iv antibiotics, Zosyn and vancomycin,   2. Prn haldol, continue the lithium, start supplemental iv thiamine, check  Lithium level, TSH, RPR, HIV, B12  3. Advance to ADA diet as tolerated subcutaneous insulin regimen for blood sugar target 140-180  4. Permissive hypertension pending MRI brain to better elucidate possibility of acute CVA  5. Increase analgesic regimen  6.  ID and urology consults invited      D/w RN at the bedside    Niya Rogers MD  7/13/2020 (3) no apparent problem

## 2022-11-10 NOTE — ASU PATIENT PROFILE, ADULT - FALL HARM RISK - UNIVERSAL INTERVENTIONS
Bed in lowest position, wheels locked, appropriate side rails in place/Call bell, personal items and telephone in reach/Instruct patient to call for assistance before getting out of bed or chair/Non-slip footwear when patient is out of bed/[place] to call system/Physically safe environment - no spills, clutter or unnecessary equipment/Purposeful Proactive Rounding/Room/bathroom lighting operational, light cord in reach

## 2022-11-11 ENCOUNTER — TRANSCRIPTION ENCOUNTER (OUTPATIENT)
Age: 32
End: 2022-11-11

## 2022-11-11 ENCOUNTER — OUTPATIENT (OUTPATIENT)
Dept: INPATIENT UNIT | Facility: HOSPITAL | Age: 32
LOS: 1 days | Discharge: HOME | End: 2022-11-11
Payer: COMMERCIAL

## 2022-11-11 ENCOUNTER — APPOINTMENT (OUTPATIENT)
Dept: ORTHOPEDIC SURGERY | Facility: HOSPITAL | Age: 32
End: 2022-11-11

## 2022-11-11 VITALS
SYSTOLIC BLOOD PRESSURE: 110 MMHG | WEIGHT: 134.04 LBS | TEMPERATURE: 98 F | HEART RATE: 94 BPM | HEIGHT: 61 IN | DIASTOLIC BLOOD PRESSURE: 71 MMHG | RESPIRATION RATE: 14 BRPM | OXYGEN SATURATION: 100 %

## 2022-11-11 VITALS
RESPIRATION RATE: 15 BRPM | DIASTOLIC BLOOD PRESSURE: 69 MMHG | TEMPERATURE: 98 F | SYSTOLIC BLOOD PRESSURE: 109 MMHG | OXYGEN SATURATION: 100 % | HEART RATE: 100 BPM

## 2022-11-11 DIAGNOSIS — S73.191A OTHER SPRAIN OF RIGHT HIP, INITIAL ENCOUNTER: ICD-10-CM

## 2022-11-11 DIAGNOSIS — Z98.890 OTHER SPECIFIED POSTPROCEDURAL STATES: Chronic | ICD-10-CM

## 2022-11-11 DIAGNOSIS — Z98.89 OTHER SPECIFIED POSTPROCEDURAL STATES: Chronic | ICD-10-CM

## 2022-11-11 LAB
ANION GAP SERPL CALC-SCNC: 5 MMOL/L — SIGNIFICANT CHANGE UP (ref 5–17)
APTT BLD: 39.3 SEC — HIGH (ref 27.5–35.5)
BUN SERPL-MCNC: 15 MG/DL — SIGNIFICANT CHANGE UP (ref 7–23)
CALCIUM SERPL-MCNC: 7.9 MG/DL — LOW (ref 8.5–10.1)
CHLORIDE SERPL-SCNC: 110 MMOL/L — HIGH (ref 96–108)
CO2 SERPL-SCNC: 24 MMOL/L — SIGNIFICANT CHANGE UP (ref 22–31)
CREAT SERPL-MCNC: 0.71 MG/DL — SIGNIFICANT CHANGE UP (ref 0.5–1.3)
EGFR: 116 ML/MIN/1.73M2 — SIGNIFICANT CHANGE UP
GLUCOSE SERPL-MCNC: 76 MG/DL — SIGNIFICANT CHANGE UP (ref 70–99)
HCG UR QL: NEGATIVE — SIGNIFICANT CHANGE UP
HCT VFR BLD CALC: 39.2 % — SIGNIFICANT CHANGE UP (ref 34.5–45)
HGB BLD-MCNC: 13.1 G/DL — SIGNIFICANT CHANGE UP (ref 11.5–15.5)
INR BLD: 1.03 RATIO — SIGNIFICANT CHANGE UP (ref 0.88–1.16)
MCHC RBC-ENTMCNC: 30.6 PG — SIGNIFICANT CHANGE UP (ref 27–34)
MCHC RBC-ENTMCNC: 33.4 GM/DL — SIGNIFICANT CHANGE UP (ref 32–36)
MCV RBC AUTO: 91.6 FL — SIGNIFICANT CHANGE UP (ref 80–100)
PLATELET # BLD AUTO: 183 K/UL — SIGNIFICANT CHANGE UP (ref 150–400)
POTASSIUM SERPL-MCNC: 3.5 MMOL/L — SIGNIFICANT CHANGE UP (ref 3.5–5.3)
POTASSIUM SERPL-SCNC: 3.5 MMOL/L — SIGNIFICANT CHANGE UP (ref 3.5–5.3)
PROTHROM AB SERPL-ACNC: 11.9 SEC — SIGNIFICANT CHANGE UP (ref 10.5–13.4)
RBC # BLD: 4.28 M/UL — SIGNIFICANT CHANGE UP (ref 3.8–5.2)
RBC # FLD: 12 % — SIGNIFICANT CHANGE UP (ref 10.3–14.5)
SODIUM SERPL-SCNC: 139 MMOL/L — SIGNIFICANT CHANGE UP (ref 135–145)
WBC # BLD: 4.74 K/UL — SIGNIFICANT CHANGE UP (ref 3.8–10.5)
WBC # FLD AUTO: 4.74 K/UL — SIGNIFICANT CHANGE UP (ref 3.8–10.5)

## 2022-11-11 PROCEDURE — 85520 HEPARIN ASSAY: CPT

## 2022-11-11 PROCEDURE — 80048 BASIC METABOLIC PNL TOTAL CA: CPT

## 2022-11-11 PROCEDURE — C1713: CPT

## 2022-11-11 PROCEDURE — 29999 UNLISTED PX ARTHROSCOPY: CPT | Mod: RT

## 2022-11-11 PROCEDURE — 85027 COMPLETE CBC AUTOMATED: CPT

## 2022-11-11 PROCEDURE — 29914 HIP ARTHRO W/FEMOROPLASTY: CPT | Mod: RT

## 2022-11-11 PROCEDURE — 85610 PROTHROMBIN TIME: CPT

## 2022-11-11 PROCEDURE — 93005 ELECTROCARDIOGRAM TRACING: CPT

## 2022-11-11 PROCEDURE — 76000 FLUOROSCOPY <1 HR PHYS/QHP: CPT

## 2022-11-11 PROCEDURE — 93010 ELECTROCARDIOGRAM REPORT: CPT

## 2022-11-11 PROCEDURE — 81025 URINE PREGNANCY TEST: CPT

## 2022-11-11 PROCEDURE — 36415 COLL VENOUS BLD VENIPUNCTURE: CPT

## 2022-11-11 PROCEDURE — 29916 HIP ARTHRO W/LABRAL REPAIR: CPT | Mod: RT

## 2022-11-11 PROCEDURE — 85730 THROMBOPLASTIN TIME PARTIAL: CPT

## 2022-11-11 RX ORDER — OXYCODONE HYDROCHLORIDE 5 MG/1
1 TABLET ORAL
Qty: 28 | Refills: 0
Start: 2022-11-11 | End: 2022-11-17

## 2022-11-11 RX ORDER — DOCUSATE SODIUM 100 MG
1 CAPSULE ORAL
Qty: 60 | Refills: 0
Start: 2022-11-11 | End: 2022-12-10

## 2022-11-11 RX ORDER — OXYCODONE HYDROCHLORIDE 5 MG/1
5 TABLET ORAL ONCE
Refills: 0 | Status: DISCONTINUED | OUTPATIENT
Start: 2022-11-11 | End: 2022-11-11

## 2022-11-11 RX ORDER — FENTANYL CITRATE 50 UG/ML
50 INJECTION INTRAVENOUS
Refills: 0 | Status: DISCONTINUED | OUTPATIENT
Start: 2022-11-11 | End: 2022-11-11

## 2022-11-11 RX ORDER — ASPIRIN/CALCIUM CARB/MAGNESIUM 324 MG
1 TABLET ORAL
Qty: 30 | Refills: 0
Start: 2022-11-11 | End: 2022-12-10

## 2022-11-11 RX ORDER — TRAMADOL HYDROCHLORIDE 50 MG/1
50 TABLET ORAL EVERY 6 HOURS
Refills: 0 | Status: DISCONTINUED | OUTPATIENT
Start: 2022-11-11 | End: 2022-11-11

## 2022-11-11 RX ORDER — HYDROMORPHONE HYDROCHLORIDE 2 MG/ML
0.5 INJECTION INTRAMUSCULAR; INTRAVENOUS; SUBCUTANEOUS ONCE
Refills: 0 | Status: DISCONTINUED | OUTPATIENT
Start: 2022-11-11 | End: 2022-11-11

## 2022-11-11 RX ORDER — ONDANSETRON 8 MG/1
1 TABLET, FILM COATED ORAL
Qty: 40 | Refills: 0
Start: 2022-11-11 | End: 2022-11-20

## 2022-11-11 RX ORDER — DULOXETINE HYDROCHLORIDE 30 MG/1
1 CAPSULE, DELAYED RELEASE ORAL
Qty: 0 | Refills: 0 | DISCHARGE

## 2022-11-11 RX ORDER — OXYCODONE HYDROCHLORIDE 5 MG/1
5 TABLET ORAL EVERY 6 HOURS
Refills: 0 | Status: DISCONTINUED | OUTPATIENT
Start: 2022-11-11 | End: 2022-11-11

## 2022-11-11 RX ORDER — INDOMETHACIN 50 MG
1 CAPSULE ORAL
Qty: 42 | Refills: 0
Start: 2022-11-11 | End: 2022-11-24

## 2022-11-11 RX ORDER — ZOLPIDEM TARTRATE 10 MG/1
1 TABLET ORAL
Qty: 0 | Refills: 0 | DISCHARGE

## 2022-11-11 RX ORDER — LEVOTHYROXINE SODIUM 125 MCG
1 TABLET ORAL
Qty: 0 | Refills: 0 | DISCHARGE

## 2022-11-11 RX ORDER — HYDROMORPHONE HYDROCHLORIDE 2 MG/ML
0.5 INJECTION INTRAMUSCULAR; INTRAVENOUS; SUBCUTANEOUS
Refills: 0 | Status: DISCONTINUED | OUTPATIENT
Start: 2022-11-11 | End: 2022-11-11

## 2022-11-11 RX ORDER — OXYCODONE HYDROCHLORIDE 5 MG/1
10 TABLET ORAL EVERY 6 HOURS
Refills: 0 | Status: DISCONTINUED | OUTPATIENT
Start: 2022-11-11 | End: 2022-11-11

## 2022-11-11 RX ORDER — ONDANSETRON 8 MG/1
4 TABLET, FILM COATED ORAL ONCE
Refills: 0 | Status: DISCONTINUED | OUTPATIENT
Start: 2022-11-11 | End: 2022-11-11

## 2022-11-11 RX ORDER — FENTANYL CITRATE 50 UG/ML
25 INJECTION INTRAVENOUS
Refills: 0 | Status: DISCONTINUED | OUTPATIENT
Start: 2022-11-11 | End: 2022-11-11

## 2022-11-11 RX ADMIN — HYDROMORPHONE HYDROCHLORIDE 0.5 MILLIGRAM(S): 2 INJECTION INTRAMUSCULAR; INTRAVENOUS; SUBCUTANEOUS at 13:29

## 2022-11-11 RX ADMIN — FENTANYL CITRATE 50 MICROGRAM(S): 50 INJECTION INTRAVENOUS at 09:35

## 2022-11-11 RX ADMIN — HYDROMORPHONE HYDROCHLORIDE 0.5 MILLIGRAM(S): 2 INJECTION INTRAMUSCULAR; INTRAVENOUS; SUBCUTANEOUS at 19:01

## 2022-11-11 RX ADMIN — OXYCODONE HYDROCHLORIDE 5 MILLIGRAM(S): 5 TABLET ORAL at 10:02

## 2022-11-11 RX ADMIN — OXYCODONE HYDROCHLORIDE 5 MILLIGRAM(S): 5 TABLET ORAL at 10:28

## 2022-11-11 RX ADMIN — FENTANYL CITRATE 50 MICROGRAM(S): 50 INJECTION INTRAVENOUS at 10:15

## 2022-11-11 RX ADMIN — FENTANYL CITRATE 50 MICROGRAM(S): 50 INJECTION INTRAVENOUS at 14:30

## 2022-11-11 RX ADMIN — HYDROMORPHONE HYDROCHLORIDE 0.5 MILLIGRAM(S): 2 INJECTION INTRAMUSCULAR; INTRAVENOUS; SUBCUTANEOUS at 10:47

## 2022-11-11 RX ADMIN — FENTANYL CITRATE 50 MICROGRAM(S): 50 INJECTION INTRAVENOUS at 13:49

## 2022-11-11 RX ADMIN — HYDROMORPHONE HYDROCHLORIDE 0.5 MILLIGRAM(S): 2 INJECTION INTRAMUSCULAR; INTRAVENOUS; SUBCUTANEOUS at 12:13

## 2022-11-11 RX ADMIN — HYDROMORPHONE HYDROCHLORIDE 0.5 MILLIGRAM(S): 2 INJECTION INTRAMUSCULAR; INTRAVENOUS; SUBCUTANEOUS at 10:26

## 2022-11-11 RX ADMIN — OXYCODONE HYDROCHLORIDE 5 MILLIGRAM(S): 5 TABLET ORAL at 16:43

## 2022-11-11 NOTE — DISCHARGE NOTE NURSING/CASE MANAGEMENT/SOCIAL WORK - PATIENT PORTAL LINK FT
You can access the FollowMyHealth Patient Portal offered by [facility] by registering at the following website: [email or web address]. By joining [facility]’s FollowMyHealth portal, you will also be able to view your health information using other applications (apps) compatible with our system.

## 2022-11-11 NOTE — ASU DISCHARGE PLAN (ADULT/PEDIATRIC) - ASU DC SPECIAL INSTRUCTIONSFT
Hip Arthroscopy Post op    ACTIVITY: Usually during the first 2 or 3 days, there is not a lot of moving around.  We do encourage you to get about with the crutches intermittently to do your usual daily activities (i.e. use the restroom, etc.)     PAIN MEDS:  We will send an eRx prescription to your pharmacy narcotic pain medication on the day of your surgery.  We do not generally write these before that day.  As your pain gets better, you can switch to Tylenol and/or ibuprofen.     CRUTCHES: Most need crutches for 2-4 weeks.  This means you can touch your foot down to the floor, but not put all of your weight on it.  If you have an isolated labral tear (no impingement/no osteoplasty), you will need crutches for 2 weeks.     STITCHES:  will be removed at about 2 weeks.     SCHOOL/WORK: Generally, most students miss about a week of school.  For work, it depends on what you do. Time out from work is also variable.  This will not only depend on the procedure but what your work requirements are. If you have a job that is mostly sitting you should be able to return in about a week. If you have a physical job you should discuss this with Dr. Seals or PETTY Mcduffie.     DRIVING: You can drive once you are fully weight bearing and it is comfortable to do so. You must be able to perform an emergency stop without hesitation. It may be 1-2 weeks before you are comfortable driving if it is your left hip and the car is an automatic.  If surgery is on your right hip then you will not be able to drive from anywhere between 2- 4 weeks. You must be off of pain medication to drive.     PHYSICAL THERAPY: You will get a prescription for this. You can schedule your first PT visit anytime for about one week after.  Therapy is usually two to three times per week.     SHOWERING: You may shower 2 days post-op with a water proof dressing, but soaking in a tub should be avoided until your 2 week appointment to check if wounds are healed.     **Please refer to patient post op info packet given to you at office visit for furter details.

## 2022-11-11 NOTE — DISCHARGE NOTE NURSING/CASE MANAGEMENT/SOCIAL WORK - NSDCPEFALRISK_GEN_ALL_CORE
For information on Fall & Injury Prevention, visit: [email or web address] OR  [email or web address] OR  https://www.cdc.gov/steadi/patient.html

## 2022-11-11 NOTE — ASU DISCHARGE PLAN (ADULT/PEDIATRIC) - NS MD DC FALL RISK RISK
For information on Fall & Injury Prevention, visit: [email or web address] OR  [email or web address] OR  https://www.cdc.gov/steadi/patient.html

## 2022-11-11 NOTE — ASU DISCHARGE PLAN (ADULT/PEDIATRIC) - CARE PROVIDER_API CALL
Juan Antonio Seals (DO)  [address], [address]  [place] [address]  Phone: [phone]  Fax: [phone]  Follow Up Time:

## 2022-11-13 ENCOUNTER — RX RENEWAL (OUTPATIENT)
Age: 32
End: 2022-11-13

## 2022-11-14 ENCOUNTER — TRANSCRIPTION ENCOUNTER (OUTPATIENT)
Age: 32
End: 2022-11-14

## 2022-11-16 ENCOUNTER — APPOINTMENT (OUTPATIENT)
Dept: ORTHOPEDIC SURGERY | Facility: CLINIC | Age: 32
End: 2022-11-16

## 2022-11-16 PROCEDURE — 99024 POSTOP FOLLOW-UP VISIT: CPT

## 2022-11-16 NOTE — HISTORY OF PRESENT ILLNESS
[Worsening] : worsening [___ mths] : [unfilled] month(s) ago [3] : a current pain level of 3/10 [4] : an average pain level of 4/10 [2] : a minimum pain level of 2/10 [5] : a maximum pain level of 5/10 [Hip Movement] : worsened by hip movement [de-identified] : S/P Right hip arthroscopy femoroplasty acetabuloplasty labrum repair, capsule repair. DOS: 11/11/2022 [de-identified] : The right hip is examined.  Her incisions are healing well.  The distalmost incision has slight superficial wound dehiscence at the distal aspect.  This is clean and dry without evidence of infection.  There is no surrounding erythema or any streaking.  She flexes her hip to 90 degrees and tolerates gentle internal and external rotation.  Her calf and thigh are soft and nontender.  She is grossly neurovascular intact distally. [de-identified] : I had a discussion with the patient regarding her condition.  At this time there is no signs of infection.  We will hold off on any antibiotic treatment.  She was advised on signs of infection including drainage erythema warmth, fevers, or chills.  If any of these present she will call us or present to the ER.  She should continue with her hip precautions.  She will follow-up next week with Dr. Seals for her routine postop evaluation and x-rays.  All questions were answered. [de-identified] : JEANE GUTIERREZ is a 32 year female being seen for initial visit R hip pain. She reports she has been experiencing hip pain since going throuhg chemo ********* months/weeks ago. Patient works at [employer]. 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 She reports increase in hip pain with deep flexion and rotation. She has had past cortisone injections with minimal relief. She also reports no relief with formal PT and HEP> \par  [de-identified] : rotation

## 2022-11-17 DIAGNOSIS — Z92.21 PERSONAL HISTORY OF ANTINEOPLASTIC CHEMOTHERAPY: ICD-10-CM
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
## 2022-11-17 DIAGNOSIS — J45.909 UNSPECIFIED ASTHMA, UNCOMPLICATED: ICD-10-CM

## 2022-11-17 DIAGNOSIS — E06.3 AUTOIMMUNE THYROIDITIS: ICD-10-CM

## 2022-11-17 DIAGNOSIS — L90.0 LICHEN SCLEROSUS ET ATROPHICUS: ICD-10-CM

## 2022-11-17 DIAGNOSIS — Z82.49 FAMILY HISTORY OF ISCHEMIC HEART DISEASE AND OTHER DISEASES OF THE CIRCULATORY SYSTEM: ICD-10-CM

## 2022-11-17 DIAGNOSIS — K58.9 IRRITABLE BOWEL SYNDROME WITHOUT DIARRHEA: ICD-10-CM
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
## 2022-11-17 DIAGNOSIS — Z85.3 PERSONAL HISTORY OF MALIGNANT NEOPLASM OF BREAST: ICD-10-CM

## 2022-11-17 DIAGNOSIS — S73.191A OTHER SPRAIN OF RIGHT HIP, INITIAL ENCOUNTER: ICD-10-CM

## 2022-11-17 DIAGNOSIS — Z82.3 FAMILY HISTORY OF STROKE: ICD-10-CM

## 2022-11-17 DIAGNOSIS — Z90.13 ACQUIRED ABSENCE OF BILATERAL BREASTS AND NIPPLES: ICD-10-CM

## 2022-11-17 DIAGNOSIS — Z80.8 FAMILY HISTORY OF MALIGNANT NEOPLASM OF OTHER ORGANS OR SYSTEMS: ICD-10-CM

## 2022-11-17 DIAGNOSIS — Z79.899 OTHER LONG TERM (CURRENT) DRUG THERAPY: ICD-10-CM

## 2022-11-17 DIAGNOSIS — B00.9 HERPESVIRAL INFECTION, UNSPECIFIED: ICD-10-CM

## 2022-11-17 DIAGNOSIS — Z92.3 PERSONAL HISTORY OF IRRADIATION: ICD-10-CM

## 2022-11-17 DIAGNOSIS — I73.00 RAYNAUD'S SYNDROME WITHOUT GANGRENE: ICD-10-CM

## 2022-11-17 DIAGNOSIS — E61.1 IRON DEFICIENCY: ICD-10-CM

## 2022-11-17 DIAGNOSIS — S71.011A LACERATION WITHOUT FOREIGN BODY, RIGHT HIP, INITIAL ENCOUNTER: ICD-10-CM

## 2022-11-17 DIAGNOSIS — N13.30 UNSPECIFIED HYDRONEPHROSIS: ICD-10-CM

## 2022-11-17 DIAGNOSIS — Z80.0 FAMILY HISTORY OF MALIGNANT NEOPLASM OF DIGESTIVE ORGANS: ICD-10-CM

## 2022-11-18 NOTE — PATIENT PROFILE ADULT. - TEACHING/LEARNING FACTORS INFLUENCE READINESS TO LEARN
none Propranolol Pregnancy And Lactation Text: This medication is Pregnancy Category C and it isn't known if it is safe during pregnancy. It is excreted in breast milk.

## 2022-11-22 ENCOUNTER — APPOINTMENT (OUTPATIENT)
Dept: ORTHOPEDIC SURGERY | Facility: CLINIC | Age: 32
End: 2022-11-22
Payer: COMMERCIAL

## 2022-11-22 DIAGNOSIS — F41.9 ANXIETY DISORDER, UNSPECIFIED: ICD-10-CM

## 2022-11-22 PROCEDURE — 73503 X-RAY EXAM HIP UNI 4/> VIEWS: CPT

## 2022-11-22 PROCEDURE — 99024 POSTOP FOLLOW-UP VISIT: CPT

## 2022-11-22 PROCEDURE — 73502 X-RAY EXAM HIP UNI 2-3 VIEWS: CPT | Mod: RT

## 2022-11-23 NOTE — ADDENDUM
[FreeTextEntry1] : Documented by Adriana Sidhu acting as a scribe for Dr. Seals and Salvatore Abreu PA-C on 11/22/2022.   All medical record entries made by the Scribe were at my, Dr. Seals, and Salvatore Abreu's, direction and personally dictated by me on 11/22/2022. I have reviewed the chart and agree that the record accurately reflects my personal performance of the history, physical exam, procedure and imaging.

## 2022-11-23 NOTE — HISTORY OF PRESENT ILLNESS
[Doing Well] : is doing well [No Sign of Infection] : is showing no signs of infection [Adequate Pain Control] : has adequate pain control [Worsening] : worsening [___ mths] : [unfilled] month(s) ago [3] : a current pain level of 3/10 [4] : an average pain level of 4/10 [2] : a minimum pain level of 2/10 [5] : a maximum pain level of 5/10 [Hip Movement] : worsened by hip movement [de-identified] : S/P Right hip arthroscopy femoroplasty acetabuloplasty labrum repair, capsule repair. DOS: 11/11/2022 [de-identified] : The right hip is examined.  Her incisions are healing well.  The distalmost incision has slight superficial wound dehiscence at the distal aspect.  This is clean and dry without evidence of infection.  There is no surrounding erythema or any streaking.  She flexes her hip to 90 degrees and tolerates gentle internal and external rotation.  Her calf and thigh are soft and nontender.  She is grossly neurovascular intact distally. [de-identified] : Patient will continue with physical therapy. Patient will follow up in 4 wks for repeat clinical assessment. She should continue with her hip precautions.  She should start bearing weight slowly on her right leg and eventually discontinue walker over the next 2 weeks. All questions were answered. [de-identified] : AP of the pelvis and 2 views of the right hip demonstrate a right hip arthroscopy in stable position, with no evidence of fracture, loosening, or dislocation. [de-identified] : JEANE GUTIERREZ is a 32 year female being seen for initial visit R hip pain. She reports she has been experiencing hip pain since going throuhg chemo ********* months/weeks ago. Patient works at [employer]. 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 She reports increase in hip pain with deep flexion and rotation. She has had past cortisone injections with minimal relief. She also reports no relief with formal PT and HEP> \par  [de-identified] : rotation

## 2022-12-21 ENCOUNTER — APPOINTMENT (OUTPATIENT)
Dept: ORTHOPEDIC SURGERY | Facility: CLINIC | Age: 32
End: 2022-12-21

## 2022-12-21 DIAGNOSIS — S73.191A OTHER SPRAIN OF RIGHT HIP, INITIAL ENCOUNTER: ICD-10-CM

## 2022-12-21 PROCEDURE — 99024 POSTOP FOLLOW-UP VISIT: CPT

## 2022-12-21 NOTE — HISTORY OF PRESENT ILLNESS
[___ Weeks Post Op] : [unfilled] weeks post op [2] : the patient reports pain that is 2/10 in severity [Clean/Dry/Intact] : clean, dry and intact [Neuro Intact] : an unremarkable neurological exam [Vascular Intact] : ~T peripheral vascular exam normal [Doing Well] : is doing well [No Sign of Infection] : is showing no signs of infection [Adequate Pain Control] : has adequate pain control [Chills] : no chills [Fever] : no fever [Nausea] : no nausea [Vomiting] : no vomiting [de-identified] : S/P Right hip arthroscopy femoroplasty acetabuloplasty labrum repair, capsule repair. DOS: 11/11/2022 [de-identified] : Patient is a 32-year-old female here today S/P Right hip arthroscopy femoroplasty acetabuloplasty labrum repair, capsule repair, 6 weeks ago. She has been going to PT 2x/week at [facility]. She notes some continued hip flexor pain with hip flexion and extension.  [de-identified] : The right hip is examined.  Her incisions are healing well, except the distal.  The distal most incision has granulation tissue.  This is clean and dry without evidence of infection.  There is no surrounding erythema or any streaking.  She flexes her hip to 90 degrees and tolerates gentle internal and external rotation.  There is tenderness along the hip flexor and she has pain with active straight leg raising her calf and thigh are soft and nontender.  She is grossly neurovascular intact distally. [de-identified] : No new imaging performed today.  [de-identified] : I had a discussion with the patient regarding her condition.  She has flexor tendinitis postoperatively.  I recommended she try to rest as much as possible.  She should hold off on physical therapy for 1 week.  I gave her prescription for prednisone, GI precautions were reviewed and meloxicam that she will start after completion of the prednisone pack.  She will follow-up in 3 to 4 weeks for repeat evaluation.  We discussed the potential for an injection if needed.  All questions were answered.

## 2023-01-09 ENCOUNTER — LABORATORY RESULT (OUTPATIENT)
Age: 33
End: 2023-01-09

## 2023-01-09 ENCOUNTER — APPOINTMENT (OUTPATIENT)
Dept: OBGYN | Facility: CLINIC | Age: 33
End: 2023-01-09
Payer: COMMERCIAL

## 2023-01-09 ENCOUNTER — APPOINTMENT (OUTPATIENT)
Dept: ORTHOPEDIC SURGERY | Facility: CLINIC | Age: 33
End: 2023-01-09

## 2023-01-09 VITALS
BODY MASS INDEX: 26.1 KG/M2 | SYSTOLIC BLOOD PRESSURE: 123 MMHG | DIASTOLIC BLOOD PRESSURE: 80 MMHG | WEIGHT: 138.25 LBS | HEIGHT: 61 IN

## 2023-01-09 DIAGNOSIS — Z12.4 ENCOUNTER FOR SCREENING FOR MALIGNANT NEOPLASM OF CERVIX: ICD-10-CM

## 2023-01-09 PROCEDURE — 99395 PREV VISIT EST AGE 18-39: CPT

## 2023-01-09 PROCEDURE — 81025 URINE PREGNANCY TEST: CPT

## 2023-01-10 NOTE — PHYSICAL EXAM
[Chaperone Present] : A chaperone was present in the examining room during all aspects of the physical examination [Appropriately responsive] : appropriately responsive [Alert] : alert [No Acute Distress] : no acute distress [No Lymphadenopathy] : no lymphadenopathy [Soft] : soft [Non-tender] : non-tender [Non-distended] : non-distended [Oriented x3] : oriented x3 [Examination Of The Breasts] : a normal appearance [] : implants [No Discharge] : no discharge [Right Breast Absent] : a total mastectomy [Left Breast Absent] : a total mastectomy [No Masses] : no breast masses were palpable [Labia Majora] : normal [Labia Minora] : normal [No Bleeding] : There was no active vaginal bleeding [Normal] : normal [Uterine Adnexae] : non-palpable [FreeTextEntry1] : holly

## 2023-01-10 NOTE — DISCUSSION/SUMMARY
[FreeTextEntry1] :   The benefits of adequate calcium intake and a daily multivitamin along with routine daily cardiovascular exercise were reviewed with the patient.\par  If bleeding becomes problematic, she will schedule a follow up. We have recent negative EMB and my suspicion is that she is perimenopausal, which I reviewed with the patient. \par   The importance of safer-sex was discussed with the patient.\par We reviewed ASCCP/ACOG guidelines for pap smears.

## 2023-01-10 NOTE — HISTORY OF PRESENT ILLNESS
[No] : Patient does not have concerns regarding sex [Currently Active] : currently active [FreeTextEntry1] : Brianna is a  LMP 23 who presents for annual exam. She is without complaints. Denies pelvic pain, abnormal bleeding, or vaginal discharge. Denies issues with bowel or bladder function. \par She is sexually active with 1 male partner (s). She desires pregnancy. Denies pain with intercourse. She is sexually satisfied. \par Lives with family. Works as . Feels safe at home. Denies depression/abuse. \par Immunizations: Received gardasil \par She was expected to enter menopause after chemo for breast cancer, but restarted menses and conceived another child. She has been having intermenstrual spotting, recent EMB was negative. She has also been getting night sweats. She has also been having mood symptoms. Recent day 3 FSH was 11. \par Breast MRI is scheduled. \par

## 2023-01-13 ENCOUNTER — TRANSCRIPTION ENCOUNTER (OUTPATIENT)
Age: 33
End: 2023-01-13

## 2023-01-16 LAB
HCG UR QL: NEGATIVE
QUALITY CONTROL: YES

## 2023-01-18 ENCOUNTER — OUTPATIENT (OUTPATIENT)
Dept: OUTPATIENT SERVICES | Facility: HOSPITAL | Age: 33
LOS: 1 days | Discharge: HOME | End: 2023-01-18

## 2023-01-18 DIAGNOSIS — Z98.89 OTHER SPECIFIED POSTPROCEDURAL STATES: Chronic | ICD-10-CM

## 2023-01-18 DIAGNOSIS — Z98.890 OTHER SPECIFIED POSTPROCEDURAL STATES: Chronic | ICD-10-CM

## 2023-01-18 DIAGNOSIS — C50.412 MALIGNANT NEOPLASM OF UPPER-OUTER QUADRANT OF LEFT FEMALE BREAST: ICD-10-CM

## 2023-01-19 ENCOUNTER — TRANSCRIPTION ENCOUNTER (OUTPATIENT)
Age: 33
End: 2023-01-19

## 2023-01-20 ENCOUNTER — APPOINTMENT (OUTPATIENT)
Dept: FAMILY MEDICINE | Facility: CLINIC | Age: 33
End: 2023-01-20
Payer: COMMERCIAL

## 2023-01-20 VITALS
HEIGHT: 61 IN | DIASTOLIC BLOOD PRESSURE: 82 MMHG | BODY MASS INDEX: 26.06 KG/M2 | OXYGEN SATURATION: 100 % | TEMPERATURE: 97.7 F | SYSTOLIC BLOOD PRESSURE: 124 MMHG | HEART RATE: 88 BPM | WEIGHT: 138 LBS

## 2023-01-20 LAB — S PYO AG SPEC QL IA: NORMAL

## 2023-01-20 PROCEDURE — 99213 OFFICE O/P EST LOW 20 MIN: CPT | Mod: 25

## 2023-01-20 PROCEDURE — 87880 STREP A ASSAY W/OPTIC: CPT | Mod: QW

## 2023-01-20 RX ORDER — CYCLOBENZAPRINE HYDROCHLORIDE 5 MG/1
5 TABLET, FILM COATED ORAL 3 TIMES DAILY
Qty: 21 | Refills: 0 | Status: DISCONTINUED | COMMUNITY
Start: 2022-11-12 | End: 2023-01-20

## 2023-01-20 RX ORDER — MELOXICAM 15 MG/1
15 TABLET ORAL
Qty: 21 | Refills: 0 | Status: DISCONTINUED | COMMUNITY
Start: 2022-12-21 | End: 2023-01-20

## 2023-01-20 RX ORDER — PREDNISONE 20 MG/1
20 TABLET ORAL DAILY
Qty: 15 | Refills: 0 | Status: DISCONTINUED | COMMUNITY
Start: 2022-12-21 | End: 2023-01-20

## 2023-01-20 RX ORDER — TERCONAZOLE 8 MG/G
0.8 CREAM VAGINAL
Qty: 1 | Refills: 0 | Status: DISCONTINUED | COMMUNITY
Start: 2022-02-24 | End: 2023-01-20

## 2023-01-20 NOTE — REVIEW OF SYSTEMS
[Fever] : fever [Chills] : chills [Nasal Discharge] : nasal discharge [Sore Throat] : sore throat [Negative] : Psychiatric

## 2023-01-20 NOTE — ASSESSMENT
[FreeTextEntry1] : Acute URI: recommend supportive care, start tylenol prn for fever/pain, recommend increased hydration, call EMS if symptoms worsen or develop sob. Recommend hand hygiene, cover mouth when coughing, wash hands frequently, poct strep negative, f/u covid19/viral panel\par RTC 2 wks\par \par

## 2023-01-20 NOTE — HISTORY OF PRESENT ILLNESS
[FreeTextEntry8] : 33 yo female presents with complaint of sinus pressure, nasal congestion, sore throat,  and body aches. She says she had a fever of 100F this morning. Symptoms began 3 days ago. She says they are worsening.

## 2023-01-22 LAB
HADV DNA SPEC QL NAA+PROBE: DETECTED
HCOV HKU1 RNA SPEC QL NAA+PROBE: DETECTED
RAPID RVP RESULT: DETECTED
SARS-COV-2 RNA PNL RESP NAA+PROBE: NOT DETECTED

## 2023-01-23 ENCOUNTER — TRANSCRIPTION ENCOUNTER (OUTPATIENT)
Age: 33
End: 2023-01-23

## 2023-01-23 LAB
CYTOLOGY CVX/VAG DOC THIN PREP: ABNORMAL
HPV HIGH+LOW RISK DNA PNL CVX: DETECTED

## 2023-01-24 ENCOUNTER — APPOINTMENT (OUTPATIENT)
Dept: HEMATOLOGY ONCOLOGY | Facility: CLINIC | Age: 33
End: 2023-01-24

## 2023-01-25 ENCOUNTER — TRANSCRIPTION ENCOUNTER (OUTPATIENT)
Age: 33
End: 2023-01-25

## 2023-01-26 ENCOUNTER — APPOINTMENT (OUTPATIENT)
Dept: ORTHOPEDIC SURGERY | Facility: CLINIC | Age: 33
End: 2023-01-26
Payer: COMMERCIAL

## 2023-01-26 PROCEDURE — 99024 POSTOP FOLLOW-UP VISIT: CPT

## 2023-01-26 NOTE — HISTORY OF PRESENT ILLNESS
[de-identified] : JEANE is a 32 year female here today for S/P Right hip arthroscopy femoroplasty acetabuloplasty labrum repair, capsule repair. DOS: 11/11/2022. She notes pain in the greater trochanter/hip flexor. She has been doing PT. She has not been back to work. She notes pain with driving. Denies paresthesias.

## 2023-01-26 NOTE — ADDENDUM
[FreeTextEntry1] : Documented by Adriana Sidhu acting as a scribe for Dr. Seals and Salvatore Abreu PA-C on 01/26/2023.   All medical record entries made by the Scribe were at my, Dr. Seals, and Salvatore Abreu's, direction and personally dictated by me on 01/26/2023. I have reviewed the chart and agree that the record accurately reflects my personal performance of the history, physical exam, procedure and imaging.

## 2023-01-26 NOTE — PHYSICAL EXAM
[de-identified] : The right hip is examined. Her incisions are healing well. This is clean and dry without evidence of infection. There is no surrounding erythema or any streaking. She flexes her hip to 100 degrees and tolerates gentle internal and external rotation. Her calf and thigh are soft and nontender. She is grossly neurovascular intact distally. She is ttp over the greater trochanter. [de-identified] : No new imaging done today.

## 2023-01-26 NOTE — DISCUSSION/SUMMARY
[de-identified] : We had a thorough discussion regarding the nature of her pain, the pathophysiology, as well as all treatment options. A prescription of Medrol Dose EDI was given and patient was informed about its risks and benefits. A note for work was given at today's visit. Patient will follow up in 6-8 wks for repeat clinical assessment. All questions were answered and the patient verbalized understanding. The patient is in agreement with this treatment plan.

## 2023-01-26 NOTE — REVIEW OF SYSTEMS
[Negative] : Heme/Lymph [FreeTextEntry9] : S/P Right hip arthroscopy femoroplasty acetabuloplasty labrum repair, capsule repair. DOS: 11/11/2022

## 2023-01-26 NOTE — REASON FOR VISIT
[Follow-Up Visit] : a follow-up visit for [FreeTextEntry2] : S/P Right hip arthroscopy femoroplasty acetabuloplasty labrum repair, capsule repair. DOS: 11/11/2022

## 2023-02-08 ENCOUNTER — APPOINTMENT (OUTPATIENT)
Dept: ORTHOPEDIC SURGERY | Facility: CLINIC | Age: 33
End: 2023-02-08

## 2023-02-09 ENCOUNTER — RESULT REVIEW (OUTPATIENT)
Age: 33
End: 2023-02-09

## 2023-02-09 ENCOUNTER — OUTPATIENT (OUTPATIENT)
Dept: OUTPATIENT SERVICES | Facility: HOSPITAL | Age: 33
LOS: 1 days | End: 2023-02-09
Payer: COMMERCIAL

## 2023-02-09 ENCOUNTER — APPOINTMENT (OUTPATIENT)
Dept: MRI IMAGING | Facility: CLINIC | Age: 33
End: 2023-02-09
Payer: COMMERCIAL

## 2023-02-09 DIAGNOSIS — Z98.89 OTHER SPECIFIED POSTPROCEDURAL STATES: Chronic | ICD-10-CM

## 2023-02-09 DIAGNOSIS — S73.191D OTHER SPRAIN OF RIGHT HIP, SUBSEQUENT ENCOUNTER: ICD-10-CM

## 2023-02-09 DIAGNOSIS — Z00.8 ENCOUNTER FOR OTHER GENERAL EXAMINATION: ICD-10-CM

## 2023-02-09 DIAGNOSIS — Z98.890 OTHER SPECIFIED POSTPROCEDURAL STATES: Chronic | ICD-10-CM

## 2023-02-09 DIAGNOSIS — Z90.13 ACQUIRED ABSENCE OF BILATERAL BREASTS AND NIPPLES: ICD-10-CM

## 2023-02-09 DIAGNOSIS — C50.919 MALIGNANT NEOPLASM OF UNSPECIFIED SITE OF UNSPECIFIED FEMALE BREAST: ICD-10-CM

## 2023-02-09 DIAGNOSIS — Z85.3 PERSONAL HISTORY OF MALIGNANT NEOPLASM OF BREAST: ICD-10-CM

## 2023-02-09 PROCEDURE — 77049 MRI BREAST C-+ W/CAD BI: CPT | Mod: 26

## 2023-02-13 ENCOUNTER — APPOINTMENT (OUTPATIENT)
Dept: ENDOCRINOLOGY | Facility: CLINIC | Age: 33
End: 2023-02-13
Payer: COMMERCIAL

## 2023-02-13 ENCOUNTER — APPOINTMENT (OUTPATIENT)
Dept: ULTRASOUND IMAGING | Facility: CLINIC | Age: 33
End: 2023-02-13

## 2023-02-13 PROCEDURE — 99214 OFFICE O/P EST MOD 30 MIN: CPT | Mod: 95

## 2023-03-01 ENCOUNTER — APPOINTMENT (OUTPATIENT)
Dept: ORTHOPEDIC SURGERY | Facility: CLINIC | Age: 33
End: 2023-03-01
Payer: COMMERCIAL

## 2023-03-01 PROCEDURE — 99214 OFFICE O/P EST MOD 30 MIN: CPT | Mod: NC

## 2023-03-01 NOTE — HISTORY OF PRESENT ILLNESS
[Stable] : stable [___ mths] : [unfilled] month(s) ago [3] : an average pain level of 3/10 [2] : a minimum pain level of 2/10 [4] : a maximum pain level of 4/10 [Hip Movement] : worsened by hip movement [Walking] : worsened by walking [de-identified] : JEANE is a 32 year female here today for S/P Right hip arthroscopy femoroplasty acetabuloplasty labrum repair, capsule repair, 3+ months ago. Currently, she admits to continued pain in R hip.  She notes pain in her groin and catching sensations.  She states this has not resolved since her surgery.  She also notes pretty significant lateral hip pain and glued pain.  She also notes sensitivity over her incisions.  He takes Cymbalta for chronic nerve pain regarding a meniscectomy in the past.

## 2023-03-01 NOTE — PHYSICAL EXAM
[de-identified] : The right hip is examined. Her incisions are healing well.  There is tenderness to palpation over the incisions.  This is clean and dry without evidence of infection. There is no surrounding erythema or any streaking. She flexes her hip to 100 degrees and tolerates gentle internal and external rotation with pain.  She also notes exquisite tenderness over the greater trochanter and trochanteric bursa. Her calf and thigh are soft and nontender. She is grossly neurovascular intact distally.

## 2023-03-01 NOTE — DISCUSSION/SUMMARY
[de-identified] : I had a lengthy discussion with the patient regarding their current condition. We discussed the treatment options including operative and nonoperative management. At this time I recommended an MRI of the right hip.  I like to evaluate the repair, she has had continued mechanical symptoms that have now resolved since her surgery.  We discussed the potential greater troches cortisone injection and that she will likely have this done depending on the results of the MRI.  I recommended scar massage for her incision hypersensitivity.  She will follow-up with us after the MRI results are available.  She remains out of work.

## 2023-03-16 LAB
25(OH)D3 SERPL-MCNC: 19.6 NG/ML
ACTH SER-ACNC: 25.1 PG/ML
ALBUMIN SERPL ELPH-MCNC: 4.2 G/DL
ALP BLD-CCNC: 55 U/L
ALT SERPL-CCNC: 8 U/L
ANION GAP SERPL CALC-SCNC: 13 MMOL/L
APPEARANCE: CLEAR
AST SERPL-CCNC: 14 U/L
BASOPHILS # BLD AUTO: 0.04 K/UL
BASOPHILS NFR BLD AUTO: 1 %
BILIRUB SERPL-MCNC: 0.2 MG/DL
BILIRUBIN URINE: NEGATIVE
BLOOD URINE: NEGATIVE
BUN SERPL-MCNC: 14 MG/DL
C PEPTIDE SERPL-MCNC: 1.9 NG/ML
CALCIUM SERPL-MCNC: 9.3 MG/DL
CHLORIDE SERPL-SCNC: 106 MMOL/L
CHOLEST SERPL-MCNC: 189 MG/DL
CO2 SERPL-SCNC: 22 MMOL/L
COLOR: NORMAL
CORTIS SERPL-MCNC: 14.6 UG/DL
CREAT SERPL-MCNC: 0.64 MG/DL
CREAT SPEC-SCNC: 150 MG/DL
EGFR: 120 ML/MIN/1.73M2
EOSINOPHIL # BLD AUTO: 0.22 K/UL
EOSINOPHIL NFR BLD AUTO: 5.4 %
ESTIMATED AVERAGE GLUCOSE: 97 MG/DL
ESTRADIOL SERPL-MCNC: 105 PG/ML
FSH SERPL-MCNC: 7.9 IU/L
GH SERPL-MCNC: 1.52 NG/ML
GLUCOSE QUALITATIVE U: NEGATIVE
GLUCOSE SERPL-MCNC: 91 MG/DL
HBA1C MFR BLD HPLC: 5 %
HCT VFR BLD CALC: 39.5 %
HDLC SERPL-MCNC: 67 MG/DL
HGB BLD-MCNC: 13.4 G/DL
IGF-1 INTERP: NORMAL
IGF-I BLD-MCNC: 230 NG/ML
IMM GRANULOCYTES NFR BLD AUTO: 0 %
INSULIN P FAST SERPL-ACNC: 5.6 UU/ML
KETONES URINE: NEGATIVE
LDLC SERPL CALC-MCNC: 109 MG/DL
LEUKOCYTE ESTERASE URINE: NEGATIVE
LH SERPL-ACNC: 10.6 IU/L
LYMPHOCYTES # BLD AUTO: 1.74 K/UL
LYMPHOCYTES NFR BLD AUTO: 42.3 %
MAGNESIUM SERPL-MCNC: 1.9 MG/DL
MAN DIFF?: NORMAL
MCHC RBC-ENTMCNC: 30.8 PG
MCHC RBC-ENTMCNC: 33.9 GM/DL
MCV RBC AUTO: 90.8 FL
MICROALBUMIN 24H UR DL<=1MG/L-MCNC: <1.2 MG/DL
MICROALBUMIN/CREAT 24H UR-RTO: NORMAL MG/G
MONOCYTES # BLD AUTO: 0.33 K/UL
MONOCYTES NFR BLD AUTO: 8 %
NEUTROPHILS # BLD AUTO: 1.78 K/UL
NEUTROPHILS NFR BLD AUTO: 43.3 %
NITRITE URINE: NEGATIVE
NONHDLC SERPL-MCNC: 122 MG/DL
PH URINE: 6
PHOSPHATE SERPL-MCNC: 3.6 MG/DL
PLATELET # BLD AUTO: 209 K/UL
POTASSIUM SERPL-SCNC: 4.1 MMOL/L
PROLACTIN SERPL-MCNC: 13 NG/ML
PROLACTIN SERPL-MCNC: 13.2 NG/ML
PROT SERPL-MCNC: 6.7 G/DL
PROTEIN URINE: NORMAL
RBC # BLD: 4.35 M/UL
RBC # FLD: 12.3 %
SHBG SERPL-SCNC: 86.1 NMOL/L
SODIUM SERPL-SCNC: 141 MMOL/L
SPECIFIC GRAVITY URINE: 1.03
T4 FREE SERPL-MCNC: 1.1 NG/DL
TESTOST FREE SERPL-MCNC: 2.2 PG/ML
TESTOST SERPL-MCNC: 22 NG/DL
TRIGL SERPL-MCNC: 65 MG/DL
TSH SERPL-ACNC: 1.59 UIU/ML
UROBILINOGEN URINE: NORMAL
VIT B12 SERPL-MCNC: 400 PG/ML
WBC # FLD AUTO: 4.11 K/UL

## 2023-03-16 NOTE — ASSESSMENT
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

## 2023-03-16 NOTE — HISTORY OF PRESENT ILLNESS
[FreeTextEntry1] : \par telehealth  folollow up \par start 1025AM\par  end time 1042AM\par Follow up Hypothyroidsm \par   on Synthroid 0.05 mg tolerating it well\par \par \par  overall feels ok\par  but has beenworried about some weight gain \par \par had hip surgery a few months ago- some limited activity \par \par

## 2023-03-18 LAB — 17OHP SERPL-MCNC: 35 NG/DL

## 2023-03-23 ENCOUNTER — APPOINTMENT (OUTPATIENT)
Dept: MRI IMAGING | Facility: CLINIC | Age: 33
End: 2023-03-23

## 2023-03-23 ENCOUNTER — RESULT REVIEW (OUTPATIENT)
Age: 33
End: 2023-03-23

## 2023-03-23 PROCEDURE — C8937: CPT

## 2023-03-23 PROCEDURE — A9585: CPT

## 2023-03-23 PROCEDURE — 73721 MRI JNT OF LWR EXTRE W/O DYE: CPT

## 2023-03-23 PROCEDURE — 73721 MRI JNT OF LWR EXTRE W/O DYE: CPT | Mod: 26,RT

## 2023-03-23 PROCEDURE — C8908: CPT

## 2023-03-26 LAB — DHEA-SULFATE, SERUM: 96 UG/DL

## 2023-03-29 LAB
11-DEOXYCORTICOSTERONE: <16 NG/DL
11-DEOXYCORTISOL: 31 NG/DL
17-HYDROXYPREGNENOLONE: 132 NG/DL
17-HYDROXYPROGESTERONE: 36 NG/DL
18-HYDROXYCORTICOSTERONE: 76 NG/DL
ANDROSTENEDIONE: 93 NG/DL
CORTICOSTERONE: 473 NG/DL
CORTISOL: 16.1 MCG/DL
CORTISONE: 2.44 MCG/DL
DEHYDROEPIANDROSTERONE.UNCONJUGATED: 411 NG/DL
PREGNENOLONE: <35 NG/DL
PROGESTERONE: 0.1 NG/ML
TESTOSTERONE: 30 NG/DL

## 2023-04-04 ENCOUNTER — APPOINTMENT (OUTPATIENT)
Dept: ORTHOPEDIC SURGERY | Facility: CLINIC | Age: 33
End: 2023-04-04
Payer: COMMERCIAL

## 2023-04-04 PROCEDURE — 99214 OFFICE O/P EST MOD 30 MIN: CPT

## 2023-04-04 NOTE — DISCUSSION/SUMMARY
[de-identified] : We had a thorough discussion regarding the nature of her pain, the pathophysiology, as well as all treatment options. Patient has tried conservative treatment such as physical therapy and NSAIDs. I have referred her for an ultrasound guided injection of the hip. A prescription of Cyclobenzaprine HCl was given to be taken as directed. Patient will follow up in 4 wks for repeat clinical assessment.  All questions were answered and the patient verbalized understanding. The patient is in agreement with this treatment plan.

## 2023-04-04 NOTE — ADDENDUM
[FreeTextEntry1] : Documented by Adriana Sidhu acting as a scribe for Dr. Seals and Salvatore Abreu PA-C on 04/04/2023.   All medical record entries made by the Scribe were at my, Dr. Seals, and Salvatore Abreu's, direction and personally dictated by me on 04/04/2023. I have reviewed the chart and agree that the record accurately reflects my personal performance of the history, physical exam, procedure and imaging.

## 2023-04-04 NOTE — HISTORY OF PRESENT ILLNESS
[de-identified] : JEANE is a 32 year female here today for S/P Right hip arthroscopy femoroplasty acetabuloplasty labrum repair, capsule repair, DOS: 11/11/2022. Currently, she admits to continued pain in R hip.  She notes her back pain has returned. She states it feels like someone is hitting her hip with an aluminum bat. She is here today for MRI review. Patient notes the oral steroids did not provide relief. She has been doing PT.

## 2023-04-04 NOTE — PHYSICAL EXAM
[de-identified] : Physical Exam: \par General: Well appearing, no acute distress \par Neurologic: A&Ox3, No focal deficits \par Head: NCAT without abrasions, lacerations, or ecchymosis to head, face, or scalp \par Eyes: No scleral icterus, no gross abnormalities \par Respiratory: Equal chest wall expansion bilaterally, no accessory muscle use \par Lymphatic: No lymphadenopathy palpated \par Skin: Warm and dry \par Psychiatric: Normal mood and affect \par \par The right hip is examined. Her incisions are healing well.  There is tenderness to palpation over the incisions.  This is clean and dry without evidence of infection. There is no surrounding erythema or any streaking. She flexes her hip to 100 degrees and tolerates gentle internal and external rotation with pain.  She also notes exquisite tenderness over the greater trochanter and trochanteric bursa. Her calf and thigh are soft and nontender. She is grossly neurovascular intact distally. [de-identified] : EXAM: [id number] - MR HIP RT  - ORDERED BY: CUAUHTEMOC TORRES\par PROCEDURE DATE:  03/23/2023\par  \par IMPRESSION:\par \par 1.  Status post interval femoroplasty, acetabuloplasty and superior labral repair. Postsurgical changes are present with blunting/fraying of the free margin. There is mild curvilinear T2 hyperintense signal at the chondrolabral junction which may be postoperative or could represent labral re-tear. Consider arthrogram MRI to further evaluate this finding.\par \par 2.  New focus of subcortical cystic change at the anterosuperior humeral head, possibly related to the femoroplasty.\par \par 3.  Mild insertional tendinosis of the gluteus minimus tendon and mild trochanteric bursitis.\par

## 2023-04-11 ENCOUNTER — APPOINTMENT (OUTPATIENT)
Dept: FAMILY MEDICINE | Facility: CLINIC | Age: 33
End: 2023-04-11
Payer: COMMERCIAL

## 2023-04-11 VITALS
OXYGEN SATURATION: 99 % | TEMPERATURE: 97.7 F | HEIGHT: 61 IN | WEIGHT: 134 LBS | HEART RATE: 98 BPM | DIASTOLIC BLOOD PRESSURE: 68 MMHG | SYSTOLIC BLOOD PRESSURE: 118 MMHG | BODY MASS INDEX: 25.3 KG/M2

## 2023-04-11 DIAGNOSIS — C50.919 MALIGNANT NEOPLASM OF UNSPECIFIED SITE OF UNSPECIFIED FEMALE BREAST: ICD-10-CM

## 2023-04-11 PROCEDURE — 99214 OFFICE O/P EST MOD 30 MIN: CPT

## 2023-04-11 RX ORDER — AMOXICILLIN AND CLAVULANATE POTASSIUM 875; 125 MG/1; MG/1
875-125 TABLET, COATED ORAL
Qty: 14 | Refills: 0 | Status: COMPLETED | COMMUNITY
Start: 2023-01-25 | End: 2023-04-11

## 2023-04-11 RX ORDER — PREDNISONE 5 MG/1
5 TABLET ORAL
Qty: 35 | Refills: 0 | Status: COMPLETED | COMMUNITY
Start: 2023-01-26 | End: 2023-04-11

## 2023-04-11 RX ORDER — ALBUTEROL SULFATE 90 UG/1
108 (90 BASE) INHALANT RESPIRATORY (INHALATION) EVERY 4 HOURS
Qty: 1 | Refills: 2 | Status: ACTIVE | COMMUNITY
Start: 2020-12-10 | End: 1900-01-01

## 2023-04-11 NOTE — HISTORY OF PRESENT ILLNESS
[FreeTextEntry8] : JEANE is a 33 year female here for symptoms of bronchitis. Pt states symptoms started 8 days ago. Pt c/o coughing, phlegm, coughing up blood. \par \par \par 1 week hx of productive cough c yellow phlegm, +ve disruption of sleep,   Denies f/c/s \par no N/V/D no abd pain \par +ve good appetite \par +ve mild sore throat \par +ve voice hoarseness \par  +ve muffled hearing c B/L ear congestion \par +ve nasal congestion c yellow d/c \par no rash \par no known sick contact

## 2023-04-11 NOTE — PHYSICAL EXAM
[No Acute Distress] : no acute distress [Well Nourished] : well nourished [Well Developed] : well developed [Well-Appearing] : well-appearing [EOMI] : extraocular movements intact [No JVD] : no jugular venous distention [No Accessory Muscle Use] : no accessory muscle use [Clear to Auscultation] : lungs were clear to auscultation bilaterally [Normal Rate] : normal rate  [Regular Rhythm] : with a regular rhythm [Normal S1, S2] : normal S1 and S2 [No Edema] : there was no peripheral edema [Non-distended] : non-distended [No CVA Tenderness] : no CVA  tenderness [Grossly Normal Strength/Tone] : grossly normal strength/tone [No Rash] : no rash [Coordination Grossly Intact] : coordination grossly intact [No Focal Deficits] : no focal deficits [Normal Gait] : normal gait [Normal Affect] : the affect was normal [Normal Mood] : the mood was normal [Normal Insight/Judgement] : insight and judgment were intact [de-identified] : +ve PND and dull TMs B

## 2023-04-11 NOTE — PLAN
[FreeTextEntry1] : Restart Flonase 2 sp ea nostril  qd    \par \par see med orders \par \par aggressive hydration!!!!!! \par \par STABLE  \par \par Oncology f/u q 6M  c Dr. Ward [facility]

## 2023-04-20 ENCOUNTER — NON-APPOINTMENT (OUTPATIENT)
Age: 33
End: 2023-04-20

## 2023-05-01 ENCOUNTER — APPOINTMENT (OUTPATIENT)
Dept: ORTHOPEDIC SURGERY | Facility: CLINIC | Age: 33
End: 2023-05-01

## 2023-05-09 ENCOUNTER — APPOINTMENT (OUTPATIENT)
Dept: FAMILY MEDICINE | Facility: CLINIC | Age: 33
End: 2023-05-09
Payer: COMMERCIAL

## 2023-05-09 VITALS
TEMPERATURE: 97.3 F | WEIGHT: 135 LBS | OXYGEN SATURATION: 98 % | HEART RATE: 101 BPM | HEIGHT: 61 IN | BODY MASS INDEX: 25.49 KG/M2 | SYSTOLIC BLOOD PRESSURE: 124 MMHG | DIASTOLIC BLOOD PRESSURE: 74 MMHG

## 2023-05-09 DIAGNOSIS — K64.9 UNSPECIFIED HEMORRHOIDS: ICD-10-CM

## 2023-05-09 DIAGNOSIS — I73.00 RAYNAUD'S SYNDROME W/OUT GANGRENE: ICD-10-CM

## 2023-05-09 DIAGNOSIS — K64.4 RESIDUAL HEMORRHOIDAL SKIN TAGS: ICD-10-CM

## 2023-05-09 DIAGNOSIS — E03.9 HYPOTHYROIDISM, UNSPECIFIED: ICD-10-CM

## 2023-05-09 PROCEDURE — 99395 PREV VISIT EST AGE 18-39: CPT

## 2023-05-09 RX ORDER — PREDNISONE 20 MG/1
20 TABLET ORAL
Qty: 9 | Refills: 0 | Status: COMPLETED | COMMUNITY
Start: 2023-04-11 | End: 2023-05-09

## 2023-05-09 NOTE — PLAN
[FreeTextEntry1] : - Continue specialist follow ups\par - Colorectal referral provided for hemorrhoids \par - Discussed speaking to oncologist about Raynauds

## 2023-05-16 ENCOUNTER — APPOINTMENT (OUTPATIENT)
Dept: CARDIOLOGY | Facility: CLINIC | Age: 33
End: 2023-05-16
Payer: COMMERCIAL

## 2023-05-16 ENCOUNTER — NON-APPOINTMENT (OUTPATIENT)
Age: 33
End: 2023-05-16

## 2023-05-16 ENCOUNTER — RX RENEWAL (OUTPATIENT)
Age: 33
End: 2023-05-16

## 2023-05-16 VITALS
HEART RATE: 90 BPM | BODY MASS INDEX: 25.49 KG/M2 | SYSTOLIC BLOOD PRESSURE: 124 MMHG | WEIGHT: 135 LBS | DIASTOLIC BLOOD PRESSURE: 80 MMHG | HEIGHT: 61 IN | OXYGEN SATURATION: 98 %

## 2023-05-16 DIAGNOSIS — R07.9 CHEST PAIN, UNSPECIFIED: ICD-10-CM

## 2023-05-16 PROCEDURE — 93000 ELECTROCARDIOGRAM COMPLETE: CPT

## 2023-05-16 PROCEDURE — 99204 OFFICE O/P NEW MOD 45 MIN: CPT | Mod: 25

## 2023-05-16 NOTE — HISTORY OF PRESENT ILLNESS
[FreeTextEntry1] : Pt is a 34 y/o F who presents today for evaluation.  She was diagnosed with L breast cancer 05/2018 and is s/p b/l mastectomy 6/29/2018, chemo treatments 08/03/2018 - 12/26/2018 with ACT.  \par For the past 6 months she has been getting R sided CP, not associated with exertion, last episode 1 week ago, with SOB which made it worse.  She denies diaphoresis, palpitations, dizziness, syncope, LE edema, PND, orthopnea. \par She remains very active - 5 mile walks, weight training \par \par TTE 06/2018 normal LV function without significant valvular disease\par TTE 05/2019 EF 55-60%, min TR\par \par PMH: breast cancer, asthma, hypothyroidism\par Smoking status: never\par Current exercise: walks a couple of miles daily\par Daily water intake: 2-3 cups\par Daily caffeine intake: 1 cup coffee\par OTC medications: none\par Family hx: pt denies any immediate fam hx cardiac disease\par Previous hospitalizations: appy 2004 - no problems with anesthesia

## 2023-05-16 NOTE — DISCUSSION/SUMMARY
[FreeTextEntry1] : Pt is a 32 y/o F who presents today for evaluation.  She was diagnosed with L breast cancer 05/2018 and is s/p b/l mastectomy 6/29/2018, chemo treatments 08/03/2018 - 12/26/2018 with ACT.  \par For the past 6 months she has been getting R sided CP, not associated with exertion, last episode 1 week ago, with SOB which made it worse.  She denies diaphoresis, palpitations, dizziness, syncope, LE edema, PND, orthopnea. \par \par Will check transthoracic echocardiogram to evaluate left ventricular function and assess for any structural abnormalities\par will perform ETT to assess patient's current cardiac reserve to incremental activity and check for provocable ECG changes.\par Advised pt to go to the nearest ED if symptoms persist or worsen \par VSS\par \par The described plan was discussed with the pt.  All questions and concerns were addressed to the best of my knowledge. \par

## 2023-05-17 ENCOUNTER — TRANSCRIPTION ENCOUNTER (OUTPATIENT)
Age: 33
End: 2023-05-17

## 2023-05-18 NOTE — HISTORY OF PRESENT ILLNESS
[de-identified] : Ms. JEANE GUTIERREZ  is a/n 33 year year old female presenting to the clinic today regarding CPE. Mood is normal, appears in good health.  Pt is a  in [place], [place] shift. Currently taking Cyclobenzaprine 10 mg, Cymbalta 60 mg, Levothyroxine 50 mcg daily. Pt leads an active lifestyle and eats a well balanced diet. Pt follows up with Endocrinologist  q 6 months for Hashimoto management. Pt had hip surgery for torn labrum in November by . Issue was found during chemo. Pt has a mediastinal mass but [place] [facility] changes and no further follow up needed. Follows up with Dr. Collins. Pt had colonoscopy in [place] due to having colitis flare up during chemo. Followed up with , had uterine biopsy, normal. After chemo, was told she would be in menopause, never be able to conceive. Did get pregnant and does have heavy bleeding. Plans on getting hysterectomy at age 35 as she is not allowed to take hormonal birth control. Pt states she sweats significantly, unknown cause. Cymbalta 60 mg is rx'd for nerve damage from breast cancer [place] [facility] . Pt presents today c/o external hemorrhoids. States she can feel them. She can feel them and they do bleed. Blood on tissue. She is also c/o Raynaud's in feet from chemo. States that she can be sweating everywhere but her feet are ice cold. Denies feet turning white. [FreeTextEntry1] : CPE

## 2023-05-18 NOTE — HEALTH RISK ASSESSMENT
[Patient reported PAP Smear was normal] : Patient reported PAP Smear was normal [Good] : ~his/her~  mood as  good [Yes] : Yes [Monthly or less (1 pt)] : Monthly or less (1 point) [1 or 2 (0 pts)] : 1 or 2 (0 points) [Never (0 pts)] : Never (0 points) [No] : In the past 12 months have you used drugs other than those required for medical reasons? No [No falls in past year] : Patient reported no falls in the past year [0] : 2) Feeling down, depressed, or hopeless: Not at all (0) [PHQ-2 Negative - No further assessment needed] : PHQ-2 Negative - No further assessment needed [None] : None [With Family] : lives with family [Employed] : employed [] :  [Feels Safe at Home] : Feels safe at home [Fully functional (bathing, dressing, toileting, transferring, walking, feeding)] : Fully functional (bathing, dressing, toileting, transferring, walking, feeding) [Fully functional (using the telephone, shopping, preparing meals, housekeeping, doing laundry, using] : Fully functional and needs no help or supervision to perform IADLs (using the telephone, shopping, preparing meals, housekeeping, doing laundry, using transportation, managing medications and managing finances) [[id number]] : 0 [de-identified] :  [PapSmearDate] : 01/23

## 2023-05-18 NOTE — HISTORY OF PRESENT ILLNESS
[de-identified] : Ms. JEANE GUTIERREZ  is a/n 33 year year old female presenting to the clinic today regarding CPE. Mood is normal, appears in good health.  Pt is a  in [place], [place] shift. Currently taking Cyclobenzaprine 10 mg, Cymbalta 60 mg, Levothyroxine 50 mcg daily. Pt leads an active lifestyle and eats a well balanced diet. Pt follows up with Endocrinologist  q 6 months for Hashimoto management. Pt had hip surgery for torn labrum in November by . Issue was found during chemo. Pt has a mediastinal mass but per  [place] [facility] was told no changes and no further follow up needed. Follows up with Dr. Collins. Pt had colonoscopy in [place] due to having colitis flare up during chemo. Followed up with , had uterine biopsy, normal. After chemo, was told she would be in menopause, never be able to conceive. Did get pregnant and does have heavy bleeding. Plans on getting hysterectomy at age 35 as she is not allowed to take hormonal birth control. Pt states she sweats significantly, unknown cause. Cymbalta 60 mg is rx'd for nerve damage from breast cancer followed by [facility] . Pt presents today c/o external hemorrhoids. States she can feel them. She can feel them and they do bleed. Blood on tissue. She is also c/o Raynaud's in feet from chemo. States that she can be sweating everywhere but her feet are ice cold. Denies feet turning white. [FreeTextEntry1] : CPE

## 2023-05-18 NOTE — HEALTH RISK ASSESSMENT
[Patient reported PAP Smear was normal] : Patient reported PAP Smear was normal [Good] : ~his/her~  mood as  good [Yes] : Yes [Monthly or less (1 pt)] : Monthly or less (1 point) [1 or 2 (0 pts)] : 1 or 2 (0 points) [Never (0 pts)] : Never (0 points) [No] : In the past 12 months have you used drugs other than those required for medical reasons? No [No falls in past year] : Patient reported no falls in the past year [0] : 2) Feeling down, depressed, or hopeless: Not at all (0) [PHQ-2 Negative - No further assessment needed] : PHQ-2 Negative - No further assessment needed [None] : None [With Family] : lives with family [Employed] : employed [] :  [Feels Safe at Home] : Feels safe at home [Fully functional (bathing, dressing, toileting, transferring, walking, feeding)] : Fully functional (bathing, dressing, toileting, transferring, walking, feeding) [Fully functional (using the telephone, shopping, preparing meals, housekeeping, doing laundry, using] : Fully functional and needs no help or supervision to perform IADLs (using the telephone, shopping, preparing meals, housekeeping, doing laundry, using transportation, managing medications and managing finances) [[id number]] : 0 [de-identified] :  [PapSmearDate] : 01/23

## 2023-05-22 ENCOUNTER — APPOINTMENT (OUTPATIENT)
Dept: ENDOCRINOLOGY | Facility: CLINIC | Age: 33
End: 2023-05-22
Payer: COMMERCIAL

## 2023-05-22 VITALS
DIASTOLIC BLOOD PRESSURE: 76 MMHG | SYSTOLIC BLOOD PRESSURE: 126 MMHG | WEIGHT: 137 LBS | HEIGHT: 61 IN | HEART RATE: 91 BPM | BODY MASS INDEX: 25.86 KG/M2 | OXYGEN SATURATION: 99 %

## 2023-05-22 PROCEDURE — 99214 OFFICE O/P EST MOD 30 MIN: CPT

## 2023-05-23 ENCOUNTER — RESULT REVIEW (OUTPATIENT)
Age: 33
End: 2023-05-23

## 2023-05-26 ENCOUNTER — APPOINTMENT (OUTPATIENT)
Dept: COLORECTAL SURGERY | Facility: CLINIC | Age: 33
End: 2023-05-26

## 2023-05-31 ENCOUNTER — OUTPATIENT (OUTPATIENT)
Dept: OUTPATIENT SERVICES | Facility: HOSPITAL | Age: 33
LOS: 1 days | End: 2023-05-31
Payer: COMMERCIAL

## 2023-05-31 ENCOUNTER — APPOINTMENT (OUTPATIENT)
Dept: CT IMAGING | Facility: CLINIC | Age: 33
End: 2023-05-31
Payer: COMMERCIAL

## 2023-05-31 ENCOUNTER — APPOINTMENT (OUTPATIENT)
Dept: ULTRASOUND IMAGING | Facility: CLINIC | Age: 33
End: 2023-05-31
Payer: COMMERCIAL

## 2023-05-31 DIAGNOSIS — N92.6 IRREGULAR MENSTRUATION, UNSPECIFIED: ICD-10-CM

## 2023-05-31 DIAGNOSIS — Z98.890 OTHER SPECIFIED POSTPROCEDURAL STATES: Chronic | ICD-10-CM

## 2023-05-31 DIAGNOSIS — E03.9 HYPOTHYROIDISM, UNSPECIFIED: ICD-10-CM

## 2023-05-31 DIAGNOSIS — Z98.89 OTHER SPECIFIED POSTPROCEDURAL STATES: Chronic | ICD-10-CM

## 2023-05-31 PROCEDURE — 71250 CT THORAX DX C-: CPT

## 2023-05-31 PROCEDURE — 76536 US EXAM OF HEAD AND NECK: CPT

## 2023-05-31 PROCEDURE — 74176 CT ABD & PELVIS W/O CONTRAST: CPT | Mod: 26

## 2023-05-31 PROCEDURE — 76536 US EXAM OF HEAD AND NECK: CPT | Mod: 26

## 2023-05-31 PROCEDURE — 71250 CT THORAX DX C-: CPT | Mod: 26

## 2023-05-31 PROCEDURE — 74176 CT ABD & PELVIS W/O CONTRAST: CPT

## 2023-06-13 ENCOUNTER — APPOINTMENT (OUTPATIENT)
Dept: ULTRASOUND IMAGING | Facility: CLINIC | Age: 33
End: 2023-06-13

## 2023-06-15 NOTE — ASSESSMENT
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

## 2023-06-15 NOTE — HISTORY OF PRESENT ILLNESS
[FreeTextEntry1] : \par \par Follow up Hypothyroidsm \par   on Synthroid 0.05 mg tolerating it well\par \par \par  overall feels ok\par  but has beenworried about some weight gain \par has beenginign weightdepite diet and exericwse \par \par had hip surgery a few months ago- some limited activity \par \par

## 2023-06-24 NOTE — ASU PATIENT PROFILE, ADULT - PATIENT KNOW
Department of Anesthesiology  Postprocedure Note    Patient: Nona Domínguez  MRN: [id number]  YOB: 1993  Date of evaluation: 6/24/2023      Procedure Summary     Date: 06/23/23 Room / Location: [facility] 03 / [facility]    Anesthesia Start: 1008 Anesthesia Stop: 1120    Procedures:       EGD ESOPHAGOGASTRODUODENOSCOPY TUBE INSERTION      EGD CONTROL HEMORRHAGE Diagnosis:       Gastrointestinal hemorrhage, unspecified gastrointestinal hemorrhage type      (Gastrointestinal hemorrhage, unspecified gastrointestinal hemorrhage type [K92.2])    Surgeons: Yevgeniy Vu MD Responsible Provider: Merna Mata MD    Anesthesia Type: MAC ASA Status: 3          Anesthesia Type: MAC    Johnathan Phase I: Johnathan Score: 10    Johnathan Phase II:        Anesthesia Post Evaluation    Patient location during evaluation: PACU  Patient participation: complete - patient participated  Level of consciousness: awake  Airway patency: patent  Nausea & Vomiting: no nausea and no vomiting  Complications: no  Cardiovascular status: hemodynamically stable  Respiratory status: acceptable  Hydration status: stable yes

## 2023-07-05 NOTE — ASSESSMENT
Patient has been tolerating allergy immunotherapy injections without incident. Recommend advancing Prescription Treatment Set to the next stronger concentration. Recommend continuing on the Traditional Build Up. Please advise. [FreeTextEntry1] : Pharyngitis: poct rapid strep negative, start azithromycin as prescribed, recommend supportive care, start tylenol prn for fever/pain, recommend increased hydration, call EMS if symptoms worsen or develop sob. Recommend hand hygiene, cover mouth when coughing, wash hands frequently, f/u covid/viral panel\par RTC 2 wks\par

## 2023-07-11 ENCOUNTER — OUTPATIENT (OUTPATIENT)
Dept: OUTPATIENT SERVICES | Facility: HOSPITAL | Age: 33
LOS: 1 days | End: 2023-07-11
Payer: COMMERCIAL

## 2023-07-11 ENCOUNTER — APPOINTMENT (OUTPATIENT)
Dept: ULTRASOUND IMAGING | Facility: CLINIC | Age: 33
End: 2023-07-11
Payer: COMMERCIAL

## 2023-07-11 DIAGNOSIS — Z98.890 OTHER SPECIFIED POSTPROCEDURAL STATES: Chronic | ICD-10-CM

## 2023-07-11 DIAGNOSIS — Z98.89 OTHER SPECIFIED POSTPROCEDURAL STATES: Chronic | ICD-10-CM

## 2023-07-11 DIAGNOSIS — S73.191A OTHER SPRAIN OF RIGHT HIP, INITIAL ENCOUNTER: ICD-10-CM

## 2023-07-11 PROCEDURE — 20611 DRAIN/INJ JOINT/BURSA W/US: CPT

## 2023-07-11 PROCEDURE — 20611 DRAIN/INJ JOINT/BURSA W/US: CPT | Mod: RT

## 2023-07-19 ENCOUNTER — APPOINTMENT (OUTPATIENT)
Dept: ORTHOPEDIC SURGERY | Facility: CLINIC | Age: 33
End: 2023-07-19

## 2023-07-20 ENCOUNTER — LABORATORY RESULT (OUTPATIENT)
Age: 33
End: 2023-07-20

## 2023-07-25 ENCOUNTER — APPOINTMENT (OUTPATIENT)
Dept: CARDIOLOGY | Facility: CLINIC | Age: 33
End: 2023-07-25

## 2023-07-31 ENCOUNTER — APPOINTMENT (OUTPATIENT)
Dept: ORTHOPEDIC SURGERY | Facility: CLINIC | Age: 33
End: 2023-07-31
Payer: COMMERCIAL

## 2023-07-31 VITALS — BODY MASS INDEX: 25.86 KG/M2 | WEIGHT: 137 LBS | HEIGHT: 61 IN

## 2023-07-31 PROCEDURE — 20611 DRAIN/INJ JOINT/BURSA W/US: CPT | Mod: RT

## 2023-07-31 PROCEDURE — 99214 OFFICE O/P EST MOD 30 MIN: CPT | Mod: 25

## 2023-07-31 NOTE — HISTORY OF PRESENT ILLNESS
[de-identified] : JEANE is a 32 year female here today for S/P Right hip arthroscopy femoroplasty acetabuloplasty labrum repair, capsule repair, DOS: 11/11/2022. Currently, she admits to continued pain in R hip. She notes that the cortisone injection in her groin did not improve symptoms. She notes that the pain is more lateral in the glutes down to the knee. She states that she walks three miles on the treadmill but struggles after the first mile.

## 2023-07-31 NOTE — DISCUSSION/SUMMARY
[de-identified] : We had a thorough discussion regarding the nature of her pain, the pathophysiology, as well as all treatment options. Pt due to her acute pain elected for a corticosteroid injection at today's visit and tolerated the procedure well. She should take it easy for the next 2-3 days while icing the joint. Patient should continue physical activity and add exercises to her gym routine. All questions were answered and the patient verbalized understanding. The patient is in agreement with this treatment plan.

## 2023-07-31 NOTE — ADDENDUM
[FreeTextEntry1] : Documented by Joyce Geronimo acting as a scribe for Dr. Seals on 07/31/2023. All medical record entries made by the Scribe were at my, Dr. Seals's, direction and personally dictated by me on 07/31/2023. I have reviewed the chart and agree that the record accurately reflects my personal performance of the history, physical exam, procedure and imaging.

## 2023-07-31 NOTE — PHYSICAL EXAM
[de-identified] : Physical Exam:  General: Well appearing, no acute distress  Neurologic: A&Ox3, No focal deficits  Head: NCAT without abrasions, lacerations, or ecchymosis to head, face, or scalp  Eyes: No scleral icterus, no gross abnormalities  Respiratory: Equal chest wall expansion bilaterally, no accessory muscle use  Lymphatic: No lymphadenopathy palpated  Skin: Warm and dry  Psychiatric: Normal mood and affect   The right hip is examined. Her incisions are healing well.  There is tenderness to palpation over the greater trochanter and IT band.  This is clean and dry without evidence of infection. There is no surrounding erythema or any streaking. She flexes her hip to 100 degrees and tolerates gentle internal and external rotation with pain. Her calf and thigh are soft and nontender. She is grossly neurovascular intact distally. [de-identified] : EXAM: [id number] - MR HIP RT  - ORDERED BY: CUAUHTEMOC TORRES\par  PROCEDURE DATE:  03/23/2023\par   \par  IMPRESSION:\par  \par  1.  Status post interval femoroplasty, acetabuloplasty and superior labral repair. Postsurgical changes are present with blunting/fraying of the free margin. There is mild curvilinear T2 hyperintense signal at the chondrolabral junction which may be postoperative or could represent labral re-tear. Consider arthrogram MRI to further evaluate this finding.\par  \par  2.  New focus of subcortical cystic change at the anterosuperior humeral head, possibly related to the femoroplasty.\par  \par  3.  Mild insertional tendinosis of the gluteus minimus tendon and mild trochanteric bursitis.\par

## 2023-07-31 NOTE — PROCEDURE
[de-identified] : US Guided Injection: Right hip trochanteric bursa. Indication: Trochanteric bursitis. A discussion was had with the patient regarding this procedure and all questions were answered. All risks,benefits and alternatives were discussed. These included but were not limited to bleeding, infection, and allergic reaction. The patient lay in the left lateral decubitus position and the point of maximal tenderness over the right greater trochanter was localized. Alcohol was then used to clean the skin, and betadine was used to sterilize and prep the area in this location on lateral aspect of the hip. Ethyl chloride spray was then used as a topical anesthetic. A 22-gauge spinal needle was used to inject 3cc of 1% Xylocaine, 2cc of 0.25% Bupivacaine and 1cc of 40mg/mL Triamcinolone Acetonide into the greater trochanteric bursa. An ultrasound guidance was used for adequate placement of needle. A sterile bandage was then applied. The patient tolerated the procedure well and there were no complications.

## 2023-09-12 ENCOUNTER — APPOINTMENT (OUTPATIENT)
Dept: ANTEPARTUM | Facility: CLINIC | Age: 33
End: 2023-09-12
Payer: COMMERCIAL

## 2023-09-12 ENCOUNTER — APPOINTMENT (OUTPATIENT)
Dept: OBGYN | Facility: CLINIC | Age: 33
End: 2023-09-12
Payer: COMMERCIAL

## 2023-09-12 ENCOUNTER — ASOB RESULT (OUTPATIENT)
Age: 33
End: 2023-09-12

## 2023-09-12 VITALS
SYSTOLIC BLOOD PRESSURE: 112 MMHG | BODY MASS INDEX: 25.68 KG/M2 | WEIGHT: 136 LBS | DIASTOLIC BLOOD PRESSURE: 76 MMHG | HEIGHT: 61 IN

## 2023-09-12 DIAGNOSIS — N93.9 ABNORMAL UTERINE AND VAGINAL BLEEDING, UNSPECIFIED: ICD-10-CM

## 2023-09-12 DIAGNOSIS — C50.919 MALIGNANT NEOPLASM OF UNSPECIFIED SITE OF UNSPECIFIED FEMALE BREAST: ICD-10-CM

## 2023-09-12 PROCEDURE — 76830 TRANSVAGINAL US NON-OB: CPT

## 2023-09-12 PROCEDURE — 99214 OFFICE O/P EST MOD 30 MIN: CPT

## 2023-09-13 PROBLEM — N93.9 ABNORMAL UTERINE BLEEDING: Status: ACTIVE | Noted: 2022-09-27

## 2023-09-13 PROBLEM — C50.919 TRIPLE NEGATIVE MALIGNANT NEOPLASM OF BREAST: Status: RESOLVED | Noted: 2018-06-07 | Resolved: 2023-09-13

## 2023-10-10 NOTE — PHYSICAL EXAM
[Normocephalic] : normocephalic [Atraumatic] : atraumatic [Supple] : supple [Examined in the supine and seated position] : examined in the supine and seated position [No Supraclavicular Adenopathy] : no supraclavicular adenopathy [No dominant masses] : no dominant masses in right breast  [No dominant masses] : no dominant masses left breast [No Axillary Lymphadenopathy] : no left axillary lymphadenopathy [No Edema] : no edema [No Rashes] : no rashes [No Ulceration] : no ulceration [PERRL] : pupils equal, round and reactive to light [EOMI] : extra ocular movement intact [Sclera nonicteric] : sclera nonicteric [de-identified] : s/p mastectomy with implant reconstruction, well healed.  [de-identified] : s/p mastectomy with implant reconstruction, well healed.  mumtaz

## 2023-10-16 NOTE — OB PROVIDER IHI INDUCTION/AUGMENTATION NOTE - NS_OBIHIATTENDATTEST_OBGYN_ALL_OB
Yes
I have reviewed the history and the physical examination of the patient, as well as the assessment and plan, as was entered by the resident physician or the mid-level provider. I agree with the plan to induce or augment labor, and in my opinion, at this time, the use of Pitocin, and/or other induction agent(s), is safe and beneficial to mother and fetus.

## 2023-11-07 ENCOUNTER — APPOINTMENT (OUTPATIENT)
Dept: ENDOCRINOLOGY | Facility: CLINIC | Age: 33
End: 2023-11-07
Payer: COMMERCIAL

## 2023-11-07 VITALS — BODY MASS INDEX: 27.02 KG/M2 | WEIGHT: 143 LBS

## 2023-11-07 PROCEDURE — 99214 OFFICE O/P EST MOD 30 MIN: CPT | Mod: 95

## 2023-11-16 NOTE — OB RN PATIENT PROFILE - THE METHOD OF FEEDING WHEN THE NEWBORN REQUESTS AND CONTINUING EACH FEEDING SESSION UNTIL THE NEWBORN IS SATISFIED
[facility]  Outpatient Physical Therapy    Treatment Note        Date: 2023  Patient: Rosa Damico  : 1968   Confirmed: Yes  MRN: [id number]  Referring Provider: Chey Solano, APR*   Secondary Referring Provider (If applicable):     Medical Diagnosis: Chronic pain of right ankle [M25.571, G89.29]    Treatment Diagnosis: imbalance, decreased Rt ankle strength, decreased Rt ankle ROM    Visit Information:  Insurance: Payor: Hankins Magaly / Plan: Shelly Magaly / Product Type: *No Product type* /   PT Visit Information  Onset Date:  (1.5 years ago)  PT Insurance Information: Hankins Magaly  Total # of Visits Approved: 60  Total # of Visits to Date: 2  No Show: 0  Canceled Appointment: 0  Progress Note Counter:     Subjective Information:  Subjective: Pt states \"I took my last steriod pill this morning. \" Pt reports continued relief from acute swelling as well as denies pain in Rt ankle for >1 week. HEP Compliance:  [x] Good [] Fair [] Poor [] Reports not doing due to:    Pain Screening  Patient Currently in Pain: Denies    Treatment:  Exercises:  Exercises  Exercise 1: ankle AROM: AP, inversion/ eversion, circles x10 ea  Exercise 2: gastroc stretch on step 30 sec/ 3  Exercise 3: SLS Rt w/ intermittent finger touch support 3x30\"  Exercise 4: foam standing w/ FT and EC 3x30\"  Exercise 5: soleus str on step 3x30\"  Exercise 6: dynamic SLS: step downs x10 Rt LE focused 4\" step, step ups w/ high knee x10,  Exercise 7: ankle Tband w/ RTB x10 ea, PF w/ GTB  Exercise 8: gait drills: retro stepping, austin/toe walking  Exercise 9: HS stretch in longsitting 3x30\" Rt  Exercise 20: HEP: ankle TB, SLS w/ support as needed, heel/toe walking  Treatment Reasoning  Limitations addressed: Mobility, Strength, Flexibility    Objective Measures:     Strength: [x] NT  [] MMT completed:     ROM: [x] NT  [] ROM measurements:    Assessment:    Body Structures, Functions, Activity Limitations Requiring Skilled
Statement Selected

## 2023-11-18 ENCOUNTER — APPOINTMENT (OUTPATIENT)
Dept: OBGYN | Facility: CLINIC | Age: 33
End: 2023-11-18
Payer: COMMERCIAL

## 2023-11-18 ENCOUNTER — ASOB RESULT (OUTPATIENT)
Age: 33
End: 2023-11-18

## 2023-11-18 ENCOUNTER — APPOINTMENT (OUTPATIENT)
Dept: ANTEPARTUM | Facility: CLINIC | Age: 33
End: 2023-11-18
Payer: COMMERCIAL

## 2023-11-18 VITALS
HEIGHT: 61 IN | DIASTOLIC BLOOD PRESSURE: 70 MMHG | BODY MASS INDEX: 27 KG/M2 | WEIGHT: 143 LBS | SYSTOLIC BLOOD PRESSURE: 110 MMHG

## 2023-11-18 PROCEDURE — 58300 INSERT INTRAUTERINE DEVICE: CPT

## 2023-11-18 PROCEDURE — 76830 TRANSVAGINAL US NON-OB: CPT

## 2023-11-18 PROCEDURE — 81025 URINE PREGNANCY TEST: CPT

## 2023-11-18 PROCEDURE — 76857 US EXAM PELVIC LIMITED: CPT | Mod: 59

## 2023-11-18 RX ORDER — COPPER 313.4 MG/1
INTRAUTERINE DEVICE INTRAUTERINE
Qty: 0 | Refills: 0 | Status: COMPLETED | OUTPATIENT
Start: 2023-11-18

## 2023-11-18 RX ADMIN — COPPER 0: 313.4 INTRAUTERINE DEVICE INTRAUTERINE at 00:00

## 2023-11-20 LAB
HCG UR QL: NEGATIVE
QUALITY CONTROL: YES

## 2023-11-20 NOTE — OB RN PATIENT PROFILE - NS_FETALANOMALIESDETAILS_OBGYN_ALL_OB_FT
Urine is normal and negative for protein.  There is a CT heart calcium score scan that was ordered on 11/10   IUGR

## 2023-11-27 ENCOUNTER — APPOINTMENT (OUTPATIENT)
Dept: FAMILY MEDICINE | Facility: CLINIC | Age: 33
End: 2023-11-27
Payer: COMMERCIAL

## 2023-11-27 VITALS
OXYGEN SATURATION: 99 % | SYSTOLIC BLOOD PRESSURE: 122 MMHG | HEIGHT: 61 IN | HEART RATE: 97 BPM | WEIGHT: 138 LBS | BODY MASS INDEX: 26.06 KG/M2 | DIASTOLIC BLOOD PRESSURE: 80 MMHG

## 2023-11-27 DIAGNOSIS — Z90.13 ACQUIRED ABSENCE OF BILATERAL BREASTS AND NIPPLES: ICD-10-CM

## 2023-11-27 LAB
C TRACH RRNA SPEC QL NAA+PROBE: NOT DETECTED
N GONORRHOEA RRNA SPEC QL NAA+PROBE: NOT DETECTED
SOURCE AMPLIFICATION: NORMAL

## 2023-11-27 PROCEDURE — 99214 OFFICE O/P EST MOD 30 MIN: CPT | Mod: 25

## 2023-11-27 PROCEDURE — G0008: CPT

## 2023-11-27 PROCEDURE — 90686 IIV4 VACC NO PRSV 0.5 ML IM: CPT

## 2023-11-27 RX ORDER — BENZOCAINE/MENTHOL 15 MG-10MG
15-10 LOZENGE MUCOUS MEMBRANE
Refills: 0 | Status: COMPLETED | COMMUNITY
Start: 2022-07-14 | End: 2023-11-27

## 2023-11-27 RX ORDER — ACETAMINOPHEN 500 MG/1
500 TABLET ORAL
Qty: 180 | Refills: 0 | Status: COMPLETED | COMMUNITY
Start: 2022-07-14 | End: 2023-11-27

## 2023-11-27 RX ORDER — FLUTICASONE PROPIONATE 50 UG/1
50 SPRAY, METERED NASAL TWICE DAILY
Qty: 1 | Refills: 1 | Status: COMPLETED | COMMUNITY
Start: 2023-01-20 | End: 2023-11-27

## 2023-11-27 RX ORDER — SEMAGLUTIDE 0.25 MG/.5ML
0.25 INJECTION, SOLUTION SUBCUTANEOUS
Qty: 1 | Refills: 1 | Status: DISCONTINUED | COMMUNITY
Start: 2023-11-07 | End: 2023-11-27

## 2023-11-27 RX ORDER — CYCLOBENZAPRINE HYDROCHLORIDE 10 MG/1
10 TABLET, FILM COATED ORAL 3 TIMES DAILY
Qty: 21 | Refills: 0 | Status: COMPLETED | COMMUNITY
Start: 2023-04-04 | End: 2023-11-27

## 2023-12-04 ENCOUNTER — TRANSCRIPTION ENCOUNTER (OUTPATIENT)
Age: 33
End: 2023-12-04

## 2023-12-08 RX ORDER — TIRZEPATIDE 2.5 MG/.5ML
2.5 INJECTION, SOLUTION SUBCUTANEOUS
Qty: 1 | Refills: 0 | Status: ACTIVE | COMMUNITY
Start: 2023-11-29 | End: 1900-01-01

## 2023-12-18 NOTE — ASSESSMENT
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

## 2023-12-18 NOTE — HISTORY OF PRESENT ILLNESS
[Home] : at home, [unfilled] , at the time of the visit. [Medical Office: ([facility])___] : at the medical office located in  [Verbal consent obtained from patient] : the patient, [unfilled] [FreeTextEntry1] : TEB start time  245 pm  end time 310 pm     Follow up Hypothyroidsm    on Synthroid 0.05 mg tolerating it well    overall feels ok  but has beenworried about some weight gain  has beenginign weightdepite diet and exericwse   had hip surgery a few months ago- some limited activity

## 2023-12-27 ENCOUNTER — RX RENEWAL (OUTPATIENT)
Age: 33
End: 2023-12-27

## 2024-01-05 ENCOUNTER — APPOINTMENT (OUTPATIENT)
Dept: OBGYN | Facility: CLINIC | Age: 34
End: 2024-01-05
Payer: COMMERCIAL

## 2024-01-05 VITALS
BODY MASS INDEX: 26.65 KG/M2 | DIASTOLIC BLOOD PRESSURE: 80 MMHG | SYSTOLIC BLOOD PRESSURE: 110 MMHG | HEIGHT: 61 IN | WEIGHT: 141.13 LBS

## 2024-01-05 PROCEDURE — 99215 OFFICE O/P EST HI 40 MIN: CPT

## 2024-01-30 NOTE — HISTORY OF PRESENT ILLNESS
[Cut/Track Calories] : cut/track calories [Low Carb Diet (Atkins/Keto)] : low carb diet (Atkins/Keto) [Exercise: ____] : exercise: [unfilled] [FreeTextEntry2] : 115-120 [FreeTextEntry3] : 141 [FreeTextEntry1] : JEANE GUTIERREZ is a 33 year year old who comes in for a dietary/weight loss consultation. Past medical history is significant for Hashimotos, Breast Cancer, Raynauds Syndrome, asthma. Patient's vital signs were reviewed. Her reported height is 5'1. Today's weight is 141 . BMI is 26.67 . A detailed weight history was obtained. THis is the heaviest the patient has been. The patient has tried numerous weight loss programs including self-directed  and self-directed exercise programs. She has lost greater than 10 pounds on more than one occasion, however, has experienced weight regain despite her best efforts with healthy diet and exercise.   After treatment from Breast Ca/tried high protein diet/ 2 kids 5 yrs/2yr Sleep: 5-7 hours  Alcohol: occasionally Exercise: srinivas tracy x5 days Occupation: , works night   Breakfast:10am  eggs, yogurt, egg bites/ veggie bites snack: premier protein shake Lunch:3pm grilled chicken. broccoli, brussel sprout Dinner: 7pm same as lunch, occasional rice. Snacks: Fruit, pretzels, saltines, popcorn stove top. Drinks: water, coffee w/ cream no sugar   I have instructed the patient to follow a 1200 calories meal plan emphasizing low saturated fat sources with moderate amount of sodium as well. Information on fast food eating was supplied and additional information on low fat eating was also supplied. Suggestions of meals and snacks were discussed with the patient in great detail.   Her diet hisory reflects that she is making some very healthy food choices on a regular basis. She does emphasize a lot of fruit and vegetables, trying tot get fruits and vegetables or both at most meals. She also is emphasizing lower fat selections.    I encouraged the patient to keep food records in order to better track calorie consumed. I recommended low fat selections and especially those that are lower in saturated fats. Emphasis would be placed on monitoring portions of meat and having more moderate snacks between meal as well.   Will try course of  ozempic. IF not, patient will try course of mounjaro out of pocket. Instructions and precautions reviewed.

## 2024-01-31 NOTE — OB RN PATIENT PROFILE - NS_DIETPREF_OBGYN_ALL_OB
Department of Anesthesiology  Preprocedure Note       Name:  Kiran Galvez   Age:  85 y.o.  :  1938                                          MRN:  [id number]         Date:  2024      Surgeon: Surgeon(s):  Ilan Montana MD    Procedure: Procedure(s):  Insert PPM BiV  Lv lead placement  Ablation AV node    Medications prior to admission:   Prior to Admission medications    Medication Sig Start Date End Date Taking? Authorizing Provider   nystatin (MYCOSTATIN) 784501 UNIT/GM powder Apply 3 times daily. 1/3/24   Miguel Angel Tavares MD   linaCLOtide (LINZESS) 72 MCG CAPS capsule Take 1 capsule by mouth every morning (before breakfast) 23   Miguel Angel Tavares MD   insulin detemir (LEVEMIR FLEXPEN) 100 UNIT/ML injection pen INJECT 40 UNITS UNDER THE SKIN IN THE MORNING AND 35 UNITS UNDER THE SKIN IN THE EVENING 23   Miguel Angel Tavares MD   alfuzosin (UROXATRAL) 10 MG extended release tablet Take 1 tablet by mouth daily 23   Miguel Angel Tavares MD   dilTIAZem (TIAZAC) 360 MG extended release capsule Take 1 capsule by mouth daily 23   Miguel Angel Tavares MD   LEVEMIR FLEXTOUCH 100 UNIT/ML injection pen Inject 3,000 Units into the skin 2 times daily 23   Miguel Angel Tavares MD   lisinopril-hydroCHLOROthiazide (PRINZIDE;ZESTORETIC) 20-12.5 MG per tablet Take 1 tablet by mouth daily 23   Miguel Angel Tavares MD   XARELTO 15 MG TABS tablet Take 1 tablet by mouth daily (with breakfast) 23   Miguel Angel Tavares MD   rosuvastatin (CRESTOR) 10 MG tablet Take 1 tablet by mouth daily 23   Miguel Angel Tavares MD   COMFORT EZ PEN NEEDLES 32G X 4 MM MISC  23   Andria Garcia MD   aspirin (ASPIRIN CHILDRENS) 81 MG chewable tablet Take 1 tablet by mouth daily 23   Ilan Vance MD   carvedilol (COREG) 6.25 MG tablet 4 tablets 23   Andria Garcia MD   Cyanocobalamin 2500 MCG TABS Take by mouth  Regular

## 2024-02-02 ENCOUNTER — TRANSCRIPTION ENCOUNTER (OUTPATIENT)
Age: 34
End: 2024-02-02

## 2024-02-05 NOTE — ED ADULT TRIAGE NOTE - SOURCE OF INFORMATION
[facility]   Physical Therapy Treatment Note      Name: Royer Castillo  Clinic Number: [id number]    Therapy Diagnosis: No diagnosis found.  Physician: Christine Peterson PA-C    Visit Date: 2/5/2024    Physician Orders: PT Eval and Treat   Medical Diagnosis from Referral: M54.2,G89.29 (ICD-10-CM) - Chronic neck pain   Evaluation Date: 1/31/2024  Authorization Period Expiration: 1/22/2025  Plan of Care Expiration: 5/31/2024  Progress Note Due: 3/1/2024  Visit # / Visits authorized: 1/ 30  FOTO: 1/3    Time In: 9:03 AM  Time Out: 10:00 AM  Total Billable Time: 56 minutes    Subjective     Pt reports: he injured his L hip during work.   He was compliant with home exercise program.  Response to previous treatment: NA  Functional change: not yet    Pain: 3/10  Location: bilateral neck and shoulder      Objective      Objective Measures updated at progress report unless specified.     Treatment     Vasyl received the treatments listed below:      therapeutic exercises to develop strength, endurance, ROM, and flexibility for 12 minutes including:  SNAG C1-C2 X 30  Cervical AROM X 30    manual therapy techniques: Joint mobilizations, Manual traction, and Myofacial release were applied to the: neck for 12 minutes, including:  Suboccipital release   Upper cervical C1-C2 rotation MET  Upper cervical C0-C1 flexion MET     neuromuscular re-education activities to improve: Balance, Coordination, Kinesthetic, Sense, and Proprioception for 32 minutes. The following activities were included:  Chin tuck DNF training 3 x 10 x 5 sec hold  Prone modified T 3 x 10 x 5 sec hold  SA slide with towel 3 x 10  ER walk out with band 3 x10  Shoulder ER with band 3 x 10  Shoulder scaption with chin tuck 3 x 10        therapeutic activities to improve functional performance for   minutes, including:        Patient Education and Home Exercises       Education provided:   - Reviewed and updated HEP.    Written Home  Exercises Provided: yes. Exercises were reviewed and Vasyl was able to demonstrate them prior to the end of the session.  Vasyl demonstrated good  understanding of the education provided. See EMR under Patient Instructions for exercises provided during therapy sessions    Assessment     Pt completed session as noted above. Noticed improved cervical rotation with manual MET and self mobilization. Pt still has trouble with lifting up the shoulder overheads which may be caused by the potential RC pathology. Added ER with band and walk out ex to improve RC activation through shoulder elevation. Pt aldo session well w/o increased shoulder symptoms. Will continue to focus on neck ROM and RC/periscapular NM control in the next visit.      Vasyl Is progressing well towards his goals.   Pt prognosis is Good.     Pt will continue to benefit from skilled outpatient physical therapy to address the deficits listed in the problem list box on initial evaluation, provide pt/family education and to maximize pt's level of independence in the home and community environment.     Pt's spiritual, cultural and educational needs considered and pt agreeable to plan of care and goals.     Anticipated barriers to physical therapy: NA    Goals: Short Term Goals: 6 weeks  1.Report decreased NECK pain  <   / =  4 /10  to increase tolerance for adls, work  2. Increase cervical ROM by 5-10 degrees in order to perform ADLs with decreased difficulty.  3. Pt to tolerate HEP to improve ROM and independence with ADL's     Long Term Goals: 12 weeks  1.Report decreased neck pain  <   / =  2  /10  to increase tolerance for adls, work  2. Increase UE/neck flexibility in order to improve posture.    3.Increased strength L shoulder stabilizers to >/= 4/5 MMT grade to increase tolerance for ADL and work activities.  4.  Pt will report at thoracic (30% impaired) on FOTO neck score for neck pain disability to demonstrate decrease in disability and improvement in  neck pain.       Plan     Will continue to focus on neck ROM and RC/periscapular NM control in the next visit.     Medardo Powell, PT       Patient

## 2024-02-09 ENCOUNTER — APPOINTMENT (OUTPATIENT)
Dept: OBGYN | Facility: CLINIC | Age: 34
End: 2024-02-09
Payer: COMMERCIAL

## 2024-02-09 VITALS
BODY MASS INDEX: 26.67 KG/M2 | WEIGHT: 141.25 LBS | SYSTOLIC BLOOD PRESSURE: 110 MMHG | HEIGHT: 61 IN | DIASTOLIC BLOOD PRESSURE: 82 MMHG

## 2024-02-09 DIAGNOSIS — C50.919 MALIGNANT NEOPLASM OF UNSPECIFIED SITE OF UNSPECIFIED FEMALE BREAST: ICD-10-CM

## 2024-02-09 DIAGNOSIS — E66.9 OBESITY, UNSPECIFIED: ICD-10-CM

## 2024-02-09 PROCEDURE — 99214 OFFICE O/P EST MOD 30 MIN: CPT

## 2024-02-16 ENCOUNTER — APPOINTMENT (OUTPATIENT)
Dept: OBGYN | Facility: CLINIC | Age: 34
End: 2024-02-16
Payer: COMMERCIAL

## 2024-02-16 ENCOUNTER — ASOB RESULT (OUTPATIENT)
Age: 34
End: 2024-02-16

## 2024-02-16 ENCOUNTER — LABORATORY RESULT (OUTPATIENT)
Age: 34
End: 2024-02-16

## 2024-02-16 ENCOUNTER — APPOINTMENT (OUTPATIENT)
Dept: ANTEPARTUM | Facility: CLINIC | Age: 34
End: 2024-02-16
Payer: COMMERCIAL

## 2024-02-16 ENCOUNTER — NON-APPOINTMENT (OUTPATIENT)
Age: 34
End: 2024-02-16

## 2024-02-16 VITALS
BODY MASS INDEX: 27 KG/M2 | DIASTOLIC BLOOD PRESSURE: 60 MMHG | HEIGHT: 61 IN | WEIGHT: 143 LBS | SYSTOLIC BLOOD PRESSURE: 122 MMHG

## 2024-02-16 PROCEDURE — 76830 TRANSVAGINAL US NON-OB: CPT

## 2024-02-16 PROCEDURE — 76857 US EXAM PELVIC LIMITED: CPT | Mod: 59

## 2024-02-16 PROCEDURE — 99395 PREV VISIT EST AGE 18-39: CPT

## 2024-02-16 NOTE — HISTORY OF PRESENT ILLNESS
[N] : Patient reports normal menses [IUD] : has an intrauterine device [Y] : Positive pregnancy history [Menarche Age: ____] : age at menarche was [unfilled] [Currently Active] : currently active [TextBox_4] : Brianna is here for her annual exam. She is s/p breast surgery 12/11/23 (Dr. Farr) to remove residual breast tissue. She is not as happy with the cosmetic outcome this time around- seeing the plastic surgeon later today. Hx of triple negative left breast cancer.  She has the paragard IUD in place- sonogram shows good position. She is still having irregular/mid cycle spotting unchanged with the IUD. Endometrial biopsy 10/2022 benign.  Menses are heavy with the paragard, but she is willing to deal with it.  s/o stubborn weight gain- seeing Dr. Medina for weight loss management. [Mammogramdate] : 06/07/18 [TextBox_19] : BR6 (PT SAW BREAST SPECIALIST LAST MONTH) [BreastSonogramDate] : 01/31/22 [TextBox_25] : BR3 (PT SAW BREAST SPECALIST LAST MONTH) [PapSmeardate] : 01/09/23 [TextBox_31] : WNL [HIVDate] : 05/05/21 [TextBox_53] : NEG [SyphilisDate] : 05/05/21 [TextBox_58] : NEG [GonorrheaDate] : 11/18/23 [TextBox_63] : NEG [ChlamydiaDate] : 11/18/23 [TextBox_68] : NEG [HPVDate] : 01/09/23 [TextBox_78] : POS, HPV 16&18/45-NEG [HepatitisBDate] : 05/05/21 [TextBox_83] : NEG [HepatitisCDate] : 05/05/21 [TextBox_88] : NEG [LMPDate] : 01/30/24 [de-identified] : PARAGARD(11/18/23) [[facility]xFullTerm] : 2 [PGHxTotal] : 3 [[facility]Living] : 2 [PGHxABSpont] : 1 [FreeTextEntry1] : 01/30/24

## 2024-02-16 NOTE — PLAN
[FreeTextEntry1] : Continue with IUD- monitor bleeding.  If intermenstrual bleeding increases, she will return for additional evaluation. We discussed potential need for another EMB.  following up with her breast surgeon/oncologist as planned.  considering hysterectomy/oophorectomy in the future, wants to hold off for now.

## 2024-02-16 NOTE — PHYSICAL EXAM
[Chaperone Present] : A chaperone was present in the examining room during all aspects of the physical examination [FreeTextEntry1] : RONALD Morocho [Appropriately responsive] : appropriately responsive [Alert] : alert [No Acute Distress] : no acute distress [Oriented x3] : oriented x3 [FreeTextEntry6] : deferred [Labia Minora] : normal [Labia Majora] : normal [IUD String] : an IUD string was noted [Normal] : normal [Uterine Adnexae] : normal

## 2024-02-19 PROBLEM — E66.9 OBESITY: Status: ACTIVE | Noted: 2023-11-07

## 2024-02-19 PROBLEM — C50.919 MALIGNANT NEOPLASM OF FEMALE BREAST, UNSPECIFIED ESTROGEN RECEPTOR STATUS, UNSPECIFIED LATERALITY, UNSPECIFIED SITE OF BREAST: Status: ACTIVE | Noted: 2019-04-11

## 2024-02-19 NOTE — ASSESSMENT
[FreeTextEntry1] : JEANE GUTIERREZ  is here for an appointment for a followup of weight loss medications.  Pt unable to get coverage for ozempic.   BARIATRIC SURGERY HISTORY: N/A OBESITY COMORBIDITIES: none CURRENT ANTI-OBESITY MEDICATIONS: n/a OBESITY MEDICATION SIDE EFFECTS: HISTORY OF WEIGHT LOSS MEDICATIONS:    Current weight: 141 BMI: 26.6  After treatment from Breast Ca/tried high protein diet/ 2 kids 5 yrs/2yr Breakfast:10am eggs, yogurt, egg bites/ veggie bites snack: premier protein shake Lunch:3pm grilled chicken. broccoli, brussel sprout Dinner: 7pm same as lunch, occasional rice. Snacks: Fruit, pretzels, saltines, popcorn stove top. Drinks: water, coffee w/ cream no sugar    Physical activity- srinivas tracy 5x/days Medication compliance-n/a   Plan of Care:  Whole food eating based strategy. Increase diversity of plants/fiber in diet Meal prep/food journaling Patient RX for Zepbound sent to pharmacy. Patient to pay dary price for medication.

## 2024-02-23 LAB
CYTOLOGY CVX/VAG DOC THIN PREP: NORMAL
HPV HIGH+LOW RISK DNA PNL CVX: DETECTED

## 2024-03-18 ENCOUNTER — APPOINTMENT (OUTPATIENT)
Dept: FAMILY MEDICINE | Facility: CLINIC | Age: 34
End: 2024-03-18
Payer: COMMERCIAL

## 2024-03-18 VITALS
OXYGEN SATURATION: 98 % | WEIGHT: 143 LBS | TEMPERATURE: 98 F | SYSTOLIC BLOOD PRESSURE: 110 MMHG | HEIGHT: 61 IN | BODY MASS INDEX: 27 KG/M2 | DIASTOLIC BLOOD PRESSURE: 70 MMHG | HEART RATE: 92 BPM

## 2024-03-18 PROCEDURE — 87880 STREP A ASSAY W/OPTIC: CPT | Mod: QW

## 2024-03-18 PROCEDURE — 99213 OFFICE O/P EST LOW 20 MIN: CPT

## 2024-03-18 NOTE — HISTORY OF PRESENT ILLNESS
[FreeTextEntry8] : 33 yo female presents with complaint of body aches, sore throat, fever. She notes fever 102F at home today. She took ibuprofen. Symptoms began on Saturday. Denies cp, palpitations, sob.

## 2024-03-18 NOTE — ASSESSMENT
[FreeTextEntry1] : Pharyngitis: recommend supportive care, start tylenol prn for fever/pain, recommend increased hydration, call EMS if symptoms worsen or develop sob. Recommend hand hygiene, cover mouth when coughing, wash hands frequently, poct rapid strep positive, f/u covid19/viral panel ordered, start amoxicillin 500 mg po bid x 10 days as prescribed RTC 2 wks

## 2024-03-18 NOTE — PHYSICAL EXAM
[Normal] : no posterior cervical lymphadenopathy and no anterior cervical lymphadenopathy [de-identified] : mild pharyngeal erythema, no exudates

## 2024-03-20 LAB
CORONAVIRUS (229E,HKU1,NL63,OC43): DETECTED
RAPID RVP RESULT: DETECTED
SARS-COV-2 RNA PNL RESP NAA+PROBE: NOT DETECTED

## 2024-03-21 ENCOUNTER — TRANSCRIPTION ENCOUNTER (OUTPATIENT)
Age: 34
End: 2024-03-21

## 2024-03-22 ENCOUNTER — TRANSCRIPTION ENCOUNTER (OUTPATIENT)
Age: 34
End: 2024-03-22

## 2024-03-25 ENCOUNTER — TRANSCRIPTION ENCOUNTER (OUTPATIENT)
Age: 34
End: 2024-03-25

## 2024-04-01 ENCOUNTER — RX RENEWAL (OUTPATIENT)
Age: 34
End: 2024-04-01

## 2024-04-01 RX ORDER — LEVOTHYROXINE SODIUM 0.05 MG/1
50 TABLET ORAL
Qty: 90 | Refills: 0 | Status: ACTIVE | COMMUNITY
Start: 2019-05-15 | End: 1900-01-01

## 2024-04-06 ENCOUNTER — APPOINTMENT (OUTPATIENT)
Dept: OBGYN | Facility: CLINIC | Age: 34
End: 2024-04-06
Payer: COMMERCIAL

## 2024-04-06 VITALS
SYSTOLIC BLOOD PRESSURE: 100 MMHG | DIASTOLIC BLOOD PRESSURE: 68 MMHG | WEIGHT: 123 LBS | HEIGHT: 61 IN | BODY MASS INDEX: 23.22 KG/M2

## 2024-04-06 DIAGNOSIS — Z92.29 PERSONAL HISTORY OF OTHER DRUG THERAPY: ICD-10-CM

## 2024-04-06 DIAGNOSIS — Z11.3 ENCOUNTER FOR SCREENING FOR INFECTIONS WITH A PREDOMINANTLY SEXUAL MODE OF TRANSMISSION: ICD-10-CM

## 2024-04-06 DIAGNOSIS — Z86.19 PERSONAL HISTORY OF OTHER INFECTIOUS AND PARASITIC DISEASES: ICD-10-CM

## 2024-04-06 DIAGNOSIS — Z48.02 ENCOUNTER FOR REMOVAL OF SUTURES: ICD-10-CM

## 2024-04-06 DIAGNOSIS — Z12.4 ENCOUNTER FOR SCREENING FOR MALIGNANT NEOPLASM OF CERVIX: ICD-10-CM

## 2024-04-06 DIAGNOSIS — Z17.1 PERSONAL HISTORY OF MALIGNANT NEOPLASM OF BREAST: ICD-10-CM

## 2024-04-06 DIAGNOSIS — J06.9 ACUTE UPPER RESPIRATORY INFECTION, UNSPECIFIED: ICD-10-CM

## 2024-04-06 DIAGNOSIS — S73.191D OTHER SPRAIN OF RIGHT HIP, SUBSEQUENT ENCOUNTER: ICD-10-CM

## 2024-04-06 DIAGNOSIS — Z23 ENCOUNTER FOR IMMUNIZATION: ICD-10-CM

## 2024-04-06 DIAGNOSIS — N89.8 OTHER SPECIFIED NONINFLAMMATORY DISORDERS OF VAGINA: ICD-10-CM

## 2024-04-06 DIAGNOSIS — Z01.419 ENCOUNTER FOR GYNECOLOGICAL EXAMINATION (GENERAL) (ROUTINE) W/OUT ABNORMAL FINDINGS: ICD-10-CM

## 2024-04-06 DIAGNOSIS — Z32.02 ENCOUNTER FOR PREGNANCY TEST, RESULT NEGATIVE: ICD-10-CM

## 2024-04-06 DIAGNOSIS — Z01.818 ENCOUNTER FOR OTHER PREPROCEDURAL EXAMINATION: ICD-10-CM

## 2024-04-06 DIAGNOSIS — Z13.1 ENCOUNTER FOR SCREENING FOR DIABETES MELLITUS: ICD-10-CM

## 2024-04-06 DIAGNOSIS — Z87.09 PERSONAL HISTORY OF OTHER DISEASES OF THE RESPIRATORY SYSTEM: ICD-10-CM

## 2024-04-06 DIAGNOSIS — Z87.898 PERSONAL HISTORY OF OTHER SPECIFIED CONDITIONS: ICD-10-CM

## 2024-04-06 DIAGNOSIS — N64.4 MASTODYNIA: ICD-10-CM

## 2024-04-06 DIAGNOSIS — Z87.42 PERSONAL HISTORY OF OTHER DISEASES OF THE FEMALE GENITAL TRACT: ICD-10-CM

## 2024-04-06 DIAGNOSIS — M54.9 DORSALGIA, UNSPECIFIED: ICD-10-CM

## 2024-04-06 DIAGNOSIS — Z85.3 PERSONAL HISTORY OF MALIGNANT NEOPLASM OF BREAST: ICD-10-CM

## 2024-04-06 DIAGNOSIS — M25.551 PAIN IN RIGHT HIP: ICD-10-CM

## 2024-04-06 DIAGNOSIS — R87.810 CERVICAL HIGH RISK HUMAN PAPILLOMAVIRUS (HPV) DNA TEST POSITIVE: ICD-10-CM

## 2024-04-06 DIAGNOSIS — N90.89 OTHER SPECIFIED NONINFLAMMATORY DISORDERS OF VULVA AND PERINEUM: ICD-10-CM

## 2024-04-06 DIAGNOSIS — I73.00 RAYNAUD'S SYNDROME W/OUT GANGRENE: ICD-10-CM

## 2024-04-06 DIAGNOSIS — Z30.430 ENCOUNTER FOR INSERTION OF INTRAUTERINE CONTRACEPTIVE DEVICE: ICD-10-CM

## 2024-04-06 DIAGNOSIS — R92.8 OTHER ABNORMAL AND INCONCLUSIVE FINDINGS ON DIAGNOSTIC IMAGING OF BREAST: ICD-10-CM

## 2024-04-06 PROCEDURE — 57454 BX/CURETT OF CERVIX W/SCOPE: CPT

## 2024-04-06 RX ORDER — AMOXICILLIN 500 MG/1
500 TABLET, FILM COATED ORAL
Qty: 20 | Refills: 0 | Status: DISCONTINUED | COMMUNITY
Start: 2024-03-18 | End: 2024-04-06

## 2024-04-06 NOTE — HISTORY OF PRESENT ILLNESS
[HPV test offered] : HPV test offered [N] : Patient reports normal menses [IUD] : has an intrauterine device [Y] : Positive pregnancy history [Menarche Age: ____] : age at menarche was [unfilled] [Currently Active] : currently active [Men] : men [Mammogramdate] : 02/09/2023 [TextBox_19] : MRI BR2, Pt had double B/L mastectomy.  [PapSmeardate] : 02/16/2024 [TextBox_31] : Negative [HPVDate] : 02/16/2024 [TextBox_78] : Positive [LMPDate] : 03/25/2024 [PGHxTotal] : 3 [[facility]] : 2 [[facility]] : 2 [PGHxABSpont] : 1 [FreeTextEntry1] : 03/25/2024

## 2024-04-06 NOTE — PLAN
[FreeTextEntry1] : She is considering the Lata ablation and her  is considering a vasectomy.  I also offered her tranexamic acid. She declines.

## 2024-04-06 NOTE — PROCEDURE
[Colposcopy] : Colposcopy  [HPV High Risk] : HPV high risk [Colposcopy Adequate] : colposcopy adequate [SCI Fully Visualized] : SCI fully visualized [ECC Performed] : ECC performed [Biopsy] : biopsy taken [Tolerated Well] : the patient tolerated the procedure well [de-identified] : Cytology normal [de-identified] : IUD strings seen [de-identified] : 1 [de-identified] : AW at 10 o'clock likely cervicitis [de-identified] : Monsels applied [de-identified] : Suspect cervivitis.  Repeat pap in 1 year.

## 2024-04-17 ENCOUNTER — TRANSCRIPTION ENCOUNTER (OUTPATIENT)
Age: 34
End: 2024-04-17

## 2024-04-17 LAB — CORE LAB BIOPSY: NORMAL

## 2024-04-19 ENCOUNTER — APPOINTMENT (OUTPATIENT)
Dept: OBGYN | Facility: CLINIC | Age: 34
End: 2024-04-19

## 2024-05-01 ENCOUNTER — TRANSCRIPTION ENCOUNTER (OUTPATIENT)
Age: 34
End: 2024-05-01

## 2024-05-13 ENCOUNTER — APPOINTMENT (OUTPATIENT)
Dept: FAMILY MEDICINE | Facility: CLINIC | Age: 34
End: 2024-05-13
Payer: COMMERCIAL

## 2024-05-13 VITALS
HEART RATE: 88 BPM | DIASTOLIC BLOOD PRESSURE: 88 MMHG | HEIGHT: 61 IN | OXYGEN SATURATION: 97 % | SYSTOLIC BLOOD PRESSURE: 120 MMHG | WEIGHT: 125 LBS | BODY MASS INDEX: 23.6 KG/M2 | TEMPERATURE: 97 F

## 2024-05-13 VITALS — DIASTOLIC BLOOD PRESSURE: 80 MMHG | SYSTOLIC BLOOD PRESSURE: 117 MMHG

## 2024-05-13 DIAGNOSIS — Z00.00 ENCOUNTER FOR GENERAL ADULT MEDICAL EXAMINATION W/OUT ABNORMAL FINDINGS: ICD-10-CM

## 2024-05-13 DIAGNOSIS — J45.909 UNSPECIFIED ASTHMA, UNCOMPLICATED: ICD-10-CM

## 2024-05-13 DIAGNOSIS — R53.83 OTHER FATIGUE: ICD-10-CM

## 2024-05-13 PROCEDURE — 99395 PREV VISIT EST AGE 18-39: CPT

## 2024-05-13 RX ORDER — TIRZEPATIDE 5 MG/.5ML
5 INJECTION, SOLUTION SUBCUTANEOUS
Qty: 1 | Refills: 0 | Status: DISCONTINUED | COMMUNITY
Start: 2024-03-08 | End: 2024-05-13

## 2024-05-13 RX ORDER — TIRZEPATIDE 2.5 MG/.5ML
2.5 INJECTION, SOLUTION SUBCUTANEOUS
Qty: 1 | Refills: 1 | Status: DISCONTINUED | COMMUNITY
Start: 2024-02-09 | End: 2024-05-13

## 2024-05-13 RX ORDER — SEMAGLUTIDE 0.68 MG/ML
2 INJECTION, SOLUTION SUBCUTANEOUS
Qty: 1 | Refills: 1 | Status: DISCONTINUED | COMMUNITY
Start: 2024-01-05 | End: 2024-05-13

## 2024-05-13 RX ORDER — MONTELUKAST 10 MG/1
10 TABLET, FILM COATED ORAL
Qty: 30 | Refills: 0 | Status: DISCONTINUED | COMMUNITY
Start: 2023-04-11 | End: 2024-05-13

## 2024-05-13 NOTE — PLAN
[FreeTextEntry1] : -General bloodwork done. -Recommended she follow-up with an endocrinologist for further treatment of Hashimoto's / Hypothyroidism. -Discussed starting Provigil to treat the fatigue, informed her of the side effects of the medication as well as its risks and benefits.  -Will follow-up with the patient in 1 year or as needed.

## 2024-05-13 NOTE — HEALTH RISK ASSESSMENT
[Patient reported PAP Smear was normal] : Patient reported PAP Smear was normal [Good] : ~his/her~  mood as  good [Yes] : Yes [Monthly or less (1 pt)] : Monthly or less (1 point) [1 or 2 (0 pts)] : 1 or 2 (0 points) [Never (0 pts)] : Never (0 points) [No] : In the past 12 months have you used drugs other than those required for medical reasons? No [No falls in past year] : Patient reported no falls in the past year [0] : 2) Feeling down, depressed, or hopeless: Not at all (0) [PHQ-2 Negative - No further assessment needed] : PHQ-2 Negative - No further assessment needed [I have developed a follow-up plan documented below in the note.] : I have developed a follow-up plan documented below in the note. [None] : None [With Family] : lives with family [Employed] : employed [] :  [Feels Safe at Home] : Feels safe at home [Fully functional (bathing, dressing, toileting, transferring, walking, feeding)] : Fully functional (bathing, dressing, toileting, transferring, walking, feeding) [Fully functional (using the telephone, shopping, preparing meals, housekeeping, doing laundry, using] : Fully functional and needs no help or supervision to perform IADLs (using the telephone, shopping, preparing meals, housekeeping, doing laundry, using transportation, managing medications and managing finances) [Smoke Detector] : smoke detector [Never] : Never [Audit-CScore] : 1 [de-identified] : Engaging in a high protein low carb diet.  [BCR7Vgmjq] : 0 [Change in mental status noted] : No change in mental status noted [Reports changes in hearing] : Reports no changes in hearing [Reports changes in vision] : Reports no changes in vision [Reports normal functional visual acuity (ie: able to read med bottle)] : Reports poor functional visual acuity.  [Reports changes in dental health] : Reports no changes in dental health [PapSmearDate] : 02/24

## 2024-05-13 NOTE — HISTORY OF PRESENT ILLNESS
[FreeTextEntry1] : CPE [de-identified] : A 34 year old female presents today for a CPE. Mood is good. She is currently taking Cymbalta 60mg and Levothyroxine 50mcg. Pt has been taking the Mounjaro 2.5mg injections once every 10 days for maintenance of weight loss. Pt is followed by the gynecologist, Dr. Lassiter. She is currently considering an ablation regarding birth control. She c/o abnormal fatigue and excessive menstrual bleeding, she has tried an IUD which only exacerbated the bleeding. She is also considering a hysterectomy but is unsure if she wants to have more children. Pt has been following up with her cardiologist annually for post-cancer screenings.

## 2024-05-13 NOTE — ADDENDUM
[FreeTextEntry1] : This note was written by Roxanna Lawrence, acting as the  for Dr. Del Rio. This note accurately reflects the work and decisions made by Dr. Del Rio.

## 2024-05-14 LAB
25(OH)D3 SERPL-MCNC: 25.3 NG/ML
ALBUMIN SERPL ELPH-MCNC: 4.8 G/DL
ALP BLD-CCNC: 48 U/L
ALT SERPL-CCNC: 11 U/L
ANION GAP SERPL CALC-SCNC: 17 MMOL/L
APPEARANCE: CLEAR
AST SERPL-CCNC: 19 U/L
BACTERIA: NEGATIVE /HPF
BASOPHILS # BLD AUTO: 0.04 K/UL
BASOPHILS NFR BLD AUTO: 0.8 %
BILIRUB SERPL-MCNC: 0.4 MG/DL
BILIRUBIN URINE: NEGATIVE
BLOOD URINE: NEGATIVE
BUN SERPL-MCNC: 10 MG/DL
CALCIUM SERPL-MCNC: 9.4 MG/DL
CAST: 0 /LPF
CHLORIDE SERPL-SCNC: 102 MMOL/L
CHOLEST SERPL-MCNC: 192 MG/DL
CO2 SERPL-SCNC: 18 MMOL/L
COLOR: NORMAL
CREAT SERPL-MCNC: 0.67 MG/DL
EGFR: 118 ML/MIN/1.73M2
EOSINOPHIL # BLD AUTO: 0.1 K/UL
EOSINOPHIL NFR BLD AUTO: 2.1 %
EPITHELIAL CELLS: 2 /HPF
ESTIMATED AVERAGE GLUCOSE: 85 MG/DL
FERRITIN SERPL-MCNC: 47 NG/ML
FOLATE SERPL-MCNC: 9.5 NG/ML
GLUCOSE QUALITATIVE U: NEGATIVE MG/DL
GLUCOSE SERPL-MCNC: 74 MG/DL
HBA1C MFR BLD HPLC: 4.6 %
HCT VFR BLD CALC: 41.2 %
HDLC SERPL-MCNC: 64 MG/DL
HGB BLD-MCNC: 13.5 G/DL
IMM GRANULOCYTES NFR BLD AUTO: 0.2 %
IRON SATN MFR SERPL: 37 %
IRON SERPL-MCNC: 98 UG/DL
KETONES URINE: 15 MG/DL
LDLC SERPL CALC-MCNC: 116 MG/DL
LEUKOCYTE ESTERASE URINE: NEGATIVE
LYMPHOCYTES # BLD AUTO: 1.73 K/UL
LYMPHOCYTES NFR BLD AUTO: 36.4 %
MAN DIFF?: NORMAL
MCHC RBC-ENTMCNC: 30.1 PG
MCHC RBC-ENTMCNC: 32.8 GM/DL
MCV RBC AUTO: 92 FL
MICROSCOPIC-UA: NORMAL
MONOCYTES # BLD AUTO: 0.41 K/UL
MONOCYTES NFR BLD AUTO: 8.6 %
NEUTROPHILS # BLD AUTO: 2.46 K/UL
NEUTROPHILS NFR BLD AUTO: 51.9 %
NITRITE URINE: NEGATIVE
NONHDLC SERPL-MCNC: 128 MG/DL
PH URINE: 5.5
PLATELET # BLD AUTO: 196 K/UL
POTASSIUM SERPL-SCNC: 4.3 MMOL/L
PROT SERPL-MCNC: 7.4 G/DL
PROTEIN URINE: NEGATIVE MG/DL
RBC # BLD: 4.48 M/UL
RBC # FLD: 14.1 %
RED BLOOD CELLS URINE: 1 /HPF
SODIUM SERPL-SCNC: 137 MMOL/L
SPECIFIC GRAVITY URINE: 1.03
T4 FREE SERPL-MCNC: 1.4 NG/DL
TIBC SERPL-MCNC: 267 UG/DL
TRIGL SERPL-MCNC: 66 MG/DL
TSH SERPL-ACNC: 0.82 UIU/ML
UIBC SERPL-MCNC: 169 UG/DL
URATE SERPL-MCNC: 2.8 MG/DL
UROBILINOGEN URINE: 0.2 MG/DL
VIT B12 SERPL-MCNC: 394 PG/ML
WBC # FLD AUTO: 4.75 K/UL
WHITE BLOOD CELLS URINE: 0 /HPF

## 2024-05-15 LAB
HCV AB SER QL: NONREACTIVE
HCV S/CO RATIO: 0.08 S/CO

## 2024-05-21 NOTE — OB NEONATOLOGY/PEDIATRICIAN DELIVERY SUMMARY - NSSUCTIONBYPEDSA_OBGYN_ALL_OB
Detail Level: Zone Initiate Treatment: mupirocin 2 % topical ointment BID\\nQuantity: 22.0 g\\nSig: Apply to surgery site on left cheek 48 hrs after procedure bid with bandage changes x 2 weeks\\n\\ncephalexin 500 mg capsule TID\\nQuantity: 21.0 Capsule\\nSig: Take 1 capsule by mouth three times daily for 7 days with food and water. None

## 2024-07-04 ENCOUNTER — RX RENEWAL (OUTPATIENT)
Age: 34
End: 2024-07-04

## 2024-08-08 NOTE — OB PROVIDER H&P - NS_TOLAC_OBGYN_ALL_OB_NU
Pharmacy Consultation Note  (Warfarin Dosing and Monitoring)    Initial consult date: 8/2  Consulting Provider: Latanya Dorsey is a 58 y.o. male for whom pharmacy has been asked to manage warfarin therapy.     Weight:   Wt Readings from Last 1 Encounters:   08/08/24 103.9 kg (229 lb)       TSH:    Lab Results   Component Value Date/Time    TSH 0.95 05/08/2024 02:24 AM       Hepatic Function Panel:                            Lab Results   Component Value Date/Time    ALKPHOS 142 08/03/2024 03:27 AM    ALT 9 08/03/2024 03:27 AM    AST 16 08/03/2024 03:27 AM    BILITOT 1.1 08/03/2024 03:27 AM    BILIDIR 0.2 07/14/2023 05:43 PM    IBILI 0.2 07/14/2023 05:43 PM       Current significant warfarin drug-drug interactions include: No significant interactions    Recent Labs     08/07/24  0552 08/08/24  0751   HGB 10.1* 10.3*       Date Warfarin Dose INR Heparin or LMWH Comment   8/3 HOLD 5 Lovenox DC'd    8/4 HOLD 4 --    8/5 4 mg 3.1 --    8/6 6 mg 2.6 --    8/7 6 mg 2.6 --    8/8 2 mg 3.7 --             Assessment:  Patient is a 58 y.o. male on warfarin for Atrial Fibrillation, Mechanical Heart Valve (mitral), and Mechanical Heart Valve (atrial).  Patient's home warfarin dosing regimen is 4mg daily per med rec but patient filled 3mg tabs most recently.   INR possibly elevated in setting of liver dysfunction.  Goal INR 2.5 - 3.5  INR 3.7 today    Plan:  Warfarin 2 mg tonight. Lower dose in the setting of slightly supratherapeutic INR.   Daily PT/INR until the INR is stable within the therapeutic range.  Pharmacist will follow and monitor/adjust dosing as necessary.    Thank you for this consult,    Jamie Nolen, PharmD 8/8/2024 3:33 PM   Ext: [phone]     0

## 2024-09-04 ENCOUNTER — APPOINTMENT (OUTPATIENT)
Dept: OBGYN | Facility: CLINIC | Age: 34
End: 2024-09-04
Payer: COMMERCIAL

## 2024-09-04 VITALS
HEIGHT: 61 IN | SYSTOLIC BLOOD PRESSURE: 116 MMHG | DIASTOLIC BLOOD PRESSURE: 64 MMHG | WEIGHT: 110.38 LBS | BODY MASS INDEX: 20.84 KG/M2

## 2024-09-04 DIAGNOSIS — N89.8 OTHER SPECIFIED NONINFLAMMATORY DISORDERS OF VAGINA: ICD-10-CM

## 2024-09-04 PROCEDURE — 99213 OFFICE O/P EST LOW 20 MIN: CPT

## 2024-09-04 RX ORDER — CLOTRIMAZOLE AND BETAMETHASONE DIPROPIONATE 10; .5 MG/G; MG/G
1-0.05 CREAM TOPICAL TWICE DAILY
Qty: 1 | Refills: 0 | Status: ACTIVE | COMMUNITY
Start: 2024-09-04 | End: 1900-01-01

## 2024-09-04 RX ORDER — LIDOCAINE 5 G/100G
5 OINTMENT TOPICAL
Qty: 1 | Refills: 0 | Status: ACTIVE | COMMUNITY
Start: 2024-09-04 | End: 1900-01-01

## 2024-09-04 NOTE — PHYSICAL EXAM
[Appropriately responsive] : appropriately responsive [Alert] : alert [Oriented x3] : oriented x3 [Normal] : normal external genitalia

## 2024-09-04 NOTE — PLAN
[FreeTextEntry1] : Vulvar irritation likely from dryness- consider mineral oil or coconut oil.  Vulvar biopsy in the past was negative for LS, but if itching persists can perform new vulvar biopsy. vaginal estrogen not recommended due to hx of breast cancer.  Affirm collected- will tx based on results.  pelvic US ordered due to new discharge, r/o pelvic mass.

## 2024-09-04 NOTE — HISTORY OF PRESENT ILLNESS
[IUD] : has an intrauterine device [Y] : Positive pregnancy history [Menarche Age: ____] : age at menarche was [unfilled] [Menopause Age: ____] : age at menopause was [unfilled] [No] : Patient does not have concerns regarding sex [Currently Active] : currently active [TextBox_4] : Brianna is here for c/o vulvar irritation - noticed it a few days after waxing.  She also has noticed a thick mucous discharge a few times in the last week.  No odor. She denies bleeding. [Mammogramdate] : 6/8/2018 [TextBox_19] : BR 6 [BreastSonogramDate] : 1/31/22 [TextBox_25] : BR 3 [HPVDate] : 2/16/24 [TextBox_78] : (positive) ...negative 16,18, 45 [LMPDate] : 08/12/24 [de-identified] : Martha 10/2023 [PGHxTotal] : 3 [[facility]] : 2 [[facility]] : 2 [PGHxABSpont] : 1 [FreeTextEntry1] : 8/12/24

## 2024-09-06 LAB
APPEARANCE: CLEAR
BILIRUBIN URINE: NEGATIVE
BLOOD URINE: NEGATIVE
COLOR: YELLOW
GLUCOSE QUALITATIVE U: NEGATIVE
KETONES URINE: NEGATIVE
LEUKOCYTE ESTERASE URINE: NEGATIVE
NITRITE URINE: NEGATIVE
PH URINE: 7
PROTEIN URINE: NEGATIVE
SPECIFIC GRAVITY URINE: 1.01
UROBILINOGEN URINE: 0.2 (ref 0.2–?)

## 2024-09-11 LAB
A VAGINAE DNA VAG QL NAA+PROBE: NORMAL
BVAB2 DNA VAG QL NAA+PROBE: NORMAL
C KRUSEI DNA VAG QL NAA+PROBE: NEGATIVE
CANDIDA DNA VAG QL NAA+PROBE: NEGATIVE
MEGA1 DNA VAG QL NAA+PROBE: NORMAL
T VAGINALIS RRNA SPEC QL NAA+PROBE: NEGATIVE

## 2024-10-29 ENCOUNTER — APPOINTMENT (OUTPATIENT)
Dept: ENDOCRINOLOGY | Facility: CLINIC | Age: 34
End: 2024-10-29
Payer: COMMERCIAL

## 2024-10-29 DIAGNOSIS — M85.80 OTHER SPECIFIED DISORDERS OF BONE DENSITY AND STRUCTURE, UNSPECIFIED SITE: ICD-10-CM

## 2024-10-29 PROCEDURE — 99213 OFFICE O/P EST LOW 20 MIN: CPT

## 2024-11-18 ENCOUNTER — APPOINTMENT (OUTPATIENT)
Dept: FAMILY MEDICINE | Facility: CLINIC | Age: 34
End: 2024-11-18

## 2024-12-09 ENCOUNTER — APPOINTMENT (OUTPATIENT)
Dept: ANTEPARTUM | Facility: CLINIC | Age: 34
End: 2024-12-09
Payer: COMMERCIAL

## 2024-12-09 ENCOUNTER — ASOB RESULT (OUTPATIENT)
Age: 34
End: 2024-12-09

## 2024-12-09 PROCEDURE — 76830 TRANSVAGINAL US NON-OB: CPT

## 2024-12-09 PROCEDURE — 76857 US EXAM PELVIC LIMITED: CPT | Mod: 59

## 2024-12-11 ENCOUNTER — APPOINTMENT (OUTPATIENT)
Dept: OBGYN | Facility: CLINIC | Age: 34
End: 2024-12-11
Payer: COMMERCIAL

## 2024-12-11 VITALS
SYSTOLIC BLOOD PRESSURE: 100 MMHG | BODY MASS INDEX: 20.32 KG/M2 | WEIGHT: 107.6 LBS | HEIGHT: 61 IN | DIASTOLIC BLOOD PRESSURE: 60 MMHG

## 2024-12-11 DIAGNOSIS — Z90.13 ACQUIRED ABSENCE OF BILATERAL BREASTS AND NIPPLES: ICD-10-CM

## 2024-12-11 DIAGNOSIS — N83.209 UNSPECIFIED OVARIAN CYST, UNSPECIFIED SIDE: ICD-10-CM

## 2024-12-11 DIAGNOSIS — Z85.3 PERSONAL HISTORY OF MALIGNANT NEOPLASM OF BREAST: ICD-10-CM

## 2024-12-11 DIAGNOSIS — Z87.42 PERSONAL HISTORY OF OTHER DISEASES OF THE FEMALE GENITAL TRACT: ICD-10-CM

## 2024-12-11 DIAGNOSIS — N92.1 EXCESSIVE AND FREQUENT MENSTRUATION WITH IRREGULAR CYCLE: ICD-10-CM

## 2024-12-11 DIAGNOSIS — Z13.79 ENCOUNTER FOR OTHER SCREENING FOR GENETIC AND CHROMOSOMAL ANOMALIES: ICD-10-CM

## 2024-12-11 PROCEDURE — 36415 COLL VENOUS BLD VENIPUNCTURE: CPT

## 2024-12-11 PROCEDURE — 99215 OFFICE O/P EST HI 40 MIN: CPT

## 2024-12-18 LAB
ESTRADIOL SERPL-MCNC: 91 PG/ML
FSH SERPL-MCNC: 19 IU/L
HCT VFR BLD CALC: 40.5 %
HGB BLD-MCNC: 13.5 G/DL
LH SERPL-ACNC: 11.9 IU/L
MCHC RBC-ENTMCNC: 30.5 PG
MCHC RBC-ENTMCNC: 33.3 G/DL
MCV RBC AUTO: 91.4 FL
PLATELET # BLD AUTO: 221 K/UL
PROLACTIN SERPL-MCNC: 7.6 NG/ML
RBC # BLD: 4.43 M/UL
RBC # FLD: 13 %
TSH SERPL-ACNC: 1.7 UIU/ML
WBC # FLD AUTO: 3.6 K/UL

## 2024-12-27 ENCOUNTER — NON-APPOINTMENT (OUTPATIENT)
Age: 34
End: 2024-12-27

## 2025-01-22 ENCOUNTER — APPOINTMENT (OUTPATIENT)
Dept: OBGYN | Facility: CLINIC | Age: 35
End: 2025-01-22

## 2025-02-21 ENCOUNTER — APPOINTMENT (OUTPATIENT)
Dept: OBGYN | Facility: CLINIC | Age: 35
End: 2025-02-21

## 2025-02-21 ENCOUNTER — APPOINTMENT (OUTPATIENT)
Dept: ANTEPARTUM | Facility: CLINIC | Age: 35
End: 2025-02-21

## 2025-03-14 ENCOUNTER — APPOINTMENT (OUTPATIENT)
Dept: ULTRASOUND IMAGING | Facility: CLINIC | Age: 35
End: 2025-03-14
Payer: COMMERCIAL

## 2025-03-14 ENCOUNTER — APPOINTMENT (OUTPATIENT)
Dept: RADIOLOGY | Facility: CLINIC | Age: 35
End: 2025-03-14
Payer: COMMERCIAL

## 2025-03-14 ENCOUNTER — OUTPATIENT (OUTPATIENT)
Dept: OUTPATIENT SERVICES | Facility: HOSPITAL | Age: 35
LOS: 1 days | End: 2025-03-14
Payer: COMMERCIAL

## 2025-03-14 DIAGNOSIS — Z98.890 OTHER SPECIFIED POSTPROCEDURAL STATES: Chronic | ICD-10-CM

## 2025-03-14 DIAGNOSIS — E06.3 AUTOIMMUNE THYROIDITIS: ICD-10-CM

## 2025-03-14 DIAGNOSIS — Z98.89 OTHER SPECIFIED POSTPROCEDURAL STATES: Chronic | ICD-10-CM

## 2025-03-14 DIAGNOSIS — E66.9 OBESITY, UNSPECIFIED: ICD-10-CM

## 2025-03-14 PROCEDURE — 77080 DXA BONE DENSITY AXIAL: CPT | Mod: 26

## 2025-03-14 PROCEDURE — 76536 US EXAM OF HEAD AND NECK: CPT

## 2025-03-14 PROCEDURE — 76536 US EXAM OF HEAD AND NECK: CPT | Mod: 26

## 2025-03-14 PROCEDURE — 77080 DXA BONE DENSITY AXIAL: CPT

## 2025-04-04 NOTE — ASSESSMENT
[FreeTextEntry1] : Acute sinusitis: start tylenol prn for fever/pain, start amox/clav bid x 7 days, start hycodan prn for cough, poct rapid strep negative, poct rapid flu negative, f/u throat cx, f/u covid pcr\par RTC 2 wks
irregular

## 2025-05-09 NOTE — BRIEF OPERATIVE NOTE - SPECIMENS
LACW lesion, Left and right NAC scar Pt alert and oriented. VSS, afbrile. Ambulating to bathroom, voiding freely.
Tube feeding being administered. Participating in care. Spoke with pt - Patient expressed understanding  Patient states she is currently at ortho appointment to discuss and will f/u with PCP on 5.20.25   left mastetcomy skin flap mass, Left and right NAC scar

## 2025-05-12 ENCOUNTER — OUTPATIENT (OUTPATIENT)
Dept: OUTPATIENT SERVICES | Facility: HOSPITAL | Age: 35
LOS: 1 days | End: 2025-05-12
Payer: COMMERCIAL

## 2025-05-12 ENCOUNTER — APPOINTMENT (OUTPATIENT)
Dept: MRI IMAGING | Facility: CLINIC | Age: 35
End: 2025-05-12
Payer: COMMERCIAL

## 2025-05-12 DIAGNOSIS — Z98.890 OTHER SPECIFIED POSTPROCEDURAL STATES: Chronic | ICD-10-CM

## 2025-05-12 DIAGNOSIS — E03.9 HYPOTHYROIDISM, UNSPECIFIED: ICD-10-CM

## 2025-05-12 DIAGNOSIS — Z85.3 PERSONAL HISTORY OF MALIGNANT NEOPLASM OF BREAST: ICD-10-CM

## 2025-05-12 DIAGNOSIS — Z98.89 OTHER SPECIFIED POSTPROCEDURAL STATES: Chronic | ICD-10-CM

## 2025-05-12 DIAGNOSIS — E06.3 AUTOIMMUNE THYROIDITIS: ICD-10-CM

## 2025-05-12 PROCEDURE — 70543 MRI ORBT/FAC/NCK W/O &W/DYE: CPT | Mod: 26

## 2025-05-12 PROCEDURE — 70543 MRI ORBT/FAC/NCK W/O &W/DYE: CPT

## 2025-05-12 PROCEDURE — A9585: CPT

## 2025-05-27 ENCOUNTER — RX RENEWAL (OUTPATIENT)
Age: 35
End: 2025-05-27

## 2025-05-30 ENCOUNTER — TRANSCRIPTION ENCOUNTER (OUTPATIENT)
Age: 35
End: 2025-05-30

## 2025-05-30 DIAGNOSIS — E55.9 VITAMIN D DEFICIENCY, UNSPECIFIED: ICD-10-CM

## 2025-05-30 DIAGNOSIS — L65.9 NONSCARRING HAIR LOSS, UNSPECIFIED: ICD-10-CM

## 2025-05-30 RX ORDER — ERGOCALCIFEROL 1.25 MG/1
1.25 MG CAPSULE, LIQUID FILLED ORAL
Qty: 12 | Refills: 1 | Status: ACTIVE | COMMUNITY
Start: 2025-05-30 | End: 1900-01-01

## 2025-06-12 ENCOUNTER — APPOINTMENT (OUTPATIENT)
Dept: FAMILY MEDICINE | Facility: CLINIC | Age: 35
End: 2025-06-12
Payer: COMMERCIAL

## 2025-06-12 VITALS
DIASTOLIC BLOOD PRESSURE: 68 MMHG | SYSTOLIC BLOOD PRESSURE: 102 MMHG | WEIGHT: 110 LBS | HEIGHT: 61 IN | HEART RATE: 90 BPM | OXYGEN SATURATION: 98 % | BODY MASS INDEX: 20.77 KG/M2

## 2025-06-12 PROBLEM — Z13.220 SCREENING FOR HYPERLIPIDEMIA: Status: ACTIVE | Noted: 2025-06-12

## 2025-06-12 PROBLEM — Z00.01 ENCOUNTER FOR PREVENTATIVE ADULT HEALTH CARE EXAM WITH ABNORMAL FINDINGS: Status: ACTIVE | Noted: 2025-06-12

## 2025-06-12 PROBLEM — E53.8 VITAMIN B12 DEFICIENCY: Status: ACTIVE | Noted: 2025-06-12

## 2025-06-12 PROBLEM — R41.840 DIFFICULTY CONCENTRATING: Status: ACTIVE | Noted: 2025-06-12

## 2025-06-12 PROBLEM — Z11.3 SCREENING FOR STD (SEXUALLY TRANSMITTED DISEASE): Status: ACTIVE | Noted: 2022-09-27

## 2025-06-12 PROCEDURE — 36415 COLL VENOUS BLD VENIPUNCTURE: CPT

## 2025-06-12 PROCEDURE — 99395 PREV VISIT EST AGE 18-39: CPT | Mod: 25

## 2025-06-12 PROCEDURE — 96372 THER/PROPH/DIAG INJ SC/IM: CPT

## 2025-06-12 PROCEDURE — 99173 VISUAL ACUITY SCREEN: CPT

## 2025-06-12 RX ORDER — MAGNESIUM 200 MG
1000 TABLET ORAL
Qty: 90 | Refills: 1 | Status: DISCONTINUED | COMMUNITY
Start: 2025-06-12 | End: 2025-06-12

## 2025-06-12 RX ORDER — MAGNESIUM 200 MG
1000 TABLET ORAL
Qty: 90 | Refills: 1 | Status: ACTIVE | COMMUNITY
Start: 2025-06-12 | End: 1900-01-01

## 2025-06-12 RX ADMIN — CYANOCOBALAMIN 0 MCG/ML: 1000 INJECTION, SOLUTION INTRAMUSCULAR at 00:00

## 2025-06-13 ENCOUNTER — TRANSCRIPTION ENCOUNTER (OUTPATIENT)
Age: 35
End: 2025-06-13

## 2025-06-16 RX ORDER — CYANOCOBALAMIN 1000 UG/ML
1000 INJECTION, SOLUTION INTRAMUSCULAR; SUBCUTANEOUS
Qty: 0 | Refills: 0 | Status: COMPLETED | OUTPATIENT
Start: 2025-06-12

## 2025-06-17 ENCOUNTER — TRANSCRIPTION ENCOUNTER (OUTPATIENT)
Age: 35
End: 2025-06-17

## 2025-06-17 LAB
APPEARANCE: CLEAR
BILIRUBIN URINE: NEGATIVE
BLOOD URINE: NEGATIVE
COLOR: YELLOW
GLUCOSE QUALITATIVE U: NEGATIVE MG/DL
HCV AB SER QL: NONREACTIVE
HCV S/CO RATIO: 0.1 S/CO
HIV1+2 AB SPEC QL IA.RAPID: NONREACTIVE
KETONES URINE: NEGATIVE MG/DL
LEUKOCYTE ESTERASE URINE: NEGATIVE
NITRITE URINE: NEGATIVE
PH URINE: 6
PROTEIN URINE: NEGATIVE MG/DL
SPECIFIC GRAVITY URINE: 1.02
T PALLIDUM AB SER QL IA: NEGATIVE
UROBILINOGEN URINE: 0.2 MG/DL

## 2025-06-23 ENCOUNTER — APPOINTMENT (OUTPATIENT)
Dept: OBGYN | Facility: CLINIC | Age: 35
End: 2025-06-23
Payer: COMMERCIAL

## 2025-06-23 VITALS
SYSTOLIC BLOOD PRESSURE: 120 MMHG | WEIGHT: 111.4 LBS | BODY MASS INDEX: 21.03 KG/M2 | HEIGHT: 61 IN | DIASTOLIC BLOOD PRESSURE: 72 MMHG

## 2025-06-23 PROBLEM — R30.0 DYSURIA: Status: ACTIVE | Noted: 2025-06-23 | Resolved: 2025-07-23

## 2025-06-23 PROBLEM — Z87.42 HISTORY OF VAGINAL DISCHARGE: Status: RESOLVED | Noted: 2024-09-04 | Resolved: 2025-06-23

## 2025-06-23 PROBLEM — Z01.419 ENCOUNTER FOR ANNUAL ROUTINE GYNECOLOGICAL EXAMINATION: Status: ACTIVE | Noted: 2025-06-23

## 2025-06-23 PROBLEM — N89.8 VAGINAL IRRITATION: Status: ACTIVE | Noted: 2025-06-23

## 2025-06-23 PROCEDURE — 99395 PREV VISIT EST AGE 18-39: CPT

## 2025-06-23 PROCEDURE — 99214 OFFICE O/P EST MOD 30 MIN: CPT | Mod: 25

## 2025-06-23 PROCEDURE — 99459 PELVIC EXAMINATION: CPT | Mod: NC

## 2025-06-23 RX ORDER — CLOTRIMAZOLE AND BETAMETHASONE DIPROPIONATE 10; .5 MG/G; MG/G
1-0.05 CREAM TOPICAL TWICE DAILY
Qty: 1 | Refills: 1 | Status: ACTIVE | COMMUNITY
Start: 2025-06-23 | End: 1900-01-01

## 2025-06-23 RX ORDER — NITROFURANTOIN (MONOHYDRATE/MACROCRYSTALS) 25; 75 MG/1; MG/1
100 CAPSULE ORAL
Qty: 14 | Refills: 0 | Status: ACTIVE | COMMUNITY
Start: 2025-06-23 | End: 1900-01-01

## 2025-06-27 LAB
APPEARANCE: CLEAR
BACTERIA UR CULT: NORMAL
BILIRUBIN URINE: NEGATIVE
BLOOD URINE: ABNORMAL
BV BACTERIA RRNA VAG QL NAA+PROBE: NOT DETECTED
C GLABRATA RNA VAG QL NAA+PROBE: NOT DETECTED
C TRACH RRNA SPEC QL NAA+PROBE: NOT DETECTED
CANDIDA RRNA VAG QL PROBE: NOT DETECTED
COLOR: YELLOW
CYTOLOGY CVX/VAG DOC THIN PREP: ABNORMAL
GLUCOSE QUALITATIVE U: NEGATIVE
HPV HIGH+LOW RISK DNA PNL CVX: NOT DETECTED
KETONES URINE: NEGATIVE
LEUKOCYTE ESTERASE URINE: NEGATIVE
N GONORRHOEA RRNA SPEC QL NAA+PROBE: NOT DETECTED
NITRITE URINE: NEGATIVE
PH URINE: 5.5
PROTEIN URINE: ABNORMAL
SOURCE TP AMPLIFICATION: NORMAL
SPECIFIC GRAVITY URINE: >=1.03
T VAGINALIS RRNA SPEC QL NAA+PROBE: NOT DETECTED
UROBILINOGEN URINE: 0.2 (ref 0.2–?)

## 2025-07-14 ENCOUNTER — APPOINTMENT (OUTPATIENT)
Dept: FAMILY MEDICINE | Facility: CLINIC | Age: 35
End: 2025-07-14
Payer: COMMERCIAL

## 2025-07-14 VITALS
TEMPERATURE: 98.5 F | BODY MASS INDEX: 21.14 KG/M2 | HEIGHT: 61 IN | OXYGEN SATURATION: 98 % | SYSTOLIC BLOOD PRESSURE: 110 MMHG | WEIGHT: 112 LBS | DIASTOLIC BLOOD PRESSURE: 70 MMHG | HEART RATE: 90 BPM

## 2025-07-14 PROBLEM — J01.80 OTHER ACUTE SINUSITIS, RECURRENCE NOT SPECIFIED: Status: ACTIVE | Noted: 2025-07-14

## 2025-07-14 PROBLEM — H10.89 OTHER CONJUNCTIVITIS OF RIGHT EYE: Status: ACTIVE | Noted: 2025-07-14

## 2025-07-14 PROBLEM — J06.9 ACUTE URI: Status: ACTIVE | Noted: 2023-01-20

## 2025-07-14 PROCEDURE — 99214 OFFICE O/P EST MOD 30 MIN: CPT

## 2025-07-14 RX ORDER — TOBRAMYCIN 3 MG/ML
0.3 SOLUTION/ DROPS OPHTHALMIC
Qty: 1 | Refills: 0 | Status: ACTIVE | COMMUNITY
Start: 2025-07-14 | End: 1900-01-01

## 2025-07-14 RX ORDER — AZITHROMYCIN 250 MG/1
250 TABLET, FILM COATED ORAL
Qty: 1 | Refills: 0 | Status: ACTIVE | COMMUNITY
Start: 2025-07-14 | End: 1900-01-01

## 2025-07-14 RX ORDER — PREDNISONE 20 MG/1
20 TABLET ORAL
Qty: 9 | Refills: 0 | Status: ACTIVE | COMMUNITY
Start: 2025-07-14 | End: 1900-01-01

## 2025-07-15 ENCOUNTER — TRANSCRIPTION ENCOUNTER (OUTPATIENT)
Age: 35
End: 2025-07-15

## 2025-07-15 LAB
RESP PATH DNA+RNA PNL NPH NAA+NON-PROBE: DETECTED
SARS-COV-2 RNA RESP QL NAA+PROBE: DETECTED

## 2025-08-06 ENCOUNTER — LABORATORY RESULT (OUTPATIENT)
Age: 35
End: 2025-08-06

## 2025-08-21 ENCOUNTER — APPOINTMENT (OUTPATIENT)
Dept: NEUROLOGY | Facility: CLINIC | Age: 35
End: 2025-08-21

## 2025-08-23 ENCOUNTER — APPOINTMENT (OUTPATIENT)
Dept: OBGYN | Facility: CLINIC | Age: 35
End: 2025-08-23